# Patient Record
Sex: MALE | Race: WHITE | Employment: OTHER | URBAN - METROPOLITAN AREA
[De-identification: names, ages, dates, MRNs, and addresses within clinical notes are randomized per-mention and may not be internally consistent; named-entity substitution may affect disease eponyms.]

---

## 2017-01-01 ENCOUNTER — OFFICE VISIT (OUTPATIENT)
Dept: CARDIOLOGY CLINIC | Age: 77
End: 2017-01-01

## 2017-01-01 ENCOUNTER — APPOINTMENT (OUTPATIENT)
Dept: GENERAL RADIOLOGY | Age: 77
DRG: 291 | End: 2017-01-01
Attending: PHYSICIAN ASSISTANT
Payer: MEDICARE

## 2017-01-01 ENCOUNTER — APPOINTMENT (OUTPATIENT)
Dept: GENERAL RADIOLOGY | Age: 77
DRG: 291 | End: 2017-01-01
Attending: FAMILY MEDICINE
Payer: MEDICARE

## 2017-01-01 ENCOUNTER — APPOINTMENT (OUTPATIENT)
Dept: GENERAL RADIOLOGY | Age: 77
DRG: 291 | End: 2017-01-01
Attending: NURSE PRACTITIONER
Payer: MEDICARE

## 2017-01-01 ENCOUNTER — HOSPICE ADMISSION (OUTPATIENT)
Dept: HOSPICE | Facility: HOSPICE | Age: 77
End: 2017-01-01
Payer: MEDICARE

## 2017-01-01 ENCOUNTER — HOSPITAL ENCOUNTER (INPATIENT)
Age: 77
LOS: 11 days | Discharge: HOSPICE | DRG: 291 | End: 2017-05-25
Attending: EMERGENCY MEDICINE | Admitting: FAMILY MEDICINE
Payer: MEDICARE

## 2017-01-01 ENCOUNTER — TELEPHONE (OUTPATIENT)
Dept: CARDIOLOGY CLINIC | Age: 77
End: 2017-01-01

## 2017-01-01 ENCOUNTER — HOME CARE VISIT (OUTPATIENT)
Dept: HOSPICE | Facility: HOSPICE | Age: 77
End: 2017-01-01
Payer: MEDICARE

## 2017-01-01 ENCOUNTER — HOME CARE VISIT (OUTPATIENT)
Dept: SCHEDULING | Facility: HOME HEALTH | Age: 77
End: 2017-01-01
Payer: MEDICARE

## 2017-01-01 ENCOUNTER — TELEPHONE (OUTPATIENT)
Dept: OTHER | Age: 77
End: 2017-01-01

## 2017-01-01 ENCOUNTER — HOSPITAL ENCOUNTER (EMERGENCY)
Age: 77
Discharge: HOME | End: 2017-05-09
Attending: EMERGENCY MEDICINE | Admitting: EMERGENCY MEDICINE
Payer: MEDICARE

## 2017-01-01 ENCOUNTER — APPOINTMENT (OUTPATIENT)
Dept: GENERAL RADIOLOGY | Age: 77
DRG: 291 | End: 2017-01-01
Attending: INTERNAL MEDICINE
Payer: MEDICARE

## 2017-01-01 ENCOUNTER — APPOINTMENT (OUTPATIENT)
Dept: ULTRASOUND IMAGING | Age: 77
DRG: 291 | End: 2017-01-01
Attending: INTERNAL MEDICINE
Payer: MEDICARE

## 2017-01-01 ENCOUNTER — TELEPHONE (OUTPATIENT)
Dept: CARDIAC REHAB | Age: 77
End: 2017-01-01

## 2017-01-01 ENCOUNTER — HOSPITAL ENCOUNTER (INPATIENT)
Age: 77
LOS: 12 days | Discharge: HOME HEALTH | DRG: 291 | End: 2017-03-11
Attending: EMERGENCY MEDICINE | Admitting: FAMILY MEDICINE
Payer: MEDICARE

## 2017-01-01 VITALS
RESPIRATION RATE: 20 BRPM | DIASTOLIC BLOOD PRESSURE: 59 MMHG | BODY MASS INDEX: 25.31 KG/M2 | TEMPERATURE: 96.3 F | SYSTOLIC BLOOD PRESSURE: 101 MMHG | WEIGHT: 191 LBS | HEIGHT: 73 IN | OXYGEN SATURATION: 95 % | HEART RATE: 69 BPM

## 2017-01-01 VITALS
WEIGHT: 193 LBS | OXYGEN SATURATION: 94 % | SYSTOLIC BLOOD PRESSURE: 117 MMHG | DIASTOLIC BLOOD PRESSURE: 67 MMHG | RESPIRATION RATE: 24 BRPM | TEMPERATURE: 98.4 F | HEART RATE: 61 BPM | HEIGHT: 72 IN | BODY MASS INDEX: 26.14 KG/M2

## 2017-01-01 VITALS
WEIGHT: 191 LBS | BODY MASS INDEX: 25.87 KG/M2 | HEART RATE: 76 BPM | TEMPERATURE: 95.3 F | DIASTOLIC BLOOD PRESSURE: 69 MMHG | RESPIRATION RATE: 18 BRPM | HEIGHT: 72 IN | OXYGEN SATURATION: 84 % | SYSTOLIC BLOOD PRESSURE: 100 MMHG

## 2017-01-01 VITALS
HEART RATE: 62 BPM | BODY MASS INDEX: 25.47 KG/M2 | HEIGHT: 72 IN | SYSTOLIC BLOOD PRESSURE: 129 MMHG | DIASTOLIC BLOOD PRESSURE: 66 MMHG | OXYGEN SATURATION: 100 % | WEIGHT: 188 LBS | RESPIRATION RATE: 17 BRPM | TEMPERATURE: 96 F

## 2017-01-01 VITALS
BODY MASS INDEX: 26.55 KG/M2 | WEIGHT: 196 LBS | SYSTOLIC BLOOD PRESSURE: 146 MMHG | DIASTOLIC BLOOD PRESSURE: 75 MMHG | HEIGHT: 72 IN | HEART RATE: 79 BPM

## 2017-01-01 DIAGNOSIS — I35.0 AORTIC STENOSIS, MODERATE: Chronic | ICD-10-CM

## 2017-01-01 DIAGNOSIS — I25.708 CORONARY ARTERY DISEASE OF BYPASS GRAFT OF NATIVE HEART WITH STABLE ANGINA PECTORIS (HCC): ICD-10-CM

## 2017-01-01 DIAGNOSIS — I27.20 PULMONARY HTN (HCC): ICD-10-CM

## 2017-01-01 DIAGNOSIS — I50.9 HEART FAILURE, UNSPECIFIED: ICD-10-CM

## 2017-01-01 DIAGNOSIS — D69.6 THROMBOCYTOPENIA (HCC): ICD-10-CM

## 2017-01-01 DIAGNOSIS — N18.9 CKD (CHRONIC KIDNEY DISEASE), UNSPECIFIED STAGE: ICD-10-CM

## 2017-01-01 DIAGNOSIS — I50.33 DIASTOLIC CHF, ACUTE ON CHRONIC (HCC): Primary | ICD-10-CM

## 2017-01-01 DIAGNOSIS — L03.115 CELLULITIS OF RIGHT LOWER EXTREMITY: Primary | ICD-10-CM

## 2017-01-01 DIAGNOSIS — Z95.1 HX OF CABG: ICD-10-CM

## 2017-01-01 DIAGNOSIS — I10 ESSENTIAL HYPERTENSION: Chronic | ICD-10-CM

## 2017-01-01 DIAGNOSIS — R60.0 LEG EDEMA, RIGHT: ICD-10-CM

## 2017-01-01 DIAGNOSIS — D64.9 ANEMIA, UNSPECIFIED TYPE: ICD-10-CM

## 2017-01-01 DIAGNOSIS — I73.9 PVD (PERIPHERAL VASCULAR DISEASE) (HCC): ICD-10-CM

## 2017-01-01 DIAGNOSIS — Z95.810 AICD (AUTOMATIC CARDIOVERTER/DEFIBRILLATOR) PRESENT: ICD-10-CM

## 2017-01-01 DIAGNOSIS — Z98.890 H/O CAROTID ENDARTERECTOMY: ICD-10-CM

## 2017-01-01 DIAGNOSIS — I50.33 ACUTE ON CHRONIC DIASTOLIC (CONGESTIVE) HEART FAILURE (HCC): ICD-10-CM

## 2017-01-01 DIAGNOSIS — L03.115 CELLULITIS OF RIGHT LOWER EXTREMITY: ICD-10-CM

## 2017-01-01 DIAGNOSIS — N18.9 ACUTE ON CHRONIC RENAL INSUFFICIENCY: ICD-10-CM

## 2017-01-01 DIAGNOSIS — R60.1 ANASARCA: ICD-10-CM

## 2017-01-01 DIAGNOSIS — N28.9 ACUTE ON CHRONIC RENAL INSUFFICIENCY: ICD-10-CM

## 2017-01-01 DIAGNOSIS — I50.9 ACUTE ON CHRONIC CONGESTIVE HEART FAILURE, UNSPECIFIED CONGESTIVE HEART FAILURE TYPE: Primary | ICD-10-CM

## 2017-01-01 DIAGNOSIS — E78.5 HYPERLIPIDEMIA, UNSPECIFIED HYPERLIPIDEMIA TYPE: Chronic | ICD-10-CM

## 2017-01-01 DIAGNOSIS — J44.9 CHRONIC OBSTRUCTIVE PULMONARY DISEASE, UNSPECIFIED COPD TYPE (HCC): Primary | ICD-10-CM

## 2017-01-01 DIAGNOSIS — I50.32 CHRONIC DIASTOLIC CONGESTIVE HEART FAILURE (HCC): Primary | ICD-10-CM

## 2017-01-01 LAB
ABO + RH BLD: NORMAL
ABO + RH BLD: NORMAL
ALBUMIN 24H MFR UR ELPH: 33.8 %
ALBUMIN SERPL BCP-MCNC: 2.7 G/DL (ref 3.4–5)
ALBUMIN SERPL BCP-MCNC: 2.7 G/DL (ref 3.4–5)
ALBUMIN SERPL BCP-MCNC: 2.8 G/DL (ref 3.4–5)
ALBUMIN SERPL BCP-MCNC: 3.1 G/DL (ref 3.4–5)
ALBUMIN SERPL BCP-MCNC: 3.2 G/DL (ref 3.4–5)
ALBUMIN/GLOB SERPL: 0.7 {RATIO} (ref 0.8–1.7)
ALP SERPL-CCNC: 107 U/L (ref 45–117)
ALP SERPL-CCNC: 121 U/L (ref 45–117)
ALP SERPL-CCNC: 70 U/L (ref 45–117)
ALP SERPL-CCNC: 92 U/L (ref 45–117)
ALPHA1 GLOB 24H MFR UR ELPH: 4.6 %
ALPHA2 GLOB 24H MFR UR ELPH: 10.4 %
ALT SERPL-CCNC: 15 U/L (ref 16–61)
ALT SERPL-CCNC: 15 U/L (ref 16–61)
ALT SERPL-CCNC: 152 U/L (ref 16–61)
ALT SERPL-CCNC: 205 U/L (ref 16–61)
ANION GAP BLD CALC-SCNC: 11 MMOL/L (ref 3–18)
ANION GAP BLD CALC-SCNC: 11 MMOL/L (ref 3–18)
ANION GAP BLD CALC-SCNC: 12 MMOL/L (ref 3–18)
ANION GAP BLD CALC-SCNC: 2 MMOL/L (ref 3–18)
ANION GAP BLD CALC-SCNC: 3 MMOL/L (ref 3–18)
ANION GAP BLD CALC-SCNC: 3 MMOL/L (ref 3–18)
ANION GAP BLD CALC-SCNC: 4 MMOL/L (ref 3–18)
ANION GAP BLD CALC-SCNC: 5 MMOL/L (ref 3–18)
ANION GAP BLD CALC-SCNC: 5 MMOL/L (ref 3–18)
ANION GAP BLD CALC-SCNC: 6 MMOL/L (ref 3–18)
ANION GAP BLD CALC-SCNC: 7 MMOL/L (ref 3–18)
ANION GAP BLD CALC-SCNC: 8 MMOL/L (ref 3–18)
ANION GAP BLD CALC-SCNC: 9 MMOL/L (ref 3–18)
APPEARANCE UR: CLEAR
APTT PPP: 41.7 SEC (ref 23–36.4)
APTT PPP: 43.3 SEC (ref 23–36.4)
APTT PPP: 49.4 SEC (ref 23–36.4)
ARTERIAL PATENCY WRIST A: YES
ARTERIAL PATENCY WRIST A: YES
AST SERPL W P-5'-P-CCNC: 20 U/L (ref 15–37)
AST SERPL W P-5'-P-CCNC: 34 U/L (ref 15–37)
AST SERPL W P-5'-P-CCNC: 372 U/L (ref 15–37)
AST SERPL W P-5'-P-CCNC: 79 U/L (ref 15–37)
ATRIAL RATE: 56 BPM
ATRIAL RATE: 58 BPM
ATRIAL RATE: 73 BPM
ATRIAL RATE: 74 BPM
B-GLOBULIN MFR UR ELPH: 23.2 %
BACTERIA SPEC CULT: ABNORMAL
BACTERIA SPEC CULT: NORMAL
BACTERIA URNS QL MICRO: NEGATIVE /HPF
BASE EXCESS BLD CALC-SCNC: 0 MMOL/L
BASE EXCESS BLD CALC-SCNC: 1 MMOL/L
BASOPHILS # BLD AUTO: 0 K/UL (ref 0–0.06)
BASOPHILS # BLD AUTO: 0 K/UL (ref 0–0.1)
BASOPHILS # BLD: 0 % (ref 0–2)
BASOPHILS # BLD: 0 % (ref 0–3)
BASOPHILS # BLD: 1 % (ref 0–2)
BDY SITE: ABNORMAL
BDY SITE: ABNORMAL
BILIRUB DIRECT SERPL-MCNC: 0.6 MG/DL (ref 0–0.2)
BILIRUB SERPL-MCNC: 0.8 MG/DL (ref 0.2–1)
BILIRUB SERPL-MCNC: 0.9 MG/DL (ref 0.2–1)
BILIRUB SERPL-MCNC: 1.3 MG/DL (ref 0.2–1)
BILIRUB SERPL-MCNC: 2.6 MG/DL (ref 0.2–1)
BILIRUB UR QL: NEGATIVE
BLD PROD TYP BPU: NORMAL
BLOOD BANK CMNT PATIENT-IMP: NORMAL
BLOOD GROUP ANTIBODIES SERPL: NORMAL
BLOOD GROUP ANTIBODIES SERPL: NORMAL
BNP SERPL-MCNC: ABNORMAL PG/ML (ref 0–900)
BPU ID: NORMAL
BUN SERPL-MCNC: 33 MG/DL (ref 7–18)
BUN SERPL-MCNC: 37 MG/DL (ref 7–18)
BUN SERPL-MCNC: 37 MG/DL (ref 7–18)
BUN SERPL-MCNC: 38 MG/DL (ref 7–18)
BUN SERPL-MCNC: 39 MG/DL (ref 7–18)
BUN SERPL-MCNC: 40 MG/DL (ref 7–18)
BUN SERPL-MCNC: 41 MG/DL (ref 7–18)
BUN SERPL-MCNC: 41 MG/DL (ref 7–18)
BUN SERPL-MCNC: 43 MG/DL (ref 7–18)
BUN SERPL-MCNC: 46 MG/DL (ref 7–18)
BUN SERPL-MCNC: 47 MG/DL (ref 7–18)
BUN SERPL-MCNC: 48 MG/DL (ref 7–18)
BUN SERPL-MCNC: 50 MG/DL (ref 7–18)
BUN SERPL-MCNC: 51 MG/DL (ref 7–18)
BUN SERPL-MCNC: 56 MG/DL (ref 7–18)
BUN SERPL-MCNC: 57 MG/DL (ref 7–18)
BUN SERPL-MCNC: 57 MG/DL (ref 7–18)
BUN SERPL-MCNC: 58 MG/DL (ref 7–18)
BUN SERPL-MCNC: 59 MG/DL (ref 7–18)
BUN SERPL-MCNC: 62 MG/DL (ref 7–18)
BUN SERPL-MCNC: 63 MG/DL (ref 7–18)
BUN SERPL-MCNC: 65 MG/DL (ref 7–18)
BUN SERPL-MCNC: 79 MG/DL (ref 7–18)
BUN SERPL-MCNC: 81 MG/DL (ref 7–18)
BUN SERPL-MCNC: 85 MG/DL (ref 7–18)
BUN SERPL-MCNC: 86 MG/DL (ref 7–18)
BUN/CREAT SERPL: 11 (ref 12–20)
BUN/CREAT SERPL: 13 (ref 12–20)
BUN/CREAT SERPL: 15 (ref 12–20)
BUN/CREAT SERPL: 15 (ref 12–20)
BUN/CREAT SERPL: 17 (ref 12–20)
BUN/CREAT SERPL: 18 (ref 12–20)
BUN/CREAT SERPL: 19 (ref 12–20)
BUN/CREAT SERPL: 20 (ref 12–20)
BUN/CREAT SERPL: 21 (ref 12–20)
BUN/CREAT SERPL: 21 (ref 12–20)
BUN/CREAT SERPL: 22 (ref 12–20)
BUN/CREAT SERPL: 22 (ref 12–20)
BUN/CREAT SERPL: 23 (ref 12–20)
BUN/CREAT SERPL: 23 (ref 12–20)
BUN/CREAT SERPL: 24 (ref 12–20)
BUN/CREAT SERPL: 25 (ref 12–20)
BUN/CREAT SERPL: 25 (ref 12–20)
BUN/CREAT SERPL: 26 (ref 12–20)
BUN/CREAT SERPL: 26 (ref 12–20)
BUN/CREAT SERPL: 27 (ref 12–20)
BUN/CREAT SERPL: 30 (ref 12–20)
BUN/CREAT SERPL: 31 (ref 12–20)
CALCIUM SERPL-MCNC: 8.3 MG/DL (ref 8.5–10.1)
CALCIUM SERPL-MCNC: 8.3 MG/DL (ref 8.5–10.1)
CALCIUM SERPL-MCNC: 8.4 MG/DL (ref 8.5–10.1)
CALCIUM SERPL-MCNC: 8.5 MG/DL (ref 8.5–10.1)
CALCIUM SERPL-MCNC: 8.5 MG/DL (ref 8.5–10.1)
CALCIUM SERPL-MCNC: 8.6 MG/DL (ref 8.5–10.1)
CALCIUM SERPL-MCNC: 8.6 MG/DL (ref 8.5–10.1)
CALCIUM SERPL-MCNC: 8.7 MG/DL (ref 8.5–10.1)
CALCIUM SERPL-MCNC: 8.8 MG/DL (ref 8.5–10.1)
CALCIUM SERPL-MCNC: 8.9 MG/DL (ref 8.5–10.1)
CALCIUM SERPL-MCNC: 9 MG/DL (ref 8.5–10.1)
CALCIUM SERPL-MCNC: 9 MG/DL (ref 8.5–10.1)
CALCIUM SERPL-MCNC: 9.1 MG/DL (ref 8.5–10.1)
CALCIUM SERPL-MCNC: 9.2 MG/DL (ref 8.5–10.1)
CALCIUM SERPL-MCNC: 9.5 MG/DL (ref 8.5–10.1)
CALCIUM SERPL-MCNC: 9.6 MG/DL (ref 8.5–10.1)
CALCULATED P AXIS, ECG09: 13 DEGREES
CALCULATED P AXIS, ECG09: 22 DEGREES
CALCULATED P AXIS, ECG09: 39 DEGREES
CALCULATED R AXIS, ECG10: -10 DEGREES
CALCULATED R AXIS, ECG10: -11 DEGREES
CALCULATED R AXIS, ECG10: 3 DEGREES
CALCULATED R AXIS, ECG10: 36 DEGREES
CALCULATED T AXIS, ECG11: -126 DEGREES
CALCULATED T AXIS, ECG11: -169 DEGREES
CALCULATED T AXIS, ECG11: -171 DEGREES
CALCULATED T AXIS, ECG11: 179 DEGREES
CALLED TO:,BCALL1: NORMAL
CHLORIDE SERPL-SCNC: 100 MMOL/L (ref 100–108)
CHLORIDE SERPL-SCNC: 100 MMOL/L (ref 100–108)
CHLORIDE SERPL-SCNC: 102 MMOL/L (ref 100–108)
CHLORIDE SERPL-SCNC: 87 MMOL/L (ref 100–108)
CHLORIDE SERPL-SCNC: 88 MMOL/L (ref 100–108)
CHLORIDE SERPL-SCNC: 90 MMOL/L (ref 100–108)
CHLORIDE SERPL-SCNC: 91 MMOL/L (ref 100–108)
CHLORIDE SERPL-SCNC: 92 MMOL/L (ref 100–108)
CHLORIDE SERPL-SCNC: 93 MMOL/L (ref 100–108)
CHLORIDE SERPL-SCNC: 93 MMOL/L (ref 100–108)
CHLORIDE SERPL-SCNC: 94 MMOL/L (ref 100–108)
CHLORIDE SERPL-SCNC: 95 MMOL/L (ref 100–108)
CHLORIDE SERPL-SCNC: 95 MMOL/L (ref 100–108)
CHLORIDE SERPL-SCNC: 96 MMOL/L (ref 100–108)
CHLORIDE SERPL-SCNC: 97 MMOL/L (ref 100–108)
CHLORIDE SERPL-SCNC: 98 MMOL/L (ref 100–108)
CHLORIDE SERPL-SCNC: 98 MMOL/L (ref 100–108)
CK MB CFR SERPL CALC: 1.9 % (ref 0–4)
CK MB CFR SERPL CALC: 10.4 % (ref 0–4)
CK MB CFR SERPL CALC: 3 % (ref 0–4)
CK MB CFR SERPL CALC: 7.5 % (ref 0–4)
CK MB CFR SERPL CALC: 8.8 % (ref 0–4)
CK MB CFR SERPL CALC: NORMAL % (ref 0–4)
CK MB SERPL-MCNC: 1.2 NG/ML (ref 5–25)
CK MB SERPL-MCNC: 1.7 NG/ML (ref 5–25)
CK MB SERPL-MCNC: 1.8 NG/ML (ref 5–25)
CK MB SERPL-MCNC: 2.1 NG/ML (ref 5–25)
CK MB SERPL-MCNC: 2.8 NG/ML (ref 5–25)
CK MB SERPL-MCNC: <1 NG/ML (ref 5–25)
CK SERPL-CCNC: 24 U/L (ref 39–308)
CK SERPL-CCNC: 24 U/L (ref 39–308)
CK SERPL-CCNC: 27 U/L (ref 39–308)
CK SERPL-CCNC: 39 U/L (ref 39–308)
CK SERPL-CCNC: 40 U/L (ref 39–308)
CK SERPL-CCNC: 89 U/L (ref 39–308)
CO2 SERPL-SCNC: 25 MMOL/L (ref 21–32)
CO2 SERPL-SCNC: 27 MMOL/L (ref 21–32)
CO2 SERPL-SCNC: 28 MMOL/L (ref 21–32)
CO2 SERPL-SCNC: 29 MMOL/L (ref 21–32)
CO2 SERPL-SCNC: 30 MMOL/L (ref 21–32)
CO2 SERPL-SCNC: 32 MMOL/L (ref 21–32)
CO2 SERPL-SCNC: 34 MMOL/L (ref 21–32)
CO2 SERPL-SCNC: 34 MMOL/L (ref 21–32)
CO2 SERPL-SCNC: 35 MMOL/L (ref 21–32)
CO2 SERPL-SCNC: 36 MMOL/L (ref 21–32)
CO2 SERPL-SCNC: 37 MMOL/L (ref 21–32)
CO2 SERPL-SCNC: 37 MMOL/L (ref 21–32)
CO2 SERPL-SCNC: 38 MMOL/L (ref 21–32)
CO2 SERPL-SCNC: 38 MMOL/L (ref 21–32)
CO2 SERPL-SCNC: 39 MMOL/L (ref 21–32)
CO2 SERPL-SCNC: 40 MMOL/L (ref 21–32)
CO2 SERPL-SCNC: 41 MMOL/L (ref 21–32)
CO2 SERPL-SCNC: 41 MMOL/L (ref 21–32)
CO2 SERPL-SCNC: 42 MMOL/L (ref 21–32)
CO2 SERPL-SCNC: 42 MMOL/L (ref 21–32)
COLLECT DURATION TIME UR: 24 HR
COLOR UR: YELLOW
CREAT SERPL-MCNC: 1.77 MG/DL (ref 0.6–1.3)
CREAT SERPL-MCNC: 1.81 MG/DL (ref 0.6–1.3)
CREAT SERPL-MCNC: 1.86 MG/DL (ref 0.6–1.3)
CREAT SERPL-MCNC: 1.86 MG/DL (ref 0.6–1.3)
CREAT SERPL-MCNC: 1.89 MG/DL (ref 0.6–1.3)
CREAT SERPL-MCNC: 1.93 MG/DL (ref 0.6–1.3)
CREAT SERPL-MCNC: 1.93 MG/DL (ref 0.6–1.3)
CREAT SERPL-MCNC: 1.95 MG/DL (ref 0.6–1.3)
CREAT SERPL-MCNC: 1.99 MG/DL (ref 0.6–1.3)
CREAT SERPL-MCNC: 1.99 MG/DL (ref 0.6–1.3)
CREAT SERPL-MCNC: 2 MG/DL (ref 0.6–1.3)
CREAT SERPL-MCNC: 2.01 MG/DL (ref 0.6–1.3)
CREAT SERPL-MCNC: 2.03 MG/DL (ref 0.6–1.3)
CREAT SERPL-MCNC: 2.09 MG/DL (ref 0.6–1.3)
CREAT SERPL-MCNC: 2.1 MG/DL (ref 0.6–1.3)
CREAT SERPL-MCNC: 2.12 MG/DL (ref 0.6–1.3)
CREAT SERPL-MCNC: 2.16 MG/DL (ref 0.6–1.3)
CREAT SERPL-MCNC: 2.41 MG/DL (ref 0.6–1.3)
CREAT SERPL-MCNC: 2.76 MG/DL (ref 0.6–1.3)
CREAT SERPL-MCNC: 2.91 MG/DL (ref 0.6–1.3)
CREAT SERPL-MCNC: 3.23 MG/DL (ref 0.6–1.3)
CREAT SERPL-MCNC: 3.61 MG/DL (ref 0.6–1.3)
CREAT SERPL-MCNC: 3.65 MG/DL (ref 0.6–1.3)
CREAT SERPL-MCNC: 3.78 MG/DL (ref 0.6–1.3)
CREAT SERPL-MCNC: 3.79 MG/DL (ref 0.6–1.3)
CREAT SERPL-MCNC: 3.96 MG/DL (ref 0.6–1.3)
CREAT SERPL-MCNC: 4.07 MG/DL (ref 0.6–1.3)
CREAT SERPL-MCNC: 4.14 MG/DL (ref 0.6–1.3)
CREAT SERPL-MCNC: 4.65 MG/DL (ref 0.6–1.3)
CREAT SERPL-MCNC: 4.94 MG/DL (ref 0.6–1.3)
CREAT UR-MCNC: 35.6 MG/DL (ref 30–125)
CREAT UR-MCNC: 60.3 MG/DL (ref 30–125)
CROSSMATCH RESULT,%XM: NORMAL
CROSSMATCH RESULT,%XM: NORMAL
DIAGNOSIS, 93000: NORMAL
DIFFERENTIAL METHOD BLD: ABNORMAL
EOSINOPHIL # BLD: 0 K/UL (ref 0–0.4)
EOSINOPHIL # BLD: 0 K/UL (ref 0–0.4)
EOSINOPHIL # BLD: 0.2 K/UL (ref 0–0.4)
EOSINOPHIL # BLD: 0.3 K/UL (ref 0–0.4)
EOSINOPHIL # BLD: 0.4 K/UL (ref 0–0.4)
EOSINOPHIL # BLD: 0.5 K/UL (ref 0–0.4)
EOSINOPHIL # BLD: 0.5 K/UL (ref 0–0.4)
EOSINOPHIL NFR BLD: 0 % (ref 0–5)
EOSINOPHIL NFR BLD: 0 % (ref 0–5)
EOSINOPHIL NFR BLD: 10 % (ref 0–5)
EOSINOPHIL NFR BLD: 3 % (ref 0–5)
EOSINOPHIL NFR BLD: 5 % (ref 0–5)
EOSINOPHIL NFR BLD: 5 % (ref 0–5)
EOSINOPHIL NFR BLD: 6 % (ref 0–5)
EOSINOPHIL NFR BLD: 7 % (ref 0–5)
EOSINOPHIL NFR BLD: 8 % (ref 0–5)
EOSINOPHIL NFR BLD: 8 % (ref 0–5)
EOSINOPHIL NFR BLD: 9 % (ref 0–5)
EPITH CASTS URNS QL MICRO: NORMAL /LPF (ref 0–5)
ERYTHROCYTE [DISTWIDTH] IN BLOOD BY AUTOMATED COUNT: 16.3 % (ref 11.6–14.5)
ERYTHROCYTE [DISTWIDTH] IN BLOOD BY AUTOMATED COUNT: 16.5 % (ref 11.6–14.5)
ERYTHROCYTE [DISTWIDTH] IN BLOOD BY AUTOMATED COUNT: 16.5 % (ref 11.6–14.5)
ERYTHROCYTE [DISTWIDTH] IN BLOOD BY AUTOMATED COUNT: 16.6 % (ref 11.6–14.5)
ERYTHROCYTE [DISTWIDTH] IN BLOOD BY AUTOMATED COUNT: 16.7 % (ref 11.6–14.5)
ERYTHROCYTE [DISTWIDTH] IN BLOOD BY AUTOMATED COUNT: 16.8 % (ref 11.6–14.5)
ERYTHROCYTE [DISTWIDTH] IN BLOOD BY AUTOMATED COUNT: 16.8 % (ref 11.6–14.5)
ERYTHROCYTE [DISTWIDTH] IN BLOOD BY AUTOMATED COUNT: 16.9 % (ref 11.6–14.5)
ERYTHROCYTE [DISTWIDTH] IN BLOOD BY AUTOMATED COUNT: 16.9 % (ref 11.6–14.5)
ERYTHROCYTE [DISTWIDTH] IN BLOOD BY AUTOMATED COUNT: 17 % (ref 11.6–14.5)
ERYTHROCYTE [DISTWIDTH] IN BLOOD BY AUTOMATED COUNT: 17 % (ref 11.6–14.5)
ERYTHROCYTE [DISTWIDTH] IN BLOOD BY AUTOMATED COUNT: 17.1 % (ref 11.6–14.5)
ERYTHROCYTE [DISTWIDTH] IN BLOOD BY AUTOMATED COUNT: 17.1 % (ref 11.6–14.5)
ERYTHROCYTE [DISTWIDTH] IN BLOOD BY AUTOMATED COUNT: 17.2 % (ref 11.6–14.5)
ERYTHROCYTE [DISTWIDTH] IN BLOOD BY AUTOMATED COUNT: 17.3 % (ref 11.6–14.5)
ERYTHROCYTE [DISTWIDTH] IN BLOOD BY AUTOMATED COUNT: 17.4 % (ref 11.6–14.5)
ERYTHROCYTE [DISTWIDTH] IN BLOOD BY AUTOMATED COUNT: 17.5 % (ref 11.6–14.5)
ERYTHROCYTE [DISTWIDTH] IN BLOOD BY AUTOMATED COUNT: 17.7 % (ref 11.6–14.5)
ERYTHROCYTE [DISTWIDTH] IN BLOOD BY AUTOMATED COUNT: 17.8 % (ref 11.6–14.5)
ERYTHROCYTE [DISTWIDTH] IN BLOOD BY AUTOMATED COUNT: 17.9 % (ref 11.6–14.5)
ERYTHROCYTE [DISTWIDTH] IN BLOOD BY AUTOMATED COUNT: 18.1 % (ref 11.6–14.5)
FERRITIN SERPL-MCNC: 111 NG/ML (ref 8–388)
GAMMA GLOB 24H MFR UR ELPH: 28 %
GAS FLOW.O2 O2 DELIVERY SYS: ABNORMAL L/MIN
GAS FLOW.O2 O2 DELIVERY SYS: ABNORMAL L/MIN
GAS FLOW.O2 SETTING OXYMISER: 8 L/M
GLOBULIN SER CALC-MCNC: 4 G/DL (ref 2–4)
GLOBULIN SER CALC-MCNC: 4.1 G/DL (ref 2–4)
GLOBULIN SER CALC-MCNC: 4.3 G/DL (ref 2–4)
GLOBULIN SER CALC-MCNC: 4.7 G/DL (ref 2–4)
GLUCOSE BLD STRIP.AUTO-MCNC: 103 MG/DL (ref 70–110)
GLUCOSE BLD STRIP.AUTO-MCNC: 108 MG/DL (ref 70–110)
GLUCOSE BLD STRIP.AUTO-MCNC: 119 MG/DL (ref 70–110)
GLUCOSE BLD STRIP.AUTO-MCNC: 125 MG/DL (ref 70–110)
GLUCOSE BLD STRIP.AUTO-MCNC: 136 MG/DL (ref 70–110)
GLUCOSE BLD STRIP.AUTO-MCNC: 92 MG/DL (ref 70–110)
GLUCOSE SERPL-MCNC: 101 MG/DL (ref 74–99)
GLUCOSE SERPL-MCNC: 101 MG/DL (ref 74–99)
GLUCOSE SERPL-MCNC: 102 MG/DL (ref 74–99)
GLUCOSE SERPL-MCNC: 103 MG/DL (ref 74–99)
GLUCOSE SERPL-MCNC: 106 MG/DL (ref 74–99)
GLUCOSE SERPL-MCNC: 110 MG/DL (ref 74–99)
GLUCOSE SERPL-MCNC: 113 MG/DL (ref 74–99)
GLUCOSE SERPL-MCNC: 114 MG/DL (ref 74–99)
GLUCOSE SERPL-MCNC: 116 MG/DL (ref 74–99)
GLUCOSE SERPL-MCNC: 117 MG/DL (ref 74–99)
GLUCOSE SERPL-MCNC: 153 MG/DL (ref 74–99)
GLUCOSE SERPL-MCNC: 48 MG/DL (ref 74–99)
GLUCOSE SERPL-MCNC: 71 MG/DL (ref 74–99)
GLUCOSE SERPL-MCNC: 76 MG/DL (ref 74–99)
GLUCOSE SERPL-MCNC: 78 MG/DL (ref 74–99)
GLUCOSE SERPL-MCNC: 80 MG/DL (ref 74–99)
GLUCOSE SERPL-MCNC: 81 MG/DL (ref 74–99)
GLUCOSE SERPL-MCNC: 84 MG/DL (ref 74–99)
GLUCOSE SERPL-MCNC: 85 MG/DL (ref 74–99)
GLUCOSE SERPL-MCNC: 88 MG/DL (ref 74–99)
GLUCOSE SERPL-MCNC: 88 MG/DL (ref 74–99)
GLUCOSE SERPL-MCNC: 90 MG/DL (ref 74–99)
GLUCOSE SERPL-MCNC: 91 MG/DL (ref 74–99)
GLUCOSE SERPL-MCNC: 93 MG/DL (ref 74–99)
GLUCOSE SERPL-MCNC: 96 MG/DL (ref 74–99)
GLUCOSE SERPL-MCNC: 98 MG/DL (ref 74–99)
GLUCOSE UR STRIP.AUTO-MCNC: NEGATIVE MG/DL
HBV SURFACE AB SER QL IA: NEGATIVE
HBV SURFACE AB SERPL IA-ACNC: <3.1 MIU/ML
HBV SURFACE AG SER QL: <0.1 INDEX
HBV SURFACE AG SER QL: NEGATIVE
HCO3 BLD-SCNC: 26.9 MMOL/L (ref 22–26)
HCO3 BLD-SCNC: 27 MMOL/L (ref 22–26)
HCT VFR BLD AUTO: 18.7 % (ref 36–48)
HCT VFR BLD AUTO: 21.3 % (ref 36–48)
HCT VFR BLD AUTO: 22.2 % (ref 36–48)
HCT VFR BLD AUTO: 22.8 % (ref 36–48)
HCT VFR BLD AUTO: 23.5 % (ref 36–48)
HCT VFR BLD AUTO: 23.5 % (ref 36–48)
HCT VFR BLD AUTO: 24 % (ref 36–48)
HCT VFR BLD AUTO: 24 % (ref 36–48)
HCT VFR BLD AUTO: 24.8 % (ref 36–48)
HCT VFR BLD AUTO: 25.3 % (ref 36–48)
HCT VFR BLD AUTO: 25.5 % (ref 36–48)
HCT VFR BLD AUTO: 26.3 % (ref 36–48)
HCT VFR BLD AUTO: 26.4 % (ref 36–48)
HCT VFR BLD AUTO: 26.5 % (ref 36–48)
HCT VFR BLD AUTO: 26.6 % (ref 36–48)
HCT VFR BLD AUTO: 26.9 % (ref 36–48)
HCT VFR BLD AUTO: 27.3 % (ref 36–48)
HCT VFR BLD AUTO: 27.3 % (ref 36–48)
HCT VFR BLD AUTO: 27.4 % (ref 36–48)
HCT VFR BLD AUTO: 27.6 % (ref 36–48)
HCT VFR BLD AUTO: 27.7 % (ref 36–48)
HCT VFR BLD AUTO: 27.7 % (ref 36–48)
HCT VFR BLD AUTO: 28.5 % (ref 36–48)
HCT VFR BLD AUTO: 28.5 % (ref 36–48)
HCT VFR BLD AUTO: 30 % (ref 36–48)
HCT VFR BLD AUTO: 30.9 % (ref 36–48)
HEP BS AB COMMENT,HBSAC: ABNORMAL
HGB BLD-MCNC: 5.9 G/DL (ref 13–16)
HGB BLD-MCNC: 6.6 G/DL (ref 13–16)
HGB BLD-MCNC: 7 G/DL (ref 13–16)
HGB BLD-MCNC: 7 G/DL (ref 13–16)
HGB BLD-MCNC: 7.2 G/DL (ref 13–16)
HGB BLD-MCNC: 7.3 G/DL (ref 13–16)
HGB BLD-MCNC: 7.6 G/DL (ref 13–16)
HGB BLD-MCNC: 7.6 G/DL (ref 13–16)
HGB BLD-MCNC: 7.8 G/DL (ref 13–16)
HGB BLD-MCNC: 7.8 G/DL (ref 13–16)
HGB BLD-MCNC: 8 G/DL (ref 13–16)
HGB BLD-MCNC: 8.1 G/DL (ref 13–16)
HGB BLD-MCNC: 8.2 G/DL (ref 13–16)
HGB BLD-MCNC: 8.3 G/DL (ref 13–16)
HGB BLD-MCNC: 8.4 G/DL (ref 13–16)
HGB BLD-MCNC: 8.5 G/DL (ref 13–16)
HGB BLD-MCNC: 9.1 G/DL (ref 13–16)
HGB BLD-MCNC: 9.6 G/DL (ref 13–16)
HGB UR QL STRIP: ABNORMAL
HYALINE CASTS URNS QL MICRO: NORMAL /LPF (ref 0–2)
INR PPP: 1.3 (ref 0.8–1.2)
INR PPP: 1.4 (ref 0.8–1.2)
INR PPP: 1.9 (ref 0.8–1.2)
INTERPRETATION UR IFE-IMP: ABNORMAL
IRON SATN MFR SERPL: 13 %
IRON SATN MFR SERPL: 19 %
IRON SERPL-MCNC: 43 UG/DL (ref 50–175)
IRON SERPL-MCNC: 62 UG/DL (ref 50–175)
KETONES UR QL STRIP.AUTO: NEGATIVE MG/DL
LACTATE SERPL-SCNC: 1.7 MMOL/L (ref 0.4–2)
LACTATE SERPL-SCNC: 2.8 MMOL/L (ref 0.4–2)
LEUKOCYTE ESTERASE UR QL STRIP.AUTO: NEGATIVE
LYMPHOCYTES # BLD AUTO: 10 % (ref 21–52)
LYMPHOCYTES # BLD AUTO: 11 % (ref 20–51)
LYMPHOCYTES # BLD AUTO: 13 % (ref 21–52)
LYMPHOCYTES # BLD AUTO: 14 % (ref 20–51)
LYMPHOCYTES # BLD AUTO: 15 % (ref 20–51)
LYMPHOCYTES # BLD AUTO: 17 % (ref 21–52)
LYMPHOCYTES # BLD AUTO: 18 % (ref 21–52)
LYMPHOCYTES # BLD AUTO: 19 % (ref 21–52)
LYMPHOCYTES # BLD AUTO: 2 % (ref 20–51)
LYMPHOCYTES # BLD AUTO: 20 % (ref 21–52)
LYMPHOCYTES # BLD AUTO: 21 % (ref 21–52)
LYMPHOCYTES # BLD AUTO: 22 % (ref 21–52)
LYMPHOCYTES # BLD AUTO: 6 % (ref 21–52)
LYMPHOCYTES # BLD AUTO: 6 % (ref 21–52)
LYMPHOCYTES # BLD AUTO: 7 % (ref 21–52)
LYMPHOCYTES # BLD AUTO: 9 % (ref 21–52)
LYMPHOCYTES # BLD AUTO: 9 % (ref 21–52)
LYMPHOCYTES # BLD: 0.2 K/UL (ref 0.8–3.5)
LYMPHOCYTES # BLD: 0.3 K/UL (ref 0.9–3.6)
LYMPHOCYTES # BLD: 0.4 K/UL (ref 0.9–3.6)
LYMPHOCYTES # BLD: 0.5 K/UL (ref 0.9–3.6)
LYMPHOCYTES # BLD: 0.6 K/UL (ref 0.8–3.5)
LYMPHOCYTES # BLD: 0.6 K/UL (ref 0.9–3.6)
LYMPHOCYTES # BLD: 0.6 K/UL (ref 0.9–3.6)
LYMPHOCYTES # BLD: 0.7 K/UL (ref 0.8–3.5)
LYMPHOCYTES # BLD: 0.7 K/UL (ref 0.8–3.5)
LYMPHOCYTES # BLD: 0.7 K/UL (ref 0.9–3.6)
LYMPHOCYTES # BLD: 0.8 K/UL (ref 0.9–3.6)
LYMPHOCYTES # BLD: 0.9 K/UL (ref 0.9–3.6)
LYMPHOCYTES # BLD: 0.9 K/UL (ref 0.9–3.6)
LYMPHOCYTES # BLD: 1 K/UL (ref 0.9–3.6)
LYMPHOCYTES # BLD: 1 K/UL (ref 0.9–3.6)
M PROTEIN 24H MFR UR ELPH: ABNORMAL %
MAGNESIUM SERPL-MCNC: 1.7 MG/DL (ref 1.8–2.4)
MAGNESIUM SERPL-MCNC: 1.8 MG/DL (ref 1.8–2.4)
MAGNESIUM SERPL-MCNC: 1.8 MG/DL (ref 1.8–2.4)
MAGNESIUM SERPL-MCNC: 1.9 MG/DL (ref 1.8–2.4)
MAGNESIUM SERPL-MCNC: 2 MG/DL (ref 1.6–2.6)
MAGNESIUM SERPL-MCNC: 2 MG/DL (ref 1.6–2.6)
MAGNESIUM SERPL-MCNC: 2 MG/DL (ref 1.8–2.4)
MAGNESIUM SERPL-MCNC: 2.1 MG/DL (ref 1.8–2.4)
MAGNESIUM SERPL-MCNC: 2.3 MG/DL (ref 1.8–2.4)
MCH RBC QN AUTO: 27.2 PG (ref 24–34)
MCH RBC QN AUTO: 27.5 PG (ref 24–34)
MCH RBC QN AUTO: 27.6 PG (ref 24–34)
MCH RBC QN AUTO: 27.7 PG (ref 24–34)
MCH RBC QN AUTO: 27.8 PG (ref 24–34)
MCH RBC QN AUTO: 28 PG (ref 24–34)
MCH RBC QN AUTO: 28.1 PG (ref 24–34)
MCH RBC QN AUTO: 28.1 PG (ref 24–34)
MCH RBC QN AUTO: 28.2 PG (ref 24–34)
MCH RBC QN AUTO: 28.3 PG (ref 24–34)
MCH RBC QN AUTO: 28.4 PG (ref 24–34)
MCH RBC QN AUTO: 28.4 PG (ref 24–34)
MCH RBC QN AUTO: 28.5 PG (ref 24–34)
MCH RBC QN AUTO: 28.5 PG (ref 24–34)
MCHC RBC AUTO-ENTMCNC: 28.8 G/DL (ref 31–37)
MCHC RBC AUTO-ENTMCNC: 29.8 G/DL (ref 31–37)
MCHC RBC AUTO-ENTMCNC: 30 G/DL (ref 31–37)
MCHC RBC AUTO-ENTMCNC: 30 G/DL (ref 31–37)
MCHC RBC AUTO-ENTMCNC: 30.1 G/DL (ref 31–37)
MCHC RBC AUTO-ENTMCNC: 30.3 G/DL (ref 31–37)
MCHC RBC AUTO-ENTMCNC: 30.4 G/DL (ref 31–37)
MCHC RBC AUTO-ENTMCNC: 30.4 G/DL (ref 31–37)
MCHC RBC AUTO-ENTMCNC: 30.6 G/DL (ref 31–37)
MCHC RBC AUTO-ENTMCNC: 30.7 G/DL (ref 31–37)
MCHC RBC AUTO-ENTMCNC: 30.8 G/DL (ref 31–37)
MCHC RBC AUTO-ENTMCNC: 30.9 G/DL (ref 31–37)
MCHC RBC AUTO-ENTMCNC: 31 G/DL (ref 31–37)
MCHC RBC AUTO-ENTMCNC: 31.1 G/DL (ref 31–37)
MCHC RBC AUTO-ENTMCNC: 31.1 G/DL (ref 31–37)
MCHC RBC AUTO-ENTMCNC: 31.4 G/DL (ref 31–37)
MCHC RBC AUTO-ENTMCNC: 31.5 G/DL (ref 31–37)
MCHC RBC AUTO-ENTMCNC: 31.5 G/DL (ref 31–37)
MCHC RBC AUTO-ENTMCNC: 31.6 G/DL (ref 31–37)
MCHC RBC AUTO-ENTMCNC: 31.7 G/DL (ref 31–37)
MCHC RBC AUTO-ENTMCNC: 31.7 G/DL (ref 31–37)
MCV RBC AUTO: 88.6 FL (ref 74–97)
MCV RBC AUTO: 89.2 FL (ref 74–97)
MCV RBC AUTO: 89.5 FL (ref 74–97)
MCV RBC AUTO: 89.7 FL (ref 74–97)
MCV RBC AUTO: 89.8 FL (ref 74–97)
MCV RBC AUTO: 90.2 FL (ref 74–97)
MCV RBC AUTO: 90.3 FL (ref 74–97)
MCV RBC AUTO: 90.3 FL (ref 74–97)
MCV RBC AUTO: 90.5 FL (ref 74–97)
MCV RBC AUTO: 90.7 FL (ref 74–97)
MCV RBC AUTO: 90.8 FL (ref 74–97)
MCV RBC AUTO: 91.3 FL (ref 74–97)
MCV RBC AUTO: 91.4 FL (ref 74–97)
MCV RBC AUTO: 91.7 FL (ref 74–97)
MCV RBC AUTO: 91.8 FL (ref 74–97)
MCV RBC AUTO: 91.9 FL (ref 74–97)
MCV RBC AUTO: 92 FL (ref 74–97)
MCV RBC AUTO: 92.2 FL (ref 74–97)
MCV RBC AUTO: 92.2 FL (ref 74–97)
MCV RBC AUTO: 92.3 FL (ref 74–97)
MCV RBC AUTO: 92.6 FL (ref 74–97)
MCV RBC AUTO: 92.9 FL (ref 74–97)
MCV RBC AUTO: 94.4 FL (ref 74–97)
MONOCYTES # BLD: 0.2 K/UL (ref 0.05–1.2)
MONOCYTES # BLD: 0.3 K/UL (ref 0.05–1.2)
MONOCYTES # BLD: 0.3 K/UL (ref 0–1)
MONOCYTES # BLD: 0.4 K/UL (ref 0.05–1.2)
MONOCYTES # BLD: 0.4 K/UL (ref 0–1)
MONOCYTES # BLD: 0.5 K/UL (ref 0.05–1.2)
MONOCYTES # BLD: 0.6 K/UL (ref 0.05–1.2)
MONOCYTES # BLD: 0.6 K/UL (ref 0.05–1.2)
MONOCYTES # BLD: 0.7 K/UL (ref 0.05–1.2)
MONOCYTES # BLD: 0.7 K/UL (ref 0–1)
MONOCYTES # BLD: 0.8 K/UL (ref 0–1)
MONOCYTES NFR BLD AUTO: 10 % (ref 2–9)
MONOCYTES NFR BLD AUTO: 10 % (ref 3–10)
MONOCYTES NFR BLD AUTO: 11 % (ref 3–10)
MONOCYTES NFR BLD AUTO: 11 % (ref 3–10)
MONOCYTES NFR BLD AUTO: 12 % (ref 3–10)
MONOCYTES NFR BLD AUTO: 13 % (ref 2–9)
MONOCYTES NFR BLD AUTO: 4 % (ref 2–9)
MONOCYTES NFR BLD AUTO: 5 % (ref 3–10)
MONOCYTES NFR BLD AUTO: 7 % (ref 3–10)
MONOCYTES NFR BLD AUTO: 8 % (ref 2–9)
MONOCYTES NFR BLD AUTO: 8 % (ref 3–10)
MONOCYTES NFR BLD AUTO: 9 % (ref 3–10)
NEUTS BAND NFR BLD MANUAL: 1 % (ref 0–5)
NEUTS BAND NFR BLD MANUAL: 2 % (ref 0–5)
NEUTS BAND NFR BLD MANUAL: 6 % (ref 0–5)
NEUTS SEG # BLD: 2.6 K/UL (ref 1.8–8)
NEUTS SEG # BLD: 2.7 K/UL (ref 1.8–8)
NEUTS SEG # BLD: 2.8 K/UL (ref 1.8–8)
NEUTS SEG # BLD: 2.9 K/UL (ref 1.8–8)
NEUTS SEG # BLD: 3 K/UL (ref 1.8–8)
NEUTS SEG # BLD: 3.1 K/UL (ref 1.8–8)
NEUTS SEG # BLD: 3.3 K/UL (ref 1.8–8)
NEUTS SEG # BLD: 3.4 K/UL (ref 1.8–8)
NEUTS SEG # BLD: 3.4 K/UL (ref 1.8–8)
NEUTS SEG # BLD: 3.9 K/UL (ref 1.8–8)
NEUTS SEG # BLD: 3.9 K/UL (ref 1.8–8)
NEUTS SEG # BLD: 4.4 K/UL (ref 1.8–8)
NEUTS SEG # BLD: 5.3 K/UL (ref 1.8–8)
NEUTS SEG # BLD: 5.7 K/UL (ref 1.8–8)
NEUTS SEG # BLD: 8.8 K/UL (ref 1.8–8)
NEUTS SEG NFR BLD AUTO: 58 % (ref 40–73)
NEUTS SEG NFR BLD AUTO: 62 % (ref 42–75)
NEUTS SEG NFR BLD AUTO: 63 % (ref 40–73)
NEUTS SEG NFR BLD AUTO: 64 % (ref 40–73)
NEUTS SEG NFR BLD AUTO: 65 % (ref 40–73)
NEUTS SEG NFR BLD AUTO: 67 % (ref 40–73)
NEUTS SEG NFR BLD AUTO: 67 % (ref 42–75)
NEUTS SEG NFR BLD AUTO: 68 % (ref 40–73)
NEUTS SEG NFR BLD AUTO: 69 % (ref 40–73)
NEUTS SEG NFR BLD AUTO: 72 % (ref 40–73)
NEUTS SEG NFR BLD AUTO: 73 % (ref 40–73)
NEUTS SEG NFR BLD AUTO: 74 % (ref 40–73)
NEUTS SEG NFR BLD AUTO: 76 % (ref 40–73)
NEUTS SEG NFR BLD AUTO: 77 % (ref 40–73)
NEUTS SEG NFR BLD AUTO: 77 % (ref 40–73)
NEUTS SEG NFR BLD AUTO: 80 % (ref 42–75)
NEUTS SEG NFR BLD AUTO: 82 % (ref 40–73)
NEUTS SEG NFR BLD AUTO: 84 % (ref 42–75)
NITRITE UR QL STRIP.AUTO: NEGATIVE
NOTE, 149533: ABNORMAL
O2/TOTAL GAS SETTING VFR VENT: 54 %
O2/TOTAL GAS SETTING VFR VENT: 60 %
P-R INTERVAL, ECG05: 192 MS
P-R INTERVAL, ECG05: 216 MS
PCO2 BLD: 51.6 MMHG (ref 35–45)
PCO2 BLD: 52.8 MMHG (ref 35–45)
PEEP RESPIRATORY: 7 CMH2O
PH BLD: 7.32 [PH] (ref 7.35–7.45)
PH BLD: 7.33 [PH] (ref 7.35–7.45)
PH UR STRIP: 6 [PH] (ref 5–8)
PHOSPHATE SERPL-MCNC: 2.8 MG/DL (ref 2.5–4.9)
PHOSPHATE SERPL-MCNC: 3 MG/DL (ref 2.5–4.9)
PHOSPHATE SERPL-MCNC: 4.5 MG/DL (ref 2.5–4.9)
PIP ISTAT,IPIP: 14
PLATELET # BLD AUTO: 104 K/UL (ref 135–420)
PLATELET # BLD AUTO: 36 K/UL (ref 135–420)
PLATELET # BLD AUTO: 44 K/UL (ref 135–420)
PLATELET # BLD AUTO: 45 K/UL (ref 135–420)
PLATELET # BLD AUTO: 48 K/UL (ref 135–420)
PLATELET # BLD AUTO: 48 K/UL (ref 135–420)
PLATELET # BLD AUTO: 49 K/UL (ref 135–420)
PLATELET # BLD AUTO: 53 K/UL (ref 135–420)
PLATELET # BLD AUTO: 54 K/UL (ref 135–420)
PLATELET # BLD AUTO: 55 K/UL (ref 135–420)
PLATELET # BLD AUTO: 55 K/UL (ref 135–420)
PLATELET # BLD AUTO: 56 K/UL (ref 135–420)
PLATELET # BLD AUTO: 59 K/UL (ref 135–420)
PLATELET # BLD AUTO: 60 K/UL (ref 135–420)
PLATELET # BLD AUTO: 64 K/UL (ref 135–420)
PLATELET # BLD AUTO: 70 K/UL (ref 135–420)
PLATELET # BLD AUTO: 72 K/UL (ref 135–420)
PLATELET # BLD AUTO: 76 K/UL (ref 135–420)
PLATELET # BLD AUTO: 78 K/UL (ref 135–420)
PLATELET # BLD AUTO: 86 K/UL (ref 135–420)
PLATELET # BLD AUTO: 91 K/UL (ref 135–420)
PLATELET # BLD AUTO: 94 K/UL (ref 135–420)
PLATELET # BLD AUTO: ABNORMAL K/UL (ref 135–420)
PLATELET COMMENTS,PCOM: ABNORMAL
PMV BLD AUTO: 10.3 FL (ref 9.2–11.8)
PMV BLD AUTO: 10.4 FL (ref 9.2–11.8)
PMV BLD AUTO: 10.4 FL (ref 9.2–11.8)
PMV BLD AUTO: 10.5 FL (ref 9.2–11.8)
PMV BLD AUTO: 10.6 FL (ref 9.2–11.8)
PMV BLD AUTO: 10.7 FL (ref 9.2–11.8)
PMV BLD AUTO: 10.9 FL (ref 9.2–11.8)
PMV BLD AUTO: 11 FL (ref 9.2–11.8)
PMV BLD AUTO: 11 FL (ref 9.2–11.8)
PMV BLD AUTO: 11.1 FL (ref 9.2–11.8)
PMV BLD AUTO: 11.4 FL (ref 9.2–11.8)
PMV BLD AUTO: 11.5 FL (ref 9.2–11.8)
PMV BLD AUTO: 11.9 FL (ref 9.2–11.8)
PMV BLD AUTO: 11.9 FL (ref 9.2–11.8)
PMV BLD AUTO: 8.8 FL (ref 9.2–11.8)
PMV BLD AUTO: 8.8 FL (ref 9.2–11.8)
PMV BLD AUTO: 9.7 FL (ref 9.2–11.8)
PMV BLD AUTO: 9.9 FL (ref 9.2–11.8)
PMV BLD AUTO: 9.9 FL (ref 9.2–11.8)
PO2 BLD: 53 MMHG (ref 80–100)
PO2 BLD: 93 MMHG (ref 80–100)
POTASSIUM SERPL-SCNC: 3.2 MMOL/L (ref 3.5–5.5)
POTASSIUM SERPL-SCNC: 3.4 MMOL/L (ref 3.5–5.5)
POTASSIUM SERPL-SCNC: 3.5 MMOL/L (ref 3.5–5.5)
POTASSIUM SERPL-SCNC: 3.5 MMOL/L (ref 3.5–5.5)
POTASSIUM SERPL-SCNC: 3.6 MMOL/L (ref 3.5–5.5)
POTASSIUM SERPL-SCNC: 3.7 MMOL/L (ref 3.5–5.5)
POTASSIUM SERPL-SCNC: 3.8 MMOL/L (ref 3.5–5.5)
POTASSIUM SERPL-SCNC: 3.8 MMOL/L (ref 3.5–5.5)
POTASSIUM SERPL-SCNC: 3.9 MMOL/L (ref 3.5–5.5)
POTASSIUM SERPL-SCNC: 4 MMOL/L (ref 3.5–5.5)
POTASSIUM SERPL-SCNC: 4.1 MMOL/L (ref 3.5–5.5)
POTASSIUM SERPL-SCNC: 4.2 MMOL/L (ref 3.5–5.5)
POTASSIUM SERPL-SCNC: 4.3 MMOL/L (ref 3.5–5.5)
POTASSIUM SERPL-SCNC: 4.4 MMOL/L (ref 3.5–5.5)
POTASSIUM SERPL-SCNC: 4.6 MMOL/L (ref 3.5–5.5)
PROT 24H UR-MRATE: 352.8 MG/24 HR (ref 30–150)
PROT SERPL-MCNC: 6.7 G/DL (ref 6.4–8.2)
PROT SERPL-MCNC: 6.8 G/DL (ref 6.4–8.2)
PROT SERPL-MCNC: 7.5 G/DL (ref 6.4–8.2)
PROT SERPL-MCNC: 7.8 G/DL (ref 6.4–8.2)
PROT UR STRIP-MCNC: NEGATIVE MG/DL
PROT UR-MCNC: 101 MG/DL
PROT UR-MCNC: 35 MG/DL
PROT UR-MCNC: 4.9 MG/DL
PROT/CREAT UR-RTO: 1
PROTHROMBIN TIME: 15.5 SEC (ref 11.5–15.2)
PROTHROMBIN TIME: 15.6 SEC (ref 11.5–15.2)
PROTHROMBIN TIME: 15.9 SEC (ref 11.5–15.2)
PROTHROMBIN TIME: 17 SEC (ref 11.5–15.2)
PROTHROMBIN TIME: 20.6 SEC (ref 11.5–15.2)
PTH-INTACT SERPL-MCNC: 136.9 PG/ML (ref 14–72)
Q-T INTERVAL, ECG07: 418 MS
Q-T INTERVAL, ECG07: 420 MS
Q-T INTERVAL, ECG07: 424 MS
Q-T INTERVAL, ECG07: 440 MS
QRS DURATION, ECG06: 80 MS
QRS DURATION, ECG06: 82 MS
QRS DURATION, ECG06: 86 MS
QRS DURATION, ECG06: 88 MS
QTC CALCULATION (BEZET), ECG08: 403 MS
QTC CALCULATION (BEZET), ECG08: 431 MS
QTC CALCULATION (BEZET), ECG08: 466 MS
QTC CALCULATION (BEZET), ECG08: 467 MS
RBC # BLD AUTO: 2.07 M/UL (ref 4.7–5.5)
RBC # BLD AUTO: 2.36 M/UL (ref 4.7–5.5)
RBC # BLD AUTO: 2.46 M/UL (ref 4.7–5.5)
RBC # BLD AUTO: 2.52 M/UL (ref 4.7–5.5)
RBC # BLD AUTO: 2.59 M/UL (ref 4.7–5.5)
RBC # BLD AUTO: 2.62 M/UL (ref 4.7–5.5)
RBC # BLD AUTO: 2.69 M/UL (ref 4.7–5.5)
RBC # BLD AUTO: 2.71 M/UL (ref 4.7–5.5)
RBC # BLD AUTO: 2.75 M/UL (ref 4.7–5.5)
RBC # BLD AUTO: 2.77 M/UL (ref 4.7–5.5)
RBC # BLD AUTO: 2.84 M/UL (ref 4.7–5.5)
RBC # BLD AUTO: 2.84 M/UL (ref 4.7–5.5)
RBC # BLD AUTO: 2.89 M/UL (ref 4.7–5.5)
RBC # BLD AUTO: 2.9 M/UL (ref 4.7–5.5)
RBC # BLD AUTO: 2.92 M/UL (ref 4.7–5.5)
RBC # BLD AUTO: 2.93 M/UL (ref 4.7–5.5)
RBC # BLD AUTO: 2.95 M/UL (ref 4.7–5.5)
RBC # BLD AUTO: 2.96 M/UL (ref 4.7–5.5)
RBC # BLD AUTO: 2.97 M/UL (ref 4.7–5.5)
RBC # BLD AUTO: 2.99 M/UL (ref 4.7–5.5)
RBC # BLD AUTO: 3.02 M/UL (ref 4.7–5.5)
RBC # BLD AUTO: 3.09 M/UL (ref 4.7–5.5)
RBC # BLD AUTO: 3.27 M/UL (ref 4.7–5.5)
RBC # BLD AUTO: 3.38 M/UL (ref 4.7–5.5)
RBC #/AREA URNS HPF: NORMAL /HPF (ref 0–5)
RBC MORPH BLD: ABNORMAL
SAO2 % BLD: 84 % (ref 92–97)
SAO2 % BLD: 96 % (ref 92–97)
SERVICE CMNT-IMP: ABNORMAL
SERVICE CMNT-IMP: NORMAL
SODIUM SERPL-SCNC: 130 MMOL/L (ref 136–145)
SODIUM SERPL-SCNC: 131 MMOL/L (ref 136–145)
SODIUM SERPL-SCNC: 131 MMOL/L (ref 136–145)
SODIUM SERPL-SCNC: 132 MMOL/L (ref 136–145)
SODIUM SERPL-SCNC: 132 MMOL/L (ref 136–145)
SODIUM SERPL-SCNC: 133 MMOL/L (ref 136–145)
SODIUM SERPL-SCNC: 134 MMOL/L (ref 136–145)
SODIUM SERPL-SCNC: 135 MMOL/L (ref 136–145)
SODIUM SERPL-SCNC: 136 MMOL/L (ref 136–145)
SODIUM SERPL-SCNC: 136 MMOL/L (ref 136–145)
SODIUM SERPL-SCNC: 137 MMOL/L (ref 136–145)
SODIUM SERPL-SCNC: 138 MMOL/L (ref 136–145)
SODIUM SERPL-SCNC: 139 MMOL/L (ref 136–145)
SODIUM SERPL-SCNC: 140 MMOL/L (ref 136–145)
SODIUM SERPL-SCNC: 140 MMOL/L (ref 136–145)
SODIUM UR-SCNC: 52 MMOL/L (ref 20–110)
SP GR UR REFRACTOMETRY: 1.01 (ref 1–1.03)
SPECIMEN EXP DATE BLD: NORMAL
SPECIMEN EXP DATE BLD: NORMAL
SPECIMEN TYPE: ABNORMAL
SPECIMEN TYPE: ABNORMAL
SPECIMEN VOL ?TM UR: 7200 ML
STATUS OF UNIT,%ST: NORMAL
TIBC SERPL-MCNC: 326 UG/DL (ref 250–450)
TIBC SERPL-MCNC: 339 UG/DL (ref 250–450)
TOTAL RESP. RATE, ITRR: 18
TOTAL RESP. RATE, ITRR: 18
TROPONIN I SERPL-MCNC: 0.77 NG/ML (ref 0–0.04)
TROPONIN I SERPL-MCNC: 1 NG/ML (ref 0–0.04)
TROPONIN I SERPL-MCNC: 1.07 NG/ML (ref 0–0.04)
TROPONIN I SERPL-MCNC: <0.02 NG/ML (ref 0–0.04)
UNIT DIVISION, %UDIV: 0
URATE SERPL-MCNC: 10.9 MG/DL (ref 2.6–7.2)
UROBILINOGEN UR QL STRIP.AUTO: 1 EU/DL (ref 0.2–1)
VANCOMYCIN SERPL-MCNC: 3.5 UG/ML (ref 5–40)
VENTRICULAR RATE, ECG03: 56 BPM
VENTRICULAR RATE, ECG03: 58 BPM
VENTRICULAR RATE, ECG03: 73 BPM
VENTRICULAR RATE, ECG03: 74 BPM
WBC # BLD AUTO: 4 K/UL (ref 4.6–13.2)
WBC # BLD AUTO: 4.1 K/UL (ref 4.6–13.2)
WBC # BLD AUTO: 4.2 K/UL (ref 4.6–13.2)
WBC # BLD AUTO: 4.2 K/UL (ref 4.6–13.2)
WBC # BLD AUTO: 4.3 K/UL (ref 4.6–13.2)
WBC # BLD AUTO: 4.3 K/UL (ref 4.6–13.2)
WBC # BLD AUTO: 4.4 K/UL (ref 4.6–13.2)
WBC # BLD AUTO: 4.5 K/UL (ref 4.6–13.2)
WBC # BLD AUTO: 4.6 K/UL (ref 4.6–13.2)
WBC # BLD AUTO: 4.7 K/UL (ref 4.6–13.2)
WBC # BLD AUTO: 4.7 K/UL (ref 4.6–13.2)
WBC # BLD AUTO: 4.8 K/UL (ref 4.6–13.2)
WBC # BLD AUTO: 4.8 K/UL (ref 4.6–13.2)
WBC # BLD AUTO: 5 K/UL (ref 4.6–13.2)
WBC # BLD AUTO: 5.1 K/UL (ref 4.6–13.2)
WBC # BLD AUTO: 5.3 K/UL (ref 4.6–13.2)
WBC # BLD AUTO: 5.9 K/UL (ref 4.6–13.2)
WBC # BLD AUTO: 6.6 K/UL (ref 4.6–13.2)
WBC # BLD AUTO: 7 K/UL (ref 4.6–13.2)
WBC # BLD AUTO: 9.8 K/UL (ref 4.6–13.2)
WBC URNS QL MICRO: NEGATIVE /HPF (ref 0–4)

## 2017-01-01 PROCEDURE — 74011250636 HC RX REV CODE- 250/636: Performed by: NURSE PRACTITIONER

## 2017-01-01 PROCEDURE — 80053 COMPREHEN METABOLIC PANEL: CPT | Performed by: FAMILY MEDICINE

## 2017-01-01 PROCEDURE — 80048 BASIC METABOLIC PNL TOTAL CA: CPT | Performed by: INTERNAL MEDICINE

## 2017-01-01 PROCEDURE — 77010033678 HC OXYGEN DAILY

## 2017-01-01 PROCEDURE — 87040 BLOOD CULTURE FOR BACTERIA: CPT | Performed by: PHYSICIAN ASSISTANT

## 2017-01-01 PROCEDURE — 74011250636 HC RX REV CODE- 250/636: Performed by: FAMILY MEDICINE

## 2017-01-01 PROCEDURE — 77010033711 HC HIGH FLOW OXYGEN

## 2017-01-01 PROCEDURE — 74011250636 HC RX REV CODE- 250/636: Performed by: INTERNAL MEDICINE

## 2017-01-01 PROCEDURE — 65660000004 HC RM CVT STEPDOWN

## 2017-01-01 PROCEDURE — 74011250637 HC RX REV CODE- 250/637: Performed by: NURSE PRACTITIONER

## 2017-01-01 PROCEDURE — 36592 COLLECT BLOOD FROM PICC: CPT

## 2017-01-01 PROCEDURE — 65660000000 HC RM CCU STEPDOWN

## 2017-01-01 PROCEDURE — 74011250637 HC RX REV CODE- 250/637: Performed by: INTERNAL MEDICINE

## 2017-01-01 PROCEDURE — 85025 COMPLETE CBC W/AUTO DIFF WBC: CPT | Performed by: FAMILY MEDICINE

## 2017-01-01 PROCEDURE — 84156 ASSAY OF PROTEIN URINE: CPT | Performed by: INTERNAL MEDICINE

## 2017-01-01 PROCEDURE — 74011000250 HC RX REV CODE- 250: Performed by: FAMILY MEDICINE

## 2017-01-01 PROCEDURE — 74011250637 HC RX REV CODE- 250/637: Performed by: FAMILY MEDICINE

## 2017-01-01 PROCEDURE — 94762 N-INVAS EAR/PLS OXIMTRY CONT: CPT

## 2017-01-01 PROCEDURE — 83735 ASSAY OF MAGNESIUM: CPT | Performed by: INTERNAL MEDICINE

## 2017-01-01 PROCEDURE — 74011000258 HC RX REV CODE- 258: Performed by: FAMILY MEDICINE

## 2017-01-01 PROCEDURE — 80048 BASIC METABOLIC PNL TOTAL CA: CPT | Performed by: FAMILY MEDICINE

## 2017-01-01 PROCEDURE — 99152 MOD SED SAME PHYS/QHP 5/>YRS: CPT

## 2017-01-01 PROCEDURE — 83540 ASSAY OF IRON: CPT | Performed by: INTERNAL MEDICINE

## 2017-01-01 PROCEDURE — 85025 COMPLETE CBC W/AUTO DIFF WBC: CPT | Performed by: INTERNAL MEDICINE

## 2017-01-01 PROCEDURE — 74011000250 HC RX REV CODE- 250: Performed by: INTERNAL MEDICINE

## 2017-01-01 PROCEDURE — 36415 COLL VENOUS BLD VENIPUNCTURE: CPT | Performed by: INTERNAL MEDICINE

## 2017-01-01 PROCEDURE — 36415 COLL VENOUS BLD VENIPUNCTURE: CPT | Performed by: NURSE PRACTITIONER

## 2017-01-01 PROCEDURE — 96375 TX/PRO/DX INJ NEW DRUG ADDON: CPT

## 2017-01-01 PROCEDURE — 80053 COMPREHEN METABOLIC PANEL: CPT | Performed by: NURSE PRACTITIONER

## 2017-01-01 PROCEDURE — 36415 COLL VENOUS BLD VENIPUNCTURE: CPT | Performed by: FAMILY MEDICINE

## 2017-01-01 PROCEDURE — 93306 TTE W/DOPPLER COMPLETE: CPT

## 2017-01-01 PROCEDURE — 84100 ASSAY OF PHOSPHORUS: CPT | Performed by: INTERNAL MEDICINE

## 2017-01-01 PROCEDURE — 80053 COMPREHEN METABOLIC PANEL: CPT

## 2017-01-01 PROCEDURE — 36415 COLL VENOUS BLD VENIPUNCTURE: CPT

## 2017-01-01 PROCEDURE — P9047 ALBUMIN (HUMAN), 25%, 50ML: HCPCS

## 2017-01-01 PROCEDURE — 74011000250 HC RX REV CODE- 250: Performed by: SURGERY

## 2017-01-01 PROCEDURE — 83605 ASSAY OF LACTIC ACID: CPT | Performed by: FAMILY MEDICINE

## 2017-01-01 PROCEDURE — A6209 FOAM DRSG <=16 SQ IN W/O BDR: HCPCS

## 2017-01-01 PROCEDURE — 85610 PROTHROMBIN TIME: CPT | Performed by: FAMILY MEDICINE

## 2017-01-01 PROCEDURE — 82962 GLUCOSE BLOOD TEST: CPT

## 2017-01-01 PROCEDURE — 83880 ASSAY OF NATRIURETIC PEPTIDE: CPT | Performed by: PHYSICIAN ASSISTANT

## 2017-01-01 PROCEDURE — 71010 XR CHEST PORT: CPT

## 2017-01-01 PROCEDURE — 82550 ASSAY OF CK (CPK): CPT | Performed by: NURSE PRACTITIONER

## 2017-01-01 PROCEDURE — 85025 COMPLETE CBC W/AUTO DIFF WBC: CPT | Performed by: NURSE PRACTITIONER

## 2017-01-01 PROCEDURE — 74011250636 HC RX REV CODE- 250/636: Performed by: SURGERY

## 2017-01-01 PROCEDURE — 80048 BASIC METABOLIC PNL TOTAL CA: CPT

## 2017-01-01 PROCEDURE — 74011000258 HC RX REV CODE- 258: Performed by: NURSE PRACTITIONER

## 2017-01-01 PROCEDURE — 0651 HSPC ROUTINE HOME CARE

## 2017-01-01 PROCEDURE — 83735 ASSAY OF MAGNESIUM: CPT | Performed by: NURSE PRACTITIONER

## 2017-01-01 PROCEDURE — 83605 ASSAY OF LACTIC ACID: CPT | Performed by: PHYSICIAN ASSISTANT

## 2017-01-01 PROCEDURE — 85730 THROMBOPLASTIN TIME PARTIAL: CPT

## 2017-01-01 PROCEDURE — 82803 BLOOD GASES ANY COMBINATION: CPT

## 2017-01-01 PROCEDURE — 83880 ASSAY OF NATRIURETIC PEPTIDE: CPT | Performed by: NURSE PRACTITIONER

## 2017-01-01 PROCEDURE — 85027 COMPLETE CBC AUTOMATED: CPT | Performed by: FAMILY MEDICINE

## 2017-01-01 PROCEDURE — P9035 PLATELET PHERES LEUKOREDUCED: HCPCS | Performed by: FAMILY MEDICINE

## 2017-01-01 PROCEDURE — 74011250637 HC RX REV CODE- 250/637: Performed by: PHYSICIAN ASSISTANT

## 2017-01-01 PROCEDURE — 86900 BLOOD TYPING SEROLOGIC ABO: CPT | Performed by: SURGERY

## 2017-01-01 PROCEDURE — 77030002986 HC SUT PROL J&J -A

## 2017-01-01 PROCEDURE — 86900 BLOOD TYPING SEROLOGIC ABO: CPT | Performed by: FAMILY MEDICINE

## 2017-01-01 PROCEDURE — 85610 PROTHROMBIN TIME: CPT | Performed by: NURSE PRACTITIONER

## 2017-01-01 PROCEDURE — 94760 N-INVAS EAR/PLS OXIMETRY 1: CPT

## 2017-01-01 PROCEDURE — 76450000000

## 2017-01-01 PROCEDURE — 96374 THER/PROPH/DIAG INJ IV PUSH: CPT

## 2017-01-01 PROCEDURE — 65270000029 HC RM PRIVATE

## 2017-01-01 PROCEDURE — 99284 EMERGENCY DEPT VISIT MOD MDM: CPT

## 2017-01-01 PROCEDURE — 77030011943

## 2017-01-01 PROCEDURE — 97116 GAIT TRAINING THERAPY: CPT

## 2017-01-01 PROCEDURE — 80048 BASIC METABOLIC PNL TOTAL CA: CPT | Performed by: NURSE PRACTITIONER

## 2017-01-01 PROCEDURE — 86335 IMMUNFIX E-PHORSIS/URINE/CSF: CPT | Performed by: INTERNAL MEDICINE

## 2017-01-01 PROCEDURE — 80048 BASIC METABOLIC PNL TOTAL CA: CPT | Performed by: PHYSICIAN ASSISTANT

## 2017-01-01 PROCEDURE — 87086 URINE CULTURE/COLONY COUNT: CPT | Performed by: PHYSICIAN ASSISTANT

## 2017-01-01 PROCEDURE — 99285 EMERGENCY DEPT VISIT HI MDM: CPT

## 2017-01-01 PROCEDURE — 82040 ASSAY OF SERUM ALBUMIN: CPT | Performed by: INTERNAL MEDICINE

## 2017-01-01 PROCEDURE — 90686 IIV4 VACC NO PRSV 0.5 ML IM: CPT | Performed by: FAMILY MEDICINE

## 2017-01-01 PROCEDURE — 93005 ELECTROCARDIOGRAM TRACING: CPT

## 2017-01-01 PROCEDURE — 84100 ASSAY OF PHOSPHORUS: CPT | Performed by: FAMILY MEDICINE

## 2017-01-01 PROCEDURE — 84300 ASSAY OF URINE SODIUM: CPT | Performed by: INTERNAL MEDICINE

## 2017-01-01 PROCEDURE — 94640 AIRWAY INHALATION TREATMENT: CPT

## 2017-01-01 PROCEDURE — 93925 LOWER EXTREMITY STUDY: CPT

## 2017-01-01 PROCEDURE — 85025 COMPLETE CBC W/AUTO DIFF WBC: CPT

## 2017-01-01 PROCEDURE — 97530 THERAPEUTIC ACTIVITIES: CPT

## 2017-01-01 PROCEDURE — 36558 INSERT TUNNELED CV CATH: CPT

## 2017-01-01 PROCEDURE — 36430 TRANSFUSION BLD/BLD COMPNT: CPT

## 2017-01-01 PROCEDURE — 90935 HEMODIALYSIS ONE EVALUATION: CPT

## 2017-01-01 PROCEDURE — 82550 ASSAY OF CK (CPK): CPT

## 2017-01-01 PROCEDURE — 83880 ASSAY OF NATRIURETIC PEPTIDE: CPT

## 2017-01-01 PROCEDURE — 76770 US EXAM ABDO BACK WALL COMP: CPT

## 2017-01-01 PROCEDURE — 76937 US GUIDE VASCULAR ACCESS: CPT

## 2017-01-01 PROCEDURE — 82570 ASSAY OF URINE CREATININE: CPT | Performed by: INTERNAL MEDICINE

## 2017-01-01 PROCEDURE — 74011250637 HC RX REV CODE- 250/637: Performed by: EMERGENCY MEDICINE

## 2017-01-01 PROCEDURE — 93308 TTE F-UP OR LMTD: CPT

## 2017-01-01 PROCEDURE — 82550 ASSAY OF CK (CPK): CPT | Performed by: FAMILY MEDICINE

## 2017-01-01 PROCEDURE — 74011250636 HC RX REV CODE- 250/636: Performed by: EMERGENCY MEDICINE

## 2017-01-01 PROCEDURE — 93971 EXTREMITY STUDY: CPT

## 2017-01-01 PROCEDURE — 87040 BLOOD CULTURE FOR BACTERIA: CPT | Performed by: FAMILY MEDICINE

## 2017-01-01 PROCEDURE — 3336500001 HSPC ELECTION

## 2017-01-01 PROCEDURE — 86920 COMPATIBILITY TEST SPIN: CPT | Performed by: FAMILY MEDICINE

## 2017-01-01 PROCEDURE — C1750 CATH, HEMODIALYSIS,LONG-TERM: HCPCS

## 2017-01-01 PROCEDURE — 02HV33Z INSERTION OF INFUSION DEVICE INTO SUPERIOR VENA CAVA, PERCUTANEOUS APPROACH: ICD-10-PCS | Performed by: SURGERY

## 2017-01-01 PROCEDURE — 36600 WITHDRAWAL OF ARTERIAL BLOOD: CPT

## 2017-01-01 PROCEDURE — 74011250636 HC RX REV CODE- 250/636

## 2017-01-01 PROCEDURE — 83970 ASSAY OF PARATHORMONE: CPT | Performed by: INTERNAL MEDICINE

## 2017-01-01 PROCEDURE — P9016 RBC LEUKOCYTES REDUCED: HCPCS | Performed by: FAMILY MEDICINE

## 2017-01-01 PROCEDURE — 74011250636 HC RX REV CODE- 250/636: Performed by: PHYSICIAN ASSISTANT

## 2017-01-01 PROCEDURE — 74011250637 HC RX REV CODE- 250/637

## 2017-01-01 PROCEDURE — 85730 THROMBOPLASTIN TIME PARTIAL: CPT | Performed by: FAMILY MEDICINE

## 2017-01-01 PROCEDURE — 80202 ASSAY OF VANCOMYCIN: CPT | Performed by: FAMILY MEDICINE

## 2017-01-01 PROCEDURE — 77030033263 HC DRSG MEPILEX 16-48IN BORD MOLN -B

## 2017-01-01 PROCEDURE — 5A1D60Z PERFORMANCE OF URINARY FILTRATION, MULTIPLE: ICD-10-PCS | Performed by: INTERNAL MEDICINE

## 2017-01-01 PROCEDURE — 83735 ASSAY OF MAGNESIUM: CPT | Performed by: FAMILY MEDICINE

## 2017-01-01 PROCEDURE — 90471 IMMUNIZATION ADMIN: CPT

## 2017-01-01 PROCEDURE — 82728 ASSAY OF FERRITIN: CPT | Performed by: INTERNAL MEDICINE

## 2017-01-01 PROCEDURE — 77030010545

## 2017-01-01 PROCEDURE — 77030018719 HC DRSG PTCH ANTIMIC J&J -A

## 2017-01-01 PROCEDURE — 84550 ASSAY OF BLOOD/URIC ACID: CPT | Performed by: NURSE PRACTITIONER

## 2017-01-01 PROCEDURE — 87106 FUNGI IDENTIFICATION YEAST: CPT | Performed by: PHYSICIAN ASSISTANT

## 2017-01-01 PROCEDURE — 77030011256 HC DRSG MEPILEX <16IN NO BORD MOLN -A

## 2017-01-01 PROCEDURE — HOSPICE MEDICATION HC HH HOSPICE MEDICATION

## 2017-01-01 PROCEDURE — 77030010507 HC ADH SKN DERMBND J&J -B

## 2017-01-01 PROCEDURE — 81001 URINALYSIS AUTO W/SCOPE: CPT | Performed by: FAMILY MEDICINE

## 2017-01-01 PROCEDURE — 86706 HEP B SURFACE ANTIBODY: CPT | Performed by: INTERNAL MEDICINE

## 2017-01-01 PROCEDURE — 80076 HEPATIC FUNCTION PANEL: CPT | Performed by: NURSE PRACTITIONER

## 2017-01-01 PROCEDURE — 74011000258 HC RX REV CODE- 258: Performed by: INTERNAL MEDICINE

## 2017-01-01 PROCEDURE — 97162 PT EVAL MOD COMPLEX 30 MIN: CPT

## 2017-01-01 PROCEDURE — 94660 CPAP INITIATION&MGMT: CPT

## 2017-01-01 PROCEDURE — G0155 HHCP-SVS OF CSW,EA 15 MIN: HCPCS

## 2017-01-01 PROCEDURE — 96365 THER/PROPH/DIAG IV INF INIT: CPT

## 2017-01-01 PROCEDURE — 96366 THER/PROPH/DIAG IV INF ADDON: CPT

## 2017-01-01 PROCEDURE — 85025 COMPLETE CBC W/AUTO DIFF WBC: CPT | Performed by: PHYSICIAN ASSISTANT

## 2017-01-01 PROCEDURE — 77010033678 HC OXYGEN DAILY: Performed by: FAMILY MEDICINE

## 2017-01-01 PROCEDURE — G0156 HHCP-SVS OF AIDE,EA 15 MIN: HCPCS

## 2017-01-01 PROCEDURE — 77030002933 HC SUT MCRYL J&J -A

## 2017-01-01 PROCEDURE — 87340 HEPATITIS B SURFACE AG IA: CPT | Performed by: INTERNAL MEDICINE

## 2017-01-01 PROCEDURE — 97165 OT EVAL LOW COMPLEX 30 MIN: CPT

## 2017-01-01 PROCEDURE — 97535 SELF CARE MNGMENT TRAINING: CPT

## 2017-01-01 PROCEDURE — 85610 PROTHROMBIN TIME: CPT

## 2017-01-01 RX ORDER — ACETAZOLAMIDE 250 MG/1
250 TABLET ORAL DAILY
Status: DISCONTINUED | OUTPATIENT
Start: 2017-01-01 | End: 2017-01-01

## 2017-01-01 RX ORDER — SODIUM CHLORIDE 0.9 % (FLUSH) 0.9 %
5-10 SYRINGE (ML) INJECTION EVERY 8 HOURS
Status: DISCONTINUED | OUTPATIENT
Start: 2017-01-01 | End: 2017-01-01 | Stop reason: SDUPTHER

## 2017-01-01 RX ORDER — ONDANSETRON 2 MG/ML
4 INJECTION INTRAMUSCULAR; INTRAVENOUS
Status: DISCONTINUED | OUTPATIENT
Start: 2017-01-01 | End: 2017-01-01 | Stop reason: HOSPADM

## 2017-01-01 RX ORDER — LORAZEPAM 0.5 MG/1
0.5 TABLET ORAL
Status: COMPLETED | OUTPATIENT
Start: 2017-01-01 | End: 2017-01-01

## 2017-01-01 RX ORDER — COLCHICINE 0.6 MG/1
0.6 CAPSULE ORAL
Status: DISCONTINUED | OUTPATIENT
Start: 2017-01-01 | End: 2017-01-01 | Stop reason: HOSPADM

## 2017-01-01 RX ORDER — HEPARIN SODIUM 5000 [USP'U]/ML
5000 INJECTION, SOLUTION INTRAVENOUS; SUBCUTANEOUS EVERY 8 HOURS
Status: DISCONTINUED | OUTPATIENT
Start: 2017-01-01 | End: 2017-01-01

## 2017-01-01 RX ORDER — FUROSEMIDE 10 MG/ML
80 INJECTION INTRAMUSCULAR; INTRAVENOUS
Status: COMPLETED | OUTPATIENT
Start: 2017-01-01 | End: 2017-01-01

## 2017-01-01 RX ORDER — METOLAZONE 5 MG/1
2.5 TABLET ORAL DAILY
Status: DISCONTINUED | OUTPATIENT
Start: 2017-01-01 | End: 2017-01-01

## 2017-01-01 RX ORDER — FLUTICASONE PROPIONATE 50 MCG
2 SPRAY, SUSPENSION (ML) NASAL DAILY
Status: DISCONTINUED | OUTPATIENT
Start: 2017-01-01 | End: 2017-01-01 | Stop reason: HOSPADM

## 2017-01-01 RX ORDER — THERA TABS 400 MCG
1 TAB ORAL DAILY
Status: DISCONTINUED | OUTPATIENT
Start: 2017-01-01 | End: 2017-01-01 | Stop reason: HOSPADM

## 2017-01-01 RX ORDER — COLCHICINE 0.6 MG/1
0.6 CAPSULE ORAL DAILY
Status: DISCONTINUED | OUTPATIENT
Start: 2017-01-01 | End: 2017-01-01

## 2017-01-01 RX ORDER — PANTOPRAZOLE SODIUM 40 MG/1
40 TABLET, DELAYED RELEASE ORAL
Status: DISCONTINUED | OUTPATIENT
Start: 2017-01-01 | End: 2017-01-01 | Stop reason: HOSPADM

## 2017-01-01 RX ORDER — ACETAZOLAMIDE 250 MG/1
250 TABLET ORAL DAILY
Status: COMPLETED | OUTPATIENT
Start: 2017-01-01 | End: 2017-01-01

## 2017-01-01 RX ORDER — ACETAMINOPHEN 325 MG/1
650 TABLET ORAL
Qty: 100 TAB | Refills: 0 | Status: SHIPPED | OUTPATIENT
Start: 2017-01-01

## 2017-01-01 RX ORDER — ASPIRIN 81 MG/1
162 TABLET ORAL DAILY
Status: DISCONTINUED | OUTPATIENT
Start: 2017-01-01 | End: 2017-01-01

## 2017-01-01 RX ORDER — HYDROCODONE BITARTRATE AND ACETAMINOPHEN 5; 325 MG/1; MG/1
1 TABLET ORAL
Status: DISCONTINUED | OUTPATIENT
Start: 2017-01-01 | End: 2017-01-01 | Stop reason: SDUPTHER

## 2017-01-01 RX ORDER — CARVEDILOL 6.25 MG/1
6.25 TABLET ORAL 2 TIMES DAILY WITH MEALS
Status: DISCONTINUED | OUTPATIENT
Start: 2017-01-01 | End: 2017-01-01

## 2017-01-01 RX ORDER — ALLOPURINOL 100 MG/1
50 TABLET ORAL DAILY
Status: DISCONTINUED | OUTPATIENT
Start: 2017-01-01 | End: 2017-01-01 | Stop reason: HOSPADM

## 2017-01-01 RX ORDER — LANOLIN ALCOHOL/MO/W.PET/CERES
1 CREAM (GRAM) TOPICAL
Status: DISCONTINUED | OUTPATIENT
Start: 2017-01-01 | End: 2017-01-01 | Stop reason: HOSPADM

## 2017-01-01 RX ORDER — METOLAZONE 2.5 MG/1
2.5 TABLET ORAL DAILY
Qty: 100 TAB | Refills: 0 | Status: SHIPPED | OUTPATIENT
Start: 2017-01-01 | End: 2017-01-01

## 2017-01-01 RX ORDER — CARVEDILOL 3.12 MG/1
3.12 TABLET ORAL 2 TIMES DAILY WITH MEALS
Status: DISCONTINUED | OUTPATIENT
Start: 2017-01-01 | End: 2017-01-01

## 2017-01-01 RX ORDER — POTASSIUM CHLORIDE 20 MEQ/1
20 TABLET, EXTENDED RELEASE ORAL DAILY
Status: DISCONTINUED | OUTPATIENT
Start: 2017-01-01 | End: 2017-01-01

## 2017-01-01 RX ORDER — ALBUMIN HUMAN 250 G/1000ML
SOLUTION INTRAVENOUS
Status: COMPLETED
Start: 2017-01-01 | End: 2017-01-01

## 2017-01-01 RX ORDER — LIDOCAINE HYDROCHLORIDE 10 MG/ML
1-60 INJECTION, SOLUTION EPIDURAL; INFILTRATION; INTRACAUDAL; PERINEURAL
Status: DISCONTINUED | OUTPATIENT
Start: 2017-01-01 | End: 2017-01-01 | Stop reason: HOSPADM

## 2017-01-01 RX ORDER — BUDESONIDE AND FORMOTEROL FUMARATE DIHYDRATE 160; 4.5 UG/1; UG/1
2 AEROSOL RESPIRATORY (INHALATION)
Status: DISCONTINUED | OUTPATIENT
Start: 2017-01-01 | End: 2017-01-01 | Stop reason: HOSPADM

## 2017-01-01 RX ORDER — GUAIFENESIN 100 MG/5ML
81 LIQUID (ML) ORAL DAILY
Status: DISCONTINUED | OUTPATIENT
Start: 2017-01-01 | End: 2017-01-01 | Stop reason: HOSPADM

## 2017-01-01 RX ORDER — SODIUM CHLORIDE 0.9 % (FLUSH) 0.9 %
5-10 SYRINGE (ML) INJECTION AS NEEDED
Status: DISCONTINUED | OUTPATIENT
Start: 2017-01-01 | End: 2017-01-01 | Stop reason: HOSPADM

## 2017-01-01 RX ORDER — LANOLIN ALCOHOL/MO/W.PET/CERES
325 CREAM (GRAM) TOPICAL
Status: DISCONTINUED | OUTPATIENT
Start: 2017-01-01 | End: 2017-01-01 | Stop reason: HOSPADM

## 2017-01-01 RX ORDER — HEPARIN SODIUM 200 [USP'U]/100ML
500 INJECTION, SOLUTION INTRAVENOUS ONCE
Status: COMPLETED | OUTPATIENT
Start: 2017-01-01 | End: 2017-01-01

## 2017-01-01 RX ORDER — ACETAMINOPHEN 325 MG/1
650 TABLET ORAL
Status: DISCONTINUED | OUTPATIENT
Start: 2017-01-01 | End: 2017-01-01 | Stop reason: HOSPADM

## 2017-01-01 RX ORDER — NITROGLYCERIN 0.4 MG/1
0.4 TABLET SUBLINGUAL AS NEEDED
Status: DISCONTINUED | OUTPATIENT
Start: 2017-01-01 | End: 2017-01-01 | Stop reason: HOSPADM

## 2017-01-01 RX ORDER — HYDROCODONE BITARTRATE AND ACETAMINOPHEN 5; 325 MG/1; MG/1
1 TABLET ORAL
Qty: 12 TAB | Refills: 0 | Status: SHIPPED | OUTPATIENT
Start: 2017-01-01 | End: 2017-01-01

## 2017-01-01 RX ORDER — PRAVASTATIN SODIUM 20 MG/1
80 TABLET ORAL
Status: DISCONTINUED | OUTPATIENT
Start: 2017-01-01 | End: 2017-01-01

## 2017-01-01 RX ORDER — PANTOPRAZOLE SODIUM 40 MG/1
40 TABLET, DELAYED RELEASE ORAL
Qty: 100 TAB | Refills: 0 | Status: SHIPPED | OUTPATIENT
Start: 2017-01-01 | End: 2017-01-01

## 2017-01-01 RX ORDER — LANOLIN ALCOHOL/MO/W.PET/CERES
325 CREAM (GRAM) TOPICAL
Qty: 100 TAB | Refills: 0 | Status: SHIPPED | OUTPATIENT
Start: 2017-01-01 | End: 2017-01-01

## 2017-01-01 RX ORDER — ALLOPURINOL 100 MG/1
100 TABLET ORAL DAILY
Status: DISCONTINUED | OUTPATIENT
Start: 2017-01-01 | End: 2017-01-01 | Stop reason: HOSPADM

## 2017-01-01 RX ORDER — SODIUM CHLORIDE 9 MG/ML
100 INJECTION, SOLUTION INTRAVENOUS CONTINUOUS
Status: DISCONTINUED | OUTPATIENT
Start: 2017-01-01 | End: 2017-01-01

## 2017-01-01 RX ORDER — IPRATROPIUM BROMIDE AND ALBUTEROL SULFATE 2.5; .5 MG/3ML; MG/3ML
3 SOLUTION RESPIRATORY (INHALATION)
Status: DISCONTINUED | OUTPATIENT
Start: 2017-01-01 | End: 2017-01-01 | Stop reason: HOSPADM

## 2017-01-01 RX ORDER — CARVEDILOL 6.25 MG/1
6.25 TABLET ORAL 2 TIMES DAILY WITH MEALS
Qty: 60 TAB | Refills: 0 | Status: SHIPPED | OUTPATIENT
Start: 2017-01-01 | End: 2017-01-01 | Stop reason: SDUPTHER

## 2017-01-01 RX ORDER — DOBUTAMINE HYDROCHLORIDE 200 MG/100ML
5 INJECTION INTRAVENOUS CONTINUOUS
Status: DISCONTINUED | OUTPATIENT
Start: 2017-01-01 | End: 2017-01-01

## 2017-01-01 RX ORDER — ZOLPIDEM TARTRATE 5 MG/1
5 TABLET ORAL
Status: DISCONTINUED | OUTPATIENT
Start: 2017-01-01 | End: 2017-01-01 | Stop reason: HOSPADM

## 2017-01-01 RX ORDER — CARVEDILOL 3.12 MG/1
3.12 TABLET ORAL EVERY 12 HOURS
Qty: 100 TAB | Refills: 0 | Status: SHIPPED | OUTPATIENT
Start: 2017-01-01

## 2017-01-01 RX ORDER — POTASSIUM CHLORIDE 20 MEQ/1
20 TABLET, EXTENDED RELEASE ORAL 2 TIMES DAILY
Status: DISCONTINUED | OUTPATIENT
Start: 2017-01-01 | End: 2017-01-01

## 2017-01-01 RX ORDER — THERA TABS 400 MCG
1 TAB ORAL DAILY
Qty: 100 TAB | Refills: 0 | Status: SHIPPED | OUTPATIENT
Start: 2017-01-01

## 2017-01-01 RX ORDER — NITROGLYCERIN 0.4 MG/1
0.4 TABLET SUBLINGUAL AS NEEDED
Qty: 1 BOTTLE | Refills: 0 | Status: SHIPPED | OUTPATIENT
Start: 2017-01-01

## 2017-01-01 RX ORDER — BUDESONIDE AND FORMOTEROL FUMARATE DIHYDRATE 160; 4.5 UG/1; UG/1
2 AEROSOL RESPIRATORY (INHALATION) 2 TIMES DAILY
Qty: 1 INHALER | Refills: 0 | Status: SHIPPED | OUTPATIENT
Start: 2017-01-01

## 2017-01-01 RX ORDER — TRISODIUM CITRATE DIHYDRATE 4 G/100ML
2 INJECTION, SOLUTION INTRAVENOUS
Status: DISCONTINUED | OUTPATIENT
Start: 2017-01-01 | End: 2017-01-01 | Stop reason: HOSPADM

## 2017-01-01 RX ORDER — METOLAZONE 5 MG/1
2.5 TABLET ORAL DAILY
Status: DISCONTINUED | OUTPATIENT
Start: 2017-01-01 | End: 2017-01-01 | Stop reason: HOSPADM

## 2017-01-01 RX ORDER — IPRATROPIUM BROMIDE AND ALBUTEROL SULFATE 2.5; .5 MG/3ML; MG/3ML
3 SOLUTION RESPIRATORY (INHALATION)
Qty: 30 NEBULE | Refills: 0 | Status: SHIPPED | OUTPATIENT
Start: 2017-01-01

## 2017-01-01 RX ORDER — LANOLIN ALCOHOL/MO/W.PET/CERES
325 CREAM (GRAM) TOPICAL
Qty: 100 TAB | Refills: 0 | Status: SHIPPED | OUTPATIENT
Start: 2017-01-01

## 2017-01-01 RX ORDER — SODIUM CHLORIDE 9 MG/ML
250 INJECTION, SOLUTION INTRAVENOUS CONTINUOUS
Status: DISCONTINUED | OUTPATIENT
Start: 2017-01-01 | End: 2017-01-01

## 2017-01-01 RX ORDER — METOLAZONE 2.5 MG/1
2.5 TABLET ORAL DAILY
Status: DISCONTINUED | OUTPATIENT
Start: 2017-01-01 | End: 2017-01-01

## 2017-01-01 RX ORDER — SODIUM CHLORIDE 9 MG/ML
250 INJECTION, SOLUTION INTRAVENOUS AS NEEDED
Status: DISCONTINUED | OUTPATIENT
Start: 2017-01-01 | End: 2017-01-01

## 2017-01-01 RX ORDER — CARVEDILOL 6.25 MG/1
6.25 TABLET ORAL 2 TIMES DAILY WITH MEALS
Qty: 60 TAB | Refills: 6 | Status: SHIPPED | OUTPATIENT
Start: 2017-01-01 | End: 2017-01-01

## 2017-01-01 RX ORDER — MAGNESIUM SULFATE HEPTAHYDRATE 40 MG/ML
2 INJECTION, SOLUTION INTRAVENOUS ONCE
Status: COMPLETED | OUTPATIENT
Start: 2017-01-01 | End: 2017-01-01

## 2017-01-01 RX ORDER — MELATONIN
2000 DAILY
Qty: 100 TAB | Refills: 0 | Status: SHIPPED | OUTPATIENT
Start: 2017-01-01 | End: 2017-01-01

## 2017-01-01 RX ORDER — HYDROCODONE BITARTRATE AND ACETAMINOPHEN 5; 325 MG/1; MG/1
1 TABLET ORAL
Status: DISCONTINUED | OUTPATIENT
Start: 2017-01-01 | End: 2017-01-01 | Stop reason: HOSPADM

## 2017-01-01 RX ORDER — COLCHICINE 0.6 MG/1
0.6 CAPSULE ORAL
Qty: 100 CAP | Refills: 0 | Status: SHIPPED | OUTPATIENT
Start: 2017-01-01

## 2017-01-01 RX ORDER — HEPARIN SODIUM 5000 [USP'U]/ML
10000 INJECTION, SOLUTION INTRAVENOUS; SUBCUTANEOUS ONCE
Status: COMPLETED | OUTPATIENT
Start: 2017-01-01 | End: 2017-01-01

## 2017-01-01 RX ORDER — ACETAMINOPHEN 325 MG/1
650 TABLET ORAL
Status: DISCONTINUED | OUTPATIENT
Start: 2017-01-01 | End: 2017-01-01 | Stop reason: SDUPTHER

## 2017-01-01 RX ORDER — COLCHICINE 0.6 MG/1
0.6 TABLET ORAL DAILY
COMMUNITY
End: 2017-01-01

## 2017-01-01 RX ORDER — FENTANYL CITRATE 50 UG/ML
12.5-1 INJECTION, SOLUTION INTRAMUSCULAR; INTRAVENOUS
Status: DISCONTINUED | OUTPATIENT
Start: 2017-01-01 | End: 2017-01-01 | Stop reason: HOSPADM

## 2017-01-01 RX ORDER — MELATONIN
2000 DAILY
Status: DISCONTINUED | OUTPATIENT
Start: 2017-01-01 | End: 2017-01-01 | Stop reason: HOSPADM

## 2017-01-01 RX ORDER — FUROSEMIDE 10 MG/ML
40 INJECTION INTRAMUSCULAR; INTRAVENOUS
Status: COMPLETED | OUTPATIENT
Start: 2017-01-01 | End: 2017-01-01

## 2017-01-01 RX ORDER — FLUTICASONE PROPIONATE 50 MCG
SPRAY, SUSPENSION (ML) NASAL
Qty: 1 BOTTLE | Refills: 0 | Status: SHIPPED | OUTPATIENT
Start: 2017-01-01

## 2017-01-01 RX ORDER — NALOXONE HYDROCHLORIDE 0.4 MG/ML
0.4 INJECTION, SOLUTION INTRAMUSCULAR; INTRAVENOUS; SUBCUTANEOUS AS NEEDED
Status: DISCONTINUED | OUTPATIENT
Start: 2017-01-01 | End: 2017-01-01 | Stop reason: HOSPADM

## 2017-01-01 RX ORDER — DOBUTAMINE HYDROCHLORIDE 200 MG/100ML
2.5 INJECTION INTRAVENOUS CONTINUOUS
Status: DISCONTINUED | OUTPATIENT
Start: 2017-01-01 | End: 2017-01-01

## 2017-01-01 RX ORDER — THERA TABS 400 MCG
1 TAB ORAL DAILY
Qty: 100 TAB | Refills: 0 | Status: SHIPPED | OUTPATIENT
Start: 2017-01-01 | End: 2017-01-01

## 2017-01-01 RX ORDER — MORPHINE SULFATE 4 MG/ML
4 INJECTION, SOLUTION INTRAMUSCULAR; INTRAVENOUS
Status: COMPLETED | OUTPATIENT
Start: 2017-01-01 | End: 2017-01-01

## 2017-01-01 RX ORDER — ALLOPURINOL 100 MG/1
50 TABLET ORAL DAILY
Qty: 100 TAB | Refills: 0 | Status: SHIPPED | OUTPATIENT
Start: 2017-01-01

## 2017-01-01 RX ORDER — FUROSEMIDE 10 MG/ML
40 INJECTION INTRAMUSCULAR; INTRAVENOUS DAILY
Status: DISCONTINUED | OUTPATIENT
Start: 2017-01-01 | End: 2017-01-01

## 2017-01-01 RX ORDER — HYDROCODONE BITARTRATE AND ACETAMINOPHEN 5; 325 MG/1; MG/1
1 TABLET ORAL
Qty: 60 TAB | Refills: 0 | Status: SHIPPED | OUTPATIENT
Start: 2017-01-01

## 2017-01-01 RX ORDER — ZOLPIDEM TARTRATE 5 MG/1
5 TABLET ORAL
Qty: 30 TAB | Refills: 0 | Status: SHIPPED | OUTPATIENT
Start: 2017-01-01

## 2017-01-01 RX ORDER — SODIUM CHLORIDE 0.9 % (FLUSH) 0.9 %
5-10 SYRINGE (ML) INJECTION AS NEEDED
Status: DISCONTINUED | OUTPATIENT
Start: 2017-01-01 | End: 2017-01-01 | Stop reason: SDUPTHER

## 2017-01-01 RX ORDER — ALLOPURINOL 100 MG/1
100 TABLET ORAL DAILY
Qty: 30 TAB | Refills: 0 | Status: SHIPPED | OUTPATIENT
Start: 2017-01-01 | End: 2017-01-01

## 2017-01-01 RX ORDER — CARVEDILOL 3.12 MG/1
3.12 TABLET ORAL EVERY 12 HOURS
Status: DISCONTINUED | OUTPATIENT
Start: 2017-01-01 | End: 2017-01-01 | Stop reason: HOSPADM

## 2017-01-01 RX ORDER — ASPIRIN 81 MG/1
162 TABLET ORAL DAILY
Qty: 30 TAB | Refills: 0 | Status: SHIPPED | OUTPATIENT
Start: 2017-01-01 | End: 2017-01-01

## 2017-01-01 RX ORDER — MIDAZOLAM HYDROCHLORIDE 1 MG/ML
.25-2 INJECTION, SOLUTION INTRAMUSCULAR; INTRAVENOUS
Status: DISCONTINUED | OUTPATIENT
Start: 2017-01-01 | End: 2017-01-01 | Stop reason: HOSPADM

## 2017-01-01 RX ORDER — PANTOPRAZOLE SODIUM 40 MG/1
40 TABLET, DELAYED RELEASE ORAL
Qty: 100 TAB | Refills: 0 | Status: SHIPPED | OUTPATIENT
Start: 2017-01-01

## 2017-01-01 RX ORDER — FUROSEMIDE 40 MG/1
40 TABLET ORAL 2 TIMES DAILY
Status: DISCONTINUED | OUTPATIENT
Start: 2017-01-01 | End: 2017-01-01 | Stop reason: HOSPADM

## 2017-01-01 RX ORDER — LOPERAMIDE HCL 2 MG
2 TABLET ORAL
COMMUNITY
End: 2017-01-01

## 2017-01-01 RX ORDER — FLUTICASONE PROPIONATE 50 MCG
SPRAY, SUSPENSION (ML) NASAL
Qty: 1 BOTTLE | Refills: 0 | Status: SHIPPED | OUTPATIENT
Start: 2017-01-01 | End: 2017-01-01

## 2017-01-01 RX ORDER — PRAVASTATIN SODIUM 20 MG/1
80 TABLET ORAL
Status: DISCONTINUED | OUTPATIENT
Start: 2017-01-01 | End: 2017-01-01 | Stop reason: HOSPADM

## 2017-01-01 RX ORDER — HYDROCODONE BITARTRATE AND ACETAMINOPHEN 5; 325 MG/1; MG/1
1 TABLET ORAL
Qty: 60 TAB | Refills: 0 | Status: ON HOLD | OUTPATIENT
Start: 2017-01-01 | End: 2017-01-01 | Stop reason: SDUPTHER

## 2017-01-01 RX ORDER — CEPHALEXIN 500 MG/1
500 CAPSULE ORAL 4 TIMES DAILY
Qty: 28 CAP | Refills: 0 | Status: SHIPPED | OUTPATIENT
Start: 2017-01-01 | End: 2017-01-01

## 2017-01-01 RX ORDER — BUDESONIDE AND FORMOTEROL FUMARATE DIHYDRATE 160; 4.5 UG/1; UG/1
2 AEROSOL RESPIRATORY (INHALATION) 2 TIMES DAILY
Status: DISCONTINUED | OUTPATIENT
Start: 2017-01-01 | End: 2017-01-01

## 2017-01-01 RX ORDER — SODIUM CHLORIDE 9 MG/ML
500 INJECTION, SOLUTION INTRAVENOUS ONCE
Status: COMPLETED | OUTPATIENT
Start: 2017-01-01 | End: 2017-01-01

## 2017-01-01 RX ORDER — PRAVASTATIN SODIUM 80 MG/1
80 TABLET ORAL
Qty: 100 TAB | Refills: 0 | Status: SHIPPED | OUTPATIENT
Start: 2017-01-01 | End: 2017-01-01

## 2017-01-01 RX ORDER — POTASSIUM CHLORIDE 20 MEQ/1
20 TABLET, EXTENDED RELEASE ORAL 3 TIMES DAILY
Status: DISCONTINUED | OUTPATIENT
Start: 2017-01-01 | End: 2017-01-01

## 2017-01-01 RX ORDER — FUROSEMIDE 10 MG/ML
80 INJECTION INTRAMUSCULAR; INTRAVENOUS DAILY
Status: DISCONTINUED | OUTPATIENT
Start: 2017-01-01 | End: 2017-01-01

## 2017-01-01 RX ORDER — NITROGLYCERIN 0.4 MG/1
0.4 TABLET SUBLINGUAL AS NEEDED
Qty: 1 BOTTLE | Refills: 0 | Status: SHIPPED | OUTPATIENT
Start: 2017-01-01 | End: 2017-01-01

## 2017-01-01 RX ORDER — ACETAZOLAMIDE 250 MG/1
250 TABLET ORAL DAILY
Status: DISPENSED | OUTPATIENT
Start: 2017-01-01 | End: 2017-01-01

## 2017-01-01 RX ORDER — LORAZEPAM 0.5 MG/1
0.5 TABLET ORAL
Status: DISCONTINUED | OUTPATIENT
Start: 2017-01-01 | End: 2017-01-01 | Stop reason: HOSPADM

## 2017-01-01 RX ORDER — CARVEDILOL 6.25 MG/1
6.25 TABLET ORAL 2 TIMES DAILY WITH MEALS
Status: DISCONTINUED | OUTPATIENT
Start: 2017-01-01 | End: 2017-01-01 | Stop reason: HOSPADM

## 2017-01-01 RX ORDER — ADHESIVE BANDAGE
30 BANDAGE TOPICAL DAILY PRN
Status: DISCONTINUED | OUTPATIENT
Start: 2017-01-01 | End: 2017-01-01

## 2017-01-01 RX ORDER — SODIUM CHLORIDE 9 MG/ML
250 INJECTION, SOLUTION INTRAVENOUS AS NEEDED
Status: DISCONTINUED | OUTPATIENT
Start: 2017-01-01 | End: 2017-01-01 | Stop reason: HOSPADM

## 2017-01-01 RX ORDER — CARVEDILOL 3.12 MG/1
3.12 TABLET ORAL EVERY 12 HOURS
Status: DISCONTINUED | OUTPATIENT
Start: 2017-01-01 | End: 2017-01-01

## 2017-01-01 RX ORDER — ZOLPIDEM TARTRATE 5 MG/1
5 TABLET ORAL
Qty: 30 TAB | Refills: 0 | Status: SHIPPED | OUTPATIENT
Start: 2017-01-01 | End: 2017-01-01

## 2017-01-01 RX ORDER — SODIUM CHLORIDE 0.9 % (FLUSH) 0.9 %
5-10 SYRINGE (ML) INJECTION EVERY 8 HOURS
Status: DISCONTINUED | OUTPATIENT
Start: 2017-01-01 | End: 2017-01-01 | Stop reason: HOSPADM

## 2017-01-01 RX ORDER — FUROSEMIDE 40 MG/1
TABLET ORAL
Qty: 100 TAB | Refills: 0 | Status: SHIPPED | OUTPATIENT
Start: 2017-01-01 | End: 2017-01-01

## 2017-01-01 RX ORDER — ALLOPURINOL 100 MG/1
100 TABLET ORAL DAILY
Status: DISCONTINUED | OUTPATIENT
Start: 2017-01-01 | End: 2017-01-01

## 2017-01-01 RX ORDER — COLCHICINE 0.6 MG/1
0.6 TABLET ORAL DAILY
Status: DISCONTINUED | OUTPATIENT
Start: 2017-01-01 | End: 2017-01-01 | Stop reason: SDUPTHER

## 2017-01-01 RX ORDER — LIDOCAINE HYDROCHLORIDE 10 MG/ML
5 INJECTION, SOLUTION EPIDURAL; INFILTRATION; INTRACAUDAL; PERINEURAL ONCE
Status: COMPLETED | OUTPATIENT
Start: 2017-01-01 | End: 2017-01-01

## 2017-01-01 RX ORDER — IPRATROPIUM BROMIDE AND ALBUTEROL SULFATE 2.5; .5 MG/3ML; MG/3ML
3 SOLUTION RESPIRATORY (INHALATION)
Qty: 30 NEBULE | Refills: 0 | Status: SHIPPED | OUTPATIENT
Start: 2017-01-01 | End: 2017-01-01

## 2017-01-01 RX ADMIN — ERYTHROPOIETIN 20000 UNITS: 20000 INJECTION, SOLUTION INTRAVENOUS; SUBCUTANEOUS at 18:04

## 2017-01-01 RX ADMIN — METOLAZONE 2.5 MG: 5 TABLET ORAL at 09:56

## 2017-01-01 RX ADMIN — CARVEDILOL 6.25 MG: 6.25 TABLET, FILM COATED ORAL at 17:54

## 2017-01-01 RX ADMIN — ALLOPURINOL 100 MG: 100 TABLET ORAL at 09:23

## 2017-01-01 RX ADMIN — FERROUS SULFATE TAB 325 MG (65 MG ELEMENTAL FE) 325 MG: 325 (65 FE) TAB at 08:50

## 2017-01-01 RX ADMIN — FUROSEMIDE 5 MG/HR: 10 INJECTION, SOLUTION INTRAVENOUS at 09:17

## 2017-01-01 RX ADMIN — CARVEDILOL 6.25 MG: 6.25 TABLET, FILM COATED ORAL at 09:52

## 2017-01-01 RX ADMIN — Medication 1 CAPSULE: at 09:05

## 2017-01-01 RX ADMIN — POTASSIUM CHLORIDE 20 MEQ: 20 TABLET, EXTENDED RELEASE ORAL at 08:40

## 2017-01-01 RX ADMIN — Medication 10 ML: at 06:00

## 2017-01-01 RX ADMIN — NITROGLYCERIN 1 INCH: 20 OINTMENT TOPICAL at 18:17

## 2017-01-01 RX ADMIN — ACETAZOLAMIDE 250 MG: 250 TABLET ORAL at 09:53

## 2017-01-01 RX ADMIN — Medication 10 ML: at 01:29

## 2017-01-01 RX ADMIN — LIDOCAINE HYDROCHLORIDE 5 ML: 10 INJECTION, SOLUTION EPIDURAL; INFILTRATION; INTRACAUDAL; PERINEURAL at 12:00

## 2017-01-01 RX ADMIN — Medication 10 ML: at 06:18

## 2017-01-01 RX ADMIN — CARVEDILOL 6.25 MG: 6.25 TABLET, FILM COATED ORAL at 09:46

## 2017-01-01 RX ADMIN — Medication 1 CAPSULE: at 09:56

## 2017-01-01 RX ADMIN — PRAVASTATIN SODIUM 80 MG: 20 TABLET ORAL at 20:51

## 2017-01-01 RX ADMIN — PANTOPRAZOLE SODIUM 40 MG: 40 TABLET, DELAYED RELEASE ORAL at 09:30

## 2017-01-01 RX ADMIN — CARVEDILOL 3.12 MG: 3.12 TABLET, FILM COATED ORAL at 08:50

## 2017-01-01 RX ADMIN — Medication 10 ML: at 00:34

## 2017-01-01 RX ADMIN — THERA TABS 1 TABLET: TAB at 08:40

## 2017-01-01 RX ADMIN — THERA TABS 1 TABLET: TAB at 09:43

## 2017-01-01 RX ADMIN — PRAVASTATIN SODIUM 80 MG: 20 TABLET ORAL at 21:26

## 2017-01-01 RX ADMIN — Medication 10 ML: at 06:11

## 2017-01-01 RX ADMIN — PRAVASTATIN SODIUM 80 MG: 20 TABLET ORAL at 22:57

## 2017-01-01 RX ADMIN — THERA TABS 1 TABLET: TAB at 09:32

## 2017-01-01 RX ADMIN — Medication 10 ML: at 13:23

## 2017-01-01 RX ADMIN — FLUTICASONE PROPIONATE 2 SPRAY: 50 SPRAY, METERED NASAL at 15:19

## 2017-01-01 RX ADMIN — ONDANSETRON 4 MG: 2 INJECTION INTRAMUSCULAR; INTRAVENOUS at 10:44

## 2017-01-01 RX ADMIN — LORAZEPAM 0.5 MG: 0.5 TABLET ORAL at 15:52

## 2017-01-01 RX ADMIN — Medication 1 CAPSULE: at 09:00

## 2017-01-01 RX ADMIN — PANTOPRAZOLE SODIUM 40 MG: 40 TABLET, DELAYED RELEASE ORAL at 08:40

## 2017-01-01 RX ADMIN — FLUTICASONE PROPIONATE 2 SPRAY: 50 SPRAY, METERED NASAL at 09:29

## 2017-01-01 RX ADMIN — Medication 10 ML: at 23:04

## 2017-01-01 RX ADMIN — DOBUTAMINE IN DEXTROSE 5 MCG/KG/MIN: 200 INJECTION, SOLUTION INTRAVENOUS at 17:52

## 2017-01-01 RX ADMIN — ALLOPURINOL 100 MG: 100 TABLET ORAL at 09:56

## 2017-01-01 RX ADMIN — ALLOPURINOL 100 MG: 100 TABLET ORAL at 09:27

## 2017-01-01 RX ADMIN — Medication 10 ML: at 17:34

## 2017-01-01 RX ADMIN — FERROUS SULFATE TAB 325 MG (65 MG ELEMENTAL FE) 325 MG: 325 (65 FE) TAB at 08:30

## 2017-01-01 RX ADMIN — Medication 10 ML: at 21:58

## 2017-01-01 RX ADMIN — DOBUTAMINE IN DEXTROSE 5 MCG/KG/MIN: 200 INJECTION, SOLUTION INTRAVENOUS at 22:59

## 2017-01-01 RX ADMIN — ONDANSETRON 4 MG: 2 INJECTION INTRAMUSCULAR; INTRAVENOUS at 10:16

## 2017-01-01 RX ADMIN — THERA TABS 1 TABLET: TAB at 08:19

## 2017-01-01 RX ADMIN — FUROSEMIDE 40 MG: 40 TABLET ORAL at 09:23

## 2017-01-01 RX ADMIN — ACETAZOLAMIDE 250 MG: 250 TABLET ORAL at 09:46

## 2017-01-01 RX ADMIN — HYDROCODONE BITARTRATE AND ACETAMINOPHEN 1 TABLET: 5; 325 TABLET ORAL at 22:33

## 2017-01-01 RX ADMIN — ALLOPURINOL 100 MG: 100 TABLET ORAL at 09:46

## 2017-01-01 RX ADMIN — CHOLECALCIFEROL TAB 25 MCG (1000 UNIT) 2000 UNITS: 25 TAB at 09:05

## 2017-01-01 RX ADMIN — FLUTICASONE PROPIONATE 2 SPRAY: 50 SPRAY, METERED NASAL at 09:00

## 2017-01-01 RX ADMIN — HYDROCODONE BITARTRATE AND ACETAMINOPHEN 1 TABLET: 5; 325 TABLET ORAL at 19:01

## 2017-01-01 RX ADMIN — Medication 10 ML: at 17:56

## 2017-01-01 RX ADMIN — Medication 10 ML: at 15:28

## 2017-01-01 RX ADMIN — FERROUS SULFATE TAB 325 MG (65 MG ELEMENTAL FE) 325 MG: 325 (65 FE) TAB at 09:36

## 2017-01-01 RX ADMIN — PRAVASTATIN SODIUM 80 MG: 20 TABLET ORAL at 01:27

## 2017-01-01 RX ADMIN — POTASSIUM CHLORIDE 20 MEQ: 1500 TABLET, EXTENDED RELEASE ORAL at 09:53

## 2017-01-01 RX ADMIN — FERROUS SULFATE TAB 325 MG (65 MG ELEMENTAL FE) 325 MG: 325 (65 FE) TAB at 08:00

## 2017-01-01 RX ADMIN — PANTOPRAZOLE SODIUM 40 MG: 40 TABLET, DELAYED RELEASE ORAL at 09:36

## 2017-01-01 RX ADMIN — ASPIRIN 81 MG CHEWABLE TABLET 81 MG: 81 TABLET CHEWABLE at 09:56

## 2017-01-01 RX ADMIN — ZOLPIDEM TARTRATE 5 MG: 5 TABLET, FILM COATED ORAL at 21:45

## 2017-01-01 RX ADMIN — COLCHICINE 0.6 MG: 0.6 CAPSULE ORAL at 18:10

## 2017-01-01 RX ADMIN — DOBUTAMINE IN DEXTROSE 2.5 MCG/KG/MIN: 200 INJECTION, SOLUTION INTRAVENOUS at 02:28

## 2017-01-01 RX ADMIN — FLUTICASONE PROPIONATE 2 SPRAY: 50 SPRAY, METERED NASAL at 09:47

## 2017-01-01 RX ADMIN — SODIUM CHLORIDE 0.5 MG/HR: 900 INJECTION, SOLUTION INTRAVENOUS at 13:32

## 2017-01-01 RX ADMIN — FERROUS SULFATE TAB 325 MG (65 MG ELEMENTAL FE) 325 MG: 325 (65 FE) TAB at 08:19

## 2017-01-01 RX ADMIN — Medication 10 ML: at 15:04

## 2017-01-01 RX ADMIN — HYDROCODONE BITARTRATE AND ACETAMINOPHEN 1 TABLET: 5; 325 TABLET ORAL at 10:18

## 2017-01-01 RX ADMIN — THERA TABS 1 TABLET: TAB at 09:26

## 2017-01-01 RX ADMIN — PRAVASTATIN SODIUM 80 MG: 20 TABLET ORAL at 22:35

## 2017-01-01 RX ADMIN — ACETAZOLAMIDE 250 MG: 250 TABLET ORAL at 09:42

## 2017-01-01 RX ADMIN — PIPERACILLIN AND TAZOBACTAM 2.25 G: 2; .25 INJECTION, POWDER, FOR SOLUTION INTRAVENOUS at 00:34

## 2017-01-01 RX ADMIN — PANTOPRAZOLE SODIUM 40 MG: 40 TABLET, DELAYED RELEASE ORAL at 09:56

## 2017-01-01 RX ADMIN — Medication 10 ML: at 22:35

## 2017-01-01 RX ADMIN — Medication 10 ML: at 21:41

## 2017-01-01 RX ADMIN — CHOLECALCIFEROL TAB 25 MCG (1000 UNIT) 2000 UNITS: 25 TAB at 09:00

## 2017-01-01 RX ADMIN — THERA TABS 1 TABLET: TAB at 09:23

## 2017-01-01 RX ADMIN — VITAMIN D, TAB 1000IU (100/BT) 2000 UNITS: 25 TAB at 10:08

## 2017-01-01 RX ADMIN — FLUTICASONE PROPIONATE 2 SPRAY: 50 SPRAY, METERED NASAL at 09:34

## 2017-01-01 RX ADMIN — IPRATROPIUM BROMIDE AND ALBUTEROL SULFATE 3 ML: .5; 3 SOLUTION RESPIRATORY (INHALATION) at 20:59

## 2017-01-01 RX ADMIN — CARVEDILOL 6.25 MG: 6.25 TABLET, FILM COATED ORAL at 09:42

## 2017-01-01 RX ADMIN — CARVEDILOL 6.25 MG: 6.25 TABLET, FILM COATED ORAL at 18:25

## 2017-01-01 RX ADMIN — SODIUM CHLORIDE 27.96 MCG: 900 INJECTION, SOLUTION INTRAVENOUS at 22:19

## 2017-01-01 RX ADMIN — PANTOPRAZOLE SODIUM 40 MG: 40 TABLET, DELAYED RELEASE ORAL at 08:37

## 2017-01-01 RX ADMIN — CARVEDILOL 6.25 MG: 6.25 TABLET, FILM COATED ORAL at 17:53

## 2017-01-01 RX ADMIN — CHOLECALCIFEROL TAB 25 MCG (1000 UNIT) 2000 UNITS: 25 TAB at 09:30

## 2017-01-01 RX ADMIN — THERA TABS 1 TABLET: TAB at 09:56

## 2017-01-01 RX ADMIN — HYDROCODONE BITARTRATE AND ACETAMINOPHEN 1 TABLET: 5; 325 TABLET ORAL at 01:41

## 2017-01-01 RX ADMIN — COLCHICINE 0.6 MG: 0.6 CAPSULE ORAL at 17:33

## 2017-01-01 RX ADMIN — SODIUM CHLORIDE 250 ML: 900 INJECTION, SOLUTION INTRAVENOUS at 21:42

## 2017-01-01 RX ADMIN — HEPARIN SODIUM 1000 UNITS: 200 INJECTION, SOLUTION INTRAVENOUS at 11:40

## 2017-01-01 RX ADMIN — ANTICOAGULANT SODIUM CITRATE SOLUTION 0.08 G: 4 SOLUTION INTRAVENOUS at 17:00

## 2017-01-01 RX ADMIN — ALLOPURINOL 100 MG: 100 TABLET ORAL at 10:08

## 2017-01-01 RX ADMIN — HEPARIN SODIUM 5000 UNITS: 5000 INJECTION INTRAVENOUS; SUBCUTANEOUS at 01:57

## 2017-01-01 RX ADMIN — HYDROCODONE BITARTRATE AND ACETAMINOPHEN 1 TABLET: 5; 325 TABLET ORAL at 13:46

## 2017-01-01 RX ADMIN — FUROSEMIDE 40 MG: 40 TABLET ORAL at 17:46

## 2017-01-01 RX ADMIN — HEPARIN SODIUM 5000 UNITS: 5000 INJECTION, SOLUTION INTRAVENOUS; SUBCUTANEOUS at 18:40

## 2017-01-01 RX ADMIN — PANTOPRAZOLE SODIUM 40 MG: 40 TABLET, DELAYED RELEASE ORAL at 09:05

## 2017-01-01 RX ADMIN — PANTOPRAZOLE SODIUM 40 MG: 40 TABLET, DELAYED RELEASE ORAL at 08:19

## 2017-01-01 RX ADMIN — POTASSIUM CHLORIDE 20 MEQ: 20 TABLET, EXTENDED RELEASE ORAL at 09:23

## 2017-01-01 RX ADMIN — VITAMIN D, TAB 1000IU (100/BT) 2000 UNITS: 25 TAB at 09:46

## 2017-01-01 RX ADMIN — VITAMIN D, TAB 1000IU (100/BT) 2000 UNITS: 25 TAB at 09:42

## 2017-01-01 RX ADMIN — HEPARIN SODIUM 5000 UNITS: 5000 INJECTION, SOLUTION INTRAVENOUS; SUBCUTANEOUS at 00:34

## 2017-01-01 RX ADMIN — PRAVASTATIN SODIUM 80 MG: 20 TABLET ORAL at 21:59

## 2017-01-01 RX ADMIN — FERROUS SULFATE TAB 325 MG (65 MG ELEMENTAL FE) 325 MG: 325 (65 FE) TAB at 09:43

## 2017-01-01 RX ADMIN — ACETAZOLAMIDE 250 MG: 250 TABLET ORAL at 09:23

## 2017-01-01 RX ADMIN — DOBUTAMINE IN DEXTROSE 5 MCG/KG/MIN: 200 INJECTION, SOLUTION INTRAVENOUS at 23:02

## 2017-01-01 RX ADMIN — Medication 1 CAPSULE: at 09:30

## 2017-01-01 RX ADMIN — VITAMIN D, TAB 1000IU (100/BT) 2000 UNITS: 25 TAB at 09:27

## 2017-01-01 RX ADMIN — Medication 10 ML: at 23:00

## 2017-01-01 RX ADMIN — HYDROCODONE BITARTRATE AND ACETAMINOPHEN 1 TABLET: 5; 325 TABLET ORAL at 15:06

## 2017-01-01 RX ADMIN — Medication 1 CAPSULE: at 18:00

## 2017-01-01 RX ADMIN — FUROSEMIDE 40 MG: 10 INJECTION, SOLUTION INTRAMUSCULAR; INTRAVENOUS at 11:02

## 2017-01-01 RX ADMIN — METOLAZONE 2.5 MG: 2.5 TABLET ORAL at 08:39

## 2017-01-01 RX ADMIN — BUDESONIDE AND FORMOTEROL FUMARATE DIHYDRATE 2 PUFF: 160; 4.5 AEROSOL RESPIRATORY (INHALATION) at 18:43

## 2017-01-01 RX ADMIN — FUROSEMIDE 5 MG/HR: 10 INJECTION, SOLUTION INTRAVENOUS at 20:00

## 2017-01-01 RX ADMIN — CHOLECALCIFEROL TAB 25 MCG (1000 UNIT) 2000 UNITS: 25 TAB at 08:50

## 2017-01-01 RX ADMIN — Medication 10 ML: at 05:20

## 2017-01-01 RX ADMIN — PIPERACILLIN AND TAZOBACTAM 2.25 G: 2; .25 INJECTION, POWDER, FOR SOLUTION INTRAVENOUS at 05:14

## 2017-01-01 RX ADMIN — FERROUS SULFATE TAB 325 MG (65 MG ELEMENTAL FE) 325 MG: 325 (65 FE) TAB at 09:23

## 2017-01-01 RX ADMIN — POTASSIUM CHLORIDE 20 MEQ: 1500 TABLET, EXTENDED RELEASE ORAL at 09:43

## 2017-01-01 RX ADMIN — CHOLECALCIFEROL TAB 25 MCG (1000 UNIT) 2000 UNITS: 25 TAB at 09:28

## 2017-01-01 RX ADMIN — Medication 10 ML: at 06:19

## 2017-01-01 RX ADMIN — VITAMIN D, TAB 1000IU (100/BT) 2000 UNITS: 25 TAB at 09:37

## 2017-01-01 RX ADMIN — Medication 1 CAPSULE: at 17:55

## 2017-01-01 RX ADMIN — THERA TABS 1 TABLET: TAB at 09:36

## 2017-01-01 RX ADMIN — CARVEDILOL 6.25 MG: 6.25 TABLET, FILM COATED ORAL at 10:08

## 2017-01-01 RX ADMIN — FUROSEMIDE 5 MG/HR: 10 INJECTION, SOLUTION INTRAVENOUS at 13:15

## 2017-01-01 RX ADMIN — ASPIRIN 81 MG CHEWABLE TABLET 81 MG: 81 TABLET CHEWABLE at 09:42

## 2017-01-01 RX ADMIN — SODIUM CHLORIDE 100 ML/HR: 900 INJECTION, SOLUTION INTRAVENOUS at 07:21

## 2017-01-01 RX ADMIN — ASPIRIN 81 MG CHEWABLE TABLET 81 MG: 81 TABLET CHEWABLE at 09:52

## 2017-01-01 RX ADMIN — CARVEDILOL 6.25 MG: 6.25 TABLET, FILM COATED ORAL at 09:36

## 2017-01-01 RX ADMIN — Medication 1 CAPSULE: at 08:40

## 2017-01-01 RX ADMIN — FUROSEMIDE 5 MG/HR: 10 INJECTION, SOLUTION INTRAVENOUS at 22:46

## 2017-01-01 RX ADMIN — Medication 10 ML: at 09:30

## 2017-01-01 RX ADMIN — Medication 1 CAPSULE: at 10:06

## 2017-01-01 RX ADMIN — Medication 10 ML: at 09:51

## 2017-01-01 RX ADMIN — POTASSIUM CHLORIDE 20 MEQ: 20 TABLET, EXTENDED RELEASE ORAL at 09:00

## 2017-01-01 RX ADMIN — Medication 10 ML: at 22:00

## 2017-01-01 RX ADMIN — FERROUS SULFATE TAB 325 MG (65 MG ELEMENTAL FE) 325 MG: 325 (65 FE) TAB at 09:52

## 2017-01-01 RX ADMIN — THERA TABS 1 TABLET: TAB at 09:47

## 2017-01-01 RX ADMIN — FLUTICASONE PROPIONATE 2 SPRAY: 50 SPRAY, METERED NASAL at 13:31

## 2017-01-01 RX ADMIN — PRAVASTATIN SODIUM 80 MG: 20 TABLET ORAL at 22:20

## 2017-01-01 RX ADMIN — Medication 1 CAPSULE: at 18:24

## 2017-01-01 RX ADMIN — CARVEDILOL 6.25 MG: 6.25 TABLET, FILM COATED ORAL at 08:30

## 2017-01-01 RX ADMIN — FENTANYL CITRATE 25 MCG: 50 INJECTION INTRAMUSCULAR; INTRAVENOUS at 11:40

## 2017-01-01 RX ADMIN — CARVEDILOL 6.25 MG: 6.25 TABLET, FILM COATED ORAL at 09:57

## 2017-01-01 RX ADMIN — PIPERACILLIN AND TAZOBACTAM 2.25 G: 2; .25 INJECTION, POWDER, FOR SOLUTION INTRAVENOUS at 18:40

## 2017-01-01 RX ADMIN — PANTOPRAZOLE SODIUM 40 MG: 40 TABLET, DELAYED RELEASE ORAL at 09:34

## 2017-01-01 RX ADMIN — ALLOPURINOL 100 MG: 100 TABLET ORAL at 09:52

## 2017-01-01 RX ADMIN — ALLOPURINOL 50 MG: 100 TABLET ORAL at 09:31

## 2017-01-01 RX ADMIN — CHOLECALCIFEROL TAB 25 MCG (1000 UNIT) 2000 UNITS: 25 TAB at 08:19

## 2017-01-01 RX ADMIN — Medication 1 CAPSULE: at 17:41

## 2017-01-01 RX ADMIN — ACETAZOLAMIDE 250 MG: 250 TABLET ORAL at 09:34

## 2017-01-01 RX ADMIN — POTASSIUM CHLORIDE 20 MEQ: 1500 TABLET, EXTENDED RELEASE ORAL at 20:50

## 2017-01-01 RX ADMIN — FERROUS SULFATE TAB 325 MG (65 MG ELEMENTAL FE) 325 MG: 325 (65 FE) TAB at 09:01

## 2017-01-01 RX ADMIN — FLUTICASONE PROPIONATE 2 SPRAY: 50 SPRAY, METERED NASAL at 09:55

## 2017-01-01 RX ADMIN — Medication 10 ML: at 15:05

## 2017-01-01 RX ADMIN — PANTOPRAZOLE SODIUM 40 MG: 40 TABLET, DELAYED RELEASE ORAL at 09:26

## 2017-01-01 RX ADMIN — COLCHICINE 0.6 MG: 0.6 CAPSULE ORAL at 12:37

## 2017-01-01 RX ADMIN — HYDROCODONE BITARTRATE AND ACETAMINOPHEN 1 TABLET: 5; 325 TABLET ORAL at 08:30

## 2017-01-01 RX ADMIN — COLCHICINE 0.6 MG: 0.6 TABLET, FILM COATED ORAL at 13:17

## 2017-01-01 RX ADMIN — VITAMIN D, TAB 1000IU (100/BT) 2000 UNITS: 25 TAB at 09:52

## 2017-01-01 RX ADMIN — MIDAZOLAM HYDROCHLORIDE 1 MG: 1 INJECTION, SOLUTION INTRAMUSCULAR; INTRAVENOUS at 11:43

## 2017-01-01 RX ADMIN — VITAMIN D, TAB 1000IU (100/BT) 2000 UNITS: 25 TAB at 09:23

## 2017-01-01 RX ADMIN — BUDESONIDE AND FORMOTEROL FUMARATE DIHYDRATE 2 PUFF: 160; 4.5 AEROSOL RESPIRATORY (INHALATION) at 10:34

## 2017-01-01 RX ADMIN — CARVEDILOL 3.12 MG: 3.12 TABLET, FILM COATED ORAL at 09:00

## 2017-01-01 RX ADMIN — PIPERACILLIN AND TAZOBACTAM 2.25 G: 2; .25 INJECTION, POWDER, FOR SOLUTION INTRAVENOUS at 17:56

## 2017-01-01 RX ADMIN — Medication 10 ML: at 01:08

## 2017-01-01 RX ADMIN — Medication 1 CAPSULE: at 18:17

## 2017-01-01 RX ADMIN — FUROSEMIDE 3 MG/HR: 10 INJECTION, SOLUTION INTRAVENOUS at 08:39

## 2017-01-01 RX ADMIN — HEPARIN SODIUM 10000 UNITS: 5000 INJECTION, SOLUTION INTRAVENOUS; SUBCUTANEOUS at 11:44

## 2017-01-01 RX ADMIN — ALLOPURINOL 50 MG: 100 TABLET ORAL at 09:00

## 2017-01-01 RX ADMIN — Medication 1 CAPSULE: at 17:53

## 2017-01-01 RX ADMIN — DOBUTAMINE IN DEXTROSE 5 MCG/KG/MIN: 200 INJECTION, SOLUTION INTRAVENOUS at 14:38

## 2017-01-01 RX ADMIN — MAGNESIUM SULFATE HEPTAHYDRATE 2 G: 40 INJECTION, SOLUTION INTRAVENOUS at 18:16

## 2017-01-01 RX ADMIN — VITAMIN D, TAB 1000IU (100/BT) 2000 UNITS: 25 TAB at 08:37

## 2017-01-01 RX ADMIN — Medication 1 CAPSULE: at 09:23

## 2017-01-01 RX ADMIN — Medication 10 ML: at 01:57

## 2017-01-01 RX ADMIN — DOBUTAMINE IN DEXTROSE 2.5 MCG/KG/MIN: 200 INJECTION, SOLUTION INTRAVENOUS at 10:45

## 2017-01-01 RX ADMIN — FUROSEMIDE 40 MG: 40 TABLET ORAL at 08:30

## 2017-01-01 RX ADMIN — PANTOPRAZOLE SODIUM 40 MG: 40 TABLET, DELAYED RELEASE ORAL at 09:47

## 2017-01-01 RX ADMIN — DOBUTAMINE IN DEXTROSE 5 MCG/KG/MIN: 200 INJECTION, SOLUTION INTRAVENOUS at 19:18

## 2017-01-01 RX ADMIN — PANTOPRAZOLE SODIUM 40 MG: 40 TABLET, DELAYED RELEASE ORAL at 09:42

## 2017-01-01 RX ADMIN — ALLOPURINOL 50 MG: 100 TABLET ORAL at 09:01

## 2017-01-01 RX ADMIN — PANTOPRAZOLE SODIUM 40 MG: 40 TABLET, DELAYED RELEASE ORAL at 09:28

## 2017-01-01 RX ADMIN — THERA TABS 1 TABLET: TAB at 09:00

## 2017-01-01 RX ADMIN — PRAVASTATIN SODIUM 80 MG: 20 TABLET ORAL at 02:13

## 2017-01-01 RX ADMIN — ACETAMINOPHEN 650 MG: 325 TABLET ORAL at 09:44

## 2017-01-01 RX ADMIN — ASPIRIN 81 MG CHEWABLE TABLET 81 MG: 81 TABLET CHEWABLE at 10:08

## 2017-01-01 RX ADMIN — CARVEDILOL 6.25 MG: 6.25 TABLET, FILM COATED ORAL at 17:46

## 2017-01-01 RX ADMIN — ERYTHROPOIETIN 20000 UNITS: 20000 INJECTION, SOLUTION INTRAVENOUS; SUBCUTANEOUS at 18:16

## 2017-01-01 RX ADMIN — Medication 1 CAPSULE: at 09:52

## 2017-01-01 RX ADMIN — FLUTICASONE PROPIONATE 2 SPRAY: 50 SPRAY, METERED NASAL at 10:23

## 2017-01-01 RX ADMIN — Medication 1 CAPSULE: at 18:05

## 2017-01-01 RX ADMIN — CARVEDILOL 6.25 MG: 6.25 TABLET, FILM COATED ORAL at 18:05

## 2017-01-01 RX ADMIN — Medication 10 ML: at 14:00

## 2017-01-01 RX ADMIN — ACETAMINOPHEN 650 MG: 325 TABLET ORAL at 06:36

## 2017-01-01 RX ADMIN — PANTOPRAZOLE SODIUM 40 MG: 40 TABLET, DELAYED RELEASE ORAL at 10:08

## 2017-01-01 RX ADMIN — HYDROCODONE BITARTRATE AND ACETAMINOPHEN 1 TABLET: 5; 325 TABLET ORAL at 21:38

## 2017-01-01 RX ADMIN — PRAVASTATIN SODIUM 80 MG: 20 TABLET ORAL at 22:17

## 2017-01-01 RX ADMIN — Medication 1 CAPSULE: at 08:30

## 2017-01-01 RX ADMIN — HYDROCODONE BITARTRATE AND ACETAMINOPHEN 1 TABLET: 5; 325 TABLET ORAL at 12:32

## 2017-01-01 RX ADMIN — THERA TABS 1 TABLET: TAB at 09:52

## 2017-01-01 RX ADMIN — FUROSEMIDE 40 MG: 40 TABLET ORAL at 09:30

## 2017-01-01 RX ADMIN — ERYTHROPOIETIN 20000 UNITS: 20000 INJECTION, SOLUTION INTRAVENOUS; SUBCUTANEOUS at 18:26

## 2017-01-01 RX ADMIN — ONDANSETRON 4 MG: 2 INJECTION INTRAMUSCULAR; INTRAVENOUS at 05:35

## 2017-01-01 RX ADMIN — PANTOPRAZOLE SODIUM 40 MG: 40 TABLET, DELAYED RELEASE ORAL at 09:52

## 2017-01-01 RX ADMIN — BUDESONIDE AND FORMOTEROL FUMARATE DIHYDRATE 2 PUFF: 160; 4.5 AEROSOL RESPIRATORY (INHALATION) at 07:44

## 2017-01-01 RX ADMIN — CARVEDILOL 6.25 MG: 6.25 TABLET, FILM COATED ORAL at 09:31

## 2017-01-01 RX ADMIN — Medication 10 ML: at 18:41

## 2017-01-01 RX ADMIN — POTASSIUM CHLORIDE 20 MEQ: 20 TABLET, EXTENDED RELEASE ORAL at 22:00

## 2017-01-01 RX ADMIN — FUROSEMIDE 40 MG: 40 TABLET ORAL at 17:55

## 2017-01-01 RX ADMIN — ERYTHROPOIETIN 6000 UNITS: 3000 INJECTION, SOLUTION INTRAVENOUS; SUBCUTANEOUS at 15:36

## 2017-01-01 RX ADMIN — PANTOPRAZOLE SODIUM 40 MG: 40 TABLET, DELAYED RELEASE ORAL at 08:30

## 2017-01-01 RX ADMIN — FUROSEMIDE 40 MG: 10 INJECTION, SOLUTION INTRAVENOUS at 08:37

## 2017-01-01 RX ADMIN — PANTOPRAZOLE SODIUM 40 MG: 40 TABLET, DELAYED RELEASE ORAL at 09:31

## 2017-01-01 RX ADMIN — THERA TABS 1 TABLET: TAB at 08:30

## 2017-01-01 RX ADMIN — THERA TABS 1 TABLET: TAB at 08:38

## 2017-01-01 RX ADMIN — HYDROCODONE BITARTRATE AND ACETAMINOPHEN 1 TABLET: 5; 325 TABLET ORAL at 06:20

## 2017-01-01 RX ADMIN — FENTANYL CITRATE 25 MCG: 50 INJECTION INTRAMUSCULAR; INTRAVENOUS at 11:43

## 2017-01-01 RX ADMIN — Medication 10 ML: at 17:54

## 2017-01-01 RX ADMIN — ONDANSETRON 4 MG: 2 INJECTION INTRAMUSCULAR; INTRAVENOUS at 09:30

## 2017-01-01 RX ADMIN — CARVEDILOL 3.12 MG: 3.12 TABLET, FILM COATED ORAL at 20:00

## 2017-01-01 RX ADMIN — PANTOPRAZOLE SODIUM 40 MG: 40 TABLET, DELAYED RELEASE ORAL at 08:50

## 2017-01-01 RX ADMIN — CARVEDILOL 6.25 MG: 6.25 TABLET, FILM COATED ORAL at 08:40

## 2017-01-01 RX ADMIN — PRAVASTATIN SODIUM 80 MG: 20 TABLET ORAL at 22:14

## 2017-01-01 RX ADMIN — ASPIRIN 81 MG CHEWABLE TABLET 81 MG: 81 TABLET CHEWABLE at 09:46

## 2017-01-01 RX ADMIN — Medication 10 ML: at 07:30

## 2017-01-01 RX ADMIN — ALLOPURINOL 100 MG: 100 TABLET ORAL at 09:36

## 2017-01-01 RX ADMIN — FLUTICASONE PROPIONATE 2 SPRAY: 50 SPRAY, METERED NASAL at 10:00

## 2017-01-01 RX ADMIN — ALLOPURINOL 100 MG: 100 TABLET ORAL at 09:43

## 2017-01-01 RX ADMIN — VITAMIN D, TAB 1000IU (100/BT) 2000 UNITS: 25 TAB at 09:57

## 2017-01-01 RX ADMIN — FUROSEMIDE 3 MG/HR: 10 INJECTION, SOLUTION INTRAVENOUS at 22:58

## 2017-01-01 RX ADMIN — THERA TABS 1 TABLET: TAB at 10:08

## 2017-01-01 RX ADMIN — ASPIRIN 162 MG: 81 TABLET, COATED ORAL at 08:37

## 2017-01-01 RX ADMIN — THERA TABS 1 TABLET: TAB at 09:01

## 2017-01-01 RX ADMIN — Medication 10 ML: at 18:19

## 2017-01-01 RX ADMIN — METOLAZONE 2.5 MG: 5 TABLET ORAL at 09:29

## 2017-01-01 RX ADMIN — ALLOPURINOL 100 MG: 100 TABLET ORAL at 09:30

## 2017-01-01 RX ADMIN — FERROUS SULFATE TAB 325 MG (65 MG ELEMENTAL FE) 325 MG: 325 (65 FE) TAB at 09:31

## 2017-01-01 RX ADMIN — Medication 1 CAPSULE: at 17:40

## 2017-01-01 RX ADMIN — FLUTICASONE PROPIONATE 2 SPRAY: 50 SPRAY, METERED NASAL at 09:41

## 2017-01-01 RX ADMIN — CARVEDILOL 6.25 MG: 6.25 TABLET, FILM COATED ORAL at 17:56

## 2017-01-01 RX ADMIN — Medication 4 MG: at 08:35

## 2017-01-01 RX ADMIN — SODIUM CHLORIDE 1000 MG: 900 INJECTION, SOLUTION INTRAVENOUS at 09:35

## 2017-01-01 RX ADMIN — ASPIRIN 81 MG CHEWABLE TABLET 81 MG: 81 TABLET CHEWABLE at 08:29

## 2017-01-01 RX ADMIN — ONDANSETRON 4 MG: 2 INJECTION INTRAMUSCULAR; INTRAVENOUS at 17:44

## 2017-01-01 RX ADMIN — COLCHICINE 0.6 MG: 0.6 CAPSULE ORAL at 17:14

## 2017-01-01 RX ADMIN — FERROUS SULFATE TAB 325 MG (65 MG ELEMENTAL FE) 325 MG: 325 (65 FE) TAB at 09:42

## 2017-01-01 RX ADMIN — BUDESONIDE AND FORMOTEROL FUMARATE DIHYDRATE 2 PUFF: 160; 4.5 AEROSOL RESPIRATORY (INHALATION) at 18:12

## 2017-01-01 RX ADMIN — PRAVASTATIN SODIUM 80 MG: 20 TABLET ORAL at 21:09

## 2017-01-01 RX ADMIN — Medication 10 ML: at 07:31

## 2017-01-01 RX ADMIN — ALLOPURINOL 100 MG: 100 TABLET ORAL at 09:05

## 2017-01-01 RX ADMIN — Medication 10 ML: at 15:07

## 2017-01-01 RX ADMIN — Medication 1 CAPSULE: at 09:34

## 2017-01-01 RX ADMIN — HYDROCODONE BITARTRATE AND ACETAMINOPHEN 1 TABLET: 5; 325 TABLET ORAL at 10:31

## 2017-01-01 RX ADMIN — POTASSIUM CHLORIDE 20 MEQ: 20 TABLET, EXTENDED RELEASE ORAL at 10:06

## 2017-01-01 RX ADMIN — Medication 1 CAPSULE: at 18:01

## 2017-01-01 RX ADMIN — DOBUTAMINE IN DEXTROSE 5 MCG/KG/MIN: 200 INJECTION, SOLUTION INTRAVENOUS at 15:54

## 2017-01-01 RX ADMIN — CARVEDILOL 6.25 MG: 6.25 TABLET, FILM COATED ORAL at 18:17

## 2017-01-01 RX ADMIN — DOBUTAMINE IN DEXTROSE 5 MCG/KG/MIN: 200 INJECTION, SOLUTION INTRAVENOUS at 03:39

## 2017-01-01 RX ADMIN — ASPIRIN 81 MG CHEWABLE TABLET 81 MG: 81 TABLET CHEWABLE at 09:23

## 2017-01-01 RX ADMIN — FLUTICASONE PROPIONATE 2 SPRAY: 50 SPRAY, METERED NASAL at 08:33

## 2017-01-01 RX ADMIN — HYDROCODONE BITARTRATE AND ACETAMINOPHEN 1 TABLET: 5; 325 TABLET ORAL at 13:17

## 2017-01-01 RX ADMIN — Medication 10 ML: at 15:00

## 2017-01-01 RX ADMIN — METOLAZONE 2.5 MG: 5 TABLET ORAL at 15:06

## 2017-01-01 RX ADMIN — FERROUS SULFATE TAB 325 MG (65 MG ELEMENTAL FE) 325 MG: 325 (65 FE) TAB at 08:40

## 2017-01-01 RX ADMIN — HYDROCODONE BITARTRATE AND ACETAMINOPHEN 1 TABLET: 5; 325 TABLET ORAL at 15:08

## 2017-01-01 RX ADMIN — POTASSIUM CHLORIDE 20 MEQ: 20 TABLET, EXTENDED RELEASE ORAL at 22:58

## 2017-01-01 RX ADMIN — PIPERACILLIN AND TAZOBACTAM 2.25 G: 2; .25 INJECTION, POWDER, FOR SOLUTION INTRAVENOUS at 06:19

## 2017-01-01 RX ADMIN — Medication 10 ML: at 14:33

## 2017-01-01 RX ADMIN — DOBUTAMINE IN DEXTROSE 5 MCG/KG/MIN: 200 INJECTION, SOLUTION INTRAVENOUS at 09:55

## 2017-01-01 RX ADMIN — FERROUS SULFATE TAB 325 MG (65 MG ELEMENTAL FE) 325 MG: 325 (65 FE) TAB at 09:28

## 2017-01-01 RX ADMIN — CARVEDILOL 6.25 MG: 6.25 TABLET, FILM COATED ORAL at 19:42

## 2017-01-01 RX ADMIN — CARVEDILOL 6.25 MG: 6.25 TABLET, FILM COATED ORAL at 18:23

## 2017-01-01 RX ADMIN — Medication 1 CAPSULE: at 09:47

## 2017-01-01 RX ADMIN — ALLOPURINOL 50 MG: 100 TABLET ORAL at 08:19

## 2017-01-01 RX ADMIN — Medication 10 ML: at 16:24

## 2017-01-01 RX ADMIN — Medication 10 ML: at 17:41

## 2017-01-01 RX ADMIN — Medication 4 MG: at 09:35

## 2017-01-01 RX ADMIN — CARVEDILOL 3.12 MG: 3.12 TABLET, FILM COATED ORAL at 09:28

## 2017-01-01 RX ADMIN — FERROUS SULFATE TAB 325 MG (65 MG ELEMENTAL FE) 325 MG: 325 (65 FE) TAB at 09:05

## 2017-01-01 RX ADMIN — POTASSIUM CHLORIDE 20 MEQ: 1500 TABLET, EXTENDED RELEASE ORAL at 19:42

## 2017-01-01 RX ADMIN — PANTOPRAZOLE SODIUM 40 MG: 40 TABLET, DELAYED RELEASE ORAL at 09:23

## 2017-01-01 RX ADMIN — ALLOPURINOL 50 MG: 100 TABLET ORAL at 08:50

## 2017-01-01 RX ADMIN — CARVEDILOL 6.25 MG: 6.25 TABLET, FILM COATED ORAL at 09:32

## 2017-01-01 RX ADMIN — CARVEDILOL 6.25 MG: 6.25 TABLET, FILM COATED ORAL at 09:23

## 2017-01-01 RX ADMIN — CARVEDILOL 3.12 MG: 3.12 TABLET, FILM COATED ORAL at 21:28

## 2017-01-01 RX ADMIN — INFLUENZA VIRUS VACCINE 0.5 ML: 15; 15; 15; 15 SUSPENSION INTRAMUSCULAR at 13:38

## 2017-01-01 RX ADMIN — ERYTHROPOIETIN 20000 UNITS: 20000 INJECTION, SOLUTION INTRAVENOUS; SUBCUTANEOUS at 17:54

## 2017-01-01 RX ADMIN — CARVEDILOL 6.25 MG: 6.25 TABLET, FILM COATED ORAL at 08:36

## 2017-01-01 RX ADMIN — CHOLECALCIFEROL TAB 25 MCG (1000 UNIT) 2000 UNITS: 25 TAB at 09:01

## 2017-01-01 RX ADMIN — THERA TABS 1 TABLET: TAB at 09:28

## 2017-01-01 RX ADMIN — FERROUS SULFATE TAB 325 MG (65 MG ELEMENTAL FE) 325 MG: 325 (65 FE) TAB at 10:07

## 2017-01-01 RX ADMIN — ASPIRIN 162 MG: 81 TABLET, COATED ORAL at 08:36

## 2017-01-01 RX ADMIN — THERA TABS 1 TABLET: TAB at 09:06

## 2017-01-01 RX ADMIN — ALLOPURINOL 50 MG: 100 TABLET ORAL at 09:43

## 2017-01-01 RX ADMIN — DOBUTAMINE IN DEXTROSE 5 MCG/KG/MIN: 200 INJECTION, SOLUTION INTRAVENOUS at 22:21

## 2017-01-01 RX ADMIN — FLUTICASONE PROPIONATE 2 SPRAY: 50 SPRAY, METERED NASAL at 09:53

## 2017-01-01 RX ADMIN — PIPERACILLIN AND TAZOBACTAM 2.25 G: 2; .25 INJECTION, POWDER, FOR SOLUTION INTRAVENOUS at 13:18

## 2017-01-01 RX ADMIN — Medication 1 CAPSULE: at 09:31

## 2017-01-01 RX ADMIN — VITAMIN D, TAB 1000IU (100/BT) 2000 UNITS: 25 TAB at 08:29

## 2017-01-01 RX ADMIN — Medication 10 ML: at 21:59

## 2017-01-01 RX ADMIN — THERA TABS 1 TABLET: TAB at 08:36

## 2017-01-01 RX ADMIN — FUROSEMIDE 80 MG: 10 INJECTION, SOLUTION INTRAVENOUS at 18:16

## 2017-01-01 RX ADMIN — Medication 1 CAPSULE: at 18:27

## 2017-01-01 RX ADMIN — CHOLECALCIFEROL TAB 25 MCG (1000 UNIT) 2000 UNITS: 25 TAB at 08:39

## 2017-01-01 RX ADMIN — LORAZEPAM 0.5 MG: 0.5 TABLET ORAL at 19:53

## 2017-01-01 RX ADMIN — CARVEDILOL 6.25 MG: 6.25 TABLET, FILM COATED ORAL at 17:34

## 2017-01-01 RX ADMIN — ALLOPURINOL 100 MG: 100 TABLET ORAL at 08:37

## 2017-01-01 RX ADMIN — PRAVASTATIN SODIUM 80 MG: 20 TABLET ORAL at 00:34

## 2017-01-01 RX ADMIN — FUROSEMIDE 5 MG/HR: 10 INJECTION, SOLUTION INTRAVENOUS at 12:27

## 2017-01-01 RX ADMIN — Medication 10 ML: at 17:55

## 2017-01-01 RX ADMIN — Medication 10 ML: at 22:02

## 2017-01-01 RX ADMIN — Medication 1 CAPSULE: at 08:37

## 2017-01-01 RX ADMIN — THERA TABS 1 TABLET: TAB at 08:50

## 2017-01-01 RX ADMIN — Medication 10 ML: at 13:39

## 2017-01-01 RX ADMIN — PANTOPRAZOLE SODIUM 40 MG: 40 TABLET, DELAYED RELEASE ORAL at 09:01

## 2017-01-01 RX ADMIN — ALBUMIN (HUMAN) 25 G: 0.25 INJECTION, SOLUTION INTRAVENOUS at 15:30

## 2017-01-01 RX ADMIN — Medication 1 CAPSULE: at 17:54

## 2017-01-01 RX ADMIN — Medication 1 CAPSULE: at 08:29

## 2017-01-01 RX ADMIN — HYDROCODONE BITARTRATE AND ACETAMINOPHEN 1 TABLET: 5; 325 TABLET ORAL at 21:59

## 2017-01-01 RX ADMIN — ONDANSETRON 4 MG: 2 INJECTION INTRAMUSCULAR; INTRAVENOUS at 09:01

## 2017-01-01 RX ADMIN — HYDROCODONE BITARTRATE AND ACETAMINOPHEN 1 TABLET: 5; 325 TABLET ORAL at 21:28

## 2017-01-01 RX ADMIN — FLUTICASONE PROPIONATE 2 SPRAY: 50 SPRAY, METERED NASAL at 10:37

## 2017-01-01 RX ADMIN — HYDROCODONE BITARTRATE AND ACETAMINOPHEN 1 TABLET: 5; 325 TABLET ORAL at 06:45

## 2017-01-01 RX ADMIN — PANTOPRAZOLE SODIUM 40 MG: 40 TABLET, DELAYED RELEASE ORAL at 08:29

## 2017-01-01 RX ADMIN — SODIUM CHLORIDE 500 ML: 900 INJECTION, SOLUTION INTRAVENOUS at 06:12

## 2017-01-01 RX ADMIN — ALLOPURINOL 50 MG: 100 TABLET ORAL at 08:29

## 2017-01-01 RX ADMIN — CARVEDILOL 3.12 MG: 3.12 TABLET, FILM COATED ORAL at 09:31

## 2017-01-01 RX ADMIN — Medication 10 ML: at 18:44

## 2017-01-01 RX ADMIN — ASPIRIN 81 MG CHEWABLE TABLET 81 MG: 81 TABLET CHEWABLE at 08:40

## 2017-01-01 RX ADMIN — FERROUS SULFATE TAB 325 MG (65 MG ELEMENTAL FE) 325 MG: 325 (65 FE) TAB at 09:32

## 2017-01-01 RX ADMIN — ASPIRIN 81 MG CHEWABLE TABLET 81 MG: 81 TABLET CHEWABLE at 09:30

## 2017-01-01 RX ADMIN — Medication 1 CAPSULE: at 09:27

## 2017-01-01 RX ADMIN — FLUTICASONE PROPIONATE 2 SPRAY: 50 SPRAY, METERED NASAL at 09:26

## 2017-01-01 RX ADMIN — ALLOPURINOL 50 MG: 100 TABLET ORAL at 09:28

## 2017-01-01 RX ADMIN — ALLOPURINOL 50 MG: 100 TABLET ORAL at 09:26

## 2017-01-01 RX ADMIN — PRAVASTATIN SODIUM 80 MG: 20 TABLET ORAL at 21:38

## 2017-01-01 RX ADMIN — HYDROCODONE BITARTRATE AND ACETAMINOPHEN 1 TABLET: 5; 325 TABLET ORAL at 09:17

## 2017-01-01 RX ADMIN — Medication 10 ML: at 07:24

## 2017-01-01 RX ADMIN — CARVEDILOL 3.12 MG: 3.12 TABLET, FILM COATED ORAL at 21:45

## 2017-01-01 RX ADMIN — HYDROCODONE BITARTRATE AND ACETAMINOPHEN 1 TABLET: 5; 325 TABLET ORAL at 15:49

## 2017-01-01 RX ADMIN — HYDROCODONE BITARTRATE AND ACETAMINOPHEN 1 TABLET: 5; 325 TABLET ORAL at 08:50

## 2017-01-01 RX ADMIN — FERROUS SULFATE TAB 325 MG (65 MG ELEMENTAL FE) 325 MG: 325 (65 FE) TAB at 09:26

## 2017-01-01 RX ADMIN — VITAMIN D, TAB 1000IU (100/BT) 2000 UNITS: 25 TAB at 09:36

## 2017-01-01 RX ADMIN — PANTOPRAZOLE SODIUM 40 MG: 40 TABLET, DELAYED RELEASE ORAL at 09:43

## 2017-01-01 RX ADMIN — FLUTICASONE PROPIONATE 2 SPRAY: 50 SPRAY, METERED NASAL at 08:40

## 2017-01-01 RX ADMIN — DOBUTAMINE IN DEXTROSE 5 MCG/KG/MIN: 200 INJECTION, SOLUTION INTRAVENOUS at 00:49

## 2017-01-01 RX ADMIN — PIPERACILLIN AND TAZOBACTAM 2.25 G: 2; .25 INJECTION, POWDER, FOR SOLUTION INTRAVENOUS at 01:25

## 2017-01-01 RX ADMIN — PRAVASTATIN SODIUM 80 MG: 20 TABLET ORAL at 21:57

## 2017-01-01 RX ADMIN — ASPIRIN 81 MG CHEWABLE TABLET 81 MG: 81 TABLET CHEWABLE at 09:34

## 2017-01-01 RX ADMIN — Medication 1 CAPSULE: at 17:34

## 2017-01-01 RX ADMIN — VITAMIN D, TAB 1000IU (100/BT) 2000 UNITS: 25 TAB at 09:30

## 2017-01-01 RX ADMIN — PANTOPRAZOLE SODIUM 40 MG: 40 TABLET, DELAYED RELEASE ORAL at 08:36

## 2017-01-01 RX ADMIN — IPRATROPIUM BROMIDE AND ALBUTEROL SULFATE 3 ML: .5; 3 SOLUTION RESPIRATORY (INHALATION) at 09:20

## 2017-01-01 RX ADMIN — HYDROCODONE BITARTRATE AND ACETAMINOPHEN 1 TABLET: 5; 325 TABLET ORAL at 21:45

## 2017-01-01 RX ADMIN — Medication 10 ML: at 22:27

## 2017-01-01 RX ADMIN — Medication 10 ML: at 18:00

## 2017-01-01 RX ADMIN — DOBUTAMINE IN DEXTROSE 5 MCG/KG/MIN: 200 INJECTION, SOLUTION INTRAVENOUS at 22:46

## 2017-01-01 RX ADMIN — DOBUTAMINE IN DEXTROSE 2.5 MCG/KG/MIN: 200 INJECTION, SOLUTION INTRAVENOUS at 19:09

## 2017-01-01 RX ADMIN — FUROSEMIDE 40 MG: 40 TABLET ORAL at 17:53

## 2017-01-01 RX ADMIN — ALLOPURINOL 100 MG: 100 TABLET ORAL at 08:30

## 2017-01-01 RX ADMIN — Medication 10 ML: at 06:42

## 2017-01-01 RX ADMIN — HYDROCODONE BITARTRATE AND ACETAMINOPHEN 1 TABLET: 5; 325 TABLET ORAL at 01:30

## 2017-01-01 RX ADMIN — HYDROCODONE BITARTRATE AND ACETAMINOPHEN 1 TABLET: 5; 325 TABLET ORAL at 09:05

## 2017-01-01 RX ADMIN — VITAMIN D, TAB 1000IU (100/BT) 2000 UNITS: 25 TAB at 08:40

## 2017-01-01 RX ADMIN — POTASSIUM CHLORIDE 20 MEQ: 20 TABLET, EXTENDED RELEASE ORAL at 17:54

## 2017-01-01 RX ADMIN — CARVEDILOL 3.12 MG: 3.12 TABLET, FILM COATED ORAL at 21:00

## 2017-01-01 RX ADMIN — PRAVASTATIN SODIUM 80 MG: 20 TABLET ORAL at 22:58

## 2017-01-01 RX ADMIN — Medication 10 ML: at 18:29

## 2017-01-01 RX ADMIN — FERROUS SULFATE TAB 325 MG (65 MG ELEMENTAL FE) 325 MG: 325 (65 FE) TAB at 08:36

## 2017-01-01 RX ADMIN — ACETAZOLAMIDE 250 MG: 250 TABLET ORAL at 12:51

## 2017-01-01 RX ADMIN — ALLOPURINOL 100 MG: 100 TABLET ORAL at 08:39

## 2017-01-01 RX ADMIN — Medication 10 ML: at 22:20

## 2017-01-01 RX ADMIN — ASPIRIN 81 MG CHEWABLE TABLET 81 MG: 81 TABLET CHEWABLE at 09:31

## 2017-01-01 RX ADMIN — THERA TABS 1 TABLET: TAB at 09:42

## 2017-01-01 RX ADMIN — FUROSEMIDE 40 MG: 40 TABLET ORAL at 18:25

## 2017-01-01 RX ADMIN — ERYTHROPOIETIN 6000 UNITS: 3000 INJECTION, SOLUTION INTRAVENOUS; SUBCUTANEOUS at 17:53

## 2017-01-01 RX ADMIN — Medication 10 ML: at 18:27

## 2017-01-01 RX ADMIN — BUDESONIDE AND FORMOTEROL FUMARATE DIHYDRATE 2 PUFF: 160; 4.5 AEROSOL RESPIRATORY (INHALATION) at 20:46

## 2017-01-01 RX ADMIN — ACETAZOLAMIDE 250 MG: 250 TABLET ORAL at 09:36

## 2017-01-01 RX ADMIN — Medication 1 CAPSULE: at 09:42

## 2017-01-01 RX ADMIN — CHOLECALCIFEROL TAB 25 MCG (1000 UNIT) 2000 UNITS: 25 TAB at 09:26

## 2017-01-01 RX ADMIN — FUROSEMIDE 40 MG: 40 TABLET ORAL at 18:23

## 2017-01-01 RX ADMIN — HYDROCODONE BITARTRATE AND ACETAMINOPHEN 1 TABLET: 5; 325 TABLET ORAL at 12:12

## 2017-01-01 RX ADMIN — CHOLECALCIFEROL TAB 25 MCG (1000 UNIT) 2000 UNITS: 25 TAB at 09:43

## 2017-01-01 RX ADMIN — Medication 1 CAPSULE: at 17:46

## 2017-01-01 RX ADMIN — FUROSEMIDE 5 MG/HR: 10 INJECTION, SOLUTION INTRAVENOUS at 16:23

## 2017-01-01 RX ADMIN — FUROSEMIDE 40 MG: 40 TABLET ORAL at 09:37

## 2017-01-01 RX ADMIN — DOBUTAMINE IN DEXTROSE 2.5 MCG/KG/MIN: 200 INJECTION, SOLUTION INTRAVENOUS at 06:24

## 2017-01-01 RX ADMIN — SODIUM CHLORIDE 0.5 MG/HR: 900 INJECTION, SOLUTION INTRAVENOUS at 09:27

## 2017-01-01 RX ADMIN — ALLOPURINOL 100 MG: 100 TABLET ORAL at 08:40

## 2017-01-01 RX ADMIN — CHOLECALCIFEROL TAB 25 MCG (1000 UNIT) 2000 UNITS: 25 TAB at 08:29

## 2017-01-01 RX ADMIN — METOLAZONE 2.5 MG: 5 TABLET ORAL at 09:31

## 2017-01-01 RX ADMIN — Medication 1 CAPSULE: at 19:42

## 2017-01-01 RX ADMIN — ASPIRIN 81 MG CHEWABLE TABLET 81 MG: 81 TABLET CHEWABLE at 09:36

## 2017-01-01 RX ADMIN — Medication 10 ML: at 22:15

## 2017-01-01 RX ADMIN — DOBUTAMINE IN DEXTROSE 2.5 MCG/KG/MIN: 200 INJECTION, SOLUTION INTRAVENOUS at 14:31

## 2017-01-01 RX ADMIN — THERA TABS 1 TABLET: TAB at 09:31

## 2017-01-01 RX ADMIN — ONDANSETRON 4 MG: 2 INJECTION INTRAMUSCULAR; INTRAVENOUS at 20:03

## 2017-01-01 RX ADMIN — PANTOPRAZOLE SODIUM 40 MG: 40 TABLET, DELAYED RELEASE ORAL at 07:30

## 2017-02-26 PROBLEM — I50.9 ACUTE CHF (CONGESTIVE HEART FAILURE) (HCC): Status: ACTIVE | Noted: 2017-01-01

## 2017-02-26 NOTE — ED NOTES
Mr. Noemi Vega appears to be in mild CHF with abd ascities and significant pitting weeping amy lower leg edema. Chronic oxygen use and will need a bed with O2 capability. Assisted in Triage of patient and referred to main treatment area due to severity of complaint. Initial orders started and patient assisted to back by Triage RN.    Signed By: Merrick Hennessy NP     February 26, 2017

## 2017-02-26 NOTE — Clinical Note
Status[de-identified] Inpatient [101] Type of Bed: Telemetry [19] Inpatient Hospitalization Certified Necessary for the Following Reasons: 8. Other (further clarification in H&P documentation) Admitting Diagnosis: CHF (congestive heart failure) ([facility] [608905] Admitting Physician: Sadaf Ribera Attending Physician: Sadaf Ribera Estimated Length of Stay: 5-7 Midnights Discharge Plan[de-identified] Extended Care Facility (e.g. Adult Home, Nursing Home, etc.)

## 2017-02-26 NOTE — IP AVS SNAPSHOT
[address] [place] [address] [place] Patient: Katy Stubbs MRN: [id number] :1940 You are allergic to the following No active allergies Immunizations Administered for This Admission Name Date Influenza Vaccine (Quad) PF 3/11/2017 Recent Documentation Height Weight BMI Smoking Status 1.829 m 85.3 kg 25.5 kg/m2 Former Smoker Emergency Contacts Name Discharge Info Relation Home Work Mobile [place], [place] DISCHARGE CAREGIVER [3] Child [2] [phone] About your hospitalization You were admitted on:  2017 You last received care in the:  [address] [place] [address] You were discharged on:  2017 Unit phone number:  [phone] Why you were hospitalized Your primary diagnosis was:  Not on File Your diagnoses also included:  Acute Chf (Congestive Heart Failure) (Hcc), Chf (Congestive Heart Failure) (Hcc), Acute On Chronic Diastolic (Congestive) Heart Failure (Hcc), Hld (Hyperlipidemia), Htn (Hypertension), Acute Exacerbation Of Chf (Congestive Heart Failure) ([facility]), Aicd (Automatic Cardioverter/Defibrillator) Present, Cad (Coronary Artery Disease), H/O Carotid Endarterectomy, Pvd (Peripheral Vascular Disease) ([facility]), Hematospermia, Diastolic Chf, Acute On Chronic (Hcc), Hx Of Cabg, Thrombocytopenia (Hcc), Hypokalemia, Aortic Stenosis, Moderate Providers Seen During Your Hospitalizations Provider Role Specialty Primary office phone Cole Watson MD Attending Provider Emergency Medicine [phone] Kenia Lowe MD Attending Provider [facility] [phone] Your Primary Care Physician (PCP) Primary Care Physician Office Phone Office Fax Eileen Moore [phone] Follow-up Information Follow up With Details Comments Contact Info Jan Wesley MD On 3/14/2017 at 3pm, check in at 230pm [facility] [address] [place] [address]. [address] [facility] [address] 
[phone] Your Appointments Tuesday March 14, 2017  3:00 PM EDT HOSPITAL FOLLOW-UP with Jan Wesley MD  
[facility] [address]) [place] [facility] [address]  
[phone] Tuesday March 21, 2017  2:30 PM EDT  
ESTABLISHED PATIENT with Amish Ogden MD  
[facility] ([address]) [address] [facility], [address] [facility] [address]  
[phone] Current Discharge Medication List  
  
START taking these medications Dose & Instructions Dispensing Information Comments Morning Noon Evening Bedtime  
 albuterol-ipratropium 2.5 mg-0.5 mg/3 ml Nebu Commonly known as:  Linda Medicus Your next dose is: Today, Tomorrow Other:  _________ Dose:  3 mL  
3 mL by Nebulization route every six (6) hours as needed. Quantity:  30 Nebule Refills:  0  
     
   
   
   
  
 cholecalciferol 1,000 unit tablet Commonly known as:  VITAMIN D3  
Replaces:  VITAMIN D3 1,000 unit Cap Your next dose is: Today, Tomorrow Other:  _________ Dose:  2000 Units Take 2 Tabs by mouth daily. Quantity:  100 Tab Refills:  0  
     
   
   
   
  
 ferrous sulfate 325 mg (65 mg iron) tablet Your next dose is: Today, Tomorrow Other:  _________ Dose:  325 mg Take 1 Tab by mouth daily (with breakfast). Quantity:  100 Tab Refills:  0  
     
   
   
   
  
 fish oil-omega-3 fatty acids 340-1,000 mg capsule Replaces:  Omega-3 Fatty Acids 300 mg Cap Your next dose is: Today, Tomorrow Other:  _________ Dose:  1 Cap Take 1 Cap by mouth two (2) times a day. Indications: HYPERTRIGLYCERIDEMIA Quantity:  100 Cap Refills:  0 fluticasone 50 mcg/actuation nasal spray Commonly known as:  Moira Reges Your next dose is: Today, Tomorrow Other:  _________  
   
   
 2 spray each nostril daily  Indications: ALLERGIC RHINITIS Quantity:  1 Bottle Refills:  0 HYDROcodone-acetaminophen 5-325 mg per tablet Commonly known as:  Cassidy Justice Your next dose is: Today, Tomorrow Other:  _________ Dose:  1 Tab Take 1 Tab by mouth every four (4) hours as needed. Max Daily Amount: 6 Tabs. Quantity:  60 Tab Refills:  0  
     
   
   
   
  
 influenza vaccine 2016-17 (36mos+)(PF) Syrg injection Commonly known as:  FLUZONE/FLUARIX/FLULAVAL QUAD Your next dose is: Today, Tomorrow Other:  _________ Dose:  0.5 mL  
0.5 mL by IntraMUSCular route PRIOR TO DISCHARGE. Quantity:  0.5 mL Refills:  0  
     
   
   
   
  
 metOLazone 2.5 mg tablet Commonly known as:  Finas Dirk Your next dose is: Today, Tomorrow Other:  _________ Dose:  2.5 mg Take 1 Tab by mouth daily. Quantity:  100 Tab Refills:  0  
     
   
   
   
  
 pantoprazole 40 mg tablet Commonly known as:  PROTONIX Replaces:  omeprazole 20 mg capsule Your next dose is: Today, Tomorrow Other:  _________ Dose:  40 mg Take 1 Tab by mouth Daily (before breakfast). Quantity:  100 Tab Refills:  0  
     
   
   
   
  
 therapeutic multivitamin tablet Commonly known as:  [facility] Replaces:  multivitamin tablet Your next dose is: Today, Tomorrow Other:  _________ Dose:  1 Tab Take 1 Tab by mouth daily. Quantity:  100 Tab Refills:  0  
     
   
   
   
  
 zolpidem 5 mg tablet Commonly known as:  AMBIEN Your next dose is: Today, Tomorrow Other:  _________ Dose:  5 mg Take 1 Tab by mouth nightly as needed for Sleep. Max Daily Amount: 5 mg. Quantity:  30 Tab Refills:  0 CONTINUE these medications which have CHANGED Dose & Instructions Dispensing Information Comments Morning Noon Evening Bedtime  
 furosemide 40 mg tablet Commonly known as:  LASIX What changed:  additional instructions Your next dose is: Today, Tomorrow Other:  _________  
   
   
 1 tab BID Quantity:  100 Tab Refills:  0  
     
   
   
   
  
 nitroglycerin 0.4 mg SL tablet Commonly known as:  NITROSTAT What changed:   
- how much to take - when to take this Your next dose is: Today, Tomorrow Other:  _________ Dose:  0.4 mg  
1 Tab by SubLINGual route as needed for Chest Pain. Quantity:  1 Bottle Refills:  0 CONTINUE these medications which have NOT CHANGED Dose & Instructions Dispensing Information Comments Morning Noon Evening Bedtime  
 allopurinol 100 mg tablet Commonly known as:  Courtney Gosling Your next dose is: Today, Tomorrow Other:  _________ Dose:  100 mg Take 1 Tab by mouth daily. Indications: GOUT  
 Quantity:  30 Tab Refills:  0  
     
   
   
   
  
 aspirin delayed-release 81 mg tablet Your next dose is: Today, Tomorrow Other:  _________ Dose:  162 mg Take 2 Tabs by mouth daily. Quantity:  30 Tab Refills:  0  
     
   
   
   
  
 carvedilol 6.25 mg tablet Commonly known as:  Cori [place] Your next dose is: Today, Tomorrow Other:  _________ Dose:  6.25 mg Take 1 Tab by mouth two (2) times daily (with meals). Indications: Chronic Heart Failure Quantity:  60 Tab Refills:  0  
     
   
   
   
  
 pravastatin 80 mg tablet Commonly known as:  PRAVACHOL Your next dose is: Today, Tomorrow Other:  _________ Dose:  80 mg Take 1 Tab by mouth nightly. Quantity:  100 Tab Refills:  0 STOP taking these medications   
 lisinopril 5 mg tablet Commonly known as:  PRINIVIL, ZESTRIL  
   
  
 multivitamin tablet Commonly known as:  ONE A DAY Replaced by:  therapeutic multivitamin tablet Omega-3 Fatty Acids 300 mg Cap Replaced by:  fish oil-omega-3 fatty acids 340-1,000 mg capsule  
   
  
 omeprazole 20 mg capsule Commonly known as:  PRILOSEC Replaced by:  pantoprazole 40 mg tablet VITAMIN D3 1,000 unit Cap Generic drug:  cholecalciferol Replaced by:  cholecalciferol 1,000 unit tablet Where to Get Your Medications Information on where to get these meds will be given to you by the nurse or doctor. ! Ask your nurse or doctor about these medications  
  albuterol-ipratropium 2.5 mg-0.5 mg/3 ml Nebu  
 allopurinol 100 mg tablet  
 aspirin delayed-release 81 mg tablet  
 carvedilol 6.25 mg tablet  
 cholecalciferol 1,000 unit tablet  
 ferrous sulfate 325 mg (65 mg iron) tablet  
 fish oil-omega-3 fatty acids 340-1,000 mg capsule  
 fluticasone 50 mcg/actuation nasal spray  
 furosemide 40 mg tablet HYDROcodone-acetaminophen 5-325 mg per tablet  
 influenza vaccine 2016-17 (36mos+)(PF) Syrg injection  
 metOLazone 2.5 mg tablet  
 nitroglycerin 0.4 mg SL tablet  
 pantoprazole 40 mg tablet  
 pravastatin 80 mg tablet  
 therapeutic multivitamin tablet  
 zolpidem 5 mg tablet Discharge Instructions DISCHARGE SUMMARY from Nurse The following personal items are in your possession at time of discharge: 
 
  
Visual Aid: Glasses, With patient Home Medications: Kept at bedside Jewelry: None Clothing: Other (comment), Undergarments, Shirt, Slippers, Pajamas (blanket) Other Valuables: Cell Phone, Other (comment) ( ) PATIENT INSTRUCTIONS: 
 
 
F-face looks uneven A-arms unable to move or move unevenly S-speech slurred or non-existent T-time-call 911 as soon as signs and symptoms begin-DO NOT go Back to bed or wait to see if you get better-TIME IS BRAIN. Warning Signs of HEART ATTACK Call 911 if you have these symptoms: 
? Chest discomfort. Most heart attacks involve discomfort in the center of the chest that lasts more than a few minutes, or that goes away and comes back. It can feel like uncomfortable pressure, squeezing, fullness, or pain. ? Discomfort in other areas of the upper body. Symptoms can include pain or discomfort in one or both arms, the back, neck, jaw, or stomach. ? Shortness of breath with or without chest discomfort. ? Other signs may include breaking out in a cold sweat, nausea, or lightheadedness. Don't wait more than five minutes to call [address]! Fast action can save your life. Calling 911 is almost always the fastest way to get lifesaving treatment. Emergency Medical Services staff can begin treatment when they arrive  up to an hour sooner than if someone gets to the hospital by car. The discharge information has been reviewed with the patient. The patient verbalized understanding. Discharge medications reviewed with the patient and appropriate educational materials and side effects teaching were provided. [employer] Activation Thank you for requesting access to [employer]. Please follow the instructions below to securely access and download your online medical record. [employer] allows you to send messages to your doctor, view your test results, renew your prescriptions, schedule appointments, and more. How Do I Sign Up? 1.  In your internet browser, go to [email or web address]. 2. Click on the First Time User? Click Here link in the Sign In box. You will see the New Member Sign Up page. 3. Enter your [employer] Access Code exactly as it appears below. You will not need to use this code after youve completed the sign-up process. If you do not sign up before the expiration date, you must request a new code. [employer] Access Code: [email or web address] Expires: 2017 11:53 AM (This is the date your [employer] access code will ) 4. Enter the last four digits of your Social Security Number (xxxx) and Date of Birth (mm/dd/yyyy) as indicated and click Submit. You will be taken to the next sign-up page. 5. Create a [employer] ID. This will be your [employer] login ID and cannot be changed, so think of one that is secure and easy to remember. 6. Create a [employer] password. You can change your password at any time. 7. Enter your Password Reset Question and Answer. This can be used at a later time if you forget your password. 8. Enter your e-mail address. You will receive e-mail notification when new information is available in [address] 9. Click Sign Up. You can now view and download portions of your medical record. 10. Click the Download Summary menu link to download a portable copy of your medical information. Additional Information If you have questions, please visit the Frequently Asked Questions section of the [employer] website at [email or web address] Remember, [employer] is NOT to be used for urgent needs. For medical emergencies, dial 911. Patient armband removed and shredded Discharge Orders Procedure Order Date Status Priority Quantity Spec Type Associated Dx 200 [facility] 17 1135 Normal Routine 1 Comments:  PT/OT/ Skilled care/CHF pathway. DME as neede [employer] Announcement  We are excited to announce that we are making your provider's discharge notes available to you in LOYAL3. You will see these notes when they are completed and signed by the physician that discharged you from your recent hospital stay. If you have any questions or concerns about any information you see in LOYAL3, please call the Health Information Department where you were seen or reach out to your Primary Care Provider for more information about your plan of care. Introducing [facility]! [employer] introduces LOYAL[employer] patient portal. Now you can access parts of your medical record, email your doctor's office, and request medication refills online. 1. In your internet browser, go to [email or web address] 2. Click on the First Time User? Click Here link in the Sign In box. You will see the New Member Sign Up page. 3. Enter your [employer] Access Code exactly as it appears below. You will not need to use this code after youve completed the sign-up process. If you do not sign up before the expiration date, you must request a new code. · [employer] Access Code: [email or web address] Expires: 6/7/2017 11:53 AM 
 
4. Enter the last four digits of your Social Security Number (xxxx) and Date of Birth (mm/dd/yyyy) as indicated and click Submit. You will be taken to the next sign-up page. 5. Create a [employer] ID. This will be your [employer] login ID and cannot be changed, so think of one that is secure and easy to remember. 6. Create a [employer] password. You can change your password at any time. 7. Enter your Password Reset Question and Answer. This can be used at a later time if you forget your password. 8. Enter your e-mail address. You will receive e-mail notification when new information is available in [address] 9. Click Sign Up. You can now view and download portions of your medical record. 10. Click the Download Summary menu link to download a portable copy of your medical information. If you have questions, please visit the Frequently Asked Questions section of the MyChart website. Remember, MyChart is NOT to be used for urgent needs. For medical emergencies, dial 911. Now available from your iPhone and Android! General Information Please provide this summary of care documentation to your next provider. Patient Signature:  ____________________________________________________________ Date:  ____________________________________________________________  
  
Kim Sleight Provider Signature:  ____________________________________________________________ Date:  ____________________________________________________________

## 2017-02-26 NOTE — ED PROVIDER NOTES
HPI Comments: 5:48 PM Sheldon Castro is a 78 y.o. male with a history of HTN, CHF, and hypercholesterolemia, who presents to the ED for evaluation of increased bilateral leg swelling. He is also c/o increased urination, SOB, and abdominal distention. He states that these sx have been getting worse over the past 2 weeks. He states that he has been taking his medications normally, and endorses taking 40 mg Lasix BID. He states that the only thing that has been abnormal lately has been a recent stress due to ordering things online. He notes that he takes baby aspirin BID, but denies being on any other blood thinners. He denies any recent changes in his medications. He notes that he uses between 3.5 and 4 L of O2 at home. He notes a surgical hx of hiatal hernia repair and CABGx3. He states that he doesn't smoke or drink or use any drugs. He denies any recent chest pain or fever. There are no other concerns at this time. Patient is a 78 y.o. male presenting with shortness of breath, lower extremity edema, and other event. The history is provided by the patient and the spouse. Shortness of Breath   Associated symptoms include leg swelling. Pertinent negatives include no cough, no wheezing, no chest pain, no vomiting and no abdominal pain. Leg Swelling   Associated symptoms include shortness of breath. Pertinent negatives include no chest pain and no abdominal pain. Other   Associated symptoms include shortness of breath. Pertinent negatives include no chest pain and no abdominal pain.         Past Medical History:   Diagnosis Date    Arrhythmia     A fib; has external vest and belt he wears for this    Arthritis     High blood pressure     Hypercholesteremia     Mini stroke ([facility] [address] 2000    Unspecified sleep apnea     does not wear machine       Past Surgical History:   Procedure Laterality Date    CABG, ARTERY-VEIN, THREE  2013    HX CAROTID ENDARTERECTOMY Left     HX CAROTID ENDARTERECTOMY Right 2000    HX CORONARY STENT PLACEMENT           No family history on file. Social History     Social History    Marital status:      Spouse name: N/A    Number of children: N/A    Years of education: N/A     Occupational History    Not on file. Social History Main Topics    Smoking status: Former Smoker    Smokeless tobacco: Never Used      Comment: quit in the 90s    Alcohol use No    Drug use: No    Sexual activity: Not on file     Other Topics Concern    Not on file     Social History Narrative         ALLERGIES: Review of patient's allergies indicates no known allergies. Review of Systems   Constitutional: Negative. HENT: Negative. Eyes: Negative. Respiratory: Positive for shortness of breath. Negative for apnea, cough, choking, chest tightness, wheezing and stridor. Cardiovascular: Positive for leg swelling. Negative for chest pain and palpitations. Gastrointestinal: Positive for abdominal distention. Negative for abdominal pain, anal bleeding, blood in stool, constipation, diarrhea, nausea, rectal pain and vomiting. Endocrine: Positive for polyuria. Negative for cold intolerance, heat intolerance, polydipsia and polyphagia. Genitourinary: Negative. Musculoskeletal: Negative. Skin: Negative. Allergic/Immunologic: Negative. Neurological: Negative. Hematological: Negative. Psychiatric/Behavioral: Negative. All other systems reviewed and are negative. Vitals:    02/26/17 1451   BP: 149/79   Pulse: (!) 57   Resp: 20   Temp: 97.4 °F (36.3 °C)   SpO2: 93%   Weight: 95.3 kg (210 lb)   Height: 6' (1.829 m)            Physical Exam   Constitutional: He is oriented to person, place, and time. He appears well-developed and well-nourished. Alert and appropriate but with visible dyspnea   HENT:   Head: Normocephalic and atraumatic.    Right Ear: External ear normal.   Left Ear: External ear normal.   Mouth/Throat: Oropharynx is clear and moist. No oropharyngeal exudate. Eyes: Conjunctivae and EOM are normal. Pupils are equal, round, and reactive to light. Right eye exhibits no discharge. Left eye exhibits no discharge. No scleral icterus. Neck: Normal range of motion. Neck supple. JVD present. No tracheal deviation present. No thyromegaly present. Cardiovascular: Normal rate and regular rhythm. Murmur heard. Heart sounds distant   Pulmonary/Chest: No respiratory distress. He has no wheezes. He has no rales. Decreased breath sounds bilateral bases   Abdominal: Soft. He exhibits distension. There is no tenderness. There is no rebound and no guarding. Ascites but no focal tenderness; well healed abdominal surgical scar; hypoactive bowel sounds   Musculoskeletal: Normal range of motion. He exhibits edema. He exhibits no tenderness. 4+ pitting edema bilateral lower extremities   Lymphadenopathy:     He has no cervical adenopathy. Neurological: He is alert and oriented to person, place, and time. No cranial nerve deficit. Coordination normal.   Skin: Skin is warm. Rash noted. No erythema. Chronic skin changes with erythema and flaking of bilateral lower extremities with some clear drainage but symmetrical without warmth or tenderness   Psychiatric: He has a normal mood and affect. His behavior is normal. Judgment and thought content normal.   Nursing note and vitals reviewed. MDM  Number of Diagnoses or Management Options  Acute on chronic renal insufficiency:   Anasarca:   Diastolic CHF, acute on chronic [facility]):   Diagnosis management comments: Patient with clinical signs of worsening CHF without evidence of respiratory failure but does have increased oxygen requirement. Has been compliant with medications but not with follow-up with Nephrology or Cardiology. No pain or signs of hypertensive or vascular emergency. Will evaluate for worsening CHF, arrhythmia, worsening renal function, electrolyte abnormality, anemia, and ACS.  Anticipate need for admission and input from Cardiology and Nephrology. The patient and his wife agree with this plan. Amount and/or Complexity of Data Reviewed  Clinical lab tests: ordered and reviewed  Tests in the radiology section of CPT®: ordered and reviewed  Obtain history from someone other than the patient: yes  Review and summarize past medical records: yes  Discuss the patient with other providers: yes  Independent visualization of images, tracings, or specimens: yes    Risk of Complications, Morbidity, and/or Mortality  Presenting problems: high  Diagnostic procedures: moderate  Management options: moderate    Patient Progress  Patient progress: improved    ED Course       Procedures        Lab findings:  Recent Results (from the past 12 hour(s))   EKG, 12 LEAD, INITIAL    Collection Time: 02/26/17  3:04 PM   Result Value Ref Range    Ventricular Rate 56 BPM    Atrial Rate 56 BPM    P-R Interval 192 ms    QRS Duration 80 ms    Q-T Interval 418 ms    QTC Calculation (Bezet) 403 ms    Calculated R Axis -10 degrees    Calculated T Axis -169 degrees    Diagnosis       Sinus bradycardia  Low voltage QRS  Septal infarct (cited on or before 17-MAR-2015)  Possible Lateral infarct (cited on or before 21-JUL-2016)  Abnormal ECG  When compared with ECG of 12-AUG-2016 14:34,  No significant change was found     CBC WITH AUTOMATED DIFF    Collection Time: 02/26/17  3:25 PM   Result Value Ref Range    WBC 4.0 (L) 4.6 - 13.2 K/uL    RBC 3.38 (L) 4.70 - 5.50 M/uL    HGB 9.6 (L) 13.0 - 16.0 g/dL    HCT 30.9 (L) 36.0 - 48.0 %    MCV 91.4 74.0 - 97.0 FL    MCH 28.4 24.0 - 34.0 PG    MCHC 31.1 31.0 - 37.0 g/dL    RDW 17.4 (H) 11.6 - 14.5 %    PLATELET CLUMPED 326 - 420 K/uL    NEUTROPHILS 73 40 - 73 %    LYMPHOCYTES 13 (L) 21 - 52 %    MONOCYTES 7 3 - 10 %    EOSINOPHILS 6 (H) 0 - 5 %    BASOPHILS 1 0 - 2 %    ABS. NEUTROPHILS 3.0 1.8 - 8.0 K/UL    ABS. LYMPHOCYTES 0.5 (L) 0.9 - 3.6 K/UL    ABS. MONOCYTES 0.3 0.05 - 1.2 K/UL    ABS. EOSINOPHILS 0.2 0.0 - 0.4 K/UL    ABS. BASOPHILS 0.0 0.0 - 0.1 K/UL    DF AUTOMATED      PLATELET COMMENTS ADEQUATE PLATELETS      RBC COMMENTS ANISOCYTOSIS  1+        RBC COMMENTS POIKILOCYTOSIS  1+       METABOLIC PANEL, COMPREHENSIVE    Collection Time: 02/26/17  3:25 PM   Result Value Ref Range    Sodium 139 136 - 145 mmol/L    Potassium 4.6 3.5 - 5.5 mmol/L    Chloride 96 (L) 100 - 108 mmol/L    CO2 38 (H) 21 - 32 mmol/L    Anion gap 5 3.0 - 18 mmol/L    Glucose 81 74 - 99 mg/dL    BUN 39 (H) 7.0 - 18 MG/DL    Creatinine 2.10 (H) 0.6 - 1.3 MG/DL    BUN/Creatinine ratio 19 12 - 20      GFR est AA 38 (L) >60 ml/min/1.73m2    GFR est non-AA 31 (L) >60 ml/min/1.73m2    Calcium 8.8 8.5 - 10.1 MG/DL    Bilirubin, total 0.9 0.2 - 1.0 MG/DL    ALT (SGPT) 15 (L) 16 - 61 U/L    AST (SGOT) 34 15 - 37 U/L    Alk.  phosphatase 107 45 - 117 U/L    Protein, total 7.8 6.4 - 8.2 g/dL    Albumin 3.1 (L) 3.4 - 5.0 g/dL    Globulin 4.7 (H) 2.0 - 4.0 g/dL    A-G Ratio 0.7 (L) 0.8 - 1.7     CARDIAC PANEL,(CK, CKMB & TROPONIN)    Collection Time: 02/26/17  3:25 PM   Result Value Ref Range    CK 89 39 - 308 U/L    CK - MB 1.7 <3.6 ng/ml    CK-MB Index 1.9 0.0 - 4.0 %    Troponin-I, Qt. <0.02 0.0 - 0.045 NG/ML   PRO-BNP    Collection Time: 02/26/17  3:25 PM   Result Value Ref Range    NT pro-BNP 21405 (H) 0 - 900 PG/ML   PROTHROMBIN TIME + INR    Collection Time: 02/26/17  3:25 PM   Result Value Ref Range    Prothrombin time 15.6 (H) 11.5 - 15.2 sec    INR 1.3 (H) 0.8 - 1.2     MAGNESIUM    Collection Time: 02/26/17  3:25 PM   Result Value Ref Range    Magnesium 1.7 (L) 1.8 - 2.4 mg/dL       EKG interpretation by ED Physician:  Sinus bradycardia, rate of 56, low voltage, t-wave flattening throughout without interval change from 2016    X-Ray, CT or other radiology findings or impressions:  XR CHEST PORT    (Results Pending)   Cardiomegaly and increased pulmonary venous congestion with effusions R>>L      Orders  Orders Placed This Encounter    XR CHEST PORT     Standing Status:   Standing     Number of Occurrences:   1     Order Specific Question:   Reason for Exam     Answer:   shortness of breath    CBC WITH AUTOMATED DIFF     Standing Status:   Standing     Number of Occurrences:   1    METABOLIC PANEL, COMPREHENSIVE     Standing Status:   Standing     Number of Occurrences:   1    CARDIAC PANEL,(CK, CKMB & TROPONIN)     Standing Status:   Standing     Number of Occurrences:   1    PRO-BNP     Standing Status:   Standing     Number of Occurrences:   1    PROTHROMBIN TIME + INR     Standing Status:   Standing     Number of Occurrences:   1    PT     Standing Status:   Standing     Number of Occurrences:   1    MAGNESIUM     Standing Status:   Standing     Number of Occurrences:   1    Cardiac Monitor     Standing Status:   Standing     Number of Occurrences:   1     Order Specific Question:   Type: Answer:   Remote Telemetry    EKG, 12 LEAD, INITIAL     Standing Status:   Standing     Number of Occurrences:   1     Order Specific Question:   Reason for Exam:     Answer:   short of breath    INSERT PERIPHERAL IV ONE TIME STAT     Standing Status:   Standing     Number of Occurrences:   1    magnesium sulfate 2 g/50 ml IVPB (premix or compounded)    furosemide (LASIX) injection 80 mg    nitroglycerin (NITROBID) 2 % ointment 1 Inch    INITIAL PHYSICIAN ORDER: INPATIENT Telemetry; 3. Patient receiving treatment that can only be provided in an inpatient setting (further clarification in H&P documentation)     Standing Status:   Standing     Number of Occurrences:   1     Order Specific Question:   Status: Answer:   Inpatient [101]     Order Specific Question:   Type of Bed     Answer:   Telemetry [19]     Order Specific Question:   Inpatient Hospitalization Certified Necessary for the Following Reasons     Answer:   3.  Patient receiving treatment that can only be provided in an inpatient setting (further clarification in H&P documentation)     Order Specific Question:   Admitting Diagnosis     Answer:   Acute CHF (congestive heart failure) [facility]) [[id number]]     Order Specific Question:   Admitting Physician     Answer:   Jessica Wood     Order Specific Question:   Attending Physician     Answer:   Jessica Wood     Order Specific Question:   Estimated Length of Stay     Answer:   > or = to 2 Midnights     Order Specific Question:   Discharge Plan:     Answer:   Home with Office Follow-up           Progress notes, Consult notes or additional Procedure notes:   Patient started on diuresis with lasix and topical nitrates applied. Replacing magnesium. Discussed clinical findings with patient with wife, patient, nephrology, and Dr. Zach Dean, all of whom agree with planned admission for further acute treatment for his decompensated CHF and renal failure. Disposition:  Diagnosis:   1. Diastolic CHF, acute on chronic (HCC)    2. Anasarca    3. Acute on chronic renal insufficiency        Disposition: Admission    Follow-up Information     None           Patient's Medications   Start Taking    No medications on file   Continue Taking    ALLOPURINOL (ZYLOPRIM) 100 MG TABLET    Take 100 mg by mouth daily. ASPIRIN DELAYED-RELEASE 81 MG TABLET    Take 162 mg by mouth daily. CARVEDILOL (COREG) 6.25 MG TABLET    Take 1 Tab by mouth two (2) times daily (with meals). CHOLECALCIFEROL, VITAMIN D3, (VITAMIN D3) 1,000 UNIT CAP    Take 2,000 Units by mouth daily. FUROSEMIDE (LASIX) 40 MG TABLET    One tab daily  Indications: PERIPHERAL EDEMA DUE TO CHRONIC HEART FAILURE    LISINOPRIL (PRINIVIL, ZESTRIL) 5 MG TABLET    Take 1 Tab by mouth daily. MULTIVITAMIN (ONE A DAY) TABLET    Take 1 Tab by mouth daily. NITROGLYCERIN (NITROSTAT) 0.4 MG SL TABLET    by SubLINGual route every five (5) minutes as needed for Chest Pain. OMEGA-3 FATTY ACIDS 300 MG CAP    Take 1 Cap by mouth two (2) times a day.     OMEPRAZOLE (PRILOSEC) 20 MG CAPSULE    Take 20 mg by mouth daily. PRAVASTATIN (PRAVACHOL) 80 MG TABLET    Take 80 mg by mouth nightly. These Medications have changed    No medications on file   Stop Taking    No medications on file         Scribe Attestation:   Mark Madrid acting as a scribe for and in the presence of Dr. Colonel Kashif MD     Signed by: Juan Corona, February 26, 2017 at 6:29 PM     Provider Attestation:   I personally performed the services described in the documentation, reviewed the documentation, as recorded by the scribe in my presence, and it accurately and completely records my words and actions.      Reviewed and signed by:  Dr. Colonel Kashif MD

## 2017-02-26 NOTE — ED TRIAGE NOTES
Pt, with family  C/o  increasing shortness of breath,  Leg & abdominal swelling  For 2 -3 weeks, On 4 L nasal O2

## 2017-02-26 NOTE — IP AVS SNAPSHOT
Current Discharge Medication List  
  
Take these medications at their scheduled times Dose & Instructions Dispensing Information Comments Morning Noon Evening Bedtime  
 allopurinol 100 mg tablet Commonly known as:  Orpha Conquest Your next dose is: Today, Tomorrow Other:  ____________ Dose:  100 mg Take 1 Tab by mouth daily. Indications: GOUT  
 Quantity:  30 Tab Refills:  0  
     
   
   
   
  
 aspirin delayed-release 81 mg tablet Your next dose is: Today, Tomorrow Other:  ____________ Dose:  162 mg Take 2 Tabs by mouth daily. Quantity:  30 Tab Refills:  0  
     
   
   
   
  
 carvedilol 6.25 mg tablet Commonly known as:  Macel Rajiv Your next dose is: Today, Tomorrow Other:  ____________ Dose:  6.25 mg Take 1 Tab by mouth two (2) times daily (with meals). Indications: Chronic Heart Failure Quantity:  60 Tab Refills:  0  
     
   
   
   
  
 cholecalciferol 1,000 unit tablet Commonly known as:  VITAMIN D3 Your next dose is: Today, Tomorrow Other:  ____________ Dose:  2000 Units Take 2 Tabs by mouth daily. Quantity:  100 Tab Refills:  0  
     
   
   
   
  
 ferrous sulfate 325 mg (65 mg iron) tablet Your next dose is: Today, Tomorrow Other:  ____________ Dose:  325 mg Take 1 Tab by mouth daily (with breakfast). Quantity:  100 Tab Refills:  0  
     
   
   
   
  
 fish oil-omega-3 fatty acids 340-1,000 mg capsule Your next dose is: Today, Tomorrow Other:  ____________ Dose:  1 Cap Take 1 Cap by mouth two (2) times a day. Indications: HYPERTRIGLYCERIDEMIA Quantity:  100 Cap Refills:  0  
     
   
   
   
  
 influenza vaccine 2016-17 (36mos+)(PF) Syrg injection Commonly known as:  FLUZONE/FLUARIX/FLULAVAL QUAD Your next dose is: Today, Tomorrow Other:  ____________  Dose:  0.5 mL  
 0.5 mL by IntraMUSCular route PRIOR TO DISCHARGE. Quantity:  0.5 mL Refills:  0  
     
   
   
   
  
 metOLazone 2.5 mg tablet Commonly known as:  Fabiene Solid Your next dose is: Today, Tomorrow Other:  ____________ Dose:  2.5 mg Take 1 Tab by mouth daily. Quantity:  100 Tab Refills:  0  
     
   
   
   
  
 pantoprazole 40 mg tablet Commonly known as:  PROTONIX Your next dose is: Today, Tomorrow Other:  ____________ Dose:  40 mg Take 1 Tab by mouth Daily (before breakfast). Quantity:  100 Tab Refills:  0  
     
   
   
   
  
 pravastatin 80 mg tablet Commonly known as:  PRAVACHOL Your next dose is: Today, Tomorrow Other:  ____________ Dose:  80 mg Take 1 Tab by mouth nightly. Quantity:  100 Tab Refills:  0  
     
   
   
   
  
 therapeutic multivitamin tablet Commonly known as:  [facility] Your next dose is: Today, Tomorrow Other:  ____________ Dose:  1 Tab Take 1 Tab by mouth daily. Quantity:  100 Tab Refills:  0 Take these medications as needed Dose & Instructions Dispensing Information Comments Morning Noon Evening Bedtime  
 albuterol-ipratropium 2.5 mg-0.5 mg/3 ml Nebu Commonly known as:  Sriram Amrit Your next dose is: Today, Tomorrow Other:  ____________ Dose:  3 mL  
3 mL by Nebulization route every six (6) hours as needed. Quantity:  30 Nebule Refills:  0 HYDROcodone-acetaminophen 5-325 mg per tablet Commonly known as:  Lizz Kellerthergill Your next dose is: Today, Tomorrow Other:  ____________ Dose:  1 Tab Take 1 Tab by mouth every four (4) hours as needed. Max Daily Amount: 6 Tabs. Quantity:  60 Tab Refills:  0  
     
   
   
   
  
 nitroglycerin 0.4 mg SL tablet Commonly known as:  NITROSTAT Your next dose is: Today, Tomorrow Other:  ____________ Dose:  0.4 mg  
1 Tab by SubLINGual route as needed for Chest Pain. Quantity:  1 Bottle Refills:  0  
     
   
   
   
  
 zolpidem 5 mg tablet Commonly known as:  AMBIEN Your next dose is: Today, Tomorrow Other:  ____________ Dose:  5 mg Take 1 Tab by mouth nightly as needed for Sleep. Max Daily Amount: 5 mg. Quantity:  30 Tab Refills:  0 Take these medications as directed Dose & Instructions Dispensing Information Comments Morning Noon Evening Bedtime  
 fluticasone 50 mcg/actuation nasal spray Commonly known as:  Padma Najera Your next dose is: Today, Tomorrow Other:  ____________  
   
   
 2 spray each nostril daily  Indications: ALLERGIC RHINITIS Quantity:  1 Bottle Refills:  0  
     
   
   
   
  
 furosemide 40 mg tablet Commonly known as:  LASIX Your next dose is: Today, Tomorrow Other:  ____________  
   
   
 1 tab BID Quantity:  100 Tab Refills:  0 Where to Get Your Medications Information about where to get these medications is not yet available ! Ask your nurse or doctor about these medications  
  albuterol-ipratropium 2.5 mg-0.5 mg/3 ml Nebu  
 allopurinol 100 mg tablet  
 aspirin delayed-release 81 mg tablet  
 carvedilol 6.25 mg tablet  
 cholecalciferol 1,000 unit tablet  
 ferrous sulfate 325 mg (65 mg iron) tablet  
 fish oil-omega-3 fatty acids 340-1,000 mg capsule  
 fluticasone 50 mcg/actuation nasal spray  
 furosemide 40 mg tablet HYDROcodone-acetaminophen 5-325 mg per tablet  
 influenza vaccine 2016-17 (36mos+)(PF) Syrg injection  
 metOLazone 2.5 mg tablet  
 nitroglycerin 0.4 mg SL tablet  
 pantoprazole 40 mg tablet  
 pravastatin 80 mg tablet  
 therapeutic multivitamin tablet  
 zolpidem 5 mg tablet

## 2017-02-26 NOTE — ED NOTES
Mr. Junior Kerr is in failure and Oxygen tank is close to end. Will consult with ED head nurse for ED bed placement since medications and ? Admission may be indicated.    Signed By: Jill Leach NP     February 26, 2017

## 2017-02-27 PROBLEM — I50.33 ACUTE ON CHRONIC DIASTOLIC (CONGESTIVE) HEART FAILURE (HCC): Status: ACTIVE | Noted: 2017-01-01

## 2017-02-27 NOTE — H&P
[address] [place] [address]    Name:  Katharina Piedra  MR#:  [id number]  :  1940  Account #:  [de-identified]  Date of Adm:  2017      CHIEF COMPLAINT: Admitted with chief complaint of increasing  abdominal girth and leg swelling. HISTORY OF PRESENT ILLNESS: This is a 68-year-old white male  with history of hypertension, hyperlipidemia, coronary bypass surgery,  history of diastolic heart failure, as well as chronic kidney disease  stage III, complained of increasing abdominal girth for the past 2  weeks, also increasing leg swelling. He has been taking Lasix 40 b.i.d.,  but despite of this abdominal swelling and leg swelling persists. He  reports that he is making enough urine. The patient has known chronic  kidney disease stage III. PAST MEDICAL HISTORY: Coronary bypass surgery, defibrillator  placement, hypertension, chronic kidney disease, gout, hyperlipidemia,  stent placement, mini strokes, obstructive sleep apnea. SOCIAL HISTORY: He is a , used to drink alcohol several  years ago, does not smoke. Used to work on air conditioning. FAMILY HISTORY: Both parents  of heart attack. REVIEW OF SYSTEMS  HEENT: No headache, no dizziness. CARDIOVASCULAR/RESPIRATORY: No chest pain. Admits to  shortness of breath. GASTROINTESTINAL: Has abdominal swelling. GENITOURINARY: No complaint. MUSCULOSKELETAL: Increasing leg edema. PHYSICAL EXAMINATION  GENERAL: The patient is awake and alert and appears to be  comfortable at rest.  VITAL SIGNS: Blood pressure on admission is 149/79, pulse 67,  temperature 97, saturation 93%. HEENT: Head is normocephalic. EYES: Pupils equal. Eye ocular movements intact. NOSE: No septal deviation. MOUTH: Tongue is moist. He is keeping his nasal oxygen through his  mouth because he says he is mouth breathing  NECK: I do not see vein distention. CHEST: Symmetrical.  HEART: Regular. LUNGS: Decreased breath sounds.   ABDOMEN: Protuberant, consistent with ascites. A scar in the right  upper quadrant, right side of the abdomen. He tells me from  cholecystectomy. EXTREMITIES: Revealed 2-3+ leg edema. LABORATORY DATA: Review of laboratory data revealed the  following: Hemoglobin 9 grams, white count 4.2, platelet is 78. CHEMISTRY: BUN 39, creatinine 1.95, creatinine yesterday was 2.1,  so this is an improvement. BTNP is 27,000. Chest x-ray showed a large right pleural effusion and small left pleural  effusion and mild interstitial edema. IMPRESSION:  1. Acute-on-chronic diastolic heart failure. 2. History of coronary artery bypass surgery. 2006  3. Hypertension. 4. Hyperlipidemia. 5. History of coronary artery bypass and defibrillator placement, as well  as stent placement. 6. Obstructive sleep apnea. 7. History of mini strokes. 8. Ascites  9. Anasrca    DISCUSSION: The patient obviously in diastolic heart failure. He may  have some right heart failure component, his ejection fraction is 55% to  60%. I will review his home medications and continue those that he  needs. Will ask Cardiology to see. Nephrology was also consulted.     MD CM Srivastava / BILL  D:  02/27/2017   09:12  T:  02/27/2017   10:17  Job #:  [id number]

## 2017-02-27 NOTE — CONSULTS
[facility] - Consult Note    Date of  Admission: 2/26/2017  5:34 PM     Primary Care Physician:  Nuris Mendez MD     Plan:       1. Acute combined systolic and diastolic congestive heart failure- presents with worsening CHF with severe edema and elevated NT pro BNP. Follow cardiac enzymes and ekg today. Fluid restriction. Stricts I&O. Monitor electrolytes and renal function. Ordered echo to assess LVF, RVF or any WMA. continue with asa, bb and statin Will add Lasix drip and dobutamine drip. Added metolazone 2.5 mg daily. Patient will need cardiac w/u when stable. 2. Anasarca- agressive diuresis with Lasix drip and metolazone. Add iv dobut due to MR/AS  3. S/p CABG in 2013 in [facility]- stable denies chest pain or pressure continue with asa, statin and bb.  4. S/p AICD- [employer]. Will interrogate today   5. Hx Carotid endarterectomy- bilateral in 2015    6. KYLEE on CKD- monitor renal function   7. Pancytopenia- consider oncology consult   8. Hypertension- normal low . continue with bb monitor closely    9. Mitral regurgitation -mild to moderate regurgitation echo on 2016   10. Aortic stenosis : moderate, will f/u      SUMMARY: echo 08/16  Left ventricle: Systolic function was normal. Ejection fraction was  estimated in the range of 55 % to 60 %. There were no regional wall motion  abnormalities. Right ventricle: Estimated peak pressure was 50 mmHg. Left atrium: Size was normal. LA volume index was 33 ml/mï¾². Mitral valve: There was annular calcification. There was mild thickening. There was moderate calcification of the anterior leaflet with mild chordal  involvement. There was mild to moderate regurgitation. Aortic valve: Transaortic velocity was increased due to valvular stenosis. There was moderate stenosis. Valve mean gradient was 24 mmHg. Tricuspid valve: There was mild regurgitation. COMPARISONS:  Comparison was made with the previous study of 22-Jul-2016.  There be a  slight improvement in the degree of mitral regurgitation, otherwise no  significant change since the previous exam           Assessment:     Hospital Problems  Date Reviewed: 8/13/2016          Codes Class Noted POA    Acute on chronic diastolic (congestive) heart failure (HCC) ICD-10-CM: I50.33  ICD-9-CM: 428.33, 428.0  2/27/2017 Unknown        Acute CHF (congestive heart failure) ([facility] ICD-10-CM: I50.9  ICD-9-CM: 428.0  2/26/2017 Unknown        HTN (hypertension) (Chronic) ICD-10-CM: I10  ICD-9-CM: 401.9  9/4/2016 Yes        HLD (hyperlipidemia) (Chronic) ICD-10-CM: E78.5  ICD-9-CM: 272.4  9/4/2016 Yes        Diastolic CHF, acute on chronic ([facility] ICD-10-CM: I50.33  ICD-9-CM: 428.33, 428.0  8/17/2016 Yes        Acute exacerbation of CHF (congestive heart failure) ([facility] ICD-10-CM: I50.9  ICD-9-CM: 428.0  8/12/2016 Yes        Hx of CABG ICD-10-CM: Z95.1  ICD-9-CM: V45.81  7/22/2016 Yes    Overview Signed 7/22/2016  5:23 PM by [address], NP     2013             H/O carotid endarterectomy ICD-10-CM: Z98.890  ICD-9-CM: V45.89  7/22/2016 Yes    Overview Signed 7/22/2016  5:27 PM by [address], NP     2015             AICD (automatic cardioverter/defibrillator) present ICD-10-CM: Z95.810  ICD-9-CM: V45.02  7/22/2016 Yes    Overview Signed 7/22/2016  5:27 PM by [address], NP     2015             CHF (congestive heart failure) (HCC) ICD-10-CM: I50.9  ICD-9-CM: 428.0  7/21/2016 Unknown        Hematospermia ICD-10-CM: R36.1  ICD-9-CM: 608.82  3/5/2014 Yes        CAD (coronary artery disease) ICD-10-CM: I25.10  ICD-9-CM: 414.00  3/5/2014 Yes        PVD (peripheral vascular disease) ([facility] ICD-10-CM: I73.9  ICD-9-CM: 443.9  3/5/2014 Yes                   History of Present Illness: This is a 78 y.o. male admitted for Acute CHF (congestive heart failure) ([facility])CHF (congestive heart failure) ([facility] Patient with PMHx CHF, CAD s/p CABG in 2013, s/p AICD, Hx of PVD, Hx carotid endarterectomy.  [place] Violet is a 70 y.o. Patient complains of dyspnea, increase in fatigue and  lower extremity edema. Patient admitted with progressive worsening sob, abdominal distention and leg swelling. He states that these sx have been getting worse over the past 2 weeks. He states that he has been taking his medications normally, and endorses taking 40 mg Lasix BID. He  uses Oxygen at home between 3.5 and 4 L of O2 at home. Denies chest pain or syncope/palpitations. No fever or chills. No abdominal pain, nausea or vomiting. C/o worsening pedal edema and increasing abdominal girth     Past Medical History:     Past Medical History:   Diagnosis Date    Arrhythmia     A fib; has external vest and belt he wears for this    Arthritis     High blood pressure     Hypercholesteremia     Mini stroke (HCC) 2000    Unspecified sleep apnea     does not wear machine         Social History:     Social History     Social History    Marital status:      Spouse name: N/A    Number of children: N/A    Years of education: N/A     Social History Main Topics    Smoking status: Former Smoker    Smokeless tobacco: Never Used      Comment: quit in the 90s    Alcohol use No    Drug use: No    Sexual activity: Not on file     Other Topics Concern    Not on file     Social History Narrative        Family History:   No family history on file.      Medications:   No Known Allergies     Current Facility-Administered Medications   Medication Dose Route Frequency    furosemide (LASIX) 100 mg in 0.9% sodium chloride 100 mL infusion  5 mg/hr IntraVENous CONTINUOUS    allopurinol (ZYLOPRIM) tablet 100 mg  100 mg Oral DAILY    aspirin delayed-release tablet 162 mg  162 mg Oral DAILY    nitroglycerin (NITROSTAT) tablet 0.4 mg  0.4 mg SubLINGual PRN    pantoprazole (PROTONIX) tablet 40 mg  40 mg Oral ACB    pravastatin (PRAVACHOL) tablet 80 mg  80 mg Oral QHS    therapeutic multivitamin (THERAGRAN) tablet 1 Tab  1 Tab Oral DAILY    cholecalciferol (VITAMIN D3) tablet 2,000 Units  2,000 Units Oral DAILY    fish oil-omega-3 fatty acids 340-1,000 mg capsule 1 Cap  1 Cap Oral BID    carvedilol (COREG) tablet 6.25 mg  6.25 mg Oral BID WITH MEALS    sodium chloride (NS) flush 5-10 mL  5-10 mL IntraVENous Q8H    sodium chloride (NS) flush 5-10 mL  5-10 mL IntraVENous PRN    acetaminophen (TYLENOL) tablet 650 mg  650 mg Oral Q4H PRN    HYDROcodone-acetaminophen (NORCO) 5-325 mg per tablet 1 Tab  1 Tab Oral Q4H PRN    naloxone (NARCAN) injection 0.4 mg  0.4 mg IntraVENous PRN    ondansetron (ZOFRAN) injection 4 mg  4 mg IntraVENous Q4H PRN    heparin (porcine) injection 5,000 Units  5,000 Units SubCUTAneous Q8H     Current Outpatient Prescriptions   Medication Sig    furosemide (LASIX) 40 mg tablet One tab daily  Indications: PERIPHERAL EDEMA DUE TO CHRONIC HEART FAILURE    carvedilol (COREG) 6.25 mg tablet Take 1 Tab by mouth two (2) times daily (with meals).  lisinopril (PRINIVIL, ZESTRIL) 5 mg tablet Take 1 Tab by mouth daily.  multivitamin (ONE A DAY) tablet Take 1 Tab by mouth daily.  Cholecalciferol, Vitamin D3, (VITAMIN D3) 1,000 unit cap Take 2,000 Units by mouth daily.  Omega-3 Fatty Acids 300 mg cap Take 1 Cap by mouth two (2) times a day.  allopurinol (ZYLOPRIM) 100 mg tablet Take 100 mg by mouth daily.  aspirin delayed-release 81 mg tablet Take 162 mg by mouth daily.  nitroglycerin (NITROSTAT) 0.4 mg SL tablet by SubLINGual route every five (5) minutes as needed for Chest Pain.  omeprazole (PRILOSEC) 20 mg capsule Take 20 mg by mouth daily.  pravastatin (PRAVACHOL) 80 mg tablet Take 80 mg by mouth nightly. Review Of Systems:           Constitutional: increase in fatigue. No fever, no chills, no weight loss, no night sweats   HEENT: No epistaxis, no nasal drainage, no difficulty in swallowing, no redness in eyes  Respiratory:  dyspnea on exertion  Cardiovascular:  dyspnea, chronic leg edema.    Gastrointestinal: abdominal  distention. Genitourinary: No urinary symptoms or hematuria  Integument/breast: No ulcers or rashes  Musculoskeletal: no muscle pain, no weakness  Neurological: No focal weakness, no seizures, no headaches  Behvioral/Psych: No anxiety, no depression             Physical Exam:     Visit Vitals    /74    Pulse 60    Temp 97.4 °F (36.3 °C)    Resp 15    Ht 6' (1.829 m)    Wt 95.3 kg (210 lb)    SpO2 93%    BMI 28.48 kg/m2     BP Readings from Last 3 Encounters:   02/27/17 125/74   08/23/16 110/80   08/02/16 93/57     Pulse Readings from Last 3 Encounters:   02/27/17 60   08/23/16 67   08/02/16 70     Wt Readings from Last 3 Encounters:   02/26/17 95.3 kg (210 lb)   08/23/16 89.6 kg (197 lb 9.6 oz)   08/02/16 97.3 kg (214 lb 9.6 oz)       General:  alert, cooperative, no distress, appears stated age, moderately obese  Skin: Warm and dry, acyanotic, normal color. Head: Normocephalic, atraumatic. Eyes: Sclerae anicteric, conjunctivae without injection. Neck:  nontender, no nuchal rigidity, no masses, no stridor, no carotid bruit, + JVD-severe  Lungs:  Dooly crackles at the bases of  both the left and right lungs. diminished breath sounds   Heart:  regular rate and rhythm, S1, S2 normal, no S3 or S4, systolic murmur: holosystolic 3/6, blowing at lower left sternal border, at apex, radiates to axilla  Abdomen:  abdomen is distended soft without significant tenderness, masses, organomegaly or guarding. +abd wall edema  Extremities:  Extremities with chronic skin changes with erythema and some drainage. no cyanosis. Edema BLE +3-4 up to upper thighs and lower back. Anasarca   Neurological: grossly intact. No focal abnormalities, moves all extremities well. Psychiatric Affect: The patient is awake, alert and oriented x3. [place] is interactive and appropriate.      Data Review:     Recent Results (from the past 48 hour(s))   EKG, 12 LEAD, INITIAL    Collection Time: 02/26/17  3:04 PM Result Value Ref Range    Ventricular Rate 56 BPM    Atrial Rate 56 BPM    P-R Interval 192 ms    QRS Duration 80 ms    Q-T Interval 418 ms    QTC Calculation (Bezet) 403 ms    Calculated R Axis -10 degrees    Calculated T Axis -169 degrees    Diagnosis       Sinus bradycardia  Low voltage QRS  Septal infarct (cited on or before 17-MAR-2015)  Possible Lateral infarct (cited on or before 21-JUL-2016)  Abnormal ECG  When compared with ECG of 12-AUG-2016 14:34,  No significant change was found  Confirmed by Vicenta Leavitt MD, ----- ([id number]) on 2/26/2017 10:18:02 PM     CBC WITH AUTOMATED DIFF    Collection Time: 02/26/17  3:25 PM   Result Value Ref Range    WBC 4.0 (L) 4.6 - 13.2 K/uL    RBC 3.38 (L) 4.70 - 5.50 M/uL    HGB 9.6 (L) 13.0 - 16.0 g/dL    HCT 30.9 (L) 36.0 - 48.0 %    MCV 91.4 74.0 - 97.0 FL    MCH 28.4 24.0 - 34.0 PG    MCHC 31.1 31.0 - 37.0 g/dL    RDW 17.4 (H) 11.6 - 14.5 %    PLATELET CLUMPED 636 - 420 K/uL    NEUTROPHILS 73 40 - 73 %    LYMPHOCYTES 13 (L) 21 - 52 %    MONOCYTES 7 3 - 10 %    EOSINOPHILS 6 (H) 0 - 5 %    BASOPHILS 1 0 - 2 %    ABS. NEUTROPHILS 3.0 1.8 - 8.0 K/UL    ABS. LYMPHOCYTES 0.5 (L) 0.9 - 3.6 K/UL    ABS. MONOCYTES 0.3 0.05 - 1.2 K/UL    ABS. EOSINOPHILS 0.2 0.0 - 0.4 K/UL    ABS.  BASOPHILS 0.0 0.0 - 0.1 K/UL    DF AUTOMATED      PLATELET COMMENTS ADEQUATE PLATELETS      RBC COMMENTS ANISOCYTOSIS  1+        RBC COMMENTS POIKILOCYTOSIS  1+       METABOLIC PANEL, COMPREHENSIVE    Collection Time: 02/26/17  3:25 PM   Result Value Ref Range    Sodium 139 136 - 145 mmol/L    Potassium 4.6 3.5 - 5.5 mmol/L    Chloride 96 (L) 100 - 108 mmol/L    CO2 38 (H) 21 - 32 mmol/L    Anion gap 5 3.0 - 18 mmol/L    Glucose 81 74 - 99 mg/dL    BUN 39 (H) 7.0 - 18 MG/DL    Creatinine 2.10 (H) 0.6 - 1.3 MG/DL    BUN/Creatinine ratio 19 12 - 20      GFR est AA 38 (L) >60 ml/min/1.73m2    GFR est non-AA 31 (L) >60 ml/min/1.73m2    Calcium 8.8 8.5 - 10.1 MG/DL    Bilirubin, total 0.9 0.2 - 1.0 MG/DL ALT (SGPT) 15 (L) 16 - 61 U/L    AST (SGOT) 34 15 - 37 U/L    Alk.  phosphatase 107 45 - 117 U/L    Protein, total 7.8 6.4 - 8.2 g/dL    Albumin 3.1 (L) 3.4 - 5.0 g/dL    Globulin 4.7 (H) 2.0 - 4.0 g/dL    A-G Ratio 0.7 (L) 0.8 - 1.7     CARDIAC PANEL,(CK, CKMB & TROPONIN)    Collection Time: 02/26/17  3:25 PM   Result Value Ref Range    CK 89 39 - 308 U/L    CK - MB 1.7 <3.6 ng/ml    CK-MB Index 1.9 0.0 - 4.0 %    Troponin-I, Qt. <0.02 0.0 - 0.045 NG/ML   PRO-BNP    Collection Time: 02/26/17  3:25 PM   Result Value Ref Range    NT pro-BNP 01990 (H) 0 - 900 PG/ML   PROTHROMBIN TIME + INR    Collection Time: 02/26/17  3:25 PM   Result Value Ref Range    Prothrombin time 15.6 (H) 11.5 - 15.2 sec    INR 1.3 (H) 0.8 - 1.2     MAGNESIUM    Collection Time: 02/26/17  3:25 PM   Result Value Ref Range    Magnesium 1.7 (L) 1.8 - 2.4 mg/dL   URINALYSIS W/ RFLX MICROSCOPIC    Collection Time: 02/27/17  1:45 AM   Result Value Ref Range    Color YELLOW      Appearance CLEAR      Specific gravity 1.009 1.005 - 1.030      pH (UA) 6.0 5.0 - 8.0      Protein NEGATIVE  NEG mg/dL    Glucose NEGATIVE  NEG mg/dL    Ketone NEGATIVE  NEG mg/dL    Bilirubin NEGATIVE  NEG      Blood SMALL (A) NEG      Urobilinogen 1.0 0.2 - 1.0 EU/dL    Nitrites NEGATIVE  NEG      Leukocyte Esterase NEGATIVE  NEG     URINE MICROSCOPIC ONLY    Collection Time: 02/27/17  1:45 AM   Result Value Ref Range    WBC NEGATIVE  0 - 4 /hpf    RBC 4 to 10 0 - 5 /hpf    Epithelial cells FEW 0 - 5 /lpf    Bacteria NEGATIVE  NEG /hpf    Hyaline cast 0 to 3 0 - 2 /lpf   METABOLIC PANEL, BASIC    Collection Time: 02/27/17  3:35 AM   Result Value Ref Range    Sodium 138 136 - 145 mmol/L    Potassium 4.2 3.5 - 5.5 mmol/L    Chloride 96 (L) 100 - 108 mmol/L    CO2 38 (H) 21 - 32 mmol/L    Anion gap 4 3.0 - 18 mmol/L    Glucose 101 (H) 74 - 99 mg/dL    BUN 39 (H) 7.0 - 18 MG/DL    Creatinine 1.95 (H) 0.6 - 1.3 MG/DL    BUN/Creatinine ratio 20 12 - 20      GFR est AA 41 (L) >60 ml/min/1.73m2    GFR est non-AA 34 (L) >60 ml/min/1.73m2    Calcium 9.1 8.5 - 10.1 MG/DL   MAGNESIUM    Collection Time: 02/27/17  3:35 AM   Result Value Ref Range    Magnesium 2.3 1.8 - 2.4 mg/dL   PROTHROMBIN TIME + INR    Collection Time: 02/27/17  3:35 AM   Result Value Ref Range    Prothrombin time 15.5 (H) 11.5 - 15.2 sec    INR 1.3 (H) 0.8 - 1.2     PTT    Collection Time: 02/27/17  3:35 AM   Result Value Ref Range    aPTT 41.7 (H) 23.0 - 36.4 SEC   CBC WITH AUTOMATED DIFF    Collection Time: 02/27/17  3:35 AM   Result Value Ref Range    WBC 4.2 (L) 4.6 - 13.2 K/uL    RBC 3.27 (L) 4.70 - 5.50 M/uL    HGB 9.1 (L) 13.0 - 16.0 g/dL    HCT 30.0 (L) 36.0 - 48.0 %    MCV 91.7 74.0 - 97.0 FL    MCH 27.8 24.0 - 34.0 PG    MCHC 30.3 (L) 31.0 - 37.0 g/dL    RDW 17.3 (H) 11.6 - 14.5 %    PLATELET 78 (L) 200 - 420 K/uL    MPV 11.0 9.2 - 11.8 FL    NEUTROPHILS 68 40 - 73 %    LYMPHOCYTES 17 (L) 21 - 52 %    MONOCYTES 7 3 - 10 %    EOSINOPHILS 7 (H) 0 - 5 %    BASOPHILS 1 0 - 2 %    ABS. NEUTROPHILS 2.9 1.8 - 8.0 K/UL    ABS. LYMPHOCYTES 0.7 (L) 0.9 - 3.6 K/UL    ABS. MONOCYTES 0.3 0.05 - 1.2 K/UL    ABS. EOSINOPHILS 0.3 0.0 - 0.4 K/UL    ABS. BASOPHILS 0.0 0.0 - 0.1 K/UL    DF AUTOMATED           Intake/Output Summary (Last 24 hours) at 02/27/17 1304  Last data filed at 02/27/17 0145   Gross per 24 hour   Intake                0 ml   Output             1500 ml   Net            -1500 ml       Cardiographics:     ECG: Sinus bradycardia. No acute ST-T changes    Echocardiogram: ordered       Signed By: Li Garsia NP     February 27, 2017      Patient seen independently  Discussed the details with NP and patient.  Please see orders & recommendations  Mayo Nunez MD

## 2017-02-27 NOTE — ED NOTES
Bedside shift change report given to Javad Azar (oncoming nurse) by John Ortiz RN   (offgoing nurse). Report included the following information SBAR.

## 2017-02-27 NOTE — ED NOTES
Provided pt w/ warm dinner meal tray and drink. Pt currently eating. Has no further requests at this time.

## 2017-02-27 NOTE — CONSULTS
Consult Note  Consult requested by: dr Fernando Sibley is a 78 y.o. male [facility] who is being seen on consult for renal failure  Chief Complaint   Patient presents with    Shortness of Breath    Leg Swelling    Other     Admission diagnosis: <principal problem not specified>     HPI: 78 y o white male with hx of crf stage,atrophic right kidney,probably not functionning. admitted to the hospital with fluid oveload. hx of cad,cadiomyopathy,chf,afib ,htn. I had seen this pt in my office,has small r kidney,renal arteries dopplers showed patent left artery  Past Medical History:   Diagnosis Date    Arrhythmia     A fib; has external vest and belt he wears for this    Arthritis     High blood pressure     Hypercholesteremia     Mini stroke ([facility] [address] 2000    Unspecified sleep apnea     does not wear machine      Past Surgical History:   Procedure Laterality Date    CABG, ARTERY-VEIN, THREE  2013    HX CAROTID ENDARTERECTOMY Left     HX CAROTID ENDARTERECTOMY Right 2000    HX CORONARY STENT PLACEMENT         Social History     Social History    Marital status:      Spouse name: N/A    Number of children: N/A    Years of education: N/A     Occupational History    Not on file. Social History Main Topics    Smoking status: Former Smoker    Smokeless tobacco: Never Used      Comment: quit in the 90s    Alcohol use No    Drug use: No    Sexual activity: Not on file     Other Topics Concern    Not on file     Social History Narrative       No family history on file. No Known Allergies     Home Medications:     Prior to Admission Medications   Prescriptions Last Dose Informant Patient Reported? Taking? Cholecalciferol, Vitamin D3, (VITAMIN D3) 1,000 unit cap   Yes No   Sig: Take 2,000 Units by mouth daily. Omega-3 Fatty Acids 300 mg cap   Yes No   Sig: Take 1 Cap by mouth two (2) times a day. allopurinol (ZYLOPRIM) 100 mg tablet   Yes No   Sig: Take 100 mg by mouth daily. aspirin delayed-release 81 mg tablet   Yes No   Sig: Take 162 mg by mouth daily. carvedilol (COREG) 6.25 mg tablet   No No   Sig: Take 1 Tab by mouth two (2) times daily (with meals). furosemide (LASIX) 40 mg tablet   No No   Sig: One tab daily  Indications: PERIPHERAL EDEMA DUE TO CHRONIC HEART FAILURE   lisinopril (PRINIVIL, ZESTRIL) 5 mg tablet   No No   Sig: Take 1 Tab by mouth daily. multivitamin (ONE A DAY) tablet   Yes No   Sig: Take 1 Tab by mouth daily. nitroglycerin (NITROSTAT) 0.4 mg SL tablet   Yes No   Sig: by SubLINGual route every five (5) minutes as needed for Chest Pain. omeprazole (PRILOSEC) 20 mg capsule   Yes No   Sig: Take 20 mg by mouth daily. pravastatin (PRAVACHOL) 80 mg tablet   Yes No   Sig: Take 80 mg by mouth nightly.       Facility-Administered Medications: None       Current Facility-Administered Medications   Medication Dose Route Frequency    furosemide (LASIX) 100 mg in 0.9% sodium chloride 100 mL infusion  5 mg/hr IntraVENous CONTINUOUS    metOLazone (ZAROXOLYN) tablet 2.5 mg  2.5 mg Oral DAILY    allopurinol (ZYLOPRIM) tablet 100 mg  100 mg Oral DAILY    nitroglycerin (NITROSTAT) tablet 0.4 mg  0.4 mg SubLINGual PRN    pantoprazole (PROTONIX) tablet 40 mg  40 mg Oral ACB    pravastatin (PRAVACHOL) tablet 80 mg  80 mg Oral QHS    therapeutic multivitamin (THERAGRAN) tablet 1 Tab  1 Tab Oral DAILY    cholecalciferol (VITAMIN D3) tablet 2,000 Units  2,000 Units Oral DAILY    fish oil-omega-3 fatty acids 340-1,000 mg capsule 1 Cap  1 Cap Oral BID    carvedilol (COREG) tablet 6.25 mg  6.25 mg Oral BID WITH MEALS    sodium chloride (NS) flush 5-10 mL  5-10 mL IntraVENous Q8H    sodium chloride (NS) flush 5-10 mL  5-10 mL IntraVENous PRN    acetaminophen (TYLENOL) tablet 650 mg  650 mg Oral Q4H PRN    HYDROcodone-acetaminophen (NORCO) 5-325 mg per tablet 1 Tab  1 Tab Oral Q4H PRN    naloxone (NARCAN) injection 0.4 mg  0.4 mg IntraVENous PRN    ondansetron [facility]) injection 4 mg  4 mg IntraVENous Q4H PRN     Current Outpatient Prescriptions   Medication Sig    furosemide (LASIX) 40 mg tablet One tab daily  Indications: PERIPHERAL EDEMA DUE TO CHRONIC HEART FAILURE    carvedilol (COREG) 6.25 mg tablet Take 1 Tab by mouth two (2) times daily (with meals).  lisinopril (PRINIVIL, ZESTRIL) 5 mg tablet Take 1 Tab by mouth daily.  multivitamin (ONE A DAY) tablet Take 1 Tab by mouth daily.  Cholecalciferol, Vitamin D3, (VITAMIN D3) 1,000 unit cap Take 2,000 Units by mouth daily.  Omega-3 Fatty Acids 300 mg cap Take 1 Cap by mouth two (2) times a day.  allopurinol (ZYLOPRIM) 100 mg tablet Take 100 mg by mouth daily.  aspirin delayed-release 81 mg tablet Take 162 mg by mouth daily.  nitroglycerin (NITROSTAT) 0.4 mg SL tablet by SubLINGual route every five (5) minutes as needed for Chest Pain.  omeprazole (PRILOSEC) 20 mg capsule Take 20 mg by mouth daily.  pravastatin (PRAVACHOL) 80 mg tablet Take 80 mg by mouth nightly. Review of Systems:   A comprehensive review of systems was negative except for that written in the HPI.   Data Review:    Labs: Results:       Chemistry Recent Labs      02/27/17 0335 02/26/17   1525   GLU  101*  81   NA  138  139   K  4.2  4.6   CL  96*  96*   CO2  38*  38*   BUN  39*  39*   CREA  1.95*  2.10*   CA  9.1  8.8   AGAP  4  5   BUCR  20  19   AP   --   107   TP   --   7.8   ALB   --   3.1*   GLOB   --   4.7*   AGRAT   --   0.7*      PTH  Lab Results   Component Value Date/Time    Calcium 9.1 02/27/2017 03:35 AM      CBC w/Diff Recent Labs      02/27/17 0335  02/26/17   1525   WBC  4.2*  4.0*   RBC  3.27*  3.38*   HGB  9.1*  9.6*   HCT  30.0*  30.9*   PLT  78*  CLUMPED   GRANS  68  73   LYMPH  17*  13*   EOS  7*  6*      Coagulation Recent Labs      02/27/17   0335  02/26/17   1525   PTP  15.5*  15.6*   INR  1.3*  1.3*   APTT  41.7*   --        Iron/Ferritin No results for input(s): IRON in the last 72 hours.    No lab exists for component: TIBCCALC   BNP No results for input(s): BNPP in the last 72 hours. Cardiac Enzymes Recent Labs      02/26/17   1525   CPK  89   CKND1  1.9      Liver Enzymes Recent Labs      02/26/17   1525   TP  7.8   ALB  3.1*   AP  107   SGOT  34      Thyroid Studies Lab Results   Component Value Date/Time    TSH 4.23 07/22/2016 05:20 AM        Urinalysis Lab Results   Component Value Date/Time    Color YELLOW 02/27/2017 01:45 AM    Appearance CLEAR 02/27/2017 01:45 AM    Specific gravity 1.009 02/27/2017 01:45 AM    pH (UA) 6.0 02/27/2017 01:45 AM    Protein NEGATIVE  02/27/2017 01:45 AM    Glucose NEGATIVE  02/27/2017 01:45 AM    Ketone NEGATIVE  02/27/2017 01:45 AM    Bilirubin NEGATIVE  02/27/2017 01:45 AM    Urobilinogen 1.0 02/27/2017 01:45 AM    Nitrites NEGATIVE  02/27/2017 01:45 AM    Leukocyte Esterase NEGATIVE  02/27/2017 01:45 AM    Epithelial cells FEW 02/27/2017 01:45 AM    Bacteria NEGATIVE  02/27/2017 01:45 AM    WBC NEGATIVE  02/27/2017 01:45 AM    RBC 4 to 10 02/27/2017 01:45 AM         IMAGES:   XR Results (maximum last 3): Results from Hospital Encounter encounter on 02/26/17   XR CHEST PORT   Narrative EXAM: Chest Radiograph    INDICATION: Shortness of breath    TECHNIQUE: AP view of the chest    COMPARISON: 8/12/2016, 8/2/2016 and 7/21/2016    FINDINGS: An unchanged left-sided pacemaker is present. No pneumothorax  identified. Interstitial infiltrates are noted. A large right pleural effusion  is present. A small layering left pleural effusion is present. The cardiac  silhouette is enlarged. Bony vasculature is mildly prominent. Degenerative  changes of the spine are noted. Impression Impression:  1. Large right pleural effusion and small left pleural effusion. 2.  Mild interstitial pulmonary edema.        Results from Hospital Encounter encounter on 08/12/16   XR CHEST PORT   Narrative Portable chest    History: Dyspnoea    Comparison: Chest radiograph 8/2/2016    Findings:      Median sternotomy wires and dual lead AICD in left chest wall unchanged. The  cardiomediastinal silhouette is enlarged. The pulmonary vasculature is  prominent. Diffuse interstitial linear opacities are present throughout the  lungs. Layering pleural effusions blunt the costophrenic angles, right greater  than left. There is silhouetting of the hemidiaphragms. Moderate degenerative  changes are present at the acromioclavicular joints. Surgical clips partially  visualized in the right neck. Impression Impression:    Cardiomegaly with central vascular congestion and interstitial edema. Pleural effusions, right greater than left, slightly increased on the left since  prior exam 8/2/2016. Similar bibasilar consolidations, atelectasis or infiltrate. Thank you for this referral.        Results from [place] encounter on 07/21/16   XR CHEST PORT   Narrative Portable Chest 1235 hours    CPT CODE:51897    HISTORY:  Nausea, vomiting, decreased oxygen saturation, acute combined systolic and  diastolic congestive heart failure,   02 sat 85 % on 3 lit /n.c..    COMPARISON: Chest x-ray July 21, 2016. FINDINGS:     There is persistent widening of the right lateral and inferior pleural space  similar to the previous exam. There is minimal widening of the left costophrenic  angle. Pulmonary vessels are cephalized and ill-defined. There is persistent  increased opacity at the lung bases with obscuration of the hemidiaphragms. The  heart remains mildly enlarged. No change in the right atrial nor the right  ventricular lead of the left upper chest AICD. Sternal cerclage wires noted. .         Impression IMPRESSION:    Pulmonary vascular congestion. Right greater than left pleural effusions. Stable cardiomegaly. Bibasal minimal infiltrate or atelectasis. CT Results (maximum last 3):     Results from [place] encounter on 12/12/16   CT ABD WO CONT   Narrative CT ABDOMEN WITHOUT ENHANCEMENT     CPT CODE: 73878    INDICATION: Acute renal failure and chronic kidney disease. TECHNIQUE: 5 mm collimation axial images obtained from the diaphragm to the  level of the iliac crest without intravenous contrast. Oral contrast was  administered. All CT scans at this facility are performed using dose optimization technique as  appropriate to a performed exam, to include automated exposure control,  adjustment of the mA and/or kV according to patient size (including appropriate  matching first site-specific examinations), or use of iterative reconstruction  technique. COMPARISON: None. FINDINGS:    ABDOMEN FINDINGS:     Lack of intravenous contrast renders this study suboptimal for evaluating the  solid abdominal organs, vasculature and bowel. Liver: Grossly unremarkable. Spleen: No splenomegaly. Pancreas: Grossly unremarkable. Biliary: Status post cholecystectomy. Bowel: No small bowel or colon dilation. .   Peritoneum/ Retroperitoneum: Large volume ascites. No free air. Peggyann Gang Lymph Nodes: 1.1 cm in short axis left periaortic node (series 2, image 45). 1  cm retrocaval node (43). .    Adrenal Glands: No focal nodule. Kidneys: Right kidney is atrophic compared with left measuring 8.2 cm in  craniocaudal diameter compared with 9.5 cm on the left. . There are left greater  than right renal vascular calcifications. There is a 4.2 cm lesion at the right  interpolar kidney with density minimally above water. There are a few  subcentimeter left renal lesions which are too small to characterize. . No  hydronephrosis. Vessels: Decreased density within the aortic lumen. 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 Postsurgical changes  aortobiiliac graft with severe calcification in the excluded bilateral common  iliac arteries. Visceral artery calcifications. . Moderate atherosclerosis with  coarse calcification throughout the visceral ostia. Lung Base: Decreased density in the cardiac chambers. Incompletely visualized  cardiac device. Right atrium appears enlarged. Large right and moderate left  pleural effusions. There is complete atelectasis at the right lower lobe and at  atelectasis at the right middle lobe. Mild atelectasis at the left lung base. .    Bones: Moderate anasarca. Status post median sternotomy. Severe degenerative  disc disease L4-L5 with vacuum disc phenomenon and moderate degenerative disc  disease otherwise. Impression IMPRESSION:  1. Atrophic right kidney with bilateral renal vascular calcifications and  moderate atherosclerosis. 2.  Right renal lesion with density minimally above water is statistically most  likely a cyst.  3.  Large volume ascites, large right and moderate left pleural effusions and  moderate anasarca. 4.  Borderline retroperitoneal lymphadenopathy. 5.  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 6.  Anemia. MRI Results (maximum last 3): No results found for this or any previous visit. Nuclear Medicine Results (maximum last 3): No results found for this or any previous visit. US Results (maximum last 3): No results found for this or any previous visit. DEXA Results (maximum last 3): No results found for this or any previous visit. KIMBERLEY Results (maximum last 3): No results found for this or any previous visit. IR Results (maximum last 3): Results from [place] encounter on 05/30/02   TRANSCRIBED VENOUS MAPPING    VAS/US Results (maximum last 3): Results from [place] encounter on 10/13/16   DUPLEX RENAL ART/THEO BILATERAL  Results from [place] encounter on 07/21/16   DUPLEX LOWER EXT VENOUS BILAT    PET Results (maximum last 3): No results found for this or any previous visit.     No results found for this or any previous visit. @LASTPROCAMB([id number])    CULTURE:   )No results for input(s): SDES, CULT in the last 72 hours. No results for input(s): CULT in the last 72 hours. Physical Assessment:     Visit Vitals    /74    Pulse 60    Temp 97.4 °F (36.3 °C)    Resp 15    Ht 6' (1.829 m)    Wt 95.3 kg (210 lb)    SpO2 93%    BMI 28.48 kg/m2     Last 3 Recorded Weights in this Encounter    02/26/17 1451   Weight: 95.3 kg (210 lb)       Intake/Output Summary (Last 24 hours) at 02/27/17 1416  Last data filed at 02/27/17 0145   Gross per 24 hour   Intake                0 ml   Output             1500 ml   Net            -1500 ml       Physial Exam:  General appearance: alert, mild distress, appears stated age  Skin: normal coloration and turgor, no rashes, no suspicious skin lesions noted. HEENT: Head; normocephalic, atraumatic. ADOLFO. ENT- ENT exam normal, no neck nodes or sinus tenderness. Lungs: decreased breath sounds  Heart: irregular  Abdomen: ascites  Extremities: edema ++    PLAN / RECOMMENDATION:    crf stage 3,atrophic right kidney,proteinuria. ?nephrotic. check 24 hours urine for protein and immunofixation,was supposed to do this test as outpatient  Chf,decompensated,needs iv diuretics ,watch renal function and electrolytes  Ascites,consider paracentesis if no improvement with diuresis  Anemia,check iron studies,may need epo     Thank you for the consultation to participate in patient's care. I have personally discussed my plan with the referring physician.      Min Rivers MD  February 27, 2017

## 2017-02-27 NOTE — ED NOTES
Paged Dr. Phu Cruz r/t patient's BP being low and patient ordered for lasix lawanda. Dr. Phu Cruz requesting question be asked of cardiology. Cardiology paged.

## 2017-02-27 NOTE — PROGRESS NOTES
H&P dictated  1. Acute on chronic diastolic CHF  2. Possible right heart failure  3. CAD/ CABGS/ Defibrillator/ Stent  4. KYLEE  5. Ascites  6. Pleural effusion    Plan  Cardiology andrenal consult  Paracentesis?

## 2017-02-27 NOTE — ED NOTES
Bedside report received from [address] pt sitting up on stretcher eating at this time without any complaints.

## 2017-02-27 NOTE — ED NOTES
Spoke with Dr. Dee Smith with Cardiology who states that it is ok to start lasix drip since patient is now on dobutamine drip, but hold lasix drip if SBP <90.

## 2017-02-28 NOTE — PROGRESS NOTES
RENAL DAILY PROGRESS NOTE    Patient: Sander Leung               Sex: male          DOA: 2/26/2017  5:34 PM        YOB: 1940      Age:  78 y.o.        LOS:  LOS: 1 day     Subjective:     Sander Leung is a 78 y.o.  who presents with Acute CHF (congestive heart failure) (HCC)  CHF (congestive heart failure) (HCC)  Acute on chronic diastolic (congestive) heart failure ([facility] [address]    Asked to evaluate for crf stage 3.admitted with fluid overload,chf  Chief complains: Patient denies nausea, vomiting, chest pain, dizziness, shortness of breath or headache.  - Reviewed last 24 hrs events     Current Facility-Administered Medications   Medication Dose Route Frequency    albuterol-ipratropium (DUO-NEB) 2.5 MG-0.5 MG/3 ML  3 mL Nebulization Q6H PRN    sodium chloride (NS) flush 5-10 mL  5-10 mL IntraVENous PRN    acetaminophen (TYLENOL) tablet 650 mg  650 mg Oral Q4H PRN    HYDROcodone-acetaminophen (NORCO) 5-325 mg per tablet 1 Tab  1 Tab Oral Q4H PRN    furosemide (LASIX) 100 mg in 0.9% sodium chloride 100 mL infusion  5 mg/hr IntraVENous CONTINUOUS    metOLazone (ZAROXOLYN) tablet 2.5 mg  2.5 mg Oral DAILY    fluticasone (FLONASE) 50 mcg/actuation nasal spray 2 Spray  2 Spray Both Nostrils DAILY    aspirin chewable tablet 81 mg  81 mg Oral DAILY    DOBUTamine (DOBUTREX) 500 mg/250 mL (2,000 mcg/mL) infusion  5 mcg/kg/min IntraVENous CONTINUOUS    allopurinol (ZYLOPRIM) tablet 100 mg  100 mg Oral DAILY    nitroglycerin (NITROSTAT) tablet 0.4 mg  0.4 mg SubLINGual PRN    pantoprazole (PROTONIX) tablet 40 mg  40 mg Oral ACB    pravastatin (PRAVACHOL) tablet 80 mg  80 mg Oral QHS    therapeutic multivitamin (THERAGRAN) tablet 1 Tab  1 Tab Oral DAILY    cholecalciferol (VITAMIN D3) tablet 2,000 Units  2,000 Units Oral DAILY    fish oil-omega-3 fatty acids 340-1,000 mg capsule 1 Cap  1 Cap Oral BID    carvedilol (COREG) tablet 6.25 mg  6.25 mg Oral BID WITH MEALS    sodium chloride (NS) flush 5-10 mL  5-10 mL IntraVENous Q8H    naloxone (NARCAN) injection 0.4 mg  0.4 mg IntraVENous PRN    ondansetron (ZOFRAN) injection 4 mg  4 mg IntraVENous Q4H PRN       Objective:     Visit Vitals    /68    Pulse 61    Temp 97.7 °F (36.5 °C)    Resp 18    Ht 6' (1.829 m)    Wt 110.7 kg (244 lb 1.6 oz)    SpO2 (!) 88%    BMI 33.11 kg/m2       Intake/Output Summary (Last 24 hours) at 02/28/17 1408  Last data filed at 02/28/17 0930   Gross per 24 hour   Intake                0 ml   Output             3300 ml   Net            -3300 ml       Physical Examination:     GEN: AAO X 3, NAD  RS: Chest is bilateral  crackles  CVS: S1-S2 heard, RRR, No S3 / murmur  Abdomen: Soft, Non tender, ascites, Positive bowel sounds, no organomegaly, no CVA / supra pubic tenderness  Extremities: + edema, no cyanosis, skin is warm on touch  CNS: Awake & follows commands, CN II-XII are grossly intact. HEENT: Head is atraumatic, PERRLA, conjunctiva pink & non icteric. No JVD or carotid bruit   Psychiatric: Normal mood, affect, judgement & memory    Musculoskeletal: No gross joints or bone deformities   Lymph Node: No palpable cervical, axillary or groin lymphadenopathy. Data Review:      Labs:     Hematology: Recent Labs      02/28/17   0350  02/27/17   0335  02/26/17   1525   WBC  4.6  4.2*  4.0*   HGB  8.3*  9.1*  9.6*   HCT  27.4*  30.0*  30.9*     Chemistry: Recent Labs      02/28/17   0350  02/27/17   0335  02/26/17   1525   BUN  39*  39*  39*   CREA  2.01*  1.95*  2.10*   CA  8.7  9.1  8.8   ALB   --    --   3.1*   K  3.9  4.2  4.6   NA  140  138  139   CL  97*  96*  96*   CO2  39*  38*  38*   PHOS  2.8   --    --    GLU  103*  101*  81        Images:    XR (Most Recent).  CXR reviewed by me and compared with previous CXR   Results from Hospital Encounter encounter on 02/26/17   XR CHEST PORT   Narrative EXAM: Chest Radiograph    INDICATION: Shortness of breath    TECHNIQUE: AP view of the chest    COMPARISON: 8/12/2016, 8/2/2016 and 7/21/2016    FINDINGS: An unchanged left-sided pacemaker is present. No pneumothorax  identified. Interstitial infiltrates are noted. A large right pleural effusion  is present. A small layering left pleural effusion is present. The cardiac  silhouette is enlarged. Bony vasculature is mildly prominent. Degenerative  changes of the spine are noted. Impression Impression:  1. Large right pleural effusion and small left pleural effusion. 2.  Mild interstitial pulmonary edema. CT (Most Recent)   Results from Hospital Encounter encounter on 12/12/16   CT ABD WO CONT   Narrative CT ABDOMEN WITHOUT ENHANCEMENT     CPT CODE: 02566    INDICATION: Acute renal failure and chronic kidney disease. TECHNIQUE: 5 mm collimation axial images obtained from the diaphragm to the  level of the iliac crest without intravenous contrast. Oral contrast was  administered. All CT scans at this facility are performed using dose optimization technique as  appropriate to a performed exam, to include automated exposure control,  adjustment of the mA and/or kV according to patient size (including appropriate  matching first site-specific examinations), or use of iterative reconstruction  technique. COMPARISON: None. FINDINGS:    ABDOMEN FINDINGS:     Lack of intravenous contrast renders this study suboptimal for evaluating the  solid abdominal organs, vasculature and bowel. Liver: Grossly unremarkable. Spleen: No splenomegaly. Pancreas: Grossly unremarkable. Biliary: Status post cholecystectomy. Bowel: No small bowel or colon dilation. .   Peritoneum/ Retroperitoneum: Large volume ascites. No free air. Timo Garcia Lymph Nodes: 1.1 cm in short axis left periaortic node (series 2, image 45). 1  cm retrocaval node (43). .    Adrenal Glands: No focal nodule. Kidneys: Right kidney is atrophic compared with left measuring 8.2 cm in  craniocaudal diameter compared with 9.5 cm on the left. Timo Garcia There are left greater  than right renal vascular calcifications. There is a 4.2 cm lesion at the right  interpolar kidney with density minimally above water. There are a few  subcentimeter left renal lesions which are too small to characterize. . No  hydronephrosis. Vessels: Decreased density within the aortic lumen. 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 Postsurgical changes  aortobiiliac graft with severe calcification in the excluded bilateral common  iliac arteries. Visceral artery calcifications. . Moderate atherosclerosis with  coarse calcification throughout the visceral ostia. Lung Base: Decreased density in the cardiac chambers. Incompletely visualized  cardiac device. Right atrium appears enlarged. Large right and moderate left  pleural effusions. There is complete atelectasis at the right lower lobe and at  atelectasis at the right middle lobe. Mild atelectasis at the left lung base. .    Bones: Moderate anasarca. Status post median sternotomy. Severe degenerative  disc disease L4-L5 with vacuum disc phenomenon and moderate degenerative disc  disease otherwise. Impression IMPRESSION:  1. Atrophic right kidney with bilateral renal vascular calcifications and  moderate atherosclerosis. 2.  Right renal lesion with density minimally above water is statistically most  likely a cyst.  3.  Large volume ascites, large right and moderate left pleural effusions and  moderate anasarca. 4.  Borderline retroperitoneal lymphadenopathy. 5.  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 6.  Anemia. EKG No results found for this or any previous visit. I have personally reviewed the old medical records and patient's labs    Plan / Recommendation:      1. crf stage 3,atrophic right kidney,no obvious renal artery stenosis by dopplers,no significant change by diuresis. continue to monitor  2.proteinuria,24 hours urine collection in progress  3.hematuria,?lópez trauma. observe for now  4.anemia is worse,low iron. start iron supplements  5.fluid overload ,continue diuretics. monitor electrolytes    D/w Dr. Elinor Angeles MD  Nephrology  2/28/2017

## 2017-02-28 NOTE — PROGRESS NOTES
Spoke with Lisandra Jay from [employer] contacting representative for hospital to come check Defibrillator. [place] [id number] representative called back. Stated Defibrillator was checked 2/28 in the ER.  Copy reported sent to  office & placed in chart at hospital.

## 2017-02-28 NOTE — PROGRESS NOTES
TRANSFER - IN REPORT:    Verbal report received from [facility]) on Katy Stubbs  being received from ED(unit) for routine progression of care      Report consisted of patients Situation, Background, Assessment and   Recommendations(SBAR). Information from the following report(s) SBAR, Kardex and MAR was reviewed with the receiving nurse. Opportunity for questions and clarification was provided. Assessment completed upon patients arrival to unit and care assumed. Gave shift report to Nelida BELTRE RN, using SBAR, MAR, and Kardex.

## 2017-02-28 NOTE — NURSE NAVIGATOR
Cardiac Nurse Navigator Initial Note       Date of  Admission: 2/26/2017  5:34 PM   Hospital Day: 1    Admission type:Emergency      Patient Active Problem List    Diagnosis Date Noted    Acute on chronic diastolic (congestive) heart failure ([facility] 02/27/2017    Acute CHF (congestive heart failure) ([facility] 02/26/2017    HTN (hypertension) 09/04/2016    HLD (hyperlipidemia) 54/67/7474    Diastolic CHF, acute on chronic ([facility] 08/17/2016    Acute exacerbation of CHF (congestive heart failure) ([facility] 08/12/2016    Anasarca 07/22/2016    Hx of CABG 07/22/2016    H/O carotid endarterectomy 07/22/2016    AICD (automatic cardioverter/defibrillator) present 07/22/2016    CHF (congestive heart failure) ([facility] 07/21/2016    Hematospermia 03/05/2014    CAD (coronary artery disease) 03/05/2014    PVD (peripheral vascular disease) ([facility] 03/05/2014      Velma Rajna MD        Past Medical History:   Diagnosis Date    Arrhythmia     A fib; has external vest and belt he wears for this    Arthritis     High blood pressure     Hypercholesteremia     Mini stroke ([facility] 2000    Unspecified sleep apnea     does not wear machine      Past Surgical History:   Procedure Laterality Date    CABG, ARTERY-VEIN, THREE  2013    HX CAROTID ENDARTERECTOMY Left     HX CAROTID ENDARTERECTOMY Right 2000    HX CORONARY STENT PLACEMENT       Current Facility-Administered Medications   Medication Dose Route Frequency    albuterol-ipratropium (DUO-NEB) 2.5 MG-0.5 MG/3 ML  3 mL Nebulization Q6H PRN    sodium chloride (NS) flush 5-10 mL  5-10 mL IntraVENous PRN    acetaminophen (TYLENOL) tablet 650 mg  650 mg Oral Q4H PRN    HYDROcodone-acetaminophen (NORCO) 5-325 mg per tablet 1 Tab  1 Tab Oral Q4H PRN    furosemide (LASIX) 100 mg in 0.9% sodium chloride 100 mL infusion  5 mg/hr IntraVENous CONTINUOUS    metOLazone (ZAROXOLYN) tablet 2.5 mg  2.5 mg Oral DAILY    fluticasone (FLONASE) 50 mcg/actuation nasal spray 2 Spray  2 Spray Both Nostrils DAILY    aspirin chewable tablet 81 mg  81 mg Oral DAILY    DOBUTamine (DOBUTREX) 500 mg/250 mL (2,000 mcg/mL) infusion  5 mcg/kg/min IntraVENous CONTINUOUS    allopurinol (ZYLOPRIM) tablet 100 mg  100 mg Oral DAILY    nitroglycerin (NITROSTAT) tablet 0.4 mg  0.4 mg SubLINGual PRN    pantoprazole (PROTONIX) tablet 40 mg  40 mg Oral ACB    pravastatin (PRAVACHOL) tablet 80 mg  80 mg Oral QHS    therapeutic multivitamin (THERAGRAN) tablet 1 Tab  1 Tab Oral DAILY    cholecalciferol (VITAMIN D3) tablet 2,000 Units  2,000 Units Oral DAILY    fish oil-omega-3 fatty acids 340-1,000 mg capsule 1 Cap  1 Cap Oral BID    carvedilol (COREG) tablet 6.25 mg  6.25 mg Oral BID WITH MEALS    sodium chloride (NS) flush 5-10 mL  5-10 mL IntraVENous Q8H    naloxone (NARCAN) injection 0.4 mg  0.4 mg IntraVENous PRN    ondansetron (ZOFRAN) injection 4 mg  4 mg IntraVENous Q4H PRN        Prior to admission patient experienced SOB, leg and abdominal swelling    Patient observed resting in bed    Cardiographics  Patient on Telemetry: Cardiac/Telemetry Monitor On: Yes   Cardiac Rhythm (only for patients on telemetry): Cardiac Rhythm: Normal sinus rhythm    Echocardiogram:  []  None ordered    [] Results Pending       Results:     EF: 55-60%          Labs:   Recent Results (from the past 24 hour(s))   CARDIAC PANEL,(CK, CKMB & TROPONIN)    Collection Time: 02/27/17  8:15 PM   Result Value Ref Range    CK 39 39 - 308 U/L    CK - MB <1.0 <3.6 ng/ml    CK-MB Index Cannot be calulated 0.0 - 4.0 %    Troponin-I, Qt. <0.02 0.0 - 6.215 NG/ML   METABOLIC PANEL, BASIC    Collection Time: 02/28/17  3:50 AM   Result Value Ref Range    Sodium 140 136 - 145 mmol/L    Potassium 3.9 3.5 - 5.5 mmol/L    Chloride 97 (L) 100 - 108 mmol/L    CO2 39 (H) 21 - 32 mmol/L    Anion gap 4 3.0 - 18 mmol/L    Glucose 103 (H) 74 - 99 mg/dL    BUN 39 (H) 7.0 - 18 MG/DL    Creatinine 2.01 (H) 0.6 - 1.3 MG/DL    BUN/Creatinine ratio 19 12 - 20 GFR est AA 40 (L) >60 ml/min/1.73m2    GFR est non-AA 33 (L) >60 ml/min/1.73m2    Calcium 8.7 8.5 - 10.1 MG/DL   CBC WITH AUTOMATED DIFF    Collection Time: 02/28/17  3:50 AM   Result Value Ref Range    WBC 4.6 4.6 - 13.2 K/uL    RBC 2.95 (L) 4.70 - 5.50 M/uL    HGB 8.3 (L) 13.0 - 16.0 g/dL    HCT 27.4 (L) 36.0 - 48.0 %    MCV 92.9 74.0 - 97.0 FL    MCH 28.1 24.0 - 34.0 PG    MCHC 30.3 (L) 31.0 - 37.0 g/dL    RDW 17.7 (H) 11.6 - 14.5 %    PLATELET 60 (L) 195 - 420 K/uL    MPV 10.6 9.2 - 11.8 FL    NEUTROPHILS 67 40 - 73 %    LYMPHOCYTES 20 (L) 21 - 52 %    MONOCYTES 8 3 - 10 %    EOSINOPHILS 5 0 - 5 %    BASOPHILS 0 0 - 2 %    ABS. NEUTROPHILS 3.1 1.8 - 8.0 K/UL    ABS. LYMPHOCYTES 0.9 0.9 - 3.6 K/UL    ABS. MONOCYTES 0.4 0.05 - 1.2 K/UL    ABS. EOSINOPHILS 0.2 0.0 - 0.4 K/UL    ABS. BASOPHILS 0.0 0.0 - 0.1 K/UL    DF AUTOMATED      PLATELET COMMENTS CLUMPED PLATELETS      RBC COMMENTS ANISOCYTOSIS  1+        RBC COMMENTS POLYCHROMASIA  1+       PHOSPHORUS    Collection Time: 02/28/17  3:50 AM   Result Value Ref Range    Phosphorus 2.8 2.5 - 4.9 MG/DL   MAGNESIUM    Collection Time: 02/28/17  3:50 AM   Result Value Ref Range    Magnesium 1.9 1.8 - 2.4 mg/dL     Lab Results   Component Value Date/Time    Hemoglobin A1c 4.8 07/22/2016 05:20 AM       Code Status: Full Code    Patient would benefit from:  [] Cardiac Rehab             [x]Home Health   [] PT  [x] Case Management             [x] H2H                           [] OT                                    [] Nutrition                              []  Apnea Link                [] 6 minute walk                            []  Outpatient sleep study  [] Other:         [] Palliative Care     Educational folder provided and contents reviewed, will follow up for reinforcement, patient given opportunity to ask questions.       Maria Victoria Trotter RN

## 2017-02-28 NOTE — PROGRESS NOTES
Patient is not available to be assessed at this time.     [id number] [facility] Certified [address] [place] [address]   [phone]

## 2017-02-28 NOTE — INTERDISCIPLINARY ROUNDS
IDR/SLIDR Summary          Patient: Nadnie Tamayo MRN: [id number]    Age: 78 y.o. YOB: 1940 Room/Bed: [facility]   Admit Diagnosis: Acute CHF (congestive heart failure) (HCC)  CHF (congestive heart failure) (HCC)  Acute on chronic diastolic (congestive) heart failure (HCC)  Principal Diagnosis: <principal problem not specified>   Goals:   Readmission: YES  Quality Measure: CHF  VTE Prophylaxis: Chemical  Influenza Vaccine screening completed? YES  Pneumococcal Vaccine screening completed? YES  Mobility needs: Yes   Nutrition plan:No  Consults:P.T, O.T. and Case Management    Financial concerns:No  Escalated to CM? YES  RRAT Score:    Interventions:Home Health  Testing due for pt today?  YES  LOS: 1 days Expected length of stay 5 days  Discharge plan:    PCP: Kati Linder MD  Transportation needs: Yes    Days before discharge:two or more days before discharge   Discharge disposition: Home    Signed:     Pauline Ventura RN  2/28/2017  9:01 AM

## 2017-02-28 NOTE — PROGRESS NOTES
Jessica Kunz M.D. PROGRESS NOTE    Name: Asmita Zepeda MRN: [id number]   : 1940 Hospital: [facility]   Date: 2017  Admission Date: 2017     Subjective/Objective/Plans  CHF, Renal failure, large ascites  On Lasix drip  Ascites  Improving significantly    Plan  Continue Lasix drip  Watch renal function    Vital Signs:  Visit Vitals    /77    Pulse 61    Temp 96.5 °F (35.8 °C)    Resp 16    Ht 6' (1.829 m)    Wt 95.3 kg (210 lb)    SpO2 91%    BMI 28.48 kg/m2       O2 Device: Nasal cannula   O2 Flow Rate (L/min): 3 l/min   Temp (24hrs), Av.5 °F (35.8 °C), Min:96.5 °F (35.8 °C), Max:96.5 °F (35.8 °C)     6:11 AM  Intake/Output:   Last shift:      1901 -  0700  In: -   Out: 750 [Urine:750]  Last 3 shifts:  07 -  1900  In: -   Out: 2500 [Urine:2500]    Intake/Output Summary (Last 24 hours) at 17 3693  Last data filed at 17 2347   Gross per 24 hour   Intake                0 ml   Output             1750 ml   Net            -1750 ml         Physical Exam:    General: in no apparent distress    HEENT: pupils equal, no ear discharge   Neck: No abnormally enlarged lymph nodes. , no JDV   Mouth: MMM no lesions    Chest: normal,    Lungs: decreased air exchange bibasilar   Heart: Regular rate and rhythm   Abdomen: gross ascites noted   Extremity: 2+ edema   Neuro: alert    Skin: Skin color, texture, turgor normal. No rashes or lesions    Data Review:    Labs: Results:       Chemistry Recent Labs      17   0350  17   0335  17   1525   GLU  103*  101*  81   NA  140  138  139   K  3.9  4.2  4.6   CL  97*  96*  96*   CO2  39*  38*  38*   BUN  39*  39*  39*   CREA  2.01*  1.95*  2.10*   CA  8.7  9.1  8.8   AGAP  4  4  5   BUCR  19  20  19   TBILI   --    --   0.9   AP   --    --   107   TP   --    --   7.8   ALB   --    --   3.1*   GLOB   --    --   4.7*   AGRAT   --    --   0.7*      CBC w/Diff Recent Labs      17   0350 02/27/17   0335  02/26/17   1525   WBC  4.6  4.2*  4.0*   RBC  2.95*  3.27*  3.38*   HGB  8.3*  9.1*  9.6*   HCT  27.4*  30.0*  30.9*   PLT  PENDING  78*  CLUMPED   GRANS  PENDING  68  73   LYMPH  PENDING  17*  13*   EOS  PENDING  7*  6*      Cardiac Enzymes Recent Labs      02/27/17 2015 02/27/17   1400   CPK  39  40   CKND1  Cannot be calulated  3.0      Coagulation Recent Labs      02/27/17   0335  02/26/17   1525   PTP  15.5*  15.6*   INR  1.3*  1.3*   APTT  41.7*   --        Lipid Panel Lab Results   Component Value Date/Time    Cholesterol, total 100 07/22/2016 05:20 AM    HDL Cholesterol 31 07/22/2016 05:20 AM    LDL, calculated 54.4 07/22/2016 05:20 AM    VLDL, calculated 14.6 07/22/2016 05:20 AM    Triglyceride 73 07/22/2016 05:20 AM    CHOL/HDL Ratio 3.2 07/22/2016 05:20 AM      BNP No results for input(s): BNPP in the last 72 hours. Liver Enzymes Recent Labs      02/26/17   1525   TP  7.8   ALB  3.1*   TBILI  0.9   AP  107   SGOT  34   ALT  15*      Thyroid Studies Lab Results   Component Value Date/Time    TSH 4.23 07/22/2016 05:20 AM          Procedures/imaging: see electronic medical records for all procedures, Xrays and details which were not copied into this note but were reviewed. Allergies:  No Known Allergies    Home Medications:  Prior to Admission Medications   Prescriptions Last Dose Informant Patient Reported? Taking? Cholecalciferol, Vitamin D3, (VITAMIN D3) 1,000 unit cap   Yes No   Sig: Take 2,000 Units by mouth daily. Omega-3 Fatty Acids 300 mg cap   Yes No   Sig: Take 1 Cap by mouth two (2) times a day. allopurinol (ZYLOPRIM) 100 mg tablet   Yes No   Sig: Take 100 mg by mouth daily. aspirin delayed-release 81 mg tablet   Yes No   Sig: Take 162 mg by mouth daily. carvedilol (COREG) 6.25 mg tablet   No No   Sig: Take 1 Tab by mouth two (2) times daily (with meals).    furosemide (LASIX) 40 mg tablet   No No   Sig: One tab daily  Indications: PERIPHERAL EDEMA DUE TO CHRONIC HEART FAILURE   lisinopril (PRINIVIL, ZESTRIL) 5 mg tablet   No No   Sig: Take 1 Tab by mouth daily. multivitamin (ONE A DAY) tablet   Yes No   Sig: Take 1 Tab by mouth daily. nitroglycerin (NITROSTAT) 0.4 mg SL tablet   Yes No   Sig: by SubLINGual route every five (5) minutes as needed for Chest Pain. omeprazole (PRILOSEC) 20 mg capsule   Yes No   Sig: Take 20 mg by mouth daily. pravastatin (PRAVACHOL) 80 mg tablet   Yes No   Sig: Take 80 mg by mouth nightly.       Facility-Administered Medications: None       Current Medications:  Current Facility-Administered Medications   Medication Dose Route Frequency    albuterol-ipratropium (DUO-NEB) 2.5 MG-0.5 MG/3 ML  3 mL Nebulization Q6H PRN    sodium chloride (NS) flush 5-10 mL  5-10 mL IntraVENous PRN    acetaminophen (TYLENOL) tablet 650 mg  650 mg Oral Q4H PRN    HYDROcodone-acetaminophen (NORCO) 5-325 mg per tablet 1 Tab  1 Tab Oral Q4H PRN    furosemide (LASIX) 100 mg in 0.9% sodium chloride 100 mL infusion  5 mg/hr IntraVENous CONTINUOUS    metOLazone (ZAROXOLYN) tablet 2.5 mg  2.5 mg Oral DAILY    fluticasone (FLONASE) 50 mcg/actuation nasal spray 2 Spray  2 Spray Both Nostrils DAILY    aspirin chewable tablet 81 mg  81 mg Oral DAILY    DOBUTamine (DOBUTREX) 500 mg/250 mL (2,000 mcg/mL) infusion  5 mcg/kg/min IntraVENous CONTINUOUS    allopurinol (ZYLOPRIM) tablet 100 mg  100 mg Oral DAILY    nitroglycerin (NITROSTAT) tablet 0.4 mg  0.4 mg SubLINGual PRN    pantoprazole (PROTONIX) tablet 40 mg  40 mg Oral ACB    pravastatin (PRAVACHOL) tablet 80 mg  80 mg Oral QHS    therapeutic multivitamin (THERAGRAN) tablet 1 Tab  1 Tab Oral DAILY    cholecalciferol (VITAMIN D3) tablet 2,000 Units  2,000 Units Oral DAILY    fish oil-omega-3 fatty acids 340-1,000 mg capsule 1 Cap  1 Cap Oral BID    carvedilol (COREG) tablet 6.25 mg  6.25 mg Oral BID WITH MEALS    sodium chloride (NS) flush 5-10 mL  5-10 mL IntraVENous Q8H    naloxone (NARCAN) injection 0.4 mg  0.4 mg IntraVENous PRN    ondansetron (ZOFRAN) injection 4 mg  4 mg IntraVENous Q4H PRN       Chart and notes reviewed. Data reviewed. I have evaluated and examined the patient.         IMPRESSION:   Patient Active Problem List   Diagnosis Code    Hematospermia R36.1    CAD (coronary artery disease) I25.10    PVD (peripheral vascular disease) ([facility] I73.9    CHF (congestive heart failure) (HCC) I50.9    Anasarca R60.1    Hx of CABG Z95.1    H/O carotid endarterectomy Z98.890    AICD (automatic cardioverter/defibrillator) present Z95.810    Acute exacerbation of CHF (congestive heart failure) (HCC) V44.5    Diastolic CHF, acute on chronic (HCC) I50.33    HTN (hypertension) I10    HLD (hyperlipidemia) E78.5    Acute CHF (congestive heart failure) (HCC) I50.9    Acute on chronic diastolic (congestive) heart failure (HCC) I50.33 ·         PLAN:/DISCUSSION:   · Continue lawanda Cleaning MD  2/28/2017, 6:11 AM

## 2017-02-28 NOTE — PROGRESS NOTES
[facility]      CARDIOLOGY PROGRESS NOTE  RECS:  1. Acute on chronic diastolic congestive heart failure- presents with worsening CHF with severe edema and elevated NT pro BNP. Neg cardiac enzymes and ekg today. Fluid restriction. Stricts I&O. Monitor electrolytes and renal function. ECHO with mod AS, MR likely causing CHF. Patient will need cardiac w/u when diuresed. 2. Anasarca- agressive diuresis with Lasix drip and metolazone. continue iv dobut due to MR/AS  3. S/p CABG in 2013 in [facility] 7- stable denies chest pain or pressure continue with asa, statin and bb.  4. S/p AICD- [employer]. Will interrogate today   5. Hx Carotid endarterectomy- bilateral in 2015    6. KYLEE on CKD- monitor renal function   7. Pancytopenia- consider oncology consult   8. Hypertension- normal low . continue with bb monitor closely   9. Mitral regurgitation -mild to moderate regurgitation echo on 2016   10.  Aortic stenosis : moderate, will f/u    ASSESSMENT:  Hospital Problems  Date Reviewed: 8/13/2016          Codes Class Noted POA    Acute on chronic diastolic (congestive) heart failure (HCC) ICD-10-CM: I50.33  ICD-9-CM: 428.33, 428.0  2/27/2017 Unknown        Acute CHF (congestive heart failure) ([facility] ICD-10-CM: I50.9  ICD-9-CM: 428.0  2/26/2017 Unknown        HTN (hypertension) (Chronic) ICD-10-CM: I10  ICD-9-CM: 401.9  9/4/2016 Yes        HLD (hyperlipidemia) (Chronic) ICD-10-CM: E78.5  ICD-9-CM: 272.4  9/4/2016 Yes        Diastolic CHF, acute on chronic ([facility] ICD-10-CM: I50.33  ICD-9-CM: 428.33, 428.0  8/17/2016 Yes        Acute exacerbation of CHF (congestive heart failure) ([facility] ICD-10-CM: I50.9  ICD-9-CM: 428.0  8/12/2016 Yes        Hx of CABG ICD-10-CM: Z95.1  ICD-9-CM: V45.81  7/22/2016 Yes    Overview Signed 7/22/2016  5:23 PM by Mandi Kaur NP     2013             H/O carotid endarterectomy ICD-10-CM: Z98.890  ICD-9-CM: V45.89  7/22/2016 Yes    Overview Signed 7/22/2016  5:27 PM by Ghulam Mauro NP     2015             AICD (automatic cardioverter/defibrillator) present ICD-10-CM: Z95.810  ICD-9-CM: V45.02  7/22/2016 Yes    Overview Signed 7/22/2016  5:27 PM by Ghulam Mauro NP     1182             CHF (congestive heart failure) ([facility] ICD-10-CM: I50.9  ICD-9-CM: 428.0  7/21/2016 Unknown        Hematospermia ICD-10-CM: R36.1  ICD-9-CM: 608.82  3/5/2014 Yes        CAD (coronary artery disease) ICD-10-CM: I25.10  ICD-9-CM: 414.00  3/5/2014 Yes        PVD (peripheral vascular disease) ([facility] ICD-10-CM: I73.9  ICD-9-CM: 443.9  3/5/2014 Yes                SUBJECTIVE:  No CP  Improving SOB    OBJECTIVE:    VS:   Visit Vitals    /68    Pulse 61    Temp 97.7 °F (36.5 °C)    Resp 18    Ht 6' (1.829 m)    Wt 110.7 kg (244 lb 1.6 oz)    SpO2 (!) 88%    BMI 33.11 kg/m2         Intake/Output Summary (Last 24 hours) at 02/28/17 1413  Last data filed at 02/28/17 0930   Gross per 24 hour   Intake                0 ml   Output             3300 ml   Net            -3300 ml     TELE: normal sinus rhythm    General: alert and in no apparent distress  HENT: Normocephalic, atraumatic. Normal external eye.   Neck :  bruit present, increased JVP  Cardiac:  regular rate and rhythm, systolic murmur: early systolic 3/6, crescendo at 2nd right intercostal space, radiates to carotids, 2nd systolic murmur: late systolic 3/6, blowing at apex  Chest/Lungs:mild rales heard lung bases  Abdomen: Soft, nontender, no masses, +ascites/abd wall edema  Extremities:  No c/c/4+edema UE/LE, peripheral pulses present      Labs: Results:       Chemistry Recent Labs      02/28/17   0350  02/27/17   0335  02/26/17   1525   GLU  103*  101*  81   NA  140  138  139   K  3.9  4.2  4.6   CL  97*  96*  96*   CO2  39*  38*  38*   BUN  39*  39*  39*   CREA  2.01*  1.95*  2.10*   CA  8.7  9.1  8.8   MG  1.9  2.3  1.7*   PHOS  2.8   --    --    AGAP  4  4  5   BUCR  19  20  19   AP   --    --   107   TP   --    --   7.8   ALB   -- --   3.1*   GLOB   --    --   4.7*   AGRAT   --    --   0.7*      CBC w/Diff Recent Labs      02/28/17   0350  02/27/17   0335  02/26/17   1525   WBC  4.6  4.2*  4.0*   RBC  2.95*  3.27*  3.38*   HGB  8.3*  9.1*  9.6*   HCT  27.4*  30.0*  30.9*   PLT  60*  78*  CLUMPED   GRANS  67  68  73   LYMPH  20*  17*  13*   EOS  5  7*  6*      Cardiac Enzymes Recent Labs      02/27/17 2015 02/27/17   1400   CPK  39  40   CKND1  Cannot be calulated  3.0      Coagulation Recent Labs      02/27/17   0335  02/26/17   1525   PTP  15.5*  15.6*   INR  1.3*  1.3*   APTT  41.7*   --        Lipid Panel Lab Results   Component Value Date/Time    Cholesterol, total 100 07/22/2016 05:20 AM    HDL Cholesterol 31 07/22/2016 05:20 AM    LDL, calculated 54.4 07/22/2016 05:20 AM    VLDL, calculated 14.6 07/22/2016 05:20 AM    Triglyceride 73 07/22/2016 05:20 AM    CHOL/HDL Ratio 3.2 07/22/2016 05:20 AM      BNP No results for input(s): BNPP in the last 72 hours.    Liver Enzymes Recent Labs      02/26/17   1525   TP  7.8   ALB  3.1*   AP  107   SGOT  34      Digoxin    Thyroid Studies Lab Results   Component Value Date/Time    TSH 4.23 07/22/2016 05:20 AM              Alvarez Aranda MD   Pager # [phone]

## 2017-02-28 NOTE — ED NOTES
TRANSFER - OUT REPORT:    Verbal report given to Susanna Jones RN(name) on Prudence Brace  being transferred to [address] [place] [address](unit) for routine progression of care       Report consisted of patients Situation, Background, Assessment and   Recommendations(SBAR). Information from the following report(s) SBAR, ED Summary, Procedure Summary, MAR and Recent Results was reviewed with the receiving nurse. Opportunity for questions and clarification was provided.       Patient transported with:   Monitor  O2 @ 4 liters  Registered Nurse  [facility]

## 2017-02-28 NOTE — ROUTINE PROCESS
Report received from BREA Montes De Oca RN. Lying in bed assisted with sitting on side of bed. Denies pain. States shortness of breath improved. Note patient holding O2 in mouth. states works better that way for him. States uses at home. Dobutamine & Lasix gtts infusing. See assessment. Call bell ikn reach.

## 2017-03-01 NOTE — PROGRESS NOTES
Received shift report from Tavia Reddy RN. Pt awake, talking with nurses. No pain. Call bell within reach.

## 2017-03-01 NOTE — PROGRESS NOTES
[facility]      CARDIOLOGY PROGRESS NOTE  RECS:      1. Acute on chronic diastolic congestive heart failure- slowly improving. Still with severe edema Fluid restriction. Stricts I&O. Monitor electrolytes and renal function. ECHO with mod AS, MR likely causing CHF. Patient will need cardiac w/u when diuresed. 2. Anasarca- agressive diuresis with Lasix drip. Hold metolazone as CO2 has increased will dd diamox for 2 days. continue iv dobut due to MR/AS  3. S/p CABG in 2013 in [facility] 7- stable denies chest pain or pressure continue with asa, statin and bb.  4. S/p AICD- [employer]. normal function interrogated on 02/27/17  5. Hx Carotid endarterectomy- bilateral in 2015    6. KYLEE on CKD- monitor renal function   7. Pancytopenia- consider oncology consult   8. Hypertension- normal low . continue with bb monitor closely   9. Mitral regurgitation -mild to moderate regurgitation echo on 2016   10.  Aortic stenosis : moderate, will f/u          ASSESSMENT:  Hospital Problems  Date Reviewed: 8/13/2016          Codes Class Noted POA    Acute on chronic diastolic (congestive) heart failure (HCC) ICD-10-CM: I50.33  ICD-9-CM: 428.33, 428.0  2/27/2017 Unknown        Acute CHF (congestive heart failure) ([facility] ICD-10-CM: I50.9  ICD-9-CM: 428.0  2/26/2017 Unknown        HTN (hypertension) (Chronic) ICD-10-CM: I10  ICD-9-CM: 401.9  9/4/2016 Yes        HLD (hyperlipidemia) (Chronic) ICD-10-CM: E78.5  ICD-9-CM: 272.4  9/4/2016 Yes        Diastolic CHF, acute on chronic ([facility] ICD-10-CM: I50.33  ICD-9-CM: 428.33, 428.0  8/17/2016 Yes        Acute exacerbation of CHF (congestive heart failure) ([facility] ICD-10-CM: I50.9  ICD-9-CM: 428.0  8/12/2016 Yes        Hx of CABG ICD-10-CM: Z95.1  ICD-9-CM: V45.81  7/22/2016 Yes    Overview Signed 7/22/2016  5:23 PM by Kamille Mo NP     2013             H/O carotid endarterectomy ICD-10-CM: Z98.890  ICD-9-CM: V45.89  7/22/2016 Yes    Overview Signed 7/22/2016 5:27 PM by Silvino Lazo NP     [id number]             AICD (automatic cardioverter/defibrillator) present ICD-10-CM: Z95.810  ICD-9-CM: V45.02  7/22/2016 Yes    Overview Signed 7/22/2016  5:27 PM by Silvino Lazo NP     2015             CHF (congestive heart failure) ([facility] ICD-10-CM: I50.9  ICD-9-CM: 428.0  7/21/2016 Unknown        Hematospermia ICD-10-CM: R36.1  ICD-9-CM: 608.82  3/5/2014 Yes        CAD (coronary artery disease) ICD-10-CM: I25.10  ICD-9-CM: 414.00  3/5/2014 Yes        PVD (peripheral vascular disease) ([facility] ICD-10-CM: I73.9  ICD-9-CM: 443.9  3/5/2014 Yes                SUBJECTIVE:  No CP  Improving SOB    OBJECTIVE:    VS:   Visit Vitals    /62 (BP 1 Location: Right arm, BP Patient Position: At rest)    Pulse 60    Temp 97.2 °F (36.2 °C)    Resp 18    Ht 6' (1.829 m)    Wt 105.7 kg (233 lb 1.6 oz)    SpO2 94%    BMI 31.61 kg/m2         Intake/Output Summary (Last 24 hours) at 03/01/17 1217  Last data filed at 03/01/17 1000   Gross per 24 hour   Intake            828.6 ml   Output             5550 ml   Net          -4721.4 ml     TELE: normal sinus rhythm    General: alert and in no apparent distress  HENT: Normocephalic, atraumatic. Normal external eye.   Neck :  bruit present, increased JVP  Cardiac:  regular rate and rhythm, systolic murmur: early systolic 3/6, crescendo at 2nd right intercostal space, radiates to carotids, 2nd systolic murmur: late systolic 3/6, blowing at apex  Chest/Lungs:mild rales heard lung bases  Abdomen: Soft, nontender, no masses, +ascites/abd wall edema  Extremities:  No c/c/2+edema UE/LE, peripheral pulses present      Labs: Results:       Chemistry Recent Labs      03/01/17   0326  02/28/17   0350  02/27/17   0335  02/26/17   1525   GLU  96  103*  101*  81   NA  137  140  138  139   K  3.6  3.9  4.2  4.6   CL  94*  97*  96*  96*   CO2  39*  39*  38*  38*   BUN  37*  39*  39*  39*   CREA  1.99*  2.01*  1.95*  2.10*   CA  9.0  8.7  9.1  8.8   MG  1.8 1.9  2.3  1.7*   PHOS   --   2.8   --    --    AGAP  4  4  4  5   BUCR  19  19  20  19   AP   --    --    --   107   TP   --    --    --   7.8   ALB   --    --    --   3.1*   GLOB   --    --    --   4.7*   AGRAT   --    --    --   0.7*      CBC w/Diff Recent Labs      03/01/17   0326  02/28/17   0350  02/27/17   0335   WBC  4.0*  4.6  4.2*   RBC  2.97*  2.95*  3.27*   HGB  8.2*  8.3*  9.1*   HCT  27.3*  27.4*  30.0*   PLT  UNABLE TO REPORT ACCURATE COUNT DUE TO PLATELET AGGREGATION, HOWEVER, PLATELETS APPEAR DECREASED IN NUMBER ON SMEAR. PLEASE RESUBMIT SODIUM CITRATE (BLUE) AND EDTA (LAVENDAR) TUBES FOR HEMATOLOGICAL TESTING. 60*  78*   GRANS  64  67  68   LYMPH  21  20*  17*   EOS  7*  5  7*      Cardiac Enzymes Recent Labs      02/27/17 2015 02/27/17   1400   CPK  39  40   CKND1  Cannot be calulated  3.0      Coagulation Recent Labs      02/27/17   0335  02/26/17   1525   PTP  15.5*  15.6*   INR  1.3*  1.3*   APTT  41.7*   --        Lipid Panel Lab Results   Component Value Date/Time    Cholesterol, total 100 07/22/2016 05:20 AM    HDL Cholesterol 31 07/22/2016 05:20 AM    LDL, calculated 54.4 07/22/2016 05:20 AM    VLDL, calculated 14.6 07/22/2016 05:20 AM    Triglyceride 73 07/22/2016 05:20 AM    CHOL/HDL Ratio 3.2 07/22/2016 05:20 AM      BNP No results for input(s): BNPP in the last 72 hours. Liver Enzymes Recent Labs      02/26/17   1525   TP  7.8   ALB  3.1*   AP  107   SGOT  34      Digoxin    Thyroid Studies Lab Results   Component Value Date/Time    TSH 4.23 07/22/2016 05:20 AM              Kim Kramer, NP  [phone]      I have independently evaluated taken history and examined the patient. All relevant labs and testing data's are reviewed. Care plan discussed and updated after review.   Felicity Holm MD

## 2017-03-01 NOTE — ROUTINE PROCESS
Assumed patient care. Report received from CAITY Lopez. Patient in bed, asleep in no distress. Call light placed in reach. Bed in low position.

## 2017-03-01 NOTE — PROGRESS NOTES
RENAL DAILY PROGRESS NOTE    Patient: Tasha Muhammad               Sex: male          DOA: 2/26/2017  5:34 PM        YOB: 1940      Age:  78 y.o.        LOS:  LOS: 2 days     Subjective:     Tasha Muhammad is a 78 y.o.  who presents with Acute CHF (congestive heart failure) (HCC)  CHF (congestive heart failure) (HCC)  Acute on chronic diastolic (congestive) heart failure ([facility]    Asked to evaluate for crf stage 3.admitted with fluid overload,chf  Chief complains: Patient denies nausea, vomiting, chest pain, dizziness, shortness of breath or headache.  - Reviewed last 24 hrs events     Current Facility-Administered Medications   Medication Dose Route Frequency    acetaZOLAMIDE (DIAMOX) tablet 250 mg  250 mg Oral DAILY    zolpidem (AMBIEN) tablet 5 mg  5 mg Oral QHS PRN    albuterol-ipratropium (DUO-NEB) 2.5 MG-0.5 MG/3 ML  3 mL Nebulization Q6H PRN    sodium chloride (NS) flush 5-10 mL  5-10 mL IntraVENous PRN    acetaminophen (TYLENOL) tablet 650 mg  650 mg Oral Q4H PRN    HYDROcodone-acetaminophen (NORCO) 5-325 mg per tablet 1 Tab  1 Tab Oral Q4H PRN    furosemide (LASIX) 100 mg in 0.9% sodium chloride 100 mL infusion  5 mg/hr IntraVENous CONTINUOUS    fluticasone (FLONASE) 50 mcg/actuation nasal spray 2 Spray  2 Spray Both Nostrils DAILY    aspirin chewable tablet 81 mg  81 mg Oral DAILY    DOBUTamine (DOBUTREX) 500 mg/250 mL (2,000 mcg/mL) infusion  5 mcg/kg/min IntraVENous CONTINUOUS    allopurinol (ZYLOPRIM) tablet 100 mg  100 mg Oral DAILY    nitroglycerin (NITROSTAT) tablet 0.4 mg  0.4 mg SubLINGual PRN    pantoprazole (PROTONIX) tablet 40 mg  40 mg Oral ACB    pravastatin (PRAVACHOL) tablet 80 mg  80 mg Oral QHS    therapeutic multivitamin (THERAGRAN) tablet 1 Tab  1 Tab Oral DAILY    cholecalciferol (VITAMIN D3) tablet 2,000 Units  2,000 Units Oral DAILY    fish oil-omega-3 fatty acids 340-1,000 mg capsule 1 Cap  1 Cap Oral BID    carvedilol (COREG) tablet 6.25 mg 6.25 mg Oral BID WITH MEALS    sodium chloride (NS) flush 5-10 mL  5-10 mL IntraVENous Q8H    naloxone (NARCAN) injection 0.4 mg  0.4 mg IntraVENous PRN    ondansetron (ZOFRAN) injection 4 mg  4 mg IntraVENous Q4H PRN       Objective:     Visit Vitals    /62 (BP 1 Location: Right arm, BP Patient Position: At rest)    Pulse 60    Temp 97.2 °F (36.2 °C)    Resp 18    Ht 6' (1.829 m)    Wt 105.7 kg (233 lb 1.6 oz)    SpO2 94%    BMI 31.61 kg/m2       Intake/Output Summary (Last 24 hours) at 03/01/17 1258  Last data filed at 03/01/17 1000   Gross per 24 hour   Intake            828.6 ml   Output             5550 ml   Net          -4721.4 ml       Physical Examination:     GEN: AAO X 3, NAD  RS: Chest is bilateral  crackles  CVS: S1-S2 heard, RRR, No S3 / murmur  Abdomen: Soft, Non tender, ascites, Positive bowel sounds, no organomegaly, no CVA / supra pubic tenderness  Extremities: + edema, no cyanosis, skin is warm on touch  CNS: Awake & follows commands, CN II-XII are grossly intact. HEENT: Head is atraumatic, PERRLA, conjunctiva pink & non icteric. No JVD or carotid bruit   Psychiatric: Normal mood, affect, judgement & memory    Musculoskeletal: No gross joints or bone deformities   Lymph Node: No palpable cervical, axillary or groin lymphadenopathy. Data Review:      Labs:     Hematology:   Recent Labs      03/01/17 0326 02/28/17 0350 02/27/17 0335 02/26/17   1525   WBC  4.0*  4.6  4.2*  4.0*   HGB  8.2*  8.3*  9.1*  9.6*   HCT  27.3*  27.4*  30.0*  30.9*     Chemistry:   Recent Labs      03/01/17 0326 02/28/17 0350 02/27/17 0335 02/26/17   1525   BUN  37*  39*  39*  39*   CREA  1.99*  2.01*  1.95*  2.10*   CA  9.0  8.7  9.1  8.8   ALB   --    --    --   3.1*   K  3.6  3.9  4.2  4.6   NA  137  140  138  139   CL  94*  97*  96*  96*   CO2  39*  39*  38*  38*   PHOS   --   2.8   --    --    GLU  96  103*  101*  81        Images:    XR (Most Recent).  CXR reviewed by me and compared with previous CXR   Results from [place] encounter on 02/26/17   XR CHEST PORT   Narrative EXAM: Chest Radiograph    INDICATION: Shortness of breath    TECHNIQUE: AP view of the chest    COMPARISON: 8/12/2016, 8/2/2016 and 7/21/2016    FINDINGS: An unchanged left-sided pacemaker is present. No pneumothorax  identified. Interstitial infiltrates are noted. A large right pleural effusion  is present. A small layering left pleural effusion is present. The cardiac  silhouette is enlarged. Bony vasculature is mildly prominent. Degenerative  changes of the spine are noted. Impression Impression:  1. Large right pleural effusion and small left pleural effusion. 2.  Mild interstitial pulmonary edema. CT (Most Recent)   Results from Hospital Encounter encounter on 12/12/16   CT ABD WO CONT   Narrative CT ABDOMEN WITHOUT ENHANCEMENT     CPT CODE: 14481    INDICATION: Acute renal failure and chronic kidney disease. TECHNIQUE: 5 mm collimation axial images obtained from the diaphragm to the  level of the iliac crest without intravenous contrast. Oral contrast was  administered. All CT scans at this facility are performed using dose optimization technique as  appropriate to a performed exam, to include automated exposure control,  adjustment of the mA and/or kV according to patient size (including appropriate  matching first site-specific examinations), or use of iterative reconstruction  technique. COMPARISON: None. FINDINGS:    ABDOMEN FINDINGS:     Lack of intravenous contrast renders this study suboptimal for evaluating the  solid abdominal organs, vasculature and bowel. Liver: Grossly unremarkable. Spleen: No splenomegaly. Pancreas: Grossly unremarkable. Biliary: Status post cholecystectomy. Bowel: No small bowel or colon dilation. .   Peritoneum/ Retroperitoneum: Large volume ascites. No free air. Dg Spies   Lymph Nodes: 1.1 cm in short axis left periaortic node (series 2, image 45). 1  cm retrocaval node (43). .    Adrenal Glands: No focal nodule. Kidneys: Right kidney is atrophic compared with left measuring 8.2 cm in  craniocaudal diameter compared with 9.5 cm on the left. . There are left greater  than right renal vascular calcifications. There is a 4.2 cm lesion at the right  interpolar kidney with density minimally above water. There are a few  subcentimeter left renal lesions which are too small to characterize. . No  hydronephrosis. Vessels: Decreased density within the aortic lumen. 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 Postsurgical changes  aortobiiliac graft with severe calcification in the excluded bilateral common  iliac arteries. Visceral artery calcifications. . Moderate atherosclerosis with  coarse calcification throughout the visceral ostia. Lung Base: Decreased density in the cardiac chambers. Incompletely visualized  cardiac device. Right atrium appears enlarged. Large right and moderate left  pleural effusions. There is complete atelectasis at the right lower lobe and at  atelectasis at the right middle lobe. Mild atelectasis at the left lung base. .    Bones: Moderate anasarca. Status post median sternotomy. Severe degenerative  disc disease L4-L5 with vacuum disc phenomenon and moderate degenerative disc  disease otherwise. Impression IMPRESSION:  1. Atrophic right kidney with bilateral renal vascular calcifications and  moderate atherosclerosis. 2.  Right renal lesion with density minimally above water is statistically most  likely a cyst.  3.  Large volume ascites, large right and moderate left pleural effusions and  moderate anasarca. 4.  Borderline retroperitoneal lymphadenopathy. 5.  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 6.  Anemia. EKG No results found for this or any previous visit.      I have personally reviewed the old medical records and patient's labs    Plan / Recommendation:      1. crf stage 3,atrophic right kidney,no obvious renal artery stenosis by dopplers,no significant change by diuresis. continue to monitor  2.proteinuria,24 hours urine collection in progress  3.hematuria,?lópez trauma. observe for now  4.anemia is worse,low iron. start iron supplements. needs oncology evaluation for pancytopenia  5.fluid overload . monitor electrolytes. metabolic alkalosis is worsening    D/w Dr. Asha Arboleda MD  Nephrology  3/1/2017

## 2017-03-01 NOTE — ROUTINE PROCESS
Bedside shift change report given to TRISHA Naranjo (oncoming nurse) by Chilo Cohen RN (offgoing nurse). Report given with SBAR, Kardex, Intake/Output, MAR and Recent Results.

## 2017-03-01 NOTE — PROGRESS NOTES
Care Management Interventions  PCP Verified by CM: Yes (MD GASTON Ocampo)  Palliative Care Consult (Criteria: CHF and RRAT>21): No  Reason for No Palliative Care Consult: Other (see comment)  Mode of Transport at Discharge: Self (pt's daughter will provide transport at [facility])  Transition of Care Consult (CM Consult): Discharge Planning  MyChart Signup: No  Discharge Durable Medical Equipment: No (patient has O2, rw, shower chair at home for use)  Health Maintenance Reviewed: Yes  Physical Therapy Consult: No  Occupational Therapy Consult: No  Speech Therapy Consult: No  Current Support Network: Lives Alone (patient has daughter, granddaughter who help him)  Confirm Follow Up Transport: Family  Plan discussed with Pt/Family/Caregiver: Yes  Discharge Location  Discharge Placement: Home     Patient 78years old male admitted to [facility] with acute chf. Saw the patient alone in room, he is AAO x 4, open to conversation. Patient shares he is a , lives alone. He has a daughter, who lives locally and helps him with transportation and as needed with other chores. Patient reports to have at home rw, shower chair, O2-only concentrator, he uses 3-4 lpm and its supplied by [employer]. Patient says he could benefit from portable O2, will request the order from attending md. DCP discussed, patient said, \"I'm going home\". He said he was in [facility] in a past and does not want to go snf any more. Patient was provided with [facility] preference to transfer form, he signed the form where he indicated his preference NOT to transfer to [place], form was faxed to [place]. CM will continue to follow.

## 2017-03-01 NOTE — ROUTINE PROCESS
Bedside and Verbal shift change report given to BREA Godinez RN (oncoming nurse) by Princess Avila RN (offgoing nurse). Report included the following information SBAR, Kardex, MAR and Recent Results.     SITUATION:    Code Status: Full Code   Reason for Admission: Acute CHF (congestive heart failure) ([facility]   CHF (congestive heart failure) (HCC)   Acute on chronic diastolic (congestive) heart failure ([facility]    [facility] day: 1   Problem List:       Hospital Problems  Date Reviewed: 8/13/2016          Codes Class Noted POA    Acute on chronic diastolic (congestive) heart failure (HCC) ICD-10-CM: I50.33  ICD-9-CM: 428.33, 428.0  2/27/2017 Unknown        Acute CHF (congestive heart failure) ([facility] ICD-10-CM: I50.9  ICD-9-CM: 428.0  2/26/2017 Unknown        HTN (hypertension) (Chronic) ICD-10-CM: I10  ICD-9-CM: 401.9  9/4/2016 Yes        HLD (hyperlipidemia) (Chronic) ICD-10-CM: E78.5  ICD-9-CM: 272.4  9/4/2016 Yes        Diastolic CHF, acute on chronic ([facility] ICD-10-CM: I50.33  ICD-9-CM: 428.33, 428.0  8/17/2016 Yes        Acute exacerbation of CHF (congestive heart failure) ([facility] ICD-10-CM: I50.9  ICD-9-CM: 428.0  8/12/2016 Yes        Hx of CABG ICD-10-CM: Z95.1  ICD-9-CM: V45.81  7/22/2016 Yes    Overview Signed 7/22/2016  5:23 PM by Silvino Lazo NP     2013             H/O carotid endarterectomy ICD-10-CM: Z98.890  ICD-9-CM: V45.89  7/22/2016 Yes    Overview Signed 7/22/2016  5:27 PM by Silvino Lazo NP     2015             AICD (automatic cardioverter/defibrillator) present ICD-10-CM: Z95.810  ICD-9-CM: V45.02  7/22/2016 Yes    Overview Signed 7/22/2016  5:27 PM by Silvino Lazo NP     2015             CHF (congestive heart failure) (HCC) ICD-10-CM: I50.9  ICD-9-CM: 428.0  7/21/2016 Unknown        Hematospermia ICD-10-CM: R36.1  ICD-9-CM: 608.82  3/5/2014 Yes        CAD (coronary artery disease) ICD-10-CM: I25.10  ICD-9-CM: 414.00  3/5/2014 Yes        PVD (peripheral vascular disease) ([facility] ICD-10-CM: I73.9  ICD-9-CM: 443.9  3/5/2014 Yes              BACKGROUND:    Past Medical History:   Past Medical History:   Diagnosis Date    Arrhythmia     A fib; has external vest and belt he wears for this    Arthritis     High blood pressure     Hypercholesteremia     [facility] ([facility] 2000    Unspecified sleep apnea     does not wear machine         Patient taking anticoagulants yes     ASSESSMENT:    Changes in Assessment Throughout Shift: Started 24 hour urine.  Diuresed     Patient has Central Line: no Reasons if yes:    Patient has Emerson Cathyes Reasons if yes: strict I&O & 24 hour urine collection      Last Vitals:     Vitals:    02/28/17 0945 02/28/17 1205 02/28/17 1215 02/28/17 1639   BP:  115/68  104/63   Pulse:  61  60   Resp:  18  18   Temp:  97.7 °F (36.5 °C)  97.9 °F (36.6 °C)   SpO2: 90% (!) 88% 91% 90%   Weight:       Height:            IV and DRAINS (will only show if present)   Peripheral IV 02/27/17 Left Forearm-Site Assessment: Clean, dry, & intact  Peripheral IV 02/26/17 Right Arm-Site Assessment: Clean, dry, & intact     WOUND (if present)   Wound Type: see Ip wound   Dressing present Dressing Present : No   Wound Concerns/Notes:  javy     PAIN    Pain Assessment    Pain Intensity 1: 0 (02/28/17 1728)              Patient Stated Pain Goal: 0  o Interventions for Pain:  none  o Intervention effective: no  o Time of last intervention:    o Reassessment Completed: no      Last 3 Weights:  Last 3 Recorded Weights in this Encounter    02/26/17 1451 02/28/17 0730   Weight: 95.3 kg (210 lb) 110.7 kg (244 lb 1.6 oz)     Weight change:      INTAKE/OUPUT    Current Shift:      Last three shifts: 02/27 0701 - 02/28 1900  In: 1009.6 [P.O.:780; I.V.:229.6]  Out: 5100 [Urine:5100]     LAB RESULTS     Recent Labs      02/28/17   0350  02/27/17   0335  02/26/17   1525   WBC  4.6  4.2*  4.0*   HGB  8.3*  9.1*  9.6*   HCT  27.4*  30.0*  30.9*   PLT  60*  78*  CLUMPED        Recent Labs      02/28/17 0350  02/27/17   0335  02/26/17   1525   NA  140  138  139   K  3.9  4.2  4.6   GLU  103*  101*  81   BUN  39*  39*  39*   CREA  2.01*  1.95*  2.10*   CA  8.7  9.1  8.8   MG  1.9  2.3  1.7*   INR   --   1.3*  1.3*       RECOMMENDATIONS AND DISCHARGE PLANNING     1. Pending tests/procedures/ Plan of Care or Other Needs: none     2. Discharge plan for patient and Needs/Barriers: Home with home health    3. Estimated Discharge Date: 3/3 Posted on Whiteboard in [address] [place] [address] Room: yes      4. The patient's care plan was reviewed with the oncoming nurse. \"HEALS\" SAFETY CHECK      Fall Risk    Total Score: 2    Safety Measures: Safety Measures: Bed/Chair-Wheels locked, Bed in low position, Call light within reach, Fall prevention (comment), Side rails X 3    A safety check occurred in the patient's room between off going nurse and oncoming nurse listed above. The safety check included the below items  Area Items   H  High Alert Medications - Verify all high alert medication drips (heparin, PCA, etc.)   E  Equipment - Suction is set up for ALL patients (with phylicia)  - Red plugs utilized for all equipment (IV pumps, etc.)  - WOWs wiped down at end of shift.  - Room stocked with oxygen, suction, and other unit-specific supplies   A  Alarms - Bed alarm is set for fall risk patients  - Ensure chair alarm is in place and activated if patient is up in a chair   L  Lines - Check IV for any infiltration  - Emerson bag is empty if patient has a Emerson   - Tubing and IV bags are labeled   S  Safety   - Room is clean, patient is clean, and equipment is clean. - Hallways are clear from equipment besides carts. - Fall bracelet on for fall risk patients  - Ensure room is clear and free of clutter  - Suction is set up for ALL patients (with phylicia)  - Hallways are clear from equipment besides carts.    - Isolation precautions followed, supplies available outside room, sign posted     Alexandra Jeffrey RN

## 2017-03-02 NOTE — ROUTINE PROCESS
Assumed patient care. Patient in bed, awake, alert and oriented x 3 in no distress. . Denies pain or discomforts at this time. Call light placed within reach. Bed in low position.

## 2017-03-02 NOTE — PROGRESS NOTES
Christine Ratliff M.D. PROGRESS NOTE    Name: Alejandrina Wallis MRN: [id number]   : 1940 Hospital: [address] [place] [address]   Date: 3/2/2017  Admission Date: 2017     Subjective/Objective/Plans  1. Acute-on-chronic diastolic heart failure. 2. History of coronary artery bypass surgery. 3. Hypertension. 4. Hyperlipidemia. 5. History of coronary artery bypass and defibrillator placement, as well  as stent placement. 6. Obstructive sleep apnea. 7. History of mini strokes. 8. Ascites    No complaint  Responding well to Lasix drip    Plan  Continue drip  24 hr urine test  Vital Signs:  Visit Vitals    /72 (BP 1 Location: Right arm, BP Patient Position: At rest)    Pulse 68    Temp 98 °F (36.7 °C)    Resp 19    Ht 6' (1.829 m)    Wt 102.6 kg (226 lb 4.8 oz)    SpO2 91%    BMI 30.69 kg/m2       O2 Device: Nasal cannula   O2 Flow Rate (L/min): 4 l/min   Temp (24hrs), Av.3 °F (36.3 °C), Min:96.4 °F (35.8 °C), Max:98 °F (36.7 °C)     6:35 PM  Intake/Output:   Last shift:       07 -  1900  In: -   Out: 190 [Urine:1900]  Last 3 shifts: 1901 -  0700  In: 1404.1 [P.O.:720; I.V.:684.1]  Out: 8275 [Urine:8275]    Intake/Output Summary (Last 24 hours) at 17 1835  Last data filed at 17 1230   Gross per 24 hour   Intake           685.11 ml   Output             5351 ml   Net         -4665.89 ml         Physical Exam:    General: in no apparent distress    HEENT: pupils equal, no ear discharge   Neck: No abnormally enlarged lymph nodes. , no JDV   Mouth: MMM no lesions    Chest: normal   Lungs: normal air entry   Heart: Regular rate and rhythm   Abdomen: gross ascites noted   Extremity: 2+ edema   Neuro: alert    Skin: Skin color, texture, turgor normal. No rashes or lesions    Data Review:    Labs: Results:       Chemistry Recent Labs      17   0315  17   0326  17   0350   GLU  90  96  103*   NA  136  137  140   K  3.6  3.6  3.9   CL  91*  94*  97*   CO2 41*  39*  39*   BUN  37*  37*  39*   CREA  1.81*  1.99*  2.01*   CA  8.9  9.0  8.7   AGAP  4  4  4   BUCR  20  19  19      CBC w/Diff Recent Labs      03/02/17   0315  03/01/17   0326  02/28/17   0350   WBC  4.0*  4.0*  4.6   RBC  2.93*  2.97*  2.95*   HGB  8.1*  8.2*  8.3*   HCT  26.9*  27.3*  27.4*   PLT  70*  UNABLE TO REPORT ACCURATE COUNT DUE TO PLATELET AGGREGATION, HOWEVER, PLATELETS APPEAR DECREASED IN NUMBER ON SMEAR. PLEASE RESUBMIT SODIUM CITRATE (BLUE) AND EDTA (LAVENDAR) TUBES FOR HEMATOLOGICAL TESTING. 60*   GRANS  63  64  67   LYMPH  18*  21  20*   EOS  10*  7*  5      Cardiac Enzymes Recent Labs      02/27/17 2015   CPK  39   CKND1  Cannot be calulated      Coagulation No results for input(s): PTP, INR, APTT in the last 72 hours. No lab exists for component: INREXT    Lipid Panel Lab Results   Component Value Date/Time    Cholesterol, total 100 07/22/2016 05:20 AM    HDL Cholesterol 31 07/22/2016 05:20 AM    LDL, calculated 54.4 07/22/2016 05:20 AM    VLDL, calculated 14.6 07/22/2016 05:20 AM    Triglyceride 73 07/22/2016 05:20 AM    CHOL/HDL Ratio 3.2 07/22/2016 05:20 AM      BNP No results for input(s): BNPP in the last 72 hours. Liver Enzymes No results for input(s): TP, ALB, TBILI, AP, SGOT, ALT, CBIL in the last 72 hours. Thyroid Studies Lab Results   Component Value Date/Time    TSH 4.23 07/22/2016 05:20 AM          Procedures/imaging: see electronic medical records for all procedures, Xrays and details which were not copied into this note but were reviewed. Allergies:  No Known Allergies    Home Medications:  Prior to Admission Medications   Prescriptions Last Dose Informant Patient Reported? Taking? Cholecalciferol, Vitamin D3, (VITAMIN D3) 1,000 unit cap   Yes No   Sig: Take 2,000 Units by mouth daily. Omega-3 Fatty Acids 300 mg cap   Yes No   Sig: Take 1 Cap by mouth two (2) times a day. allopurinol (ZYLOPRIM) 100 mg tablet   Yes No   Sig: Take 100 mg by mouth daily. aspirin delayed-release 81 mg tablet   Yes No   Sig: Take 162 mg by mouth daily. carvedilol (COREG) 6.25 mg tablet   No No   Sig: Take 1 Tab by mouth two (2) times daily (with meals). furosemide (LASIX) 40 mg tablet   No No   Sig: One tab daily  Indications: PERIPHERAL EDEMA DUE TO CHRONIC HEART FAILURE   lisinopril (PRINIVIL, ZESTRIL) 5 mg tablet   No No   Sig: Take 1 Tab by mouth daily. multivitamin (ONE A DAY) tablet   Yes No   Sig: Take 1 Tab by mouth daily. nitroglycerin (NITROSTAT) 0.4 mg SL tablet   Yes No   Sig: by SubLINGual route every five (5) minutes as needed for Chest Pain. omeprazole (PRILOSEC) 20 mg capsule   Yes No   Sig: Take 20 mg by mouth daily. pravastatin (PRAVACHOL) 80 mg tablet   Yes No   Sig: Take 80 mg by mouth nightly.       Facility-Administered Medications: None       Current Medications:  Current Facility-Administered Medications   Medication Dose Route Frequency    [START ON 3/3/2017] acetaZOLAMIDE (DIAMOX) tablet 250 mg  250 mg Oral DAILY    acetaZOLAMIDE (DIAMOX) tablet 250 mg  250 mg Oral DAILY    zolpidem (AMBIEN) tablet 5 mg  5 mg Oral QHS PRN    albuterol-ipratropium (DUO-NEB) 2.5 MG-0.5 MG/3 ML  3 mL Nebulization Q6H PRN    sodium chloride (NS) flush 5-10 mL  5-10 mL IntraVENous PRN    acetaminophen (TYLENOL) tablet 650 mg  650 mg Oral Q4H PRN    HYDROcodone-acetaminophen (NORCO) 5-325 mg per tablet 1 Tab  1 Tab Oral Q4H PRN    furosemide (LASIX) 100 mg in 0.9% sodium chloride 100 mL infusion  5 mg/hr IntraVENous CONTINUOUS    fluticasone (FLONASE) 50 mcg/actuation nasal spray 2 Spray  2 Spray Both Nostrils DAILY    aspirin chewable tablet 81 mg  81 mg Oral DAILY    DOBUTamine (DOBUTREX) 500 mg/250 mL (2,000 mcg/mL) infusion  5 mcg/kg/min IntraVENous CONTINUOUS    allopurinol (ZYLOPRIM) tablet 100 mg  100 mg Oral DAILY    nitroglycerin (NITROSTAT) tablet 0.4 mg  0.4 mg SubLINGual PRN    pantoprazole (PROTONIX) tablet 40 mg  40 mg Oral ACB    pravastatin (PRAVACHOL) tablet 80 mg  80 mg Oral QHS    therapeutic multivitamin (THERAGRAN) tablet 1 Tab  1 Tab Oral DAILY    cholecalciferol (VITAMIN D3) tablet 2,000 Units  2,000 Units Oral DAILY    fish oil-omega-3 fatty acids 340-1,000 mg capsule 1 Cap  1 Cap Oral BID    carvedilol (COREG) tablet 6.25 mg  6.25 mg Oral BID WITH MEALS    sodium chloride (NS) flush 5-10 mL  5-10 mL IntraVENous Q8H    naloxone (NARCAN) injection 0.4 mg  0.4 mg IntraVENous PRN    ondansetron (ZOFRAN) injection 4 mg  4 mg IntraVENous Q4H PRN       Chart and notes reviewed. Data reviewed. I have evaluated and examined the patient.         IMPRESSION:   Patient Active Problem List   Diagnosis Code    Hematospermia R36.1    CAD (coronary artery disease) I25.10    PVD (peripheral vascular disease) ([facility] [address] I73.9    CHF (congestive heart failure) (HCC) I50.9    Anasarca R60.1    Hx of CABG Z95.1    H/O carotid endarterectomy Z98.890    AICD (automatic cardioverter/defibrillator) present Z95.810    Acute exacerbation of CHF (congestive heart failure) (HCC) X45.0    Diastolic CHF, acute on chronic (HCC) I50.33    HTN (hypertension) I10    HLD (hyperlipidemia) E78.5    Acute CHF (congestive heart failure) (HCC) I50.9    Acute on chronic diastolic (congestive) heart failure (HCC) I50.33 ·         PLAN:/DISCUSSION:   · Continue current treatment        Lolita Warren MD  3/2/2017, 6:35 PM

## 2017-03-02 NOTE — ROUTINE PROCESS
Bedside and Verbal shift change report given to Lila Stevenson RN (oncoming nurse) by Constantino Burt RN (offgoing nurse). Report included the following information SBAR, Kardex, Intake/Output, MAR and Recent Results.

## 2017-03-02 NOTE — NURSE NAVIGATOR
Spoke with Myranda Drew, regarding the importance of daily weights, reporting worsening signs and symptoms of Heart Failure, low sodium diet, and not missing their MD appointments. \"Good for Your Heart\" educational folder given and reviewed. Discussed diet, patient states that he eats oatmeal for breakfast, cooks his own food, that he invested in new pots, pans, slow cooker. He tries to mix it up and watches his sodium. Answered related questions and Myranda Drew acknowledge understanding through teach back method.

## 2017-03-02 NOTE — PROGRESS NOTES
RENAL DAILY PROGRESS NOTE    Patient: Nicole Garibay               Sex: male          DOA: 2/26/2017  5:34 PM        YOB: 1940      Age:  78 y.o.        LOS:  LOS: 3 days     Subjective:     Nicole Garibay is a 78 y.o.  who presents with Acute CHF (congestive heart failure) (HCC)  CHF (congestive heart failure) (HCC)  Acute on chronic diastolic (congestive) heart failure ([facility] [address]    Asked to evaluate for crf stage 3.admitted with fluid overload,chf  Chief complains: Patient denies nausea, vomiting, chest pain, dizziness, shortness of breath or headache.  - Reviewed last 24 hrs events     Current Facility-Administered Medications   Medication Dose Route Frequency    acetaZOLAMIDE (DIAMOX) tablet 250 mg  250 mg Oral DAILY    zolpidem (AMBIEN) tablet 5 mg  5 mg Oral QHS PRN    albuterol-ipratropium (DUO-NEB) 2.5 MG-0.5 MG/3 ML  3 mL Nebulization Q6H PRN    sodium chloride (NS) flush 5-10 mL  5-10 mL IntraVENous PRN    acetaminophen (TYLENOL) tablet 650 mg  650 mg Oral Q4H PRN    HYDROcodone-acetaminophen (NORCO) 5-325 mg per tablet 1 Tab  1 Tab Oral Q4H PRN    furosemide (LASIX) 100 mg in 0.9% sodium chloride 100 mL infusion  5 mg/hr IntraVENous CONTINUOUS    fluticasone (FLONASE) 50 mcg/actuation nasal spray 2 Spray  2 Spray Both Nostrils DAILY    aspirin chewable tablet 81 mg  81 mg Oral DAILY    DOBUTamine (DOBUTREX) 500 mg/250 mL (2,000 mcg/mL) infusion  5 mcg/kg/min IntraVENous CONTINUOUS    allopurinol (ZYLOPRIM) tablet 100 mg  100 mg Oral DAILY    nitroglycerin (NITROSTAT) tablet 0.4 mg  0.4 mg SubLINGual PRN    pantoprazole (PROTONIX) tablet 40 mg  40 mg Oral ACB    pravastatin (PRAVACHOL) tablet 80 mg  80 mg Oral QHS    therapeutic multivitamin (THERAGRAN) tablet 1 Tab  1 Tab Oral DAILY    cholecalciferol (VITAMIN D3) tablet 2,000 Units  2,000 Units Oral DAILY    fish oil-omega-3 fatty acids 340-1,000 mg capsule 1 Cap  1 Cap Oral BID    carvedilol (COREG) tablet 6.25 mg 6.25 mg Oral BID WITH MEALS    sodium chloride (NS) flush 5-10 mL  5-10 mL IntraVENous Q8H    naloxone (NARCAN) injection 0.4 mg  0.4 mg IntraVENous PRN    ondansetron (ZOFRAN) injection 4 mg  4 mg IntraVENous Q4H PRN       Objective:     Visit Vitals    /81 (BP 1 Location: Right arm, BP Patient Position: At rest)    Pulse 65    Temp 97.9 °F (36.6 °C)    Resp 18    Ht 6' (1.829 m)    Wt 102.6 kg (226 lb 4.8 oz)    SpO2 91%    BMI 30.69 kg/m2       Intake/Output Summary (Last 24 hours) at 03/02/17 1057  Last data filed at 03/02/17 9301   Gross per 24 hour   Intake           925.11 ml   Output             4525 ml   Net         -3599.89 ml       Physical Examination:     GEN: AAO X 3, NAD  RS: Chest is bilateral  crackles  CVS: S1-S2 heard, RRR, No S3 / murmur  Abdomen: Soft, Non tender, ascites, Positive bowel sounds, no organomegaly, no CVA / supra pubic tenderness  Extremities: + edema, no cyanosis, skin is warm on touch  CNS: Awake & follows commands, CN II-XII are grossly intact. HEENT: Head is atraumatic, PERRLA, conjunctiva pink & non icteric. No JVD or carotid bruit   Psychiatric: Normal mood, affect, judgement & memory    Musculoskeletal: No gross joints or bone deformities   Lymph Node: No palpable cervical, axillary or groin lymphadenopathy. Data Review:      Labs:     Hematology:   Recent Labs      03/02/17 0315 03/01/17 0326  02/28/17   0350   WBC  4.0*  4.0*  4.6   HGB  8.1*  8.2*  8.3*   HCT  26.9*  27.3*  27.4*     Chemistry:   Recent Labs      03/02/17 0315 03/01/17   0326  02/28/17   0350   BUN  37*  37*  39*   CREA  1.81*  1.99*  2.01*   CA  8.9  9.0  8.7   K  3.6  3.6  3.9   NA  136  137  140   CL  91*  94*  97*   CO2  41*  39*  39*   PHOS   --    --   2.8   GLU  90  96  103*        Images:    XR (Most Recent).  CXR reviewed by me and compared with previous CXR   Results from Hospital Encounter encounter on 02/26/17   XR CHEST PORT   Narrative EXAM: Chest Radiograph    INDICATION: Shortness of breath    TECHNIQUE: AP view of the chest    COMPARISON: 8/12/2016, 8/2/2016 and 7/21/2016    FINDINGS: An unchanged left-sided pacemaker is present. No pneumothorax  identified. Interstitial infiltrates are noted. A large right pleural effusion  is present. A small layering left pleural effusion is present. The cardiac  silhouette is enlarged. Bony vasculature is mildly prominent. Degenerative  changes of the spine are noted. Impression Impression:  1. Large right pleural effusion and small left pleural effusion. 2.  Mild interstitial pulmonary edema. CT (Most Recent)   Results from Hospital Encounter encounter on 12/12/16   CT ABD WO CONT   Narrative CT ABDOMEN WITHOUT ENHANCEMENT     CPT CODE: 29529    INDICATION: Acute renal failure and chronic kidney disease. TECHNIQUE: 5 mm collimation axial images obtained from the diaphragm to the  level of the iliac crest without intravenous contrast. Oral contrast was  administered. All CT scans at this facility are performed using dose optimization technique as  appropriate to a performed exam, to include automated exposure control,  adjustment of the mA and/or kV according to patient size (including appropriate  matching first site-specific examinations), or use of iterative reconstruction  technique. COMPARISON: None. FINDINGS:    ABDOMEN FINDINGS:     Lack of intravenous contrast renders this study suboptimal for evaluating the  solid abdominal organs, vasculature and bowel. Liver: Grossly unremarkable. Spleen: No splenomegaly. Pancreas: Grossly unremarkable. Biliary: Status post cholecystectomy. Bowel: No small bowel or colon dilation. .   Peritoneum/ Retroperitoneum: Large volume ascites. No free air. Kulwant Isabel Lymph Nodes: 1.1 cm in short axis left periaortic node (series 2, image 45). 1  cm retrocaval node (43). .    Adrenal Glands: No focal nodule.   Kidneys: Right kidney is atrophic compared with left measuring 8.2 cm in  craniocaudal diameter compared with 9.5 cm on the left. . There are left greater  than right renal vascular calcifications. There is a 4.2 cm lesion at the right  interpolar kidney with density minimally above water. There are a few  subcentimeter left renal lesions which are too small to characterize. . No  hydronephrosis. Vessels: Decreased density within the aortic lumen. 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 Postsurgical changes  aortobiiliac graft with severe calcification in the excluded bilateral common  iliac arteries. Visceral artery calcifications. . Moderate atherosclerosis with  coarse calcification throughout the visceral ostia. Lung Base: Decreased density in the cardiac chambers. Incompletely visualized  cardiac device. Right atrium appears enlarged. Large right and moderate left  pleural effusions. There is complete atelectasis at the right lower lobe and at  atelectasis at the right middle lobe. Mild atelectasis at the left lung base. .    Bones: Moderate anasarca. Status post median sternotomy. Severe degenerative  disc disease L4-L5 with vacuum disc phenomenon and moderate degenerative disc  disease otherwise. Impression IMPRESSION:  1. Atrophic right kidney with bilateral renal vascular calcifications and  moderate atherosclerosis. 2.  Right renal lesion with density minimally above water is statistically most  likely a cyst.  3.  Large volume ascites, large right and moderate left pleural effusions and  moderate anasarca. 4.  Borderline retroperitoneal lymphadenopathy. 5.  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 6.  Anemia. EKG No results found for this or any previous visit.      I have personally reviewed the old medical records and patient's labs    Plan / Recommendation:      1. crf stage 3,atrophic right kidney,no obvious renal artery stenosis by dopplers,no significant change by diuresis. continue to monitor  2.proteinuria,? Nephrotic syndrome,24 hours urine collection in progress  3.hematuria,?lópez trauma. observe for now. repeat urinalysis when 24 hours collection is done  4.anemia is worse,low iron. start iron supplements. needs oncology evaluation for pancytopenia  5.fluid overload . monitor electrolytes  6.metabolic alkalosis related to diuretics,agree with diamox    D/w Dr. Jim Payne MD  Nephrology  3/2/2017

## 2017-03-02 NOTE — WOUND CARE
Physical Exam   Room 361: pt has broken blisters on lower legs. Will order skin care protocol for nonadherent dressings.   Nikki GALLARDON, RN, [facility], [address]

## 2017-03-02 NOTE — ROUTINE PROCESS
Received pt AAOx3, NAD, breathing non labored, on O2 NC at 4L, HOB up. Lasix drip and Dobutamine drip going per order. Pt's on 24h urine collection-to end at [address]. Emerson cath in place. Bed at the lowest level on lock position, call bell w/i reach. 1930 - 24h urine collection ended and sent to the lab per order. Bedside and Verbal shift change report given to [address] Giorgio (oncoming nurse) by me (offgoing nurse).  Report included the following information SBAR, Kardex, Intake/Output, MAR, Recent Results and Cardiac Rhythm SR.

## 2017-03-02 NOTE — PROGRESS NOTES
Jenifer Hagen M.D. PROGRESS NOTE    Name: Joey Martino MRN: [id number]   : 1940 Hospital: [address] [place] [address]   Date: 3/1/2017  Admission Date: 2017     Subjective/Objective/Plans  1. Acute-on-chronic diastolic heart failure. 2. History of coronary artery bypass surgery. 3. Hypertension. 4. Hyperlipidemia. 5. History of coronary artery bypass and defibrillator placement, as well  as stent placement. 6. Obstructive sleep apnea. 7. History of mini strokes. 8. Ascites    VSS  Edema improving  Abdomen softer but still  Significant ascites  Legs 2-3 + edema, some redness watch for cellulitis    Vital Signs:  Visit Vitals    /77 (BP 1 Location: Right arm, BP Patient Position: At rest)    Pulse 60    Temp 98.3 °F (36.8 °C)    Resp 18    Ht 6' (1.829 m)    Wt 105.7 kg (233 lb 1.6 oz)    SpO2 94%    BMI 31.61 kg/m2       O2 Device: Nasal cannula   O2 Flow Rate (L/min): 4 l/min   Temp (24hrs), Av.7 °F (36.5 °C), Min:96.8 °F (36 °C), Max:98.5 °F (36.9 °C)     7:24 PM  Intake/Output:   Last shift:         Last 3 shifts: 701 -  1900  In: 1960.2 [P.O.:1260; I.V.:700.2]  Out: 6825 [Urine:6825]    Intake/Output Summary (Last 24 hours) at 174  Last data filed at 17 1849   Gross per 24 hour   Intake            950.6 ml   Output             4825 ml   Net          -3874.4 ml         Physical Exam:    General: in no apparent distress, in no respiratory distress and acyanotic and alert    HEENT: pupils equal, no ear discharge   Neck: No abnormally enlarged lymph nodes. , no JDV   Mouth: MMM no lesions    Chest: normal, no breast masses   Lungs: decreased air exchange bilaterally   Heart: Regular rate and rhythm   Abdomen: gross ascites noted   Extremity: 2+, 3+ and pitting edema   Neuro: alert    Skin: Skin color, texture, turgor normal. No rashes or lesions    Data Review:    Labs: Results:       Chemistry Recent Labs      17   0326  17   0350 02/27/17   0335   GLU  96  103*  101*   NA  137  140  138   K  3.6  3.9  4.2   CL  94*  97*  96*   CO2  39*  39*  38*   BUN  37*  39*  39*   CREA  1.99*  2.01*  1.95*   CA  9.0  8.7  9.1   AGAP  4  4  4   BUCR  19  19  20      CBC w/Diff Recent Labs      03/01/17   0326  02/28/17   0350  02/27/17   0335   WBC  4.0*  4.6  4.2*   RBC  2.97*  2.95*  3.27*   HGB  8.2*  8.3*  9.1*   HCT  27.3*  27.4*  30.0*   PLT  UNABLE TO REPORT ACCURATE COUNT DUE TO PLATELET AGGREGATION, HOWEVER, PLATELETS APPEAR DECREASED IN NUMBER ON SMEAR. PLEASE RESUBMIT SODIUM CITRATE (BLUE) AND EDTA (LAVENDAR) TUBES FOR HEMATOLOGICAL TESTING. 60*  78*   GRANS  64  67  68   LYMPH  21  20*  17*   EOS  7*  5  7*      Cardiac Enzymes Recent Labs      02/27/17 2015 02/27/17   1400   CPK  39  40   CKND1  Cannot be calulated  3.0      Coagulation Recent Labs      02/27/17   0335   PTP  15.5*   INR  1.3*   APTT  41.7*       Lipid Panel Lab Results   Component Value Date/Time    Cholesterol, total 100 07/22/2016 05:20 AM    HDL Cholesterol 31 07/22/2016 05:20 AM    LDL, calculated 54.4 07/22/2016 05:20 AM    VLDL, calculated 14.6 07/22/2016 05:20 AM    Triglyceride 73 07/22/2016 05:20 AM    CHOL/HDL Ratio 3.2 07/22/2016 05:20 AM      BNP No results for input(s): BNPP in the last 72 hours. Liver Enzymes No results for input(s): TP, ALB, TBILI, AP, SGOT, ALT, CBIL in the last 72 hours. Thyroid Studies Lab Results   Component Value Date/Time    TSH 4.23 07/22/2016 05:20 AM          Procedures/imaging: see electronic medical records for all procedures, Xrays and details which were not copied into this note but were reviewed. Allergies:  No Known Allergies    Home Medications:  Prior to Admission Medications   Prescriptions Last Dose Informant Patient Reported? Taking? Cholecalciferol, Vitamin D3, (VITAMIN D3) 1,000 unit cap   Yes No   Sig: Take 2,000 Units by mouth daily.    Omega-3 Fatty Acids 300 mg cap   Yes No   Sig: Take 1 Cap by mouth two (2) times a day. allopurinol (ZYLOPRIM) 100 mg tablet   Yes No   Sig: Take 100 mg by mouth daily. aspirin delayed-release 81 mg tablet   Yes No   Sig: Take 162 mg by mouth daily. carvedilol (COREG) 6.25 mg tablet   No No   Sig: Take 1 Tab by mouth two (2) times daily (with meals). furosemide (LASIX) 40 mg tablet   No No   Sig: One tab daily  Indications: PERIPHERAL EDEMA DUE TO CHRONIC HEART FAILURE   lisinopril (PRINIVIL, ZESTRIL) 5 mg tablet   No No   Sig: Take 1 Tab by mouth daily. multivitamin (ONE A DAY) tablet   Yes No   Sig: Take 1 Tab by mouth daily. nitroglycerin (NITROSTAT) 0.4 mg SL tablet   Yes No   Sig: by SubLINGual route every five (5) minutes as needed for Chest Pain. omeprazole (PRILOSEC) 20 mg capsule   Yes No   Sig: Take 20 mg by mouth daily. pravastatin (PRAVACHOL) 80 mg tablet   Yes No   Sig: Take 80 mg by mouth nightly.       Facility-Administered Medications: None       Current Medications:  Current Facility-Administered Medications   Medication Dose Route Frequency    acetaZOLAMIDE (DIAMOX) tablet 250 mg  250 mg Oral DAILY    zolpidem (AMBIEN) tablet 5 mg  5 mg Oral QHS PRN    albuterol-ipratropium (DUO-NEB) 2.5 MG-0.5 MG/3 ML  3 mL Nebulization Q6H PRN    sodium chloride (NS) flush 5-10 mL  5-10 mL IntraVENous PRN    acetaminophen (TYLENOL) tablet 650 mg  650 mg Oral Q4H PRN    HYDROcodone-acetaminophen (NORCO) 5-325 mg per tablet 1 Tab  1 Tab Oral Q4H PRN    furosemide (LASIX) 100 mg in 0.9% sodium chloride 100 mL infusion  5 mg/hr IntraVENous CONTINUOUS    fluticasone (FLONASE) 50 mcg/actuation nasal spray 2 Spray  2 Spray Both Nostrils DAILY    aspirin chewable tablet 81 mg  81 mg Oral DAILY    DOBUTamine (DOBUTREX) 500 mg/250 mL (2,000 mcg/mL) infusion  5 mcg/kg/min IntraVENous CONTINUOUS    allopurinol (ZYLOPRIM) tablet 100 mg  100 mg Oral DAILY    nitroglycerin (NITROSTAT) tablet 0.4 mg  0.4 mg SubLINGual PRN    pantoprazole (PROTONIX) tablet 40 mg  40 mg Oral ACB    pravastatin (PRAVACHOL) tablet 80 mg  80 mg Oral QHS    therapeutic multivitamin (THERAGRAN) tablet 1 Tab  1 Tab Oral DAILY    cholecalciferol (VITAMIN D3) tablet 2,000 Units  2,000 Units Oral DAILY    fish oil-omega-3 fatty acids 340-1,000 mg capsule 1 Cap  1 Cap Oral BID    carvedilol (COREG) tablet 6.25 mg  6.25 mg Oral BID WITH MEALS    sodium chloride (NS) flush 5-10 mL  5-10 mL IntraVENous Q8H    naloxone (NARCAN) injection 0.4 mg  0.4 mg IntraVENous PRN    ondansetron (ZOFRAN) injection 4 mg  4 mg IntraVENous Q4H PRN       Chart and notes reviewed. Data reviewed. I have evaluated and examined the patient.         IMPRESSION:   Patient Active Problem List   Diagnosis Code    Hematospermia R36.1    CAD (coronary artery disease) I25.10    PVD (peripheral vascular disease) ([facility] I73.9    CHF (congestive heart failure) (HCC) I50.9    Anasarca R60.1    Hx of CABG Z95.1    H/O carotid endarterectomy Z98.890    AICD (automatic cardioverter/defibrillator) present Z95.810    Acute exacerbation of CHF (congestive heart failure) (HCC) N76.3    Diastolic CHF, acute on chronic (HCC) I50.33    HTN (hypertension) I10    HLD (hyperlipidemia) E78.5    Acute CHF (congestive heart failure) (HCC) I50.9    Acute on chronic diastolic (congestive) heart failure (HCC) I50.33 ·         PLAN:/DISCUSSION:   · Lasix drip as ordered        Jessica Driscoll MD  3/1/2017, 7:24 PM

## 2017-03-02 NOTE — ROUTINE PROCESS
Bedside shift change report given to Ollie (oncoming nurse) by Abdiaziz Mason RN (offgoing nurse). Report given with SBAR, Kardex, Intake/Output, MAR and Recent Results.

## 2017-03-02 NOTE — ROUTINE PROCESS
Bedside and Verbal shift change report given to Rodolfo Escamilla RN (oncoming nurse) by Wally Segura RN (offgoing nurse). Report included the following information SBAR, Kardex, MAR and Recent Results.     SITUATION:    Code Status: Full Code   Reason for Admission: Acute CHF (congestive heart failure) ([facility]   CHF (congestive heart failure) (HCC)   Acute on chronic diastolic (congestive) heart failure ([facility]    [facility] day: 2   Problem List:       Hospital Problems  Date Reviewed: 8/13/2016          Codes Class Noted POA    Acute on chronic diastolic (congestive) heart failure (HCC) ICD-10-CM: I50.33  ICD-9-CM: 428.33, 428.0  2/27/2017 Unknown        Acute CH[facility] (congestive heart failure) [facility] ICD-10-CM: I50.9  ICD-9-CM: 428.0  2/26/2017 Unknown        HTN (hypertension) (Chronic) ICD-10-CM: I10  ICD-9-CM: 401.9  9/4/2016 Yes        HLD (hyperlipidemia) (Chronic) ICD-10-CM: E78.5  ICD-9-CM: 272.4  9/4/2016 Yes        Diastolic CHF, acute on chronic ([facility] ICD-10-CM: I50.33  ICD-9-CM: 428.33, 428.0  8/17/2016 Yes        Acute exacerbation of CHF (congestive heart failure) ([facility] ICD-10-CM: I50.9  ICD-9-CM: 428.0  8/12/2016 Yes        Hx of CABG ICD-10-CM: Z95.1  ICD-9-CM: V45.81  7/22/2016 Yes    Overview Signed 7/22/2016  5:23 PM by Darylene Albee, NP     2013             H/O carotid endarterectomy ICD-10-CM: Z98.890  ICD-9-CM: V45.89  7/22/2016 Yes    Overview Signed 7/22/2016  5:27 PM by Darylene Albee, NP     2015             AICD (automatic cardioverter/defibrillator) present ICD-10-CM: Z95.810  ICD-9-CM: V45.02  7/22/2016 Yes    Overview Signed 7/22/2016  5:27 PM by Darylene Albee, NP     2015             CHF (congestive heart failure) (HCC) ICD-10-CM: I50.9  ICD-9-CM: 428.0  7/21/2016 Unknown        Hematospermia ICD-10-CM: R36.1  ICD-9-CM: 608.82  3/5/2014 Yes        CAD (coronary artery disease) ICD-10-CM: I25.10  ICD-9-CM: 414.00  3/5/2014 Yes        PVD (peripheral vascular disease) ([facility] ICD-10-CM: I73.9  ICD-9-CM: 443.9  3/5/2014 Yes              BACKGROUND:    Past Medical History:   Past Medical History:   Diagnosis Date    Arrhythmia     A fib; has external vest and belt he wears for this    Arthritis     High blood pressure     Hypercholesteremia     [facility] stroke ([facility] 2000    Unspecified sleep apnea     does not wear machine         Patient taking anticoagulants no     ASSESSMENT:    Changes in Assessment Throughout Shift:      Patient has Central Line: no Reasons if yes:    Patient has Emerson Cath: yes Reasons if yes: Strict I & O      Last Vitals:     Vitals:    03/01/17 0509 03/01/17 0746 03/01/17 1100 03/01/17 1535   BP:  127/80 119/62 110/77   Pulse:  63 60 60   Resp:  18 18 18   Temp:  97.3 °F (36.3 °C) 97.2 °F (36.2 °C) 98.3 °F (36.8 °C)   SpO2:  90% 94%    Weight: 105.7 kg (233 lb 1.6 oz)      Height:            IV and DRAINS (will only show if present)   Peripheral IV 02/27/17 Left Forearm-Site Assessment: Clean, dry, & intact  Peripheral IV 02/26/17 Right Arm-Site Assessment: Clean, dry, & intact     WOUND (if present)   Wound Type:  none   Dressing present Dressing Present : No   Wound Concerns/Notes:  none     PAIN    Pain Assessment    Pain Intensity 1: 0 (03/01/17 1835)              Patient Stated Pain Goal: 0  o Interventions for Pain:  none  o Intervention effective:   o Time of last intervention:    o Reassessment Completed:       Last 3 Weights:  Last 3 Recorded Weights in this Encounter    02/26/17 1451 02/28/17 0730 03/01/17 0509   Weight: 95.3 kg (210 lb) 110.7 kg (244 lb 1.6 oz) 105.7 kg (233 lb 1.6 oz)     Weight change:      INTAKE/OUPUT    Current Shift: 03/01 1901 - 03/02 0700  In: -   Out: 350 [Urine:350]    Last three shifts: 02/28 0701 - 03/01 1900  In: 1960.2 [P.O.:1260;  I.V.:700.2]  Out: 6825 [Urine:6825]     LAB RESULTS     Recent Labs      03/01/17   0326  02/28/17   0350  02/27/17   0335   WBC  4.0*  4.6  4.2*   HGB  8.2*  8.3*  9.1*   HCT  27.3*  27.4* 30.0*   PLT  UNABLE TO REPORT ACCURATE COUNT DUE TO PLATELET AGGREGATION, HOWEVER, PLATELETS APPEAR DECREASED IN NUMBER ON SMEAR. PLEASE RESUBMIT SODIUM CITRATE (BLUE) AND EDTA (LAVENDAR) TUBES FOR HEMATOLOGICAL TESTING. 60*  78*        Recent Labs      03/01/17   0326  02/28/17   0350  02/27/17   0335   NA  137  140  138   K  3.6  3.9  4.2   GLU  96  103*  101*   BUN  37*  39*  39*   CREA  1.99*  2.01*  1.95*   CA  9.0  8.7  9.1   MG  1.8  1.9  2.3   INR   --    --   1.3*       RECOMMENDATIONS AND DISCHARGE PLANNING     1. Pending tests/procedures/ Plan of Care or Other Needs:      2. Discharge plan for patient and Needs/Barriers:     3. Estimated Discharge Date: 3/3/17 Posted on Whiteboard in [facility] Room: yes      4. The patient's care plan was reviewed with the oncoming nurse. \"HEALS\" SAFETY CHECK      Fall Risk    Total Score: 2    Safety Measures: Safety Measures: Bed/Chair-Wheels locked, Bed in low position, Call light within reach, Gripper socks, Side rails X 3    A safety check occurred in the patient's room between off going nurse and oncoming nurse listed above. The safety check included the below items  Area Items   H  High Alert Medications - Verify all high alert medication drips (heparin, PCA, etc.)   E  Equipment - Suction is set up for ALL patients (with yanker)  - Red plugs utilized for all equipment (IV pumps, etc.)  - WOWs wiped down at end of shift.  - Room stocked with oxygen, suction, and other unit-specific supplies   A  Alarms - Bed alarm is set for fall risk patients  - Ensure chair alarm is in place and activated if patient is up in a chair   L  Lines - Check IV for any infiltration  - Emerson bag is empty if patient has a Emerson   - Tubing and IV bags are labeled   S  Safety   - Room is clean, patient is clean, and equipment is clean. - Hallways are clear from equipment besides carts.    - Fall bracelet on for fall risk patients  - Ensure room is clear and free of clutter  - Suction is set up for ALL patients (with phylicia)  - Hallways are clear from equipment besides carts.    - Isolation precautions followed, supplies available outside room, sign posted     Uma Her RN

## 2017-03-02 NOTE — PROGRESS NOTES
[facility]      CARDIOLOGY PROGRESS NOTE  RECS:      1. Acute on chronic diastolic congestive heart failure- slowly improving. Still with severe edema. Continue with Fluid restriction. Stricts I&O. Monitor electrolytes and renal function. ECHO with mod AS, MR likely causing CHF. Patient will need cardiac w/u when diuresed. 2. Anasarca- agressive diuresis with Lasix drip. Hold metolazone as CO2 has increased will add diamox for 3 days. continue iv dobut due to MR/AS  3. S/p CABG in 2013 in [facility]- stable denies chest pain or pressure continue with asa, statin and bb.  4. S/p AICD- [employer]. normal function interrogated on 02/27/17  5. Hx Carotid endarterectomy- bilateral in 2015    6. KYLEE on CKD- monitor renal function nephrology following   7. Pancytopenia- consider oncology consult   8. Hypertension- normal low . continue with bb monitor closely   9. Mitral regurgitation -mild to moderate regurgitation echo on 2016   10.  Aortic stenosis : moderate, will f/u          ASSESSMENT:  Hospital Problems  Date Reviewed: 8/13/2016          Codes Class Noted POA    Acute on chronic diastolic (congestive) heart failure (HCC) ICD-10-CM: I50.33  ICD-9-CM: 428.33, 428.0  2/27/2017 Unknown        Acute CHF (congestive heart failure) ([facility] ICD-10-CM: I50.9  ICD-9-CM: 428.0  2/26/2017 Unknown        HTN (hypertension) (Chronic) ICD-10-CM: I10  ICD-9-CM: 401.9  9/4/2016 Yes        HLD (hyperlipidemia) (Chronic) ICD-10-CM: E78.5  ICD-9-CM: 272.4  9/4/2016 Yes        Diastolic CHF, acute on chronic ([facility] ICD-10-CM: I50.33  ICD-9-CM: 428.33, 428.0  8/17/2016 Yes        Acute exacerbation of CHF (congestive heart failure) ([facility] ICD-10-CM: I50.9  ICD-9-CM: 428.0  8/12/2016 Yes        Hx of CABG ICD-10-CM: Z95.1  ICD-9-CM: V45.81  7/22/2016 Yes    Overview Signed 7/22/2016  5:23 PM by Hany Bains NP     2013             H/O carotid endarterectomy ICD-10-CM: Z98.890  ICD-9-CM: V45.89 7/22/2016 Yes    Overview Signed 7/22/2016  5:27 PM by [address] NP     6535             AICD (automatic cardioverter/defibrillator) present ICD-10-CM: Z95.810  ICD-9-CM: V45.02  7/22/2016 Yes    Overview Signed 7/22/2016  5:27 PM by [address] NP     2015             CHF (congestive heart failure) (HCC) ICD-10-CM: I50.9  ICD-9-CM: 428.0  7/21/2016 Unknown        Hematospermia ICD-10-CM: R36.1  ICD-9-CM: 608.82  3/5/2014 Yes        CAD (coronary artery disease) ICD-10-CM: I25.10  ICD-9-CM: 414.00  3/5/2014 Yes        PVD (peripheral vascular disease) ([facility] ICD-10-CM: I73.9  ICD-9-CM: 443.9  3/5/2014 Yes                SUBJECTIVE:  No CP  Improving SOB    OBJECTIVE:    VS:   Visit Vitals    /72 (BP 1 Location: Right arm, BP Patient Position: At rest)    Pulse 68    Temp 98 °F (36.7 °C)    Resp 19    Ht 6' (1.829 m)    Wt 102.6 kg (226 lb 4.8 oz)    SpO2 91%    BMI 30.69 kg/m2         Intake/Output Summary (Last 24 hours) at 03/02/17 1547  Last data filed at 03/02/17 1230   Gross per 24 hour   Intake           805.11 ml   Output             5351 ml   Net         -4545.89 ml     TELE: normal sinus rhythm    General: alert and in no apparent distress  HENT: Normocephalic, atraumatic. Normal external eye.   Neck :  bruit present, increased JVP  Cardiac:  regular rate and rhythm, systolic murmur: early systolic 3/6, crescendo at 2nd right intercostal space, radiates to carotids, 2nd systolic murmur: late systolic 3/6, blowing at apex  Chest/Lungs:mild rales heard lung bases  Abdomen: Soft, nontender, no masses, +ascites/abd wall edema  Extremities:  No c/c/2+edema UE/LE, peripheral pulses present      Labs: Results:       Chemistry Recent Labs      03/02/17   0315  03/01/17   0326  02/28/17   0350   GLU  90  96  103*   NA  136  137  140   K  3.6  3.6  3.9   CL  91*  94*  97*   CO2  41*  39*  39*   BUN  37*  37*  39*   CREA  1.81*  1.99*  2.01*   CA  8.9  9.0  8.7   MG  1.8  1.8  1.9   PHOS --    --   2.8   AGAP  4  4  4   BUCR  20  19  19      CBC w/Diff Recent Labs      03/02/17   0315  03/01/17   0326  02/28/17   0350   WBC  4.0*  4.0*  4.6   RBC  2.93*  2.97*  2.95*   HGB  8.1*  8.2*  8.3*   HCT  26.9*  27.3*  27.4*   PLT  70*  UNABLE TO REPORT ACCURATE COUNT DUE TO PLATELET AGGREGATION, HOWEVER, PLATELETS APPEAR DECREASED IN NUMBER ON SMEAR. PLEASE RESUBMIT SODIUM CITRATE (BLUE) AND EDTA (LAVENDAR) TUBES FOR HEMATOLOGICAL TESTING. 60*   GRANS  63  64  67   LYMPH  18*  21  20*   EOS  10*  7*  5      Cardiac Enzymes Recent Labs      02/27/17 2015   CPK  39   CKND1  Cannot be calulated      Coagulation No results for input(s): PTP, INR, APTT in the last 72 hours. No lab exists for component: INREXT, INREXT    Lipid Panel Lab Results   Component Value Date/Time    Cholesterol, total 100 07/22/2016 05:20 AM    HDL Cholesterol 31 07/22/2016 05:20 AM    LDL, calculated 54.4 07/22/2016 05:20 AM    VLDL, calculated 14.6 07/22/2016 05:20 AM    Triglyceride 73 07/22/2016 05:20 AM    CHOL/HDL Ratio 3.2 07/22/2016 05:20 AM      BNP No results for input(s): BNPP in the last 72 hours. Liver Enzymes No results for input(s): TP, ALB, TBIL, AP, SGOT, GPT in the last 72 hours. No lab exists for component: DBIL   Digoxin    Thyroid Studies Lab Results   Component Value Date/Time    TSH 4.23 07/22/2016 05:20 AM              Tolnamathew Najera NP  [phone] supervised. I have independently evaluated and examined the patient. All relevant labs and testing data's are reviewed. Care plan discussed and updated after review.     Jenny Mcnamara MD

## 2017-03-03 NOTE — PROGRESS NOTES
Received shift report from [place], [facility]. Pt awake, talking with nurses; no pain. Call bell within reach.

## 2017-03-03 NOTE — PROGRESS NOTES
Problem: Mobility Impaired (Adult and Pediatric)  Goal: *Acute Goals and Plan of Care (Insert Text)  Physical Therapy Goals  Initiated 3/3/2017 and to be accomplished within 7 day(s)  1. Patient will move from supine to sit and sit to supine in bed with independence. 2. Patient will transfer from bed to chair and chair to bed with modified independence using the least restrictive device. 3. Patient will perform sit to stand with modified independence. 4. Patient will ambulate with modified independence for 150 feet with the least restrictive device. 5. Patient will ascend/descend 3 stairs with 1 handrail(s) with supervision/set-up. PHYSICAL THERAPY EVALUATION     Patient: Anatoliy Higuera (57 y.o. male)  Date: 3/3/2017  Primary Diagnosis: Acute CHF (congestive heart failure) (HCC)  CHF (congestive heart failure) (HCC)  Acute on chronic diastolic (congestive) heart failure (HCC)        Precautions: fall         ASSESSMENT :  Based on the objective data described below, the patient presents with decreased strength and activity tolerance and increased edema resulting in decreased independence with functional mobiliyt. Pt was cooperative in initial part of evaluation with bed mobility and standing however became agitated and uncooperative as soon as he was asked to ambulate. Pt refused any ambulation further than 3 steps despite not having pain or shortness of breath. Patient was returned to supine in bed. Patient will benefit from skilled intervention to address the above impairments.   Patients rehabilitation potential is considered to be Fair  Factors which may influence rehabilitation potential include:   [ ]         None noted  [ ]         Mental ability/status  [X]         Medical condition  [ ]         Home/family situation and support systems  [ ]         Safety awareness  [ ]         Pain tolerance/management  [ ]         Other:        PLAN :  Recommendations and Planned Interventions:  [X] Bed Mobility Training             [ ]    Neuromuscular Re-Education  [X]           Transfer Training                   [ ]    Orthotic/Prosthetic Training  [X]           Gait Training                          [ ]    Modalities  [X]           Therapeutic Exercises          [X]    Edema Management/Control  [X]           Therapeutic Activities            [X]    Patient and Family Training/Education  [ ]           Other (comment):     Frequency/Duration: Patient will be followed by physical therapy 1-2 times per day/4-7 days per week to address goals. Discharge Recommendations: Home Health versus SNF  Further Equipment Recommendations for Discharge: rolling walker (TBD further when patient is willing to ambulate)       G-CODES       Mobility  Current  CJ= 20-39%   Goal  CI= 1-19%. The severity rating is based on the Level of Assistance required for Functional Mobility and ADLs. Eval Complexity: History: MEDIUM  Complexity : 1-2 comorbidities / personal factors will impact the outcome/ POC Exam:MEDIUM Complexity : 3 Standardized tests and measures addressing body structure, function, activity limitation and / or participation in recreation  Presentation: MEDIUM Complexity : Evolving with changing characteristics  Clinical Decision Making:Medium Complexity , Overall Complexity:MEDIUM      SUBJECTIVE:   Patient stated I'm not doing this, you aren't getting your way today.       OBJECTIVE DATA SUMMARY:       Past Medical History:   Diagnosis Date    Arrhythmia       A fib; has external vest and belt he wears for this    Arthritis      High blood pressure      Hypercholesteremia      Mini stroke ([facility] [address] 2000    Unspecified sleep apnea       does not wear machine     Past Surgical History:   Procedure Laterality Date    CABG, ARTERY-VEIN, THREE   2013    HX CAROTID ENDARTERECTOMY Left      HX CAROTID ENDARTERECTOMY Right 2000    HX CORONARY STENT PLACEMENT         Prior Level of Function/Home Situation: per patient he was independent with mobility   Home Situation  Home Environment: Private residence  # Steps to Enter: 0  One/Two Story Residence: One story  Living Alone: Yes  Support Systems: Family member(s), Child(erik)  Patient Expects to be Discharged to[de-identified] Private residence  Current DME Used/Available at Home: None  Critical Behavior:   easily agitated       Strength:    Strength: Generally decreased, functional (B LEs)   Tone & Sensation:   Tone: Normal       Range Of Motion:  AROM: Generally decreased, functional (B LEs)      Functional Mobility:  Bed Mobility:  Supine to Sit: Supervision  Sit to Supine: Supervision     Transfers:  Sit to Stand: Supervision  Stand to Sit: Supervision  Balance:   Sitting: Intact  Standing: With support  Standing - Static: Good  Standing - Dynamic : Fair  Ambulation/Gait Training:  Distance (ft): 3 Feet (ft)  Assistive Device: Walker, rolling  Ambulation - Level of Assistance: Supervision  Gait Abnormalities: Decreased step clearance     Pain:  Pt reports 2/10 pain or discomfort prior to treatment. Pt reports 2/10 pain or discomfort post treatment. Activity Tolerance:   Poor+  Please refer to the flowsheet for vital signs taken during this treatment. After treatment:   [ ]         Patient left in no apparent distress sitting up in chair  [X]         Patient left in no apparent distress in bed  [X]         Call bell left within reach  [X]         Nursing notified  [ ]         Caregiver present  [ ]         Bed alarm activated      COMMUNICATION/EDUCATION:   [X]         Fall prevention education was provided and the patient/caregiver indicated understanding. [X]         Patient/family have participated as able in goal setting and plan of care. [ ]         Patient/family agree to work toward stated goals and plan of care. [X]         Patient understands intent and goals of therapy, but is neutral about his/her participation.   [ ]         Patient is unable to participate in goal setting and plan of care.   Educated patient on the importance of increasing activity throughout the day     Thank you for this referral.  Teto Domínguez, PT   Time Calculation: 45 mins

## 2017-03-03 NOTE — CONSULTS
[address]    Name:  Anne-Marie Alan  MR#:  [id number]  :  1940  Account #:  [de-identified]  Date of Adm:  2017  Date of Consultation:  2017      REASON FOR CONSULTATION: Thrombocytopenia, anemia and  chronic kidney disease. CHIEF COMPLAINT: The patient was admitted with complaints of  progressive lower extremity swelling and an increasing abdominal  girth. HISTORY OF PRESENT ILLNESS: The patient is a 81-year-old man  who has a longstanding history of coronary artery disease, diastolic  heart failure, hypertension, hyperlipidemia, and chronic kidney disease. He was admitted with complaints of progressive swelling in his lower  extremities and an increase in his abdominal girth. The patient had  been on diuretic therapy with Lasix at a dose of 40 mg b.i.d. He  continued to have the problem and is now on the inpatient service. During the course of his evaluation, he was found to have progressive  thrombocytopenia with his a.m. platelet count at 63,131. He also has  longstanding kidney disease and he is anemic with a hemoglobin of  8.1 from yesterday and a hematocrit of 26.9%. Iron studies were  previously done and revealed that he is not iron deficient. His ferritin  level was 111 ng/mL with an iron saturation 13% and a circulating iron  level of 43 mcg/dL. I was asked to assess the patient because of his  thrombocytopenia and have subsequently learned that he also has  progressive anemia. Currently, the patient is not complaining of any  significant shortness of breath. He is diuresing well and does have a  catheter in place. ALLERGIES: THE PATIENT HAS NO KNOWN MEDICATION  ALLERGIES. MEDICATIONS PRIOR TO ADMISSION  The patient had been takin. Lasix 40 mg b.i.d.  2. Coreg 6.25 mg daily. 3. Zestril 5 mg daily. 4. Multivitamin supplement. 5. Vitamin D3, 2000 units daily. 6. Omega-3 fatty acids 300 mg daily. 7. Zyloprim 100 mg daily.   8. Aspirin 81 mg daily. 9. Nitrostat as needed for chest pain. 10. Prilosec 20 mg daily. 11. Pravachol 80 mg daily. MEDICAL DIAGNOSES  1. Coronary artery disease. 2. Diastolic heart failure. 3. Hypertension. 4. Hyperlipidemia. 5. Chronic kidney disease. SURGICAL HISTORY: Coronary artery bypass graft, carotid  endarterectomy, and coronary stent placement. SOCIAL HISTORY: The patient is a . He has a prior history of  alcohol use and chronic tobacco use. He formerly worked in air  conditioning. FAMILY HISTORY: There is a positive family history for heart disease  in both parents. REVIEW OF SYSTEMS: The primary complaint on the multiorgan  system review on admission was of progressive lower extremity  swelling or edema, progressive abdominal girth increase, mild  shortness of breath. He denied all other complaints on the multiorgan  system review except for some lower extremity weakness and  moderate deconditioning. PHYSICAL EXAMINATION  GENERAL APPEARANCE: The patient is sitting up at the bedside at  this time. CURRENT VITAL SIGNS: Show blood pressure 104/68, pulse 56,  respiratory rate 18, and temperature is 97.8. SKIN: Shows no bruise or rash. HEENT: Head is normocephalic and atraumatic. Conjunctivae and  sclerae are clear. Pupils are reactive to light and accommodation. EOMs are intact. ENT reveals no oral mucosal lesions or ulcerations. NECK: Supple, without lymphadenopathy or thyromegaly. CHEST WALL AND LUNGS: There is symmetric chest wall expansion. The lungs are without wheeze or rhonchi. HEART: The S1 and S2 heart sounds are present. There is no  murmur, gallops, or rubs. ABDOMEN: Bowel sounds are present. The patient has abdominal  distention due to ascites. GENITOURINARY/RECTAL: Deferred. EXTREMITIES: 3+ lower extremity edema is noted. LABORATORY DATA: CBC from today shows a WBC count of 4,  hemoglobin is 8.1 g/dL, hematocrit 26.9%, and the platelet count  33,339. The chemistry panel from 03/02/2017 shows a sodium of 136,  potassium 3.6, chloride 91, CO2 41, BUN 37, creatinine 1.81, calcium  8.9, and the ferritin level was 111 ng/mL with an iron level of 43  mcg/dL. Iron saturation was 13%. IMAGING: A general diagnostic chest x-ray from 02/26/2017 showed a  large right pleural effusion and small left pleural effusion. There was  mild interstitial pulmonary edema. ASSESSMENT AND PLAN: The patient is a 73-year-old man with  coronary artery disease and history of diastolic heart failure, who  presented with progressive leg edema and ascites. He also has  longstanding chronic kidney disease with associated anemia and  thrombocytopenia. At this time, I have explained to the patient that his  anemia is related most likely primarily to his chronic kidney disease;  however, because of his age he could have a component of  myelodysplastic syndrome. At this point, I would recommend starting  him on Procrit 20,000 units 3 times weekly while he is in the hospital.  Upon discharge, we will try to provide Procrit at a dose of 60,000 units  every 2 weeks. Additionally, the patient has chronic idiopathic  thrombocytopenic purpura or chronic thrombocytopenia. His platelet  count was 62,675. No therapeutic intervention is warranted at this time. We will monitor his platelets on a regular basis and therapeutic  intervention will only be warranted if the platelet counts decline below  30,000. The platelets will be monitored on a regular basis along with  his hemoglobin and hematocrit during his hospital stay. Thank you, Dr. Jose Arreola, for requesting my assessment in the  management of this patient.         MD ROBINSON Mcleod / Makenna Garcia  D:  03/03/2017   09:01  T:  03/03/2017   10:03  Job #:  [id number]

## 2017-03-03 NOTE — PROGRESS NOTES
Christine Ratliff M.D. PROGRESS NOTE    Name: Alejandrina Wallis MRN: [id number]   : 1940 Hospital: [address] [place] [address]   Date: 3/3/2017  Admission Date: 2017     Subjective/Objective/Plans  1. Acute-on-chronic diastolic heart failure. 2. History of coronary artery bypass surgery. 3. Hypertension. 4. Hyperlipidemia. 5. History of coronary artery bypass and defibrillator placement, as well  as stent placement. 6. Obstructive sleep apnea. 7. History of mini strokes. 8. Ascites      No complaint  Receeding edema, ascites less    Plan  Continue Lasix drip  Vital Signs:  Visit Vitals    /68 (BP 1 Location: Right arm, BP Patient Position: At rest)    Pulse (!) 56    Temp 97.8 °F (36.6 °C)    Resp 18    Ht 6' (1.829 m)    Wt 99.5 kg (219 lb 6.4 oz)    SpO2 94%    BMI 29.76 kg/m2       O2 Device: Nasal cannula   O2 Flow Rate (L/min): 4 l/min   Temp (24hrs), Av.8 °F (36.6 °C), Min:97.2 °F (36.2 °C), Max:98.1 °F (36.7 °C)     1:46 PM  Intake/Output:   Last shift:         Last 3 shifts:  1901 -  0700  In: 910.3 [P.O.:460; I.V.:450.3]  Out: 1253 [Urine:5350]    Intake/Output Summary (Last 24 hours) at 17 1346  Last data filed at 17 1859   Gross per 24 hour   Intake           456.74 ml   Output                0 ml   Net           456.74 ml         Physical Exam:    General: in no apparent distress, in no respiratory distress and acyanotic and alert    HEENT: pupils equal, no ear discharge   Neck: No abnormally enlarged lymph nodes. , no JDV   Mouth: MMM no lesions    Chest: normal,    Lungs: decreased air exchange bilaterally   Heart: Regular rate and rhythm   Abdomen: abdomen is soft without significant tenderness, masses, organomegaly or guarding, ascites   Extremity: 2 + edema   Neuro: alert    Skin: Skin color, texture, turgor normal. No rashes or lesions    Data Review:    Labs: Results:       Chemistry Recent Labs      17   0315  17   0326   GLU  90  03 NA  136  137   K  3.6  3.6   CL  91*  94*   CO2  41*  39*   BUN  37*  37*   CREA  1.81*  1.99*   CA  8.9  9.0   AGAP  4  4   BUCR  20  19      CBC w/Diff Recent Labs      03/03/17   1151  03/02/17   0315  03/01/17   0326   WBC  4.4*  4.0*  4.0*   RBC  3.09*  2.93*  2.97*   HGB  8.5*  8.1*  8.2*   HCT  28.5*  26.9*  27.3*   PLT  PENDING  70*  UNABLE TO REPORT ACCURATE COUNT DUE TO PLATELET AGGREGATION, HOWEVER, PLATELETS APPEAR DECREASED IN NUMBER ON SMEAR. PLEASE RESUBMIT SODIUM CITRATE (BLUE) AND EDTA (LAVENDAR) TUBES FOR HEMATOLOGICAL TESTING. GRANS  PENDING  63  64   LYMPH  PENDING  18*  21   EOS  PENDING  10*  7*      Cardiac Enzymes No results for input(s): CPK, CKND1, TYSON in the last 72 hours. No lab exists for component: CKRMB, TROIP   Coagulation No results for input(s): PTP, INR, APTT in the last 72 hours. No lab exists for component: INREXT    Lipid Panel Lab Results   Component Value Date/Time    Cholesterol, total 100 07/22/2016 05:20 AM    HDL Cholesterol 31 07/22/2016 05:20 AM    LDL, calculated 54.4 07/22/2016 05:20 AM    VLDL, calculated 14.6 07/22/2016 05:20 AM    Triglyceride 73 07/22/2016 05:20 AM    CHOL/HDL Ratio 3.2 07/22/2016 05:20 AM      BNP No results for input(s): BNPP in the last 72 hours. Liver Enzymes No results for input(s): TP, ALB, TBILI, AP, SGOT, ALT, CBIL in the last 72 hours. Thyroid Studies Lab Results   Component Value Date/Time    TSH 4.23 07/22/2016 05:20 AM          Procedures/imaging: see electronic medical records for all procedures, Xrays and details which were not copied into this note but were reviewed. Allergies:  No Known Allergies    Home Medications:  Prior to Admission Medications   Prescriptions Last Dose Informant Patient Reported? Taking? Cholecalciferol, Vitamin D3, (VITAMIN D3) 1,000 unit cap   Yes No   Sig: Take 2,000 Units by mouth daily. Omega-3 Fatty Acids 300 mg cap   Yes No   Sig: Take 1 Cap by mouth two (2) times a day. allopurinol (ZYLOPRIM) 100 mg tablet   Yes No   Sig: Take 100 mg by mouth daily. aspirin delayed-release 81 mg tablet   Yes No   Sig: Take 162 mg by mouth daily. carvedilol (COREG) 6.25 mg tablet   No No   Sig: Take 1 Tab by mouth two (2) times daily (with meals). furosemide (LASIX) 40 mg tablet   No No   Sig: One tab daily  Indications: PERIPHERAL EDEMA DUE TO CHRONIC HEART FAILURE   lisinopril (PRINIVIL, ZESTRIL) 5 mg tablet   No No   Sig: Take 1 Tab by mouth daily. multivitamin (ONE A DAY) tablet   Yes No   Sig: Take 1 Tab by mouth daily. nitroglycerin (NITROSTAT) 0.4 mg SL tablet   Yes No   Sig: by SubLINGual route every five (5) minutes as needed for Chest Pain. omeprazole (PRILOSEC) 20 mg capsule   Yes No   Sig: Take 20 mg by mouth daily. pravastatin (PRAVACHOL) 80 mg tablet   Yes No   Sig: Take 80 mg by mouth nightly.       Facility-Administered Medications: None       Current Medications:  Current Facility-Administered Medications   Medication Dose Route Frequency    epoetin ron (EPOGEN;PROCRIT) injection 20,000 Units  20,000 Units SubCUTAneous Q MON, WED & FRI    [START ON 3/4/2017] acetaZOLAMIDE (DIAMOX) tablet 250 mg  250 mg Oral DAILY    zolpidem (AMBIEN) tablet 5 mg  5 mg Oral QHS PRN    albuterol-ipratropium (DUO-NEB) 2.5 MG-0.5 MG/3 ML  3 mL Nebulization Q6H PRN    sodium chloride (NS) flush 5-10 mL  5-10 mL IntraVENous PRN    acetaminophen (TYLENOL) tablet 650 mg  650 mg Oral Q4H PRN    HYDROcodone-acetaminophen (NORCO) 5-325 mg per tablet 1 Tab  1 Tab Oral Q4H PRN    furosemide (LASIX) 100 mg in 0.9% sodium chloride 100 mL infusion  5 mg/hr IntraVENous CONTINUOUS    fluticasone (FLONASE) 50 mcg/actuation nasal spray 2 Spray  2 Spray Both Nostrils DAILY    aspirin chewable tablet 81 mg  81 mg Oral DAILY    DOBUTamine (DOBUTREX) 500 mg/250 mL (2,000 mcg/mL) infusion  5 mcg/kg/min IntraVENous CONTINUOUS    allopurinol (ZYLOPRIM) tablet 100 mg  100 mg Oral DAILY    nitroglycerin (NITROSTAT) tablet 0.4 mg  0.4 mg SubLINGual PRN    pantoprazole (PROTONIX) tablet 40 mg  40 mg Oral ACB    pravastatin (PRAVACHOL) tablet 80 mg  80 mg Oral QHS    therapeutic multivitamin (THERAGRAN) tablet 1 Tab  1 Tab Oral DAILY    cholecalciferol (VITAMIN D3) tablet 2,000 Units  2,000 Units Oral DAILY    fish oil-omega-3 fatty acids 340-1,000 mg capsule 1 Cap  1 Cap Oral BID    carvedilol (COREG) tablet 6.25 mg  6.25 mg Oral BID WITH MEALS    sodium chloride (NS) flush 5-10 mL  5-10 mL IntraVENous Q8H    naloxone (NARCAN) injection 0.4 mg  0.4 mg IntraVENous PRN    ondansetron (ZOFRAN) injection 4 mg  4 mg IntraVENous Q4H PRN       Chart and notes reviewed. Data reviewed. I have evaluated and examined the patient.         IMPRESSION:   Patient Active Problem List   Diagnosis Code    Hematospermia R36.1    CAD (coronary artery disease) I25.10    PVD (peripheral vascular disease) ([facility] [address] I73.9    CHF (congestive heart failure) (HCC) I50.9    Anasarca R60.1    Hx of CABG Z95.1    H/O carotid endarterectomy Z98.890    AICD (automatic cardioverter/defibrillator) present Z95.810    Acute exacerbation of CHF (congestive heart failure) (HCC) B81.5    Diastolic CHF, acute on chronic (HCC) I50.33    HTN (hypertension) I10    HLD (hyperlipidemia) E78.5    Acute CHF (congestive heart failure) (HCC) I50.9    Acute on chronic diastolic (congestive) heart failure (HCC) I50.33 ·         PLAN:/DISCUSSION:   · Continue current treatment        Kiko Huertas MD  3/3/2017, 1:46 PM

## 2017-03-03 NOTE — CARDIO/PULMONARY
Cardiac rehab in to follow up with patient. He stated that he did not have any questions at the present time. Will continue to follow.

## 2017-03-03 NOTE — PROGRESS NOTES
Problem: Self Care Deficits Care Plan (Adult)  Goal: *Acute Goals and Plan of Care (Insert Text)  Outcome: Resolved/Met Date Met:  03/03/17  OCCUPATIONAL THERAPY EVALUATION/DISCHARGE     Patient: Jacob Gaming (80 y.o. male)  Date: 3/3/2017  Primary Diagnosis: Acute CHF (congestive heart failure) (HCC)  CHF (congestive heart failure) (HCC)  Acute on chronic diastolic (congestive) heart failure ([facility] [address]        Precautions:   Fall      ASSESSMENT AND RECOMMENDATIONS:  Based on the objective data described below, the patient is able to perform basic self care tasks without assistance using AE given (reacher, sock aid, [facility], [facility]) after demonstration/practice. Supervision given for functional transfers. Will defer to PT for mobility training prn. Patient has needed DME for bathroom safety at home. Skilled occupational therapy is not indicated at this time. Discharge Recommendations: None  Further Equipment Recommendations for Discharge: N/A       COMPLEXITY      Eval Complexity: History: LOW Complexity : Brief history review ; Examination: LOW Complexity : 1-3 performance deficits relating to physical, cognitive , or psychosocial skils that result in activity limitations and / or participation restrictions ; Decision Making:LOW Complexity : No comorbidities that affect functional and no verbal or physical assistance needed to complete eval tasks  Assessment: Low Complexity          G-CODES:      Self Care  Current  CI= 1-19%   Goal  CI= 1-19%   D/C  CI= 1-19%. The severity rating is based on the Level of Assistance required for Functional Mobility and ADLs. SUBJECTIVE:   Patient stated I'm really tired today because the IV pump was beeping all night.       OBJECTIVE DATA SUMMARY:       Past Medical History:   Diagnosis Date    Arrhythmia       A fib; has external vest and belt he wears for this    Arthritis      High blood pressure      Hypercholesteremia      Mini stroke ([facility] [address] 2000    Unspecified sleep apnea       does not wear machine     Past Surgical History:   Procedure Laterality Date    CABG, ARTERY-VEIN, THREE   2013    HX CAROTID ENDARTERECTOMY Left      HX CAROTID ENDARTERECTOMY Right 2000    HX CORONARY STENT PLACEMENT         Prior Level of Function/Home Situation: Pt was modified independent with basic self care tasks and used a RW for functional mobility PTA. Home Situation  Home Environment: Private residence  # Steps to Enter: 0  One/Two Story Residence: One story  Living Alone: Yes  Support Systems: Family member(s), Child(erik)  Patient Expects to be Discharged to[de-identified] Private residence  Current DME Used/Available at Home: None  Tub or Shower Type: Shower (with seat)  [X]     Right hand dominant       [ ]     Left hand dominant  Cognitive/Behavioral Status:  Neurologic State: Alert  Orientation Level: Oriented X4  Cognition: Follows commands  Safety/Judgement: Fall prevention  Skin: Intact on UEs  Edema: None noted in UEs  Vision/Perceptual:    Acuity: Within Defined Limits    Corrective Lenses: Glasses  Coordination:  Coordination: Within functional limits (B LEs)  Fine Motor Skills-Upper: Left Intact; Right Intact    Gross Motor Skills-Upper: Left Intact; Right Intact  Balance:  Sitting: Intact  Standing: With support  Standing - Static: Good  Standing - Dynamic : Fair  Strength:  Strength:  Within functional limits (UEs)  Tone & Sensation:  Tone: Normal (UEs)  Range of Motion:  AROM: Within functional limits (UEs)  Functional Mobility and Transfers for ADLs:  Bed Mobility:  Supine to Sit: Supervision  Sit to Supine: Supervision  Transfers:  Sit to Stand: Supervision              Toilet Transfer : Supervision  ADL Assessment:  Feeding: Independent     Oral Facial Hygiene/Grooming: Independent     Bathing: Supervision     Upper Body Dressing: Independent     Lower Body Dressing: Supervision     Toileting: Independent  ADL Intervention:  Patient practiced LB dressing with use of AE (abhijit sock aid, long handled shoe horn) after demonstration. He is able to reach his feet most of the time for LB ADLs but needs assist in times of fatigue. Cognitive Retraining  Safety/Judgement: Fall prevention     Pain:  Pt reports 0/10 pain or discomfort prior to treatment. Pt reports 0/10 pain or discomfort post treatment. Activity Tolerance:   Good  Please refer to the flowsheet for vital signs taken during this treatment. After treatment:   [ ]  Patient left in no apparent distress sitting up in chair  [X]  Patient left in no apparent distress in bed  [X]  Call bell left within reach  [ ]  Nursing notified  [ ]  Caregiver present  [ ]  Bed alarm activated      COMMUNICATION/EDUCATION:   Communication/Collaboration:  [X]      Home safety education was provided and the patient/caregiver indicated understanding. [X]      Patient/family have participated as able and agree with findings and recommendations. [ ]      Patient is unable to participate in plan of care at this time.      Naheed Mooney, MS OTR/L  Time Calculation: 25 mins

## 2017-03-03 NOTE — PROGRESS NOTES
RENAL DAILY PROGRESS NOTE    Subjective:   Admitted for Acute CHF seen for CKD stage 3    Complaint: breathing much better wants to have his own oxygen concentrator at home.     Current Facility-Administered Medications   Medication Dose Route Frequency    epoetin ron (EPOGEN;PROCRIT) injection 20,000 Units  20,000 Units SubCUTAneous Q MON, WED & FRI    [START ON 3/4/2017] acetaZOLAMIDE (DIAMOX) tablet 250 mg  250 mg Oral DAILY    zolpidem (AMBIEN) tablet 5 mg  5 mg Oral QHS PRN    albuterol-ipratropium (DUO-NEB) 2.5 MG-0.5 MG/3 ML  3 mL Nebulization Q6H PRN    sodium chloride (NS) flush 5-10 mL  5-10 mL IntraVENous PRN    acetaminophen (TYLENOL) tablet 650 mg  650 mg Oral Q4H PRN    HYDROcodone-acetaminophen (NORCO) 5-325 mg per tablet 1 Tab  1 Tab Oral Q4H PRN    furosemide (LASIX) 100 mg in 0.9% sodium chloride 100 mL infusion  3 mg/hr IntraVENous CONTINUOUS    fluticasone (FLONASE) 50 mcg/actuation nasal spray 2 Spray  2 Spray Both Nostrils DAILY    aspirin chewable tablet 81 mg  81 mg Oral DAILY    DOBUTamine (DOBUTREX) 500 mg/250 mL (2,000 mcg/mL) infusion  5 mcg/kg/min IntraVENous CONTINUOUS    allopurinol (ZYLOPRIM) tablet 100 mg  100 mg Oral DAILY    nitroglycerin (NITROSTAT) tablet 0.4 mg  0.4 mg SubLINGual PRN    pantoprazole (PROTONIX) tablet 40 mg  40 mg Oral ACB    pravastatin (PRAVACHOL) tablet 80 mg  80 mg Oral QHS    therapeutic multivitamin (THERAGRAN) tablet 1 Tab  1 Tab Oral DAILY    cholecalciferol (VITAMIN D3) tablet 2,000 Units  2,000 Units Oral DAILY    fish oil-omega-3 fatty acids 340-1,000 mg capsule 1 Cap  1 Cap Oral BID    carvedilol (COREG) tablet 6.25 mg  6.25 mg Oral BID WITH MEALS    sodium chloride (NS) flush 5-10 mL  5-10 mL IntraVENous Q8H    naloxone (NARCAN) injection 0.4 mg  0.4 mg IntraVENous PRN    ondansetron (ZOFRAN) injection 4 mg  4 mg IntraVENous Q4H PRN           Objective:   Patient Vitals for the past 24 hrs:   Temp Pulse Resp BP SpO2   03/03/17 1601 98.2 °F (36.8 °C) 61 - 119/65 91 %   03/03/17 0327 97.8 °F (36.6 °C) (!) 56 18 104/68 94 %   03/02/17 2305 98.1 °F (36.7 °C) 68 18 121/70 90 %   03/02/17 1913 97.9 °F (36.6 °C) (!) 59 18 109/66 90 %        Weight change: -6.169 kg (-13 lb 9.6 oz)     03/01 1901 - 03/03 0700  In: 910.3 [P.O.:460; I.V.:450.3]  Out: 5351 [Urine:5350]    Intake/Output Summary (Last 24 hours) at 03/03/17 1806  Last data filed at 03/03/17 1227   Gross per 24 hour   Intake           816.74 ml   Output             1100 ml   Net          -283.26 ml     Physical Exam: alert, oriented x 3 afebrile    HEENT: non icteric  Cardiovascular: regular no rub + systolic Murmur  C/L: dec both bases  Abdomen: + ascites  Ext: + LE edema    Data Review:     LABS:   Hematology: Recent Labs      03/03/17   1151  03/02/17   0315  03/01/17   0326   WBC  4.4*  4.0*  4.0*   HGB  8.5*  8.1*  8.2*   HCT  28.5*  26.9*  27.3*   Pl 56K  Chemistry: Recent Labs      03/03/17   1357  03/02/17   0315  03/01/17   0326   BUN  40*  37*  37*   CREA  1.86*  1.81*  1.99*   CA  9.1  8.9  9.0   K  3.2*  3.6  3.6   NA  135*  136  137   CL  88*  91*  94*   CO2  42*  41*  39*   GLU  114*  90  96     24 H urine still pend    IMPRESSION AND PLAN:   CKD stage 3 renal function fairly stable good diuresis   Metabolic alkalosis pt on diamox and Lasix drip along with dobutamine. Defer to cardio.    Hypokalemia replace po  Anemia low Fe stores  Start po Fe cont epo         Alem Osei MD  3/3/2017

## 2017-03-03 NOTE — PROGRESS NOTES
[facility]      CARDIOLOGY PROGRESS NOTE  RECS:      1. Acute on chronic diastolic congestive heart failure-  Improving but Still with se edema. Continue with Fluid restriction. Stricts I&O. Monitor electrolytes and renal function. ECHO with mod AS, MR likely causing CHF. 2. Anasarca- agressive diuresis with Lasix drip decreased to 3 mg/hr. CO2 has increased will add diamox for 2 days. continue iv dobut due to MR/AS  3. S/p CABG in 2013 in [facility]- stable denies chest pain or pressure continue with asa, statin and bb.  4. S/p AICD- [employer]. normal function interrogated on 02/27/17  5. Hx Carotid endarterectomy- bilateral in 2015    6. KYLEE on CKD- monitor renal function nephrology following   7. Pancytopenia- consider oncology consult   8. Hypertension- normal low . continue with bb monitor closely   9. Mitral regurgitation -mild to moderate regurgitation echo on 2016   10.  Aortic stenosis : moderate, will f/u          ASSESSMENT:  Hospital Problems  Date Reviewed: 8/13/2016          Codes Class Noted POA    Acute on chronic diastolic (congestive) heart failure (HCC) ICD-10-CM: I50.33  ICD-9-CM: 428.33, 428.0  2/27/2017 Unknown        Acute CHF (congestive heart failure) ([facility] ICD-10-CM: I50.9  ICD-9-CM: 428.0  2/26/2017 Unknown        HTN (hypertension) (Chronic) ICD-10-CM: I10  ICD-9-CM: 401.9  9/4/2016 Yes        HLD (hyperlipidemia) (Chronic) ICD-10-CM: E78.5  ICD-9-CM: 272.4  9/4/2016 Yes        Diastolic CHF, acute on chronic ([facility] ICD-10-CM: I50.33  ICD-9-CM: 428.33, 428.0  8/17/2016 Yes        Acute exacerbation of CHF (congestive heart failure) ([facility] ICD-10-CM: I50.9  ICD-9-CM: 428.0  8/12/2016 Yes        Hx of CABG ICD-10-CM: Z95.1  ICD-9-CM: V45.81  7/22/2016 Yes    Overview Signed 7/22/2016  5:23 PM by Ghulam Mauro NP     2013             H/O carotid endarterectomy ICD-10-CM: Z98.890  ICD-9-CM: V45.89  7/22/2016 Yes    Overview Signed 7/22/2016  5:27 PM by Sloan King NP     2015             AICD (automatic cardioverter/defibrillator) present ICD-10-CM: Z95.810  ICD-9-CM: V45.02  7/22/2016 Yes    Overview Signed 7/22/2016  5:27 PM by Sloan King NP     [id number]             CHF (congestive heart failure) ([facility] ICD-10-CM: I50.9  ICD-9-CM: 428.0  7/21/2016 Unknown        Hematospermia ICD-10-CM: R36.1  ICD-9-CM: 608.82  3/5/2014 Yes        CAD (coronary artery disease) ICD-10-CM: I25.10  ICD-9-CM: 414.00  3/5/2014 Yes        PVD (peripheral vascular disease) ([facility] ICD-10-CM: I73.9  ICD-9-CM: 443.9  3/5/2014 Yes                SUBJECTIVE:  No CP  Improving SOB    OBJECTIVE:    VS:   Visit Vitals    /68 (BP 1 Location: Right arm, BP Patient Position: At rest)    Pulse (!) 56    Temp 97.8 °F (36.6 °C)    Resp 18    Ht 6' (1.829 m)    Wt 99.5 kg (219 lb 6.4 oz)    SpO2 94%    BMI 29.76 kg/m2         Intake/Output Summary (Last 24 hours) at 03/03/17 1257  Last data filed at 03/02/17 1859   Gross per 24 hour   Intake           456.74 ml   Output                0 ml   Net           456.74 ml     TELE: normal sinus rhythm    General: alert and in no apparent distress  HENT: Normocephalic, atraumatic. Normal external eye.   Neck :  bruit present, increased JVP  Cardiac:  regular rate and rhythm, systolic murmur: early systolic 3/6, crescendo at 2nd right intercostal space, radiates to carotids, 2nd systolic murmur: late systolic 3/6, blowing at apex  Chest/Lungs:mild rales heard lung bases  Abdomen: Soft, nontender, no masses, +ascites/abd wall edema  Extremities:  No c/c/2+edema UE/LE, peripheral pulses present      Labs: Results:       Chemistry Recent Labs      03/02/17   0315  03/01/17   0326   GLU  90  96   NA  136  137   K  3.6  3.6   CL  91*  94*   CO2  41*  39*   BUN  37*  37*   CREA  1.81*  1.99*   CA  8.9  9.0   MG  1.8  1.8   AGAP  4  4   BUCR  20  19      CBC w/Diff Recent Labs      03/02/17   0315  03/01/17   0326   WBC  4.0*  4.0*   RBC  2.93* 2.97*   HGB  8.1*  8.2*   HCT  26.9*  27.3*   PLT  70*  UNABLE TO REPORT ACCURATE COUNT DUE TO PLATELET AGGREGATION, HOWEVER, PLATELETS APPEAR DECREASED IN NUMBER ON SMEAR. PLEASE RESUBMIT SODIUM CITRATE (BLUE) AND EDTA (LAVENDAR) TUBES FOR HEMATOLOGICAL TESTING. GRANS  63  64   LYMPH  18*  21   EOS  10*  7*      Cardiac Enzymes No results for input(s): CPK, CKND1, TYSON in the last 72 hours. No lab exists for component: CKRMB, TROIP   Coagulation No results for input(s): PTP, INR, APTT in the last 72 hours. No lab exists for component: INREXT, INREXT    Lipid Panel Lab Results   Component Value Date/Time    Cholesterol, total 100 07/22/2016 05:20 AM    HDL Cholesterol 31 07/22/2016 05:20 AM    LDL, calculated 54.4 07/22/2016 05:20 AM    VLDL, calculated 14.6 07/22/2016 05:20 AM    Triglyceride 73 07/22/2016 05:20 AM    CHOL/HDL Ratio 3.2 07/22/2016 05:20 AM      BNP No results for input(s): BNPP in the last 72 hours. Liver Enzymes No results for input(s): TP, ALB, TBIL, AP, SGOT, GPT in the last 72 hours. No lab exists for component: DBIL   Digoxin    Thyroid Studies Lab Results   Component Value Date/Time    TSH 4.23 07/22/2016 05:20 AM              Kamille Mo NP  [phone] supervised. I have independently evaluated and examined the patient. All relevant labs and testing data's are reviewed. Care plan discussed and updated after review.     Anabell Sutherland MD

## 2017-03-04 NOTE — PROGRESS NOTES
Jenifer Hagen M.D. PROGRESS NOTE    Name: Joey Martino MRN: [id number]   : 1940 Hospital: [facility]   Date: 3/4/2017  Admission Date: 2017     Subjective/Objective/Plans  1. Acute-on-chronic diastolic heart failure. 2. History of coronary artery bypass surgery. 3. Hypertension. 4. Hyperlipidemia. 5. History of coronary artery bypass and defibrillator placement, as well  as stent placement. 6. Obstructive sleep apnea. 7. History of mini strokes. 8. Ascites    Asleep during rounds  VSS. Large urine output    Plan  Lasix drip    Vital Signs:  Visit Vitals    /71 (BP 1 Location: Right arm, BP Patient Position: At rest)    Pulse 70    Temp 98 °F (36.7 °C)    Resp 18    Ht 6' (1.829 m)    Wt 101.1 kg (222 lb 14.4 oz)    SpO2 99%    BMI 30.23 kg/m2       O2 Device: Nasal cannula   O2 Flow Rate (L/min): 4 l/min   Temp (24hrs), Av.3 °F (36.8 °C), Min:97.8 °F (36.6 °C), Max:98.8 °F (37.1 °C)     12:30 PM  Intake/Output:   Last shift:      701 -  1900  In: -   Out: 1850 [Urine:1850]  Last 3 shifts:  190 -  0700  In: 685.1 [P.O.:480; I.V.:205.1]  Out: 3750 [Urine:3750]    Intake/Output Summary (Last 24 hours) at 17 1230  Last data filed at 17 1112   Gross per 24 hour   Intake            205.1 ml   Output             4500 ml   Net          -4294.9 ml         Physical Exam:    General: in no apparent distress and in no respiratory distress and acyanotic    HEENT: pupils equal, no ear discharge   Neck: No abnormally enlarged lymph nodes. , no JDV   Mouth: MMM no lesions    Chest: normal, no breast masses   Lungs: decreased air exchange bilaterally   Heart: Regular rate and rhythm   Abdomen: gross ascites noted   Extremity: 2+ edema   Neuro: alert    Skin: Skin color, texture, turgor normal. No rashes or lesions    Data Review:    Labs: Results:       Chemistry Recent Labs      17   0159  17   1357  17   0315   GLU  110*  114* 90   NA  135*  135*  136   K  3.4*  3.2*  3.6   CL  87*  88*  91*   CO2  41*  42*  41*   BUN  38*  40*  37*   CREA  1.93*  1.86*  1.81*   CA  9.0  9.1  8.9   AGAP  7  5  4   BUCR  20  22*  20      CBC w/Diff Recent Labs      03/04/17   0159  03/03/17   1151  03/02/17   0315   WBC  4.1*  4.4*  4.0*   RBC  3.02*  3.09*  2.93*   HGB  8.4*  8.5*  8.1*   HCT  27.7*  28.5*  26.9*   PLT  48*  56*  70*   GRANS  67  63  63   LYMPH  15*  17*  18*   EOS  6*  9*  10*      Cardiac Enzymes No results for input(s): CPK, CKND1, TYSON in the last 72 hours. No lab exists for component: CKRMB, TROIP   Coagulation No results for input(s): PTP, INR, APTT in the last 72 hours. No lab exists for component: INREXT    Lipid Panel Lab Results   Component Value Date/Time    Cholesterol, total 100 07/22/2016 05:20 AM    HDL Cholesterol 31 07/22/2016 05:20 AM    LDL, calculated 54.4 07/22/2016 05:20 AM    VLDL, calculated 14.6 07/22/2016 05:20 AM    Triglyceride 73 07/22/2016 05:20 AM    CHOL/HDL Ratio 3.2 07/22/2016 05:20 AM      BNP No results for input(s): BNPP in the last 72 hours. Liver Enzymes No results for input(s): TP, ALB, TBILI, AP, SGOT, ALT, CBIL in the last 72 hours. Thyroid Studies Lab Results   Component Value Date/Time    TSH 4.23 07/22/2016 05:20 AM          Procedures/imaging: see electronic medical records for all procedures, Xrays and details which were not copied into this note but were reviewed. Allergies:  No Known Allergies    Home Medications:  Prior to Admission Medications   Prescriptions Last Dose Informant Patient Reported? Taking? Cholecalciferol, Vitamin D3, (VITAMIN D3) 1,000 unit cap   Yes No   Sig: Take 2,000 Units by mouth daily. Omega-3 Fatty Acids 300 mg cap   Yes No   Sig: Take 1 Cap by mouth two (2) times a day. allopurinol (ZYLOPRIM) 100 mg tablet   Yes No   Sig: Take 100 mg by mouth daily. aspirin delayed-release 81 mg tablet   Yes No   Sig: Take 162 mg by mouth daily.    carvedilol (COREG) 6.25 mg tablet   No No   Sig: Take 1 Tab by mouth two (2) times daily (with meals). furosemide (LASIX) 40 mg tablet   No No   Sig: One tab daily  Indications: PERIPHERAL EDEMA DUE TO CHRONIC HEART FAILURE   lisinopril (PRINIVIL, ZESTRIL) 5 mg tablet   No No   Sig: Take 1 Tab by mouth daily. multivitamin (ONE A DAY) tablet   Yes No   Sig: Take 1 Tab by mouth daily. nitroglycerin (NITROSTAT) 0.4 mg SL tablet   Yes No   Sig: by SubLINGual route every five (5) minutes as needed for Chest Pain. omeprazole (PRILOSEC) 20 mg capsule   Yes No   Sig: Take 20 mg by mouth daily. pravastatin (PRAVACHOL) 80 mg tablet   Yes No   Sig: Take 80 mg by mouth nightly.       Facility-Administered Medications: None       Current Medications:  Current Facility-Administered Medications   Medication Dose Route Frequency    epoetin ron (EPOGEN;PROCRIT) injection 20,000 Units  20,000 Units SubCUTAneous Q MON, WED & FRI    acetaZOLAMIDE (DIAMOX) tablet 250 mg  250 mg Oral DAILY    potassium chloride (K-DUR, KLOR-CON) SR tablet 20 mEq  20 mEq Oral DAILY    ferrous sulfate tablet 325 mg  1 Tab Oral DAILY WITH BREAKFAST    zolpidem (AMBIEN) tablet 5 mg  5 mg Oral QHS PRN    albuterol-ipratropium (DUO-NEB) 2.5 MG-0.5 MG/3 ML  3 mL Nebulization Q6H PRN    sodium chloride (NS) flush 5-10 mL  5-10 mL IntraVENous PRN    acetaminophen (TYLENOL) tablet 650 mg  650 mg Oral Q4H PRN    HYDROcodone-acetaminophen (NORCO) 5-325 mg per tablet 1 Tab  1 Tab Oral Q4H PRN    furosemide (LASIX) 100 mg in 0.9% sodium chloride 100 mL infusion  3 mg/hr IntraVENous CONTINUOUS    fluticasone (FLONASE) 50 mcg/actuation nasal spray 2 Spray  2 Spray Both Nostrils DAILY    aspirin chewable tablet 81 mg  81 mg Oral DAILY    DOBUTamine (DOBUTREX) 500 mg/250 mL (2,000 mcg/mL) infusion  5 mcg/kg/min IntraVENous CONTINUOUS    allopurinol (ZYLOPRIM) tablet 100 mg  100 mg Oral DAILY    nitroglycerin (NITROSTAT) tablet 0.4 mg  0.4 mg SubLINGual PRN  pantoprazole (PROTONIX) tablet 40 mg  40 mg Oral ACB    pravastatin (PRAVACHOL) tablet 80 mg  80 mg Oral QHS    therapeutic multivitamin (THERAGRAN) tablet 1 Tab  1 Tab Oral DAILY    cholecalciferol (VITAMIN D3) tablet 2,000 Units  2,000 Units Oral DAILY    fish oil-omega-3 fatty acids 340-1,000 mg capsule 1 Cap  1 Cap Oral BID    carvedilol (COREG) tablet 6.25 mg  6.25 mg Oral BID WITH MEALS    sodium chloride (NS) flush 5-10 mL  5-10 mL IntraVENous Q8H    naloxone (NARCAN) injection 0.4 mg  0.4 mg IntraVENous PRN    ondansetron (ZOFRAN) injection 4 mg  4 mg IntraVENous Q4H PRN       Chart and notes reviewed. Data reviewed. I have evaluated and examined the patient.         IMPRESSION:   Patient Active Problem List   Diagnosis Code    Hematospermia R36.1    CAD (coronary artery disease) I25.10    PVD (peripheral vascular disease) ([facility] I73.9    CHF (congestive heart failure) (HCC) I50.9    Anasarca R60.1    Hx of CABG Z95.1    H/O carotid endarterectomy Z98.890    AICD (automatic cardioverter/defibrillator) present Z95.810    Acute exacerbation of CHF (congestive heart failure) (HCC) R44.5    Diastolic CHF, acute on chronic (HCC) I50.33    HTN (hypertension) I10    HLD (hyperlipidemia) E78.5    Acute CHF (congestive heart failure) (HCC) I50.9    Acute on chronic diastolic (congestive) heart failure (HCC) I50.33 ·         PLAN:/DISCUSSION:   · Dani Valente MD  3/4/2017, 12:30 PM

## 2017-03-04 NOTE — PROGRESS NOTES
Hematology/Medical oncology Progress Note      NAME: Jacob Gaming   :  1940  MRM:  [id number]    Date/Time: 3/4/2017  9:24 AM         Subjective:     Mr Shana Kramer is a 77 y/o man with CHF who was admitted with increasing leg swelling and increasing abdominal girth. He also has progressive anemia and thrombocytopenia. Past Medical History reviewed and unchanged from Admission History and Physical    Review of Systems   Constitutional: Positive for fatigue. Negative for activity change, appetite change, chills, diaphoresis, fever and unexpected weight change. HENT: Negative for congestion, facial swelling, mouth sores, nosebleeds, postnasal drip, trouble swallowing and voice change. Eyes: Negative for photophobia, pain, discharge and itching. Respiratory: Negative for apnea, cough, choking, chest tightness, wheezing and stridor. Cardiovascular: Positive for leg swelling. Negative for chest pain and palpitations. Gastrointestinal: Positive for abdominal distention. Negative for abdominal pain, blood in stool, constipation, diarrhea, nausea and rectal pain. Genitourinary: Negative for difficulty urinating, dysuria, flank pain, hematuria and urgency. Musculoskeletal: Negative for arthralgias, back pain, gait problem, joint swelling, neck pain and neck stiffness. Skin: Negative for color change, pallor, rash and wound. Neurological: Negative for dizziness, facial asymmetry, speech difficulty, weakness, light-headedness and headaches. Hematological: Negative for adenopathy. Does not bruise/bleed easily. Psychiatric/Behavioral: Negative for agitation, confusion, hallucinations and sleep disturbance. Objective:       Vitals:      Last 24hrs VS reviewed since prior progress note.  Most recent are:    Visit Vitals    /71 (BP 1 Location: Right arm, BP Patient Position: At rest)    Pulse 70    Temp 98 °F (36.7 °C)    Resp 18    Ht 6' (1.829 m)    Wt 101.1 kg (222 lb 14.4 oz)    SpO2 99%    BMI 30.23 kg/m2     SpO2 Readings from Last 6 Encounters:   03/04/17 99%   08/23/16 91%   08/02/16 90%   03/17/15 100%    O2 Flow Rate (L/min): 4 l/min     Intake/Output Summary (Last 24 hours) at 03/04/17 0924  Last data filed at 03/04/17 0751   Gross per 24 hour   Intake            685.1 ml   Output             4300 ml   Net          -3614.9 ml          Exam:      Physical Exam   Nursing note and vitals reviewed. Constitutional: He is oriented to person, place, and time. He appears well-developed and well-nourished. No distress. HENT:   Head: Normocephalic and atraumatic. Mouth/Throat: No oropharyngeal exudate. Eyes: Conjunctivae and EOM are normal. Pupils are equal, round, and reactive to light. Right eye exhibits no discharge. Left eye exhibits no discharge. No scleral icterus. Neck: Normal range of motion. Neck supple. No tracheal deviation present. No thyromegaly present. Cardiovascular: Normal rate and regular rhythm. Exam reveals no gallop and no friction rub. No murmur heard. Pulmonary/Chest: Effort normal and breath sounds normal. No apnea. No respiratory distress. He has no wheezes. He has no rales. Chest wall is not dull to percussion. He exhibits no mass, no tenderness, no bony tenderness, no laceration, no crepitus, no edema, no deformity, no swelling and no retraction. Right breast exhibits no inverted nipple, no mass, no nipple discharge, no skin change and no tenderness. Left breast exhibits no inverted nipple, no mass, no nipple discharge, no skin change and no tenderness. Breasts are symmetrical.   Abdominal: Soft. Bowel sounds are normal. He exhibits distension. There is no tenderness. There is no rebound and no guarding. Musculoskeletal: Normal range of motion. He exhibits edema. He exhibits no tenderness. Lymphadenopathy:     He has no cervical adenopathy. Neurological: He is alert and oriented to person, place, and time.  Coordination normal.   Skin: Skin is warm and dry. No rash noted. He is not diaphoretic. No erythema. No pallor. Psychiatric: He has a normal mood and affect.  His behavior is normal. Thought content normal.       Telemetry reviewed:   normal sinus rhythm  Tubes:   Emerson    Lab Data Reviewed: (see below)      Medications:  Current Facility-Administered Medications   Medication Dose Route Frequency    epoetin ron (EPOGEN;PROCRIT) injection 20,000 Units  20,000 Units SubCUTAneous Q MON, WED & FRI    acetaZOLAMIDE (DIAMOX) tablet 250 mg  250 mg Oral DAILY    potassium chloride (K-DUR, KLOR-CON) SR tablet 20 mEq  20 mEq Oral DAILY    ferrous sulfate tablet 325 mg  1 Tab Oral DAILY WITH BREAKFAST    zolpidem (AMBIEN) tablet 5 mg  5 mg Oral QHS PRN    albuterol-ipratropium (DUO-NEB) 2.5 MG-0.5 MG/3 ML  3 mL Nebulization Q6H PRN    sodium chloride (NS) flush 5-10 mL  5-10 mL IntraVENous PRN    acetaminophen (TYLENOL) tablet 650 mg  650 mg Oral Q4H PRN    HYDROcodone-acetaminophen (NORCO) 5-325 mg per tablet 1 Tab  1 Tab Oral Q4H PRN    furosemide (LASIX) 100 mg in 0.9% sodium chloride 100 mL infusion  3 mg/hr IntraVENous CONTINUOUS    fluticasone (FLONASE) 50 mcg/actuation nasal spray 2 Spray  2 Spray Both Nostrils DAILY    aspirin chewable tablet 81 mg  81 mg Oral DAILY    DOBUTamine (DOBUTREX) 500 mg/250 mL (2,000 mcg/mL) infusion  5 mcg/kg/min IntraVENous CONTINUOUS    allopurinol (ZYLOPRIM) tablet 100 mg  100 mg Oral DAILY    nitroglycerin (NITROSTAT) tablet 0.4 mg  0.4 mg SubLINGual PRN    pantoprazole (PROTONIX) tablet 40 mg  40 mg Oral ACB    pravastatin (PRAVACHOL) tablet 80 mg  80 mg Oral QHS    therapeutic multivitamin (THERAGRAN) tablet 1 Tab  1 Tab Oral DAILY    cholecalciferol (VITAMIN D3) tablet 2,000 Units  2,000 Units Oral DAILY    fish oil-omega-3 fatty acids 340-1,000 mg capsule 1 Cap  1 Cap Oral BID    carvedilol (COREG) tablet 6.25 mg  6.25 mg Oral BID WITH MEALS    sodium chloride (NS) flush 5-10 mL  5-10 mL IntraVENous Q8H    naloxone (NARCAN) injection 0.4 mg  0.4 mg IntraVENous PRN    ondansetron (ZOFRAN) injection 4 mg  4 mg IntraVENous Q4H PRN       ______________________________________________________________________      Lab Review:     Recent Labs      03/04/17   0159 03/03/17   1151  03/02/17   0315   WBC  4.1*  4.4*  4.0*   HGB  8.4*  8.5*  8.1*   HCT  27.7*  28.5*  26.9*   PLT  48*  56*  70*     Recent Labs      03/04/17   0159 03/03/17   1357  03/02/17   0315   NA  135*  135*  136   K  3.4*  3.2*  3.6   CL  87*  88*  91*   CO2  41*  42*  41*   GLU  110*  114*  90   BUN  38*  40*  37*   CREA  1.93*  1.86*  1.81*   CA  9.0  9.1  8.9   MG  1.9   --   1.8     No components found for: GLPOC  No results for input(s): PH, PCO2, PO2, HCO3, FIO2 in the last 72 hours. No results for input(s): INR in the last 72 hours. No lab exists for component: INREXT, INREXT    Other pertinent lab:          Assessment:     Active Problems:    Hematospermia (3/5/2014)      CAD (coronary artery disease) (3/5/2014)      PVD (peripheral vascular disease) ([facility] (3/5/2014)      CHF (congestive heart failure) [facility] (7/21/2016)      Hx of CABG (7/22/2016)      Overview: 2013      H/O carotid endarterectomy (7/22/2016)      Overview: 2015      AICD (automatic cardioverter/defibrillator) present (7/22/2016)      Overview: 2015      Acute exacerbation of CHF (congestive heart failure) ([facility] (1/47/5441)      Diastolic CHF, acute on chronic ([facility] (8/17/2016)      HTN (hypertension) (9/4/2016)      HLD (hyperlipidemia) (9/4/2016)      Acute CHF (congestive heart failure) ([facility] (2/26/2017)      Acute on chronic diastolic (congestive) heart failure ([facility] (2/27/2017)           Plan:     Risk of deterioration: low             1. ACKD: continue procrit at 20k units 3x weekly. 2. Thrombocytopenia: continue the current level of care; monitor platelets daily and begin gamma-globulin if the counts declines below 15k.   3. CHF: management per cardiology.          Total time spent with patient: 30 minutes                  Care Plan discussed with: Patient, Nursing Staff and Consultant/Specialist    Discussed:  Care Plan    Prophylaxis:  H2B/PPI    Disposition:  Home w/Family           ___________________________________________________    Attending Physician: Sarah Bello MD

## 2017-03-04 NOTE — PROGRESS NOTES
RENAL DAILY PROGRESS NOTE    Subjective:   Admitted for Acute CHF seen for CKD stage 3    Complaint: sitting up eating lunch. Grand daughter at bedside.  Breathing better, appetite fair      Current Facility-Administered Medications   Medication Dose Route Frequency    epoetin ron (EPOGEN;PROCRIT) injection 20,000 Units  20,000 Units SubCUTAneous Q MON, WED & FRI    acetaZOLAMIDE (DIAMOX) tablet 250 mg  250 mg Oral DAILY    potassium chloride (K-DUR, KLOR-CON) SR tablet 20 mEq  20 mEq Oral DAILY    ferrous sulfate tablet 325 mg  1 Tab Oral DAILY WITH BREAKFAST    zolpidem (AMBIEN) tablet 5 mg  5 mg Oral QHS PRN    albuterol-ipratropium (DUO-NEB) 2.5 MG-0.5 MG/3 ML  3 mL Nebulization Q6H PRN    sodium chloride (NS) flush 5-10 mL  5-10 mL IntraVENous PRN    acetaminophen (TYLENOL) tablet 650 mg  650 mg Oral Q4H PRN    HYDROcodone-acetaminophen (NORCO) 5-325 mg per tablet 1 Tab  1 Tab Oral Q4H PRN    furosemide (LASIX) 100 mg in 0.9% sodium chloride 100 mL infusion  3 mg/hr IntraVENous CONTINUOUS    fluticasone (FLONASE) 50 mcg/actuation nasal spray 2 Spray  2 Spray Both Nostrils DAILY    aspirin chewable tablet 81 mg  81 mg Oral DAILY    DOBUTamine (DOBUTREX) 500 mg/250 mL (2,000 mcg/mL) infusion  5 mcg/kg/min IntraVENous CONTINUOUS    allopurinol (ZYLOPRIM) tablet 100 mg  100 mg Oral DAILY    nitroglycerin (NITROSTAT) tablet 0.4 mg  0.4 mg SubLINGual PRN    pantoprazole (PROTONIX) tablet 40 mg  40 mg Oral ACB    pravastatin (PRAVACHOL) tablet 80 mg  80 mg Oral QHS    therapeutic multivitamin (THERAGRAN) tablet 1 Tab  1 Tab Oral DAILY    cholecalciferol (VITAMIN D3) tablet 2,000 Units  2,000 Units Oral DAILY    fish oil-omega-3 fatty acids 340-1,000 mg capsule 1 Cap  1 Cap Oral BID    carvedilol (COREG) tablet 6.25 mg  6.25 mg Oral BID WITH MEALS    sodium chloride (NS) flush 5-10 mL  5-10 mL IntraVENous Q8H    naloxone (NARCAN) injection 0.4 mg  0.4 mg IntraVENous PRN    ondansetron (ZOFRAN) injection 4 mg  4 mg IntraVENous Q4H PRN           Objective:     Patient Vitals for the past 24 hrs:   Temp Pulse Resp BP SpO2   03/04/17 0831 98 °F (36.7 °C) 70 18 106/71 99 %   03/04/17 0400 98.8 °F (37.1 °C) 72 20 96/65 (!) 84 %   03/04/17 0032 97.8 °F (36.6 °C) 70 20 104/68 (!) 82 %   03/03/17 2000 98.5 °F (36.9 °C) 66 20 111/67 93 %   03/03/17 1601 98.2 °F (36.8 °C) 61 - 119/65 91 %        Weight change: 1.588 kg (3 lb 8 oz)     03/02 1901 - 03/04 0700  In: 685.1 [P.O.:480; I.V.:205.1]  Out: 3750 [Urine:3750]    Intake/Output Summary (Last 24 hours) at 03/04/17 1409  Last data filed at 03/04/17 1112   Gross per 24 hour   Intake            205.1 ml   Output             4500 ml   Net          -4294.9 ml     Physical Exam: alert, oriented x 3 afebrile    HEENT: non icteric  Cardiovascular: regular no rub + systolic Murmur  C/L: dec both bases, no rales or wheezing  Abdomen: + ascites  Ext: + LE edema    Data Review:     LABS:   Hematology:   Recent Labs      03/04/17   0159  03/03/17   1151  03/02/17   0315   WBC  4.1*  4.4*  4.0*   HGB  8.4*  8.5*  8.1*   HCT  27.7*  28.5*  26.9*   Pl 48K  Chemistry:   Recent Labs      03/04/17   0159  03/03/17   1357  03/02/17   0315   BUN  38*  40*  37*   CREA  1.93*  1.86*  1.81*   CA  9.0  9.1  8.9   K  3.4*  3.2*  3.6   NA  135*  135*  136   CL  87*  88*  91*   CO2  41*  42*  41*   GLU  110*  114*  90     24 H urine still pend    IMPRESSION AND PLAN:   CKD stage 3 renal function fairly stable good diuresis   Metabolic alkalosis pt on diamox and Lasix drip along with dobutamine. Defer to cardio.    Hypokalemia  From inc urinary loses, inc K supplement  Anemia low Fe stores  Contcpo Fe cont epo         Eliel Hare MD  3/4/2017

## 2017-03-04 NOTE — ROUTINE PROCESS
Bedside and Verbal shift change report given to Juanito Ventura, RN (oncoming nurse) by Irasema Bautista RN (offgoing nurse). Report included the following information SBAR, Kardex, MAR and Recent Results.     SITUATION:    Code Status: Full Code   Reason for Admission: Acute CHF (congestive heart failure) ([facility]   CHF (congestive heart failure) (HCC)   Acute on chronic diastolic (congestive) heart failure ([facility]    [facility] day: 4   Problem List:       Hospital Problems  Date Reviewed: 8/13/2016          Codes Class Noted POA    Acute on chronic diastolic (congestive) heart failure (HCC) ICD-10-CM: I50.33  ICD-9-CM: 428.33, 428.0  2/27/2017 Unknown        Acute CHF (congestive heart failure) ([facility] ICD-10-CM: I50.9  ICD-9-CM: 428.0  2/26/2017 Unknown        HTN (hypertension) (Chronic) ICD-10-CM: I10  ICD-9-CM: 401.9  9/4/2016 Yes        HLD (hyperlipidemia) (Chronic) ICD-10-CM: E78.5  ICD-9-CM: 272.4  9/4/2016 Yes        Diastolic CHF, acute on chronic ([facility] ICD-10-CM: I50.33  ICD-9-CM: 428.33, 428.0  8/17/2016 Yes        Acute exacerbation of CHF (congestive heart failure) ([facility] ICD-10-CM: I50.9  ICD-9-CM: 428.0  8/12/2016 Yes        Hx of CABG ICD-10-CM: Z95.1  ICD-9-CM: V45.81  7/22/2016 Yes    Overview Signed 7/22/2016  5:23 PM by Christian Brown NP     2013             H/O carotid endarterectomy ICD-10-CM: Z98.890  ICD-9-CM: V45.89  7/22/2016 Yes    Overview Signed 7/22/2016  5:27 PM by Christian Brown NP     2015             AICD (automatic cardioverter/defibrillator) present ICD-10-CM: Z95.810  ICD-9-CM: V45.02  7/22/2016 Yes    Overview Signed 7/22/2016  5:27 PM by Christian Brown NP     2015             CHF (congestive heart failure) (HCC) ICD-10-CM: I50.9  ICD-9-CM: 428.0  7/21/2016 Unknown        Hematospermia ICD-10-CM: R36.1  ICD-9-CM: 608.82  3/5/2014 Yes        CAD (coronary artery disease) ICD-10-CM: I25.10  ICD-9-CM: 414.00  3/5/2014 Yes        PVD (peripheral vascular disease) ([facility] ICD-10-CM: I73.9  ICD-9-CM: 443.9  3/5/2014 Yes              BACKGROUND:    Past Medical History:   Past Medical History:   Diagnosis Date    Arrhythmia     A fib; has external vest and belt he wears for this    Arthritis     High blood pressure     Hypercholesteremia     [facility] stroke [facility] 2000    Unspecified sleep apnea     does not wear machine         Patient taking anticoagulants yes     ASSESSMENT:    Changes in Assessment Throughout Shift:      Patient has Central Line: no Reasons if yes:    Patient has Emerson Cath: yes Reasons if yes:       Last Vitals:     Vitals:    03/02/17 2305 03/03/17 0327 03/03/17 1601 03/03/17 2000   BP: 121/70 104/68 119/65 111/67   Pulse: 68 (!) 56 61 66   Resp: 18 18  20   Temp: 98.1 °F (36.7 °C) 97.8 °F (36.6 °C) 98.2 °F (36.8 °C) 98.5 °F (36.9 °C)   SpO2: 90% 94% 91% 93%   Weight:  99.5 kg (219 lb 6.4 oz)     Height:            IV and DRAINS (will only show if present)   Peripheral IV 02/27/17 Left Forearm-Site Assessment: Clean, dry, & intact  Peripheral IV 02/26/17 Right Arm-Site Assessment: Clean, dry, & intact     WOUND (if present)   Wound Type:  none   Dressing present    Wound Concerns/Notes:  none     PAIN    Pain Assessment    Pain Intensity 1: 0 (03/03/17 1815)              Patient Stated Pain Goal: 0  o Interventions for Pain:  none  o Intervention effective:   o Time of last intervention:    o Reassessment Completed:       Last 3 Weights:  Last 3 Recorded Weights in this Encounter    03/02/17 0400 03/02/17 0637 03/03/17 0327   Weight: 105.7 kg (233 lb) 102.6 kg (226 lb 4.8 oz) 99.5 kg (219 lb 6.4 oz)     Weight change: -6.169 kg (-13 lb 9.6 oz)     INTAKE/OUPUT    Current Shift:      Last three shifts: 03/02 0701 - 03/03 1900  In: 1141.8 [P.O.:700;  I.V.:441.8]  Out: 3401 [Urine:3400]     LAB RESULTS     Recent Labs      03/03/17   1151  03/02/17   0315  03/01/17   0326   WBC  4.4*  4.0*  4.0*   HGB  8.5*  8.1*  8.2*   HCT  28.5*  26.9*  27.3*   PLT  56*  70* UNABLE TO REPORT ACCURATE COUNT DUE TO PLATELET AGGREGATION, HOWEVER, PLATELETS APPEAR DECREASED IN NUMBER ON SMEAR. PLEASE RESUBMIT SODIUM CITRATE (BLUE) AND EDTA (LAVENDAR) TUBES FOR HEMATOLOGICAL TESTING. Recent Labs      03/03/17   1357  03/02/17   0315  03/01/17   0326   NA  135*  136  137   K  3.2*  3.6  3.6   GLU  114*  90  96   BUN  40*  37*  37*   CREA  1.86*  1.81*  1.99*   CA  9.1  8.9  9.0   MG   --   1.8  1.8       RECOMMENDATIONS AND DISCHARGE PLANNING     1. Pending tests/procedures/ Plan of Care or Other Needs:      2. Discharge plan for patient and Needs/Barriers:     3. Estimated Discharge Date: 3/5/17 Posted on Whiteboard in [facility]: yes      4. The patient's care plan was reviewed with the oncoming nurse. \"HEALS\" SAFETY CHECK      Fall Risk    Total Score: 3    Safety Measures: Safety Measures: Bed/Chair-Wheels locked, Bed in low position, Call light within reach, Gripper socks, Side rails X 3    A safety check occurred in the patient's room between off going nurse and oncoming nurse listed above. The safety check included the below items  Area Items   H  High Alert Medications - Verify all high alert medication drips (heparin, PCA, etc.)   E  Equipment - Suction is set up for ALL patients (with phylicia)  - Red plugs utilized for all equipment (IV pumps, etc.)  - WOWs wiped down at end of shift.  - Room stocked with oxygen, suction, and other unit-specific supplies   A  Alarms - Bed alarm is set for fall risk patients  - Ensure chair alarm is in place and activated if patient is up in a chair   L  Lines - Check IV for any infiltration  - Emerson bag is empty if patient has a Emerson   - Tubing and IV bags are labeled   S  Safety   - Room is clean, patient is clean, and equipment is clean. - Hallways are clear from equipment besides carts.    - Fall bracelet on for fall risk patients  - Ensure room is clear and free of clutter  - Suction is set up for ALL patients (with phylicia)  - Hallways are clear from equipment besides carts.    - Isolation precautions followed, supplies available outside room, sign posted     Bessy Sanchez RN

## 2017-03-04 NOTE — PROGRESS NOTES
[facility]      CARDIOLOGY PROGRESS NOTE  RECS:      1. Acute on chronic diastolic congestive heart failure-  Improving but Still with significant  edema. Continue with Fluid restriction. Stricts I&O. Monitor electrolytes and renal function. ECHO with mod AS, MR likely causing CHF. 2. Anasarca- agressive diuresis with Lasix drip decreased to 3 mg/hr. CO2 has increased will add diamox for 2 days. continue iv dobut due to MR/AS  3. S/p CABG in 2013 in [facility]- stable denies chest pain or pressure continue with asa, statin and bb.  4. S/p AICD- [employer]. normal function interrogated on 02/27/17  5. Hx Carotid endarterectomy- bilateral in 2015    6. KYLEE on CKD- monitor renal function nephrology following   7. Pancytopenia- consider oncology consult   8. Hypertension- normal low . continue with bb monitor closely   9. Mitral regurgitation -mild to moderate regurgitation echo on 2016   10.  Aortic stenosis : moderate, will f/u          ASSESSMENT:  Hospital Problems  Date Reviewed: 8/13/2016          Codes Class Noted POA    Acute on chronic diastolic (congestive) heart failure (HCC) ICD-10-CM: I50.33  ICD-9-CM: 428.33, 428.0  2/27/2017 Unknown        Acute CHF (congestive heart failure) ([facility] ICD-10-CM: I50.9  ICD-9-CM: 428.0  2/26/2017 Unknown        HTN (hypertension) (Chronic) ICD-10-CM: I10  ICD-9-CM: 401.9  9/4/2016 Yes        HLD (hyperlipidemia) (Chronic) ICD-10-CM: E78.5  ICD-9-CM: 272.4  9/4/2016 Yes        Diastolic CHF, acute on chronic ([facility] ICD-10-CM: I50.33  ICD-9-CM: 428.33, 428.0  8/17/2016 Yes        Acute exacerbation of CHF (congestive heart failure) ([facility] ICD-10-CM: I50.9  ICD-9-CM: 428.0  8/12/2016 Yes        Hx of CABG ICD-10-CM: Z95.1  ICD-9-CM: V45.81  7/22/2016 Yes    Overview Signed 7/22/2016  5:23 PM by Darylene Albee, NP     2013             H/O carotid endarterectomy ICD-10-CM: Z98.890  ICD-9-CM: V45.89  7/22/2016 Yes    Overview Signed 7/22/2016 5:27 PM by Ilya Ly NP     [id number]             AICD (automatic cardioverter/defibrillator) present ICD-10-CM: Z95.810  ICD-9-CM: V45.02  7/22/2016 Yes    Overview Signed 7/22/2016  5:27 PM by Ilya Ly NP     2015             CHF (congestive heart failure) ([facility] ICD-10-CM: I50.9  ICD-9-CM: 428.0  7/21/2016 Unknown        Hematospermia ICD-10-CM: R36.1  ICD-9-CM: 608.82  3/5/2014 Yes        CAD (coronary artery disease) ICD-10-CM: I25.10  ICD-9-CM: 414.00  3/5/2014 Yes        PVD (peripheral vascular disease) ([facility] ICD-10-CM: I73.9  ICD-9-CM: 443.9  3/5/2014 Yes                SUBJECTIVE:  No CP  Improving SOB    OBJECTIVE:    VS:   Visit Vitals    /71 (BP 1 Location: Right arm, BP Patient Position: At rest)    Pulse 70    Temp 98 °F (36.7 °C)    Resp 18    Ht 6' (1.829 m)    Wt 101.1 kg (222 lb 14.4 oz)    SpO2 99%    BMI 30.23 kg/m2         Intake/Output Summary (Last 24 hours) at 03/04/17 1119  Last data filed at 03/04/17 1112   Gross per 24 hour   Intake            445.1 ml   Output             4900 ml   Net          -4454.9 ml     TELE: normal sinus rhythm    General: alert and in no apparent distress  HENT: Normocephalic, atraumatic. Normal external eye.   Neck :  bruit present, increased JVP  Cardiac:  regular rate and rhythm, systolic murmur: early systolic 3/6, crescendo at 2nd right intercostal space, radiates to carotids, 2nd systolic murmur: late systolic 3/6, blowing at apex  Chest/Lungs:mild rales heard lung bases  Abdomen: Soft, nontender, no masses, +ascites/abd wall edema  Extremities:  No c/c/2+edema UE/LE, peripheral pulses present      Labs: Results:       Chemistry Recent Labs      03/04/17   0159  03/03/17   1357  03/02/17   0315   GLU  110*  114*  90   NA  135*  135*  136   K  3.4*  3.2*  3.6   CL  87*  88*  91*   CO2  41*  42*  41*   BUN  38*  40*  37*   CREA  1.93*  1.86*  1.81*   CA  9.0  9.1  8.9   MG  1.9   --   1.8   AGAP  7  5  4   BUCR  20  22*  20      CBC w/Diff Recent Labs      03/04/17   0159  03/03/17   1151  03/02/17   0315   WBC  4.1*  4.4*  4.0*   RBC  3.02*  3.09*  2.93*   HGB  8.4*  8.5*  8.1*   HCT  27.7*  28.5*  26.9*   PLT  48*  56*  70*   GRANS  67  63  63   LYMPH  15*  17*  18*   EOS  6*  9*  10*      Cardiac Enzymes No results for input(s): CPK, CKND1, TYSON in the last 72 hours. No lab exists for component: CKRMB, TROIP   Coagulation No results for input(s): PTP, INR, APTT in the last 72 hours. No lab exists for component: INREXT, INREXT    Lipid Panel Lab Results   Component Value Date/Time    Cholesterol, total 100 07/22/2016 05:20 AM    HDL Cholesterol 31 07/22/2016 05:20 AM    LDL, calculated 54.4 07/22/2016 05:20 AM    VLDL, calculated 14.6 07/22/2016 05:20 AM    Triglyceride 73 07/22/2016 05:20 AM    CHOL/HDL Ratio 3.2 07/22/2016 05:20 AM      BNP No results for input(s): BNPP in the last 72 hours. Liver Enzymes No results for input(s): TP, ALB, TBIL, AP, SGOT, GPT in the last 72 hours.     No lab exists for component: DBIL   Digoxin    Thyroid Studies Lab Results   Component Value Date/Time    TSH 4.23 07/22/2016 05:20 AM              Arianna Sweet MD  .

## 2017-03-05 NOTE — ROUTINE PROCESS
Bedside and Verbal shift change report given to [address] (oncoming nurse) by Minoo Yin RN (offgoing nurse). Report given with Allison LOGAN and MAR.

## 2017-03-05 NOTE — PROGRESS NOTES
Hematology/Medical Oncology Progress Note             Name: Padmini Thomson   : 1940   MRN: [id number]   Date: 3/5/2017 9:28 AM     [x]I have reviewed the flowsheet and previous days notes. Events overnight reviewed and discussed with nursing staff. Vital signs and records reviewed. The patient is a 81-year-old man  who has a longstanding history of coronary artery disease, diastolic  heart failure, hypertension, hyperlipidemia, and chronic kidney disease. He was admitted with complaints of progressive swelling in his lower  extremities and an increase in his abdominal girth. The patient had  been on diuretic therapy with Lasix at a dose of 40 mg b.i.d. He  continued to have the problem and is now on the inpatient service. During the course of his evaluation, he was found to have progressive thrombocytopenia               ROS:  Constitutional: Positive for fatigue. Negative for fever, chills, diaphoresis, activity change, appetite change and unexpected weight change. HENT: Negative for nosebleeds, congestion, facial swelling, mouth sores, trouble swallowing, neck pain, neck stiffness, voice change and postnasal drip. Eyes: Negative for photophobia, pain, discharge and itching. Respiratory: Negative for apnea, cough, choking, chest tightness, wheezing and stridor. Cardiovascular: Negative for chest pain, palpitations and leg swelling. Gastrointestinal: Negative for abdominal pain. Negative for nausea, diarrhea, constipation, blood in stool and rectal pain. Genitourinary: Negative for dysuria, urgency, hematuria, flank pain and difficulty urinating. Musculoskeletal: Negative for back pain, joint swelling, arthralgias and gait problem. Skin: Negative for color change, pallor, rash and wound. Neurological: Positive for weakness. Negative for dizziness, facial asymmetry, speech difficulty, light-headedness and headaches. Hematological: Negative for adenopathy. Does not bruise/bleed easily. Psychiatric/Behavioral: Negative for hallucinations, confusion, disturbed wake/sleep cycle and agitation. Vital Signs:    Visit Vitals    /69    Pulse 76    Temp 97.6 °F (36.4 °C)    Resp 22    Ht 6' (1.829 m)    Wt 100.7 kg (222 lb)    SpO2 93%    BMI 30.11 kg/m2       O2 Device: Nasal cannula   O2 Flow Rate (L/min): 3 l/min   Temp (24hrs), Av.1 °F (36.7 °C), Min:97.6 °F (36.4 °C), Max:98.4 °F (36.9 °C)       Intake/Output:   Last shift:         Last 3 shifts:  1901 -  0700  In: 1095.2 [P.O.:680; I.V.:415.2]  Out: 5700 [Urine:5700]    Intake/Output Summary (Last 24 hours) at 17 09  Last data filed at 17 0437   Gross per 24 hour   Intake           1095.2 ml   Output             2200 ml   Net          -1104.8 ml       Physical Exam:    Constitutional: Appears comfortable, NAD  HENT: Head: Normocephalic and atraumatic. Mouth/Throat: No oropharyngeal exudate. Eyes:  No scleral icterus. Neck: Normal range of motion. Neck supple. No tracheal deviation present. No thyromegaly present. Cardiovascular: Normal rate and regular rhythm. Exam reveals no gallop and no friction rub. (+)murmur. Pulmonary/Chest: Decreased Bibasilar BS, no rhonchi, no wheeze. Abdominal: Soft.  Bowel sounds are normal. (+)ascitis Musculoskeletal: BLE edema   Neurologic:  Non-focal    DATA:   Current Facility-Administered Medications   Medication Dose Route Frequency    potassium chloride (K-DUR, KLOR-CON) SR tablet 20 mEq  20 mEq Oral BID    epoetin ron (EPOGEN;PROCRIT) injection 20,000 Units  20,000 Units SubCUTAneous Q MON, WED & FRI    acetaZOLAMIDE (DIAMOX) tablet 250 mg  250 mg Oral DAILY    ferrous sulfate tablet 325 mg  1 Tab Oral DAILY WITH BREAKFAST    zolpidem (AMBIEN) tablet 5 mg  5 mg Oral QHS PRN    albuterol-ipratropium (DUO-NEB) 2.5 MG-0.5 MG/3 ML  3 mL Nebulization Q6H PRN    sodium chloride (NS) flush 5-10 mL  5-10 mL IntraVENous PRN    acetaminophen (TYLENOL) tablet 650 mg  650 mg Oral Q4H PRN    HYDROcodone-acetaminophen (NORCO) 5-325 mg per tablet 1 Tab  1 Tab Oral Q4H PRN    furosemide (LASIX) 100 mg in 0.9% sodium chloride 100 mL infusion  3 mg/hr IntraVENous CONTINUOUS    fluticasone (FLONASE) 50 mcg/actuation nasal spray 2 Spray  2 Spray Both Nostrils DAILY    aspirin chewable tablet 81 mg  81 mg Oral DAILY    DOBUTamine (DOBUTREX) 500 mg/250 mL (2,000 mcg/mL) infusion  5 mcg/kg/min IntraVENous CONTINUOUS    allopurinol (ZYLOPRIM) tablet 100 mg  100 mg Oral DAILY    nitroglycerin (NITROSTAT) tablet 0.4 mg  0.4 mg SubLINGual PRN    pantoprazole (PROTONIX) tablet 40 mg  40 mg Oral ACB    pravastatin (PRAVACHOL) tablet 80 mg  80 mg Oral QHS    therapeutic multivitamin (THERAGRAN) tablet 1 Tab  1 Tab Oral DAILY    cholecalciferol (VITAMIN D3) tablet 2,000 Units  2,000 Units Oral DAILY    fish oil-omega-3 fatty acids 340-1,000 mg capsule 1 Cap  1 Cap Oral BID    carvedilol (COREG) tablet 6.25 mg  6.25 mg Oral BID WITH MEALS    sodium chloride (NS) flush 5-10 mL  5-10 mL IntraVENous Q8H    naloxone (NARCAN) injection 0.4 mg  0.4 mg IntraVENous PRN    ondansetron (ZOFRAN) injection 4 mg  4 mg IntraVENous Q4H PRN                    Labs:  Recent Labs      03/05/17   0256  03/04/17   0159  03/03/17   1151   WBC  4.2*  4.1*  4.4*   HGB  7.8*  8.4*  8.5*   HCT  25.3*  27.7*  28.5*   PLT  55*  48*  56*     Recent Labs      03/05/17   0256  03/04/17   1531  03/04/17   0159   NA  135*  133*  135*   K  3.4*  3.5  3.4*   CL  88*  88*  87*   CO2  40*  42*  41*   GLU  88  88  110*   BUN  41*  41*  38*   CREA  1.77*  1.99*  1.93*   CA  9.1  9.6  9.0   MG  2.0   --   1.9   PHOS  3.0   --    --      No results for input(s): PH, PCO2, PO2, HCO3, FIO2 in the last 72 hours.        IMPRESSION:   · Chronic Idiopathic Thrombocytopenia Purpura/Chronic Thrombocytopenia  · Anemia likely 2/2 CKD  · Chronic Kidney Disease  · CAD        PLAN:   · Continue Procrit 20,000 units 3 times weekly while he is in the hospital.        Upon discharge, we will try to provide Procrit at a dose of 60,000 units        every 2 weeks. · The patient has chronic idiopathic thrombocytopenic purpura or chronic thrombocytopenia. His platelet count is 18,774. No therapeutic intervention is warranted at this time. We will monitor his platelets along with his hemoglobin on a regular basis and therapeutic intervention will only be warranted if the platelet counts decline below 30,000.   ·            My assessment/plan was discussed with:  [x]nursing []PT/OT    []respiratory therapy [x]Dr.Lloyd Teodoro Hayes MD      []family []       Jam Fountain NP

## 2017-03-05 NOTE — PROGRESS NOTES
[facility]      CARDIOLOGY PROGRESS NOTE  RECS:      1. Acute on chronic diastolic congestive heart failure-  Improving but Still with significant  edema. Continue with Fluid restriction. Stricts I&O. Monitor electrolytes and renal function. ECHO with mod AS, MR likely causing CHF. Diuresing well  2. Anasarca- agressive diuresis with Lasix drip decreased to 3 mg/hr. CO2 has increased will add diamox for 2 days. continue iv dobut due to MR/AS  3. S/p CABG in 2013 in [facility]- stable denies chest pain or pressure continue with asa, statin and bb.  4. S/p AICD- [employer]. normal function interrogated on 02/27/17  5. Hx Carotid endarterectomy- bilateral in 2015    6. KYLEE on CKD- monitor renal function nephrology following   7. Pancytopenia- consider oncology consult   8. Hypertension- normal low . continue with bb monitor closely   9. Mitral regurgitation -mild to moderate regurgitation echo on 2016   10.  Aortic stenosis : moderate, will f/u          ASSESSMENT:  Hospital Problems  Date Reviewed: 8/13/2016          Codes Class Noted POA    Acute on chronic diastolic (congestive) heart failure (HCC) ICD-10-CM: I50.33  ICD-9-CM: 428.33, 428.0  2/27/2017 Unknown        Acute CHF (congestive heart failure) ([facility] ICD-10-CM: I50.9  ICD-9-CM: 428.0  2/26/2017 Unknown        HTN (hypertension) (Chronic) ICD-10-CM: I10  ICD-9-CM: 401.9  9/4/2016 Yes        HLD (hyperlipidemia) (Chronic) ICD-10-CM: E78.5  ICD-9-CM: 272.4  9/4/2016 Yes        Diastolic CHF, acute on chronic ([facility] ICD-10-CM: I50.33  ICD-9-CM: 428.33, 428.0  8/17/2016 Yes        Acute exacerbation of CHF (congestive heart failure) ([facility] ICD-10-CM: I50.9  ICD-9-CM: 428.0  8/12/2016 Yes        Hx of CABG ICD-10-CM: Z95.1  ICD-9-CM: V45.81  7/22/2016 Yes    Overview Signed 7/22/2016  5:23 PM by Li Garsia NP     2013             H/O carotid endarterectomy ICD-10-CM: Z98.890  ICD-9-CM: V45.89  7/22/2016 Yes    Overview Signed 7/22/2016  5:27 PM by [address], NP     [id number]             AICD (automatic cardioverter/defibrillator) present ICD-10-CM: Z95.810  ICD-9-CM: V45.02  7/22/2016 Yes    Overview Signed 7/22/2016  5:27 PM by [address], NP     2015             CHF (congestive heart failure) ([facility]) ICD-10-CM: I50.9  ICD-9-CM: 428.0  7/21/2016 Unknown        Hematospermia ICD-10-CM: R36.1  ICD-9-CM: 608.82  3/5/2014 Yes        CAD (coronary artery disease) ICD-10-CM: I25.10  ICD-9-CM: 414.00  3/5/2014 Yes        PVD (peripheral vascular disease) ([facility] ICD-10-CM: I73.9  ICD-9-CM: 443.9  3/5/2014 Yes                SUBJECTIVE:  No CP  Improving SOB    OBJECTIVE:    VS:   Visit Vitals    /69    Pulse 76    Temp 97.6 °F (36.4 °C)    Resp 22    Ht 6' (1.829 m)    Wt 100.7 kg (222 lb)    SpO2 93%    BMI 30.11 kg/m2         Intake/Output Summary (Last 24 hours) at 03/05/17 1032  Last data filed at 03/05/17 0437   Gross per 24 hour   Intake           1095.2 ml   Output             2200 ml   Net          -1104.8 ml     TELE: normal sinus rhythm    General: alert and in no apparent distress  HENT: Normocephalic, atraumatic. Normal external eye.   Neck :  bruit present, increased JVP  Cardiac:  regular rate and rhythm, systolic murmur: early systolic 3/6, crescendo at 2nd right intercostal space, radiates to carotids, 2nd systolic murmur: late systolic 3/6, blowing at apex  Chest/Lungs:mild rales heard lung bases  Abdomen: Soft, nontender, no masses, +ascites/abd wall edema  Extremities:  No c/c/2+edema UE/LE, peripheral pulses present      Labs: Results:       Chemistry Recent Labs      03/05/17   0256  03/04/17   1531  03/04/17   0159   GLU  88  88  110*   NA  135*  133*  135*   K  3.4*  3.5  3.4*   CL  88*  88*  87*   CO2  40*  42*  41*   BUN  41*  41*  38*   CREA  1.77*  1.99*  1.93*   CA  9.1  9.6  9.0   MG  2.0   --   1.9   PHOS  3.0   --    --    AGAP  7  3  7   BUCR  23*  21*  20      CBC w/Diff Recent Labs 03/05/17   0256  03/04/17   0159  03/03/17   1151   WBC  4.2*  4.1*  4.4*   RBC  2.75*  3.02*  3.09*   HGB  7.8*  8.4*  8.5*   HCT  25.3*  27.7*  28.5*   PLT  55*  48*  56*   GRANS  64  67  63   LYMPH  19*  15*  17*   EOS  8*  6*  9*      Cardiac Enzymes No results for input(s): CPK, CKND1, TYSON in the last 72 hours. No lab exists for component: CKRMB, TROIP   Coagulation No results for input(s): PTP, INR, APTT in the last 72 hours. No lab exists for component: INREXT, INREXT    Lipid Panel Lab Results   Component Value Date/Time    Cholesterol, total 100 07/22/2016 05:20 AM    HDL Cholesterol 31 07/22/2016 05:20 AM    LDL, calculated 54.4 07/22/2016 05:20 AM    VLDL, calculated 14.6 07/22/2016 05:20 AM    Triglyceride 73 07/22/2016 05:20 AM    CHOL/HDL Ratio 3.2 07/22/2016 05:20 AM      BNP No results for input(s): BNPP in the last 72 hours. Liver Enzymes No results for input(s): TP, ALB, TBIL, AP, SGOT, GPT in the last 72 hours.     No lab exists for component: DBIL   Digoxin    Thyroid Studies Lab Results   Component Value Date/Time    TSH 4.23 07/22/2016 05:20 AM              Leidy Vee MD  .

## 2017-03-05 NOTE — PROGRESS NOTES
RENAL DAILY PROGRESS NOTE    Subjective:   Admitted for Acute CHF seen for CKD stage 3    Complaint: sitting up eating lunch. Grand daughter at bedside.  Breathing better, he thinks he is down 22 lbs      Current Facility-Administered Medications   Medication Dose Route Frequency    potassium chloride (K-DUR, KLOR-CON) SR tablet 20 mEq  20 mEq Oral TID    epoetin ron (EPOGEN;PROCRIT) injection 20,000 Units  20,000 Units SubCUTAneous Q MON, WED & FRI    ferrous sulfate tablet 325 mg  1 Tab Oral DAILY WITH BREAKFAST    zolpidem (AMBIEN) tablet 5 mg  5 mg Oral QHS PRN    albuterol-ipratropium (DUO-NEB) 2.5 MG-0.5 MG/3 ML  3 mL Nebulization Q6H PRN    sodium chloride (NS) flush 5-10 mL  5-10 mL IntraVENous PRN    acetaminophen (TYLENOL) tablet 650 mg  650 mg Oral Q4H PRN    HYDROcodone-acetaminophen (NORCO) 5-325 mg per tablet 1 Tab  1 Tab Oral Q4H PRN    furosemide (LASIX) 100 mg in 0.9% sodium chloride 100 mL infusion  3 mg/hr IntraVENous CONTINUOUS    fluticasone (FLONASE) 50 mcg/actuation nasal spray 2 Spray  2 Spray Both Nostrils DAILY    aspirin chewable tablet 81 mg  81 mg Oral DAILY    DOBUTamine (DOBUTREX) 500 mg/250 mL (2,000 mcg/mL) infusion  5 mcg/kg/min IntraVENous CONTINUOUS    allopurinol (ZYLOPRIM) tablet 100 mg  100 mg Oral DAILY    nitroglycerin (NITROSTAT) tablet 0.4 mg  0.4 mg SubLINGual PRN    pantoprazole (PROTONIX) tablet 40 mg  40 mg Oral ACB    pravastatin (PRAVACHOL) tablet 80 mg  80 mg Oral QHS    therapeutic multivitamin (THERAGRAN) tablet 1 Tab  1 Tab Oral DAILY    cholecalciferol (VITAMIN D3) tablet 2,000 Units  2,000 Units Oral DAILY    fish oil-omega-3 fatty acids 340-1,000 mg capsule 1 Cap  1 Cap Oral BID    carvedilol (COREG) tablet 6.25 mg  6.25 mg Oral BID WITH MEALS    sodium chloride (NS) flush 5-10 mL  5-10 mL IntraVENous Q8H    naloxone (NARCAN) injection 0.4 mg  0.4 mg IntraVENous PRN    ondansetron (ZOFRAN) injection 4 mg  4 mg IntraVENous Q4H PRN Objective:     Patient Vitals for the past 24 hrs:   Temp Pulse Resp BP SpO2   03/05/17 1211 97.9 °F (36.6 °C) 69 20 107/73 99 %   03/05/17 0845 97.6 °F (36.4 °C) 76 22 137/69 93 %   03/05/17 0416 98.2 °F (36.8 °C) 67 18 126/66 100 %   03/04/17 1904 98.2 °F (36.8 °C) 71 18 111/71 100 %        Weight change: -0.408 kg (-14.4 oz)     03/03 1901 - 03/05 0700  In: 1095.2 [P.O.:680; I.V.:415.2]  Out: 5700 [Urine:5700]    Intake/Output Summary (Last 24 hours) at 03/05/17 1638  Last data filed at 03/05/17 1222   Gross per 24 hour   Intake           1095.2 ml   Output             2800 ml   Net          -1704.8 ml     Physical Exam: alert, oriented x 3 afebrile    HEENT: non icteric  Cardiovascular: regular no rub + systolic Murmur  C/L: dec both bases, no rales or wheezing  Abdomen: + ascites  Ext: + LE edema    Data Review:     LABS:   Hematology:   Recent Labs      03/05/17   0256  03/04/17   0159  03/03/17   1151   WBC  4.2*  4.1*  4.4*   HGB  7.8*  8.4*  8.5*   HCT  25.3*  27.7*  28.5*   Pl 55K  Chemistry:   Recent Labs      03/05/17   0256  03/04/17   1531  03/04/17   0159  03/03/17   1357   BUN  41*  41*  38*  40*   CREA  1.77*  1.99*  1.93*  1.86*   CA  9.1  9.6  9.0  9.1   K  3.4*  3.5  3.4*  3.2*   NA  135*  133*  135*  135*   CL  88*  88*  87*  88*   CO2  40*  42*  41*  42*   PHOS  3.0   --    --    --    GLU  88  88  110*  114*   Mag 2  24 H urine still pend    IMPRESSION AND PLAN:   CKD stage 3 renal function fairly stable good diuresis . Check UPCR  Metabolic alkalosis improving still on Lasix drip along with dobutamine. Defer to cardio.    Hypokalemia  From inc urinary loses, inc K supplement to TID and K in diet  Anemia low Fe stores  Cont po Fe cont epo         Rose Dunlap MD  3/5/2017

## 2017-03-05 NOTE — PROGRESS NOTES
Edna Alvarado M.D. PROGRESS NOTE    Name: Jacob Gaming MRN: [id number]   : 1940 Hospital: [address] [place] [address]   Date: 3/5/2017  Admission Date: 2017     Subjective/Objective/Plans  1. Acute-on-chronic diastolic heart failure. 2. History of coronary artery bypass surgery. 3. Hypertension. 4. Hyperlipidemia. 5. History of coronary artery bypass and defibrillator placement, as well  as stent placement. 6. Obstructive sleep apnea. 7. History of mini strokes. 8. Ascites       VSS. Large urine output  K+ 3.4     Plan  Lasix drip  KDUR 20 meq BID    Vital Signs:  Visit Vitals    /73    Pulse 69    Temp 97.9 °F (36.6 °C)    Resp 20    Ht 6' (1.829 m)    Wt 100.7 kg (222 lb)    SpO2 99%    BMI 30.11 kg/m2       O2 Device: Nasal cannula   O2 Flow Rate (L/min): 3 l/min   Temp (24hrs), Av.1 °F (36.7 °C), Min:97.6 °F (36.4 °C), Max:98.4 °F (36.9 °C)     12:29 PM  Intake/Output:   Last shift:      701 -  190  In: -   Out: 1200 [Urine:1200]  Last 3 shifts: 1901 -  07  In: 1095.2 [P.O.:680; I.V.:415.2]  Out: 5700 [Urine:5700]    Intake/Output Summary (Last 24 hours) at 17 1229  Last data filed at 17 1222   Gross per 24 hour   Intake           1095.2 ml   Output             2800 ml   Net          -1704.8 ml         Physical Exam:    General: in no apparent distress, in no respiratory distress and acyanotic and alert    HEENT: pupils equal, no ear discharge   Neck: No abnormally enlarged lymph nodes. , no JDV   Mouth: MMM no lesions    Chest: normal, no breast masses   Lungs: decreased air exchange RLL   Heart: Regular rate and rhythm   Abdomen: gross ascites noted   Extremity: 2+ edema   Neuro: alert    Skin: Skin color, texture, turgor normal. No rashes or lesions    Data Review:    Labs: Results:       Chemistry Recent Labs      17   0256  17   1531  17   0159   GLU  88  88  110*   NA  135*  133*  135*   K  3.4*  3.5  3.4*   CL  88* 88*  87*   CO2  40*  42*  41*   BUN  41*  41*  38*   CREA  1.77*  1.99*  1.93*   CA  9.1  9.6  9.0   AGAP  7  3  7   BUCR  23*  21*  20      CBC w/Diff Recent Labs      03/05/17   0256  03/04/17   0159  03/03/17   1151   WBC  4.2*  4.1*  4.4*   RBC  2.75*  3.02*  3.09*   HGB  7.8*  8.4*  8.5*   HCT  25.3*  27.7*  28.5*   PLT  55*  48*  56*   GRANS  64  67  63   LYMPH  19*  15*  17*   EOS  8*  6*  9*      Cardiac Enzymes No results for input(s): CPK, CKND1, TYSON in the last 72 hours. No lab exists for component: CKRMB, TROIP   Coagulation No results for input(s): PTP, INR, APTT in the last 72 hours. No lab exists for component: INREXT    Lipid Panel Lab Results   Component Value Date/Time    Cholesterol, total 100 07/22/2016 05:20 AM    HDL Cholesterol 31 07/22/2016 05:20 AM    LDL, calculated 54.4 07/22/2016 05:20 AM    VLDL, calculated 14.6 07/22/2016 05:20 AM    Triglyceride 73 07/22/2016 05:20 AM    CHOL/HDL Ratio 3.2 07/22/2016 05:20 AM      BNP No results for input(s): BNPP in the last 72 hours. Liver Enzymes No results for input(s): TP, ALB, TBILI, AP, SGOT, ALT, CBIL in the last 72 hours. Thyroid Studies Lab Results   Component Value Date/Time    TSH 4.23 07/22/2016 05:20 AM          Procedures/imaging: see electronic medical records for all procedures, Xrays and details which were not copied into this note but were reviewed. Allergies:  No Known Allergies    Home Medications:  Prior to Admission Medications   Prescriptions Last Dose Informant Patient Reported? Taking? Cholecalciferol, Vitamin D3, (VITAMIN D3) 1,000 unit cap   Yes No   Sig: Take 2,000 Units by mouth daily. Omega-3 Fatty Acids 300 mg cap   Yes No   Sig: Take 1 Cap by mouth two (2) times a day. allopurinol (ZYLOPRIM) 100 mg tablet   Yes No   Sig: Take 100 mg by mouth daily. aspirin delayed-release 81 mg tablet   Yes No   Sig: Take 162 mg by mouth daily.    carvedilol (COREG) 6.25 mg tablet   No No   Sig: Take 1 Tab by mouth two (2) times daily (with meals). furosemide (LASIX) 40 mg tablet   No No   Sig: One tab daily  Indications: PERIPHERAL EDEMA DUE TO CHRONIC HEART FAILURE   lisinopril (PRINIVIL, ZESTRIL) 5 mg tablet   No No   Sig: Take 1 Tab by mouth daily. multivitamin (ONE A DAY) tablet   Yes No   Sig: Take 1 Tab by mouth daily. nitroglycerin (NITROSTAT) 0.4 mg SL tablet   Yes No   Sig: by SubLINGual route every five (5) minutes as needed for Chest Pain. omeprazole (PRILOSEC) 20 mg capsule   Yes No   Sig: Take 20 mg by mouth daily. pravastatin (PRAVACHOL) 80 mg tablet   Yes No   Sig: Take 80 mg by mouth nightly.       Facility-Administered Medications: None       Current Medications:  Current Facility-Administered Medications   Medication Dose Route Frequency    potassium chloride (K-DUR, KLOR-CON) SR tablet 20 mEq  20 mEq Oral BID    epoetin ron (EPOGEN;PROCRIT) injection 20,000 Units  20,000 Units SubCUTAneous Q MON, WED & FRI    ferrous sulfate tablet 325 mg  1 Tab Oral DAILY WITH BREAKFAST    zolpidem (AMBIEN) tablet 5 mg  5 mg Oral QHS PRN    albuterol-ipratropium (DUO-NEB) 2.5 MG-0.5 MG/3 ML  3 mL Nebulization Q6H PRN    sodium chloride (NS) flush 5-10 mL  5-10 mL IntraVENous PRN    acetaminophen (TYLENOL) tablet 650 mg  650 mg Oral Q4H PRN    HYDROcodone-acetaminophen (NORCO) 5-325 mg per tablet 1 Tab  1 Tab Oral Q4H PRN    furosemide (LASIX) 100 mg in 0.9% sodium chloride 100 mL infusion  3 mg/hr IntraVENous CONTINUOUS    fluticasone (FLONASE) 50 mcg/actuation nasal spray 2 Spray  2 Spray Both Nostrils DAILY    aspirin chewable tablet 81 mg  81 mg Oral DAILY    DOBUTamine (DOBUTREX) 500 mg/250 mL (2,000 mcg/mL) infusion  5 mcg/kg/min IntraVENous CONTINUOUS    allopurinol (ZYLOPRIM) tablet 100 mg  100 mg Oral DAILY    nitroglycerin (NITROSTAT) tablet 0.4 mg  0.4 mg SubLINGual PRN    pantoprazole (PROTONIX) tablet 40 mg  40 mg Oral ACB    pravastatin (PRAVACHOL) tablet 80 mg  80 mg Oral QHS    therapeutic multivitamin (THERAGRAN) tablet 1 Tab  1 Tab Oral DAILY    cholecalciferol (VITAMIN D3) tablet 2,000 Units  2,000 Units Oral DAILY    fish oil-omega-3 fatty acids 340-1,000 mg capsule 1 Cap  1 Cap Oral BID    carvedilol (COREG) tablet 6.25 mg  6.25 mg Oral BID WITH MEALS    sodium chloride (NS) flush 5-10 mL  5-10 mL IntraVENous Q8H    naloxone (NARCAN) injection 0.4 mg  0.4 mg IntraVENous PRN    ondansetron (ZOFRAN) injection 4 mg  4 mg IntraVENous Q4H PRN       Chart and notes reviewed. Data reviewed. I have evaluated and examined the patient.         IMPRESSION:   Patient Active Problem List   Diagnosis Code    Hematospermia R36.1    CAD (coronary artery disease) I25.10    PVD (peripheral vascular disease) ([facility] I73.9    CHF (congestive heart failure) ([facility] I50.9    Anasarca R60.1    Hx of CABG Z95.1    H/O carotid endarterectomy Z98.890    AICD (automatic cardioverter/defibrillator) present Z95.810    Acute exacerbation of CHF (congestive heart failure) (HCC) P22.3    Diastolic CHF, acute on chronic (HCC) I50.33    HTN (hypertension) I10    HLD (hyperlipidemia) E78.5    Acute CHF (congestive heart failure) ([facility]) I50.9    Acute on chronic diastolic (congestive) heart failure ([facility] I50.33 ·         PLAN:/DISCUSSION:   · kcl as ordered        Antonette Duque MD  3/5/2017, 12:29 PM

## 2017-03-05 NOTE — PROGRESS NOTES
1904 - Received bedside report from [facility], patient was sitting on side of bed, oriented to call button. 12 - Gave report to Dustin Gresham RN, using SBAR, MAR, and Kardex.

## 2017-03-06 NOTE — PROGRESS NOTES
[facility]      CARDIOLOGY PROGRESS NOTE  RECS:      1. Acute on chronic diastolic congestive heart failure-  Improving but still with significant  edema. Continue with Fluid restriction. Stricts I&O. Monitor electrolytes and renal function. ECHO with mod AS, MR likely causing CHF. Diuresing well  2. Anasarca- improving continue with lasix drip plans to change to po tomorrow. CO2 stable monitor. continue iv dobutamine due to MR/AS  3. S/p CABG in 2013 in [facility] 7- stable denies chest pain or pressure continue with asa, statin and bb.  4. S/p AICD- [employer]. normal function interrogated on 02/27/17  5. Hx Carotid endarterectomy- bilateral in 2015    6. KYLEE on CKD- monitor renal function nephrology following   7. Pancytopenia- consider oncology consult   8. Hypertension- normal low . continue with bb monitor closely   9. Mitral regurgitation -mild to moderate regurgitation echo on 2016   10.  Aortic stenosis : moderate, will f/u          ASSESSMENT:  Hospital Problems  Date Reviewed: 8/13/2016          Codes Class Noted POA    Acute on chronic diastolic (congestive) heart failure (HCC) ICD-10-CM: I50.33  ICD-9-CM: 428.33, 428.0  2/27/2017 Unknown        Acute CHF (congestive heart failure) ([facility] ICD-10-CM: I50.9  ICD-9-CM: 428.0  2/26/2017 Unknown        HTN (hypertension) (Chronic) ICD-10-CM: I10  ICD-9-CM: 401.9  9/4/2016 Yes        HLD (hyperlipidemia) (Chronic) ICD-10-CM: E78.5  ICD-9-CM: 272.4  9/4/2016 Yes        Diastolic CHF, acute on chronic ([facility] ICD-10-CM: I50.33  ICD-9-CM: 428.33, 428.0  8/17/2016 Yes        Acute exacerbation of CHF (congestive heart failure) ([facility] ICD-10-CM: I50.9  ICD-9-CM: 428.0  8/12/2016 Yes        Hx of CABG ICD-10-CM: Z95.1  ICD-9-CM: V45.81  7/22/2016 Yes    Overview Signed 7/22/2016  5:23 PM by Ghulam Mauro NP     2013             H/O carotid endarterectomy ICD-10-CM: Z98.890  ICD-9-CM: V45.89  7/22/2016 Yes    Overview Signed 7/22/2016 5:27 PM by Silvino Lazo NP     1160             AICD (automatic cardioverter/defibrillator) present ICD-10-CM: Z95.810  ICD-9-CM: V45.02  7/22/2016 Yes    Overview Signed 7/22/2016  5:27 PM by Silvino Lazo NP     2015             CHF (congestive heart failure) ([facility] ICD-10-CM: I50.9  ICD-9-CM: 428.0  7/21/2016 Unknown        Hematospermia ICD-10-CM: R36.1  ICD-9-CM: 608.82  3/5/2014 Yes        CAD (coronary artery disease) ICD-10-CM: I25.10  ICD-9-CM: 414.00  3/5/2014 Yes        PVD (peripheral vascular disease) ([facility] ICD-10-CM: I73.9  ICD-9-CM: 443.9  3/5/2014 Yes                SUBJECTIVE:  No CP  Improving SOB    OBJECTIVE:    VS:   Visit Vitals    /71 (BP 1 Location: Right arm, BP Patient Position: At rest)    Pulse 66    Temp 97.6 °F (36.4 °C)    Resp 18    Ht 6' (1.829 m)    Wt 99.4 kg (219 lb 2.2 oz)    SpO2 92%    BMI 29.72 kg/m2         Intake/Output Summary (Last 24 hours) at 03/06/17 1201  Last data filed at 03/06/17 0936   Gross per 24 hour   Intake           1105.2 ml   Output             3650 ml   Net          -2544.8 ml     TELE: normal sinus rhythm    General: alert and in no apparent distress  HENT: Normocephalic, atraumatic. Normal external eye.   Neck :  bruit present, increased JVP  Cardiac:  regular rate and rhythm, systolic murmur: early systolic 3/6, crescendo at 2nd right intercostal space, radiates to carotids, 2nd systolic murmur: late systolic 3/6, blowing at apex  Chest/Lungs:mild rales heard lung bases  Abdomen: Soft, nontender, no masses, +ascites/abd wall edema  Extremities:  No c/c/2+edema UE/LE, peripheral pulses present      Labs: Results:       Chemistry Recent Labs      03/06/17   0311  03/05/17   0256  03/04/17   1531  03/04/17   0159   GLU  80  88  88  110*   NA  133*  135*  133*  135*   K  3.6  3.4*  3.5  3.4*   CL  91*  88*  88*  87*   CO2  39*  40*  42*  41*   BUN  46*  41*  41*  38*   CREA  2.00*  1.77*  1.99*  1.93*   CA  8.9  9.1  9.6  9.0   MG  1.9 2.0   --   1.9   PHOS   --   3.0   --    --    AGAP  3  7  3  7   BUCR  23*  23*  21*  20   ALB  2.8*   --    --    --       CBC w/Diff Recent Labs      03/06/17   0311 03/05/17   0256  03/04/17   0159   WBC  4.8  4.2*  4.1*   RBC  2.90*  2.75*  3.02*   HGB  8.0*  7.8*  8.4*   HCT  26.6*  25.3*  27.7*   PLT  CLUMPED PLATELETS  55*  48*   GRANS  63  64  67   LYMPH  20*  19*  15*   EOS  7*  8*  6*      Cardiac Enzymes No results for input(s): CPK, CKND1, TYSON in the last 72 hours. No lab exists for component: CKRMB, TROIP   Coagulation No results for input(s): PTP, INR, APTT in the last 72 hours. No lab exists for component: INREXT, INREXT    Lipid Panel Lab Results   Component Value Date/Time    Cholesterol, total 100 07/22/2016 05:20 AM    HDL Cholesterol 31 07/22/2016 05:20 AM    LDL, calculated 54.4 07/22/2016 05:20 AM    VLDL, calculated 14.6 07/22/2016 05:20 AM    Triglyceride 73 07/22/2016 05:20 AM    CHOL/HDL Ratio 3.2 07/22/2016 05:20 AM      BNP No results for input(s): BNPP in the last 72 hours. Liver Enzymes Recent Labs      03/06/17 0311   ALB  2.8*      Digoxin    Thyroid Studies Lab Results   Component Value Date/Time    TSH 4.23 07/22/2016 05:20 AM              Greer Viera NP  [phone] supervised. I have independently evaluated and examined the patient. All relevant labs and testing data's are reviewed. Care plan discussed and updated after review.     Shara Hilario MD

## 2017-03-06 NOTE — PROGRESS NOTES
Roshan Cortes M.D. PROGRESS NOTE    Name: Jacob Church MRN: [id number]   : 1940 Hospital: [facility]   Date: 3/6/2017  Admission Date: 2017     Subjective/Objective/Plans    1. Acute-on-chronic diastolic heart failure. 2. History of coronary artery bypass surgery. 3. Hypertension. 4. Hyperlipidemia. 5. History of coronary artery bypass and defibrillator placement, as well  as stent placement. 6. Obstructive sleep apnea. 7. History of mini strokes. 8. Ascites    Tolerating Lasix drip and responding to treatment except ascites still much    Plan  Continue Lasix drip  Vital Signs:  Visit Vitals    /70 (BP 1 Location: Left arm, BP Patient Position: Sitting)    Pulse 67    Temp 97.6 °F (36.4 °C)    Resp 18    Ht 6' (1.829 m)    Wt 99.4 kg (219 lb 2.2 oz)    SpO2 94%    BMI 29.72 kg/m2       O2 Device: Nasal cannula   O2 Flow Rate (L/min): 3 l/min   Temp (24hrs), Av.8 °F (36.6 °C), Min:97.6 °F (36.4 °C), Max:98.1 °F (36.7 °C)     3:54 PM  Intake/Output:   Last shift:       07 -  1900  In: 150 [P.O.:150]  Out: -   Last 3 shifts:  1901 -  0700  In: 1382.8 [P.O.:760; I.V.:622.8]  Out: 5250 [Urine:5250]    Intake/Output Summary (Last 24 hours) at 17 1554  Last data filed at 17 0936   Gross per 24 hour   Intake           1105.2 ml   Output             2450 ml   Net          -1344.8 ml         Physical Exam:    General: in no apparent distress, in no respiratory distress and acyanotic and alert    HEENT: pupils equal, no ear discharge   Neck: No abnormally enlarged lymph nodes. , no JDV   Mouth: MMM no lesions    Chest: normal, no breast masses   Lungs: decreased air exchange bilaterally   Heart: Regular rate and rhythm   Abdomen: abdomen is soft without significant tenderness, masses, organomegaly or guarding   Extremity: 1+ edema   Neuro: alert    Skin: Skin color, texture, turgor normal. No rashes or lesions    Data Review:    Labs: Results:       Chemistry Recent Labs      03/06/17 0311 03/05/17 0256 03/04/17   1531   GLU  80  88  88   NA  133*  135*  133*   K  3.6  3.4*  3.5   CL  91*  88*  88*   CO2  39*  40*  42*   BUN  46*  41*  41*   CREA  2.00*  1.77*  1.99*   CA  8.9  9.1  9.6   AGAP  3  7  3   BUCR  23*  23*  21*   ALB  2.8*   --    --       CBC w/Diff Recent Labs      03/06/17 0311 03/05/17 0256 03/04/17   0159   WBC  4.8  4.2*  4.1*   RBC  2.90*  2.75*  3.02*   HGB  8.0*  7.8*  8.4*   HCT  26.6*  25.3*  27.7*   PLT  CLUMPED PLATELETS  55*  48*   GRANS  63  64  67   LYMPH  20*  19*  15*   EOS  7*  8*  6*      Cardiac Enzymes No results for input(s): CPK, CKND1, TYSON in the last 72 hours. No lab exists for component: CKRMB, TROIP   Coagulation No results for input(s): PTP, INR, APTT in the last 72 hours. No lab exists for component: INREXT    Lipid Panel Lab Results   Component Value Date/Time    Cholesterol, total 100 07/22/2016 05:20 AM    HDL Cholesterol 31 07/22/2016 05:20 AM    LDL, calculated 54.4 07/22/2016 05:20 AM    VLDL, calculated 14.6 07/22/2016 05:20 AM    Triglyceride 73 07/22/2016 05:20 AM    CHOL/HDL Ratio 3.2 07/22/2016 05:20 AM      BNP No results for input(s): BNPP in the last 72 hours. Liver Enzymes Recent Labs      03/06/17 0311   ALB  2.8*      Thyroid Studies Lab Results   Component Value Date/Time    TSH 4.23 07/22/2016 05:20 AM          Procedures/imaging: see electronic medical records for all procedures, Xrays and details which were not copied into this note but were reviewed. Allergies:  No Known Allergies    Home Medications:  Prior to Admission Medications   Prescriptions Last Dose Informant Patient Reported? Taking? Cholecalciferol, Vitamin D3, (VITAMIN D3) 1,000 unit cap   Yes No   Sig: Take 2,000 Units by mouth daily. Omega-3 Fatty Acids 300 mg cap   Yes No   Sig: Take 1 Cap by mouth two (2) times a day.    allopurinol (ZYLOPRIM) 100 mg tablet   Yes No   Sig: Take 100 mg by mouth daily. aspirin delayed-release 81 mg tablet   Yes No   Sig: Take 162 mg by mouth daily. carvedilol (COREG) 6.25 mg tablet   No No   Sig: Take 1 Tab by mouth two (2) times daily (with meals). furosemide (LASIX) 40 mg tablet   No No   Sig: One tab daily  Indications: PERIPHERAL EDEMA DUE TO CHRONIC HEART FAILURE   lisinopril (PRINIVIL, ZESTRIL) 5 mg tablet   No No   Sig: Take 1 Tab by mouth daily. multivitamin (ONE A DAY) tablet   Yes No   Sig: Take 1 Tab by mouth daily. nitroglycerin (NITROSTAT) 0.4 mg SL tablet   Yes No   Sig: by SubLINGual route every five (5) minutes as needed for Chest Pain. omeprazole (PRILOSEC) 20 mg capsule   Yes No   Sig: Take 20 mg by mouth daily. pravastatin (PRAVACHOL) 80 mg tablet   Yes No   Sig: Take 80 mg by mouth nightly.       Facility-Administered Medications: None       Current Medications:  Current Facility-Administered Medications   Medication Dose Route Frequency    potassium chloride (K-DUR, KLOR-CON) SR tablet 20 mEq  20 mEq Oral TID    epoetin ron (EPOGEN;PROCRIT) injection 20,000 Units  20,000 Units SubCUTAneous Q MON, WED & FRI    ferrous sulfate tablet 325 mg  1 Tab Oral DAILY WITH BREAKFAST    zolpidem (AMBIEN) tablet 5 mg  5 mg Oral QHS PRN    albuterol-ipratropium (DUO-NEB) 2.5 MG-0.5 MG/3 ML  3 mL Nebulization Q6H PRN    sodium chloride (NS) flush 5-10 mL  5-10 mL IntraVENous PRN    acetaminophen (TYLENOL) tablet 650 mg  650 mg Oral Q4H PRN    HYDROcodone-acetaminophen (NORCO) 5-325 mg per tablet 1 Tab  1 Tab Oral Q4H PRN    furosemide (LASIX) 100 mg in 0.9% sodium chloride 100 mL infusion  3 mg/hr IntraVENous CONTINUOUS    fluticasone (FLONASE) 50 mcg/actuation nasal spray 2 Spray  2 Spray Both Nostrils DAILY    aspirin chewable tablet 81 mg  81 mg Oral DAILY    DOBUTamine (DOBUTREX) 500 mg/250 mL (2,000 mcg/mL) infusion  5 mcg/kg/min IntraVENous CONTINUOUS    allopurinol (ZYLOPRIM) tablet 100 mg  100 mg Oral DAILY    nitroglycerin (NITROSTAT) tablet 0.4 mg  0.4 mg SubLINGual PRN    pantoprazole (PROTONIX) tablet 40 mg  40 mg Oral ACB    pravastatin (PRAVACHOL) tablet 80 mg  80 mg Oral QHS    therapeutic multivitamin (THERAGRAN) tablet 1 Tab  1 Tab Oral DAILY    cholecalciferol (VITAMIN D3) tablet 2,000 Units  2,000 Units Oral DAILY    fish oil-omega-3 fatty acids 340-1,000 mg capsule 1 Cap  1 Cap Oral BID    carvedilol (COREG) tablet 6.25 mg  6.25 mg Oral BID WITH MEALS    sodium chloride (NS) flush 5-10 mL  5-10 mL IntraVENous Q8H    naloxone (NARCAN) injection 0.4 mg  0.4 mg IntraVENous PRN    ondansetron (ZOFRAN) injection 4 mg  4 mg IntraVENous Q4H PRN       Chart and notes reviewed. Data reviewed. I have evaluated and examined the patient.         IMPRESSION:   Patient Active Problem List   Diagnosis Code    Hematospermia R36.1    CAD (coronary artery disease) I25.10    PVD (peripheral vascular disease) ([facility] I73.9    CHF (congestive heart failure) (HCC) I50.9    Anasarca R60.1    Hx of CABG Z95.1    H/O carotid endarterectomy Z98.890    AICD (automatic cardioverter/defibrillator) present Z95.810    Acute exacerbation of CHF (congestive heart failure) (HCC) V33.6    Diastolic CHF, acute on chronic (HCC) I50.33    HTN (hypertension) I10    HLD (hyperlipidemia) E78.5    Acute CHF (congestive heart failure) (HCC) I50.9    Acute on chronic diastolic (congestive) heart failure (HCC) I50.33 ·         PLAN:/DISCUSSION:   · Continue current treatment        Familia Larry MD  3/6/2017, 3:54 PM

## 2017-03-06 NOTE — ROUTINE PROCESS
Bedside and Verbal shift change report received from BREA Davies RN. Report included the following information SBAR, Kardex, MAR and Cardiac Rhythm NSR.

## 2017-03-06 NOTE — INTERDISCIPLINARY ROUNDS
IDR/SLIDR Summary          Patient: Asmita Zepeda MRN: [id number]    Age: 78 y.o. YOB: 1940 Room/Bed: [facility] [place]   Admit Diagnosis: Acute CHF (congestive heart failure) (HCC)  CHF (congestive heart failure) (HCC)  Acute on chronic diastolic (congestive) heart failure (HCC)  Principal Diagnosis: <principal problem not specified>   Goals: ***  Readmission: {YES/NO:84477}  Quality Measure: {Quality Measures:21346}  VTE Prophylaxis: {VTE Prophylaxis:21798}  Influenza Vaccine screening completed? {YES/NO:40083}  Pneumococcal Vaccine screening completed? {YES/NO:89409}  Mobility needs: {Yes/No Caps:22126}   Nutrition plan:{Yes/No Caps:22126}  Consults:{Consult needed:21347}    Financial concerns:{Yes/No Caps:22126}  Escalated to CM? {YES/NO:20626}  RRAT Score: ***   Interventions:{Intervention:21348}  Testing due for pt today?  {YES/NO:62057}  LOS: 7 days Expected length of stay *** days  Discharge plan: ***   PCP: Regan Moss MD  Transportation needs: {Yes/No Caps:22126}    Days before discharge:{Days before Discharge:21344}  Discharge disposition: {Discharge Destination:77147::\"Home\"}    Signed:     Lizbet Bazzi RN  3/6/2017  9:30 AM

## 2017-03-06 NOTE — ROUTINE PROCESS
Bedside and Verbal shift change report given to [address] (oncoming nurse) by Cristhian Peña RN (offgoing nurse). Report given with SBAR, Kardex and MAR.

## 2017-03-06 NOTE — PROGRESS NOTES
NUTRITION    Nutrition Screen      RECOMMENDATIONS / PLAN:     - No nutrition intervention indicated at this time. Will re-screen as appropriate. NUTRITION INTERVENTIONS & DIAGNOSIS:     Nutrition Diagnosis: No nutrition diagnosis at this time. ASSESSMENT:     Subjective/Objective:  Excellent appetite, consuming all of meals. Follows low sugar, low sodium diet at home. Current Appetite:   [x] Good     [] Fair     [] Poor     [] Other:  Appetite/meal intake prior to admission:   [x] Good     [] Fair     [] Poor     [] Other:    Diet: DIET CARDIAC Regular; FR 1000ML      Food Allergies: NKFA  Feeding Limitations:  [] Swallowing difficulty    [] Chewing difficulty    [] Other:  Current Meal Intake: Patient Vitals for the past 100 hrs:   % Diet Eaten   03/06/17 0936 100 %   03/03/17 1000 50 %     Average po intake adequate to meet patients estimated nutritional needs:   [x] Yes     [] No   [] Unable to determine at this time    BM:  3/3  Skin Integrity: calf blister   Edema: 3+ weeping LEs   Pertinent Medications: Reviewed    Recent Labs      03/06/17   0311  03/05/17   0256  03/04/17   1531  03/04/17   0159   NA  133*  135*  133*  135*   K  3.6  3.4*  3.5  3.4*   CL  91*  88*  88*  87*   CO2  39*  40*  42*  41*   GLU  80  88  88  110*   BUN  46*  41*  41*  38*   CREA  2.00*  1.77*  1.99*  1.93*   CA  8.9  9.1  9.6  9.0   MG  1.9  2.0   --   1.9   PHOS   --   3.0   --    --    ALB  2.8*   --    --    --        Intake/Output Summary (Last 24 hours) at 03/06/17 1444  Last data filed at 03/06/17 0936   Gross per 24 hour   Intake           1105.2 ml   Output             2450 ml   Net          -1344.8 ml       Anthropometrics:  Ht Readings from Last 1 Encounters:   02/26/17 6' (1.829 m)     Last 3 Recorded Weights in this Encounter    03/04/17 0615 03/05/17 0400 03/06/17 0400   Weight: 101.1 kg (222 lb 14.4 oz) 100.7 kg (222 lb) 99.4 kg (219 lb 2.2 oz)     Body mass index is 29.72 kg/(m^2).           Weight History: patient denies change in weight PTA, usual weight is 185-190 lb     Weight Metrics 3/6/2017 8/23/2016 8/12/2016 8/2/2016 3/23/2015 3/17/2015 3/16/2015   Weight 219 lb 2.2 oz 197 lb 9.6 oz - 214 lb 9.6 oz - - 165 lb   BMI 29.72 kg/m2 - 26.8 kg/m2 29.1 kg/m2 22.37 kg/m2 21.77 kg/m2 -        Admitting Diagnosis: Acute CHF (congestive heart failure) (HCC)  CHF (congestive heart failure) (HCC)  Acute on chronic diastolic (congestive) heart failure (HCC)  Past Medical History:   Diagnosis Date    Arrhythmia     A fib; has external vest and belt he wears for this    Arthritis     High blood pressure     Hypercholesteremia     Mini stroke ([facility] 2000    Unspecified sleep apnea     does not wear machine       Education Needs:        [x] None identified  [] Identified - Not appropriate at this time  []  Identified and addressed - refer to education log  Learning Limitations:   [x] None identified  [] Identified    Cultural, Confucianism & ethnic food preferences:  [x] None identified    [] Identified and addressed     ESTIMATED NUTRITION NEEDS:     Calories: 0092-4103 kcal (MSJx1.2-1.3) based on  [x] Actual BW: 100 kg     [] IBW:   Protein:  gm (0.8-1 gm/kg) based on  [x] Actual BW: 100 kg     [] IBW:   Fluid: 1 mL/kcal     MONITORING & EVALUATION:     Nutrition Goal(s):   1. Po intake of meals will meet >75% of patient estimated nutritional needs within the next 7 days.   Outcome:  [] Met/Ongoing    []  Not Met    [x] New/Initial Goal     Monitoring:   [x] Diet tolerance   [x] Meal intake   [] Supplement intake   [] GI symptoms/ability to tolerate po diet   [] Respiratory status   [] Plan of care      Previous Recommendations (for follow-up assessments only):     []   Implemented       []   Not Implemented (RD to address)     [] No Recommendation Made     Discharge Planning: cardiac diet   [x] Participated in care planning, discharge planning, & interdisciplinary rounds as appropriate      Ivan Douglas [address], [address] [place]    Pager: [phone]

## 2017-03-06 NOTE — PROGRESS NOTES
Hematology/Medical oncology Progress Note      NAME: Sheldon Castro   :  1940  MRM:  [id number]    Date/Time: 3/6/2017  8:14 AM         Subjective:     Mr Sahara Sumner is a 77 y/o man with CHF who was admitted with increasing leg swelling and increasing abdominal girth. He also has progressive anemia and thrombocytopenia. Past Medical History reviewed and unchanged from Admission History and Physical    Review of Systems   Constitutional: Positive for fatigue. Negative for activity change, appetite change, chills, diaphoresis, fever and unexpected weight change. HENT: Negative for congestion, facial swelling, mouth sores, nosebleeds, postnasal drip, trouble swallowing and voice change. Eyes: Negative for photophobia, pain, discharge and itching. Respiratory: Negative for apnea, cough, choking, chest tightness, wheezing and stridor. Cardiovascular: Positive for leg swelling. Negative for chest pain and palpitations. Gastrointestinal: Positive for abdominal distention. Negative for abdominal pain, blood in stool, constipation, diarrhea, nausea and rectal pain. Genitourinary: Negative for difficulty urinating, dysuria, flank pain, hematuria and urgency. Musculoskeletal: Negative for arthralgias, back pain, gait problem, joint swelling, neck pain and neck stiffness. Skin: Negative for color change, pallor, rash and wound. Neurological: Negative for dizziness, facial asymmetry, speech difficulty, weakness, light-headedness and headaches. Hematological: Negative for adenopathy. Does not bruise/bleed easily. Psychiatric/Behavioral: Negative for agitation, confusion, hallucinations and sleep disturbance. Objective:       Vitals:      Last 24hrs VS reviewed since prior progress note.  Most recent are:    Visit Vitals    /69 (BP 1 Location: Left arm, BP Patient Position: At rest)    Pulse 73    Temp 98.1 °F (36.7 °C)    Resp 20    Ht 6' (1.829 m)    Wt 100.7 kg (222 lb)    SpO2 94%    BMI 30.11 kg/m2     SpO2 Readings from Last 6 Encounters:   03/06/17 94%   08/23/16 91%   08/02/16 90%   03/17/15 100%    O2 Flow Rate (L/min): 3 l/min       Intake/Output Summary (Last 24 hours) at 03/06/17 0814  Last data filed at 03/05/17 1817   Gross per 24 hour   Intake            747.6 ml   Output             2200 ml   Net          -1452.4 ml          Exam:      Physical Exam   Nursing note and vitals reviewed. Constitutional: He is oriented to person, place, and time. He appears well-developed and well-nourished. No distress. HENT:   Head: Normocephalic and atraumatic. Mouth/Throat: No oropharyngeal exudate. Eyes: Conjunctivae and EOM are normal. Pupils are equal, round, and reactive to light. Right eye exhibits no discharge. Left eye exhibits no discharge. No scleral icterus. Neck: Normal range of motion. Neck supple. No tracheal deviation present. No thyromegaly present. Cardiovascular: Normal rate and regular rhythm. Exam reveals no gallop and no friction rub. No murmur heard. Pulmonary/Chest: Effort normal and breath sounds normal. No apnea. No respiratory distress. He has no wheezes. He has no rales. Chest wall is not dull to percussion. He exhibits no mass, no tenderness, no bony tenderness, no laceration, no crepitus, no edema, no deformity, no swelling and no retraction. Right breast exhibits no inverted nipple, no mass, no nipple discharge, no skin change and no tenderness. Left breast exhibits no inverted nipple, no mass, no nipple discharge, no skin change and no tenderness. Breasts are symmetrical.   Abdominal: Soft. Bowel sounds are normal. He exhibits distension. There is no tenderness. There is no rebound and no guarding. Musculoskeletal: Normal range of motion. He exhibits edema. He exhibits no tenderness. Lymphadenopathy:     He has no cervical adenopathy. Neurological: He is alert and oriented to person, place, and time.  Coordination normal.   Skin: Skin is warm and dry. No rash noted. He is not diaphoretic. No erythema. No pallor. Psychiatric: He has a normal mood and affect.  His behavior is normal. Thought content normal.       Telemetry reviewed:   normal sinus rhythm  Tubes:   Emerson    Lab Data Reviewed: (see below)      Medications:  Current Facility-Administered Medications   Medication Dose Route Frequency    potassium chloride (K-DUR, KLOR-CON) SR tablet 20 mEq  20 mEq Oral TID    epoetin ron (EPOGEN;PROCRIT) injection 20,000 Units  20,000 Units SubCUTAneous Q MON, WED & FRI    ferrous sulfate tablet 325 mg  1 Tab Oral DAILY WITH BREAKFAST    zolpidem (AMBIEN) tablet 5 mg  5 mg Oral QHS PRN    albuterol-ipratropium (DUO-NEB) 2.5 MG-0.5 MG/3 ML  3 mL Nebulization Q6H PRN    sodium chloride (NS) flush 5-10 mL  5-10 mL IntraVENous PRN    acetaminophen (TYLENOL) tablet 650 mg  650 mg Oral Q4H PRN    HYDROcodone-acetaminophen (NORCO) 5-325 mg per tablet 1 Tab  1 Tab Oral Q4H PRN    furosemide (LASIX) 100 mg in 0.9% sodium chloride 100 mL infusion  3 mg/hr IntraVENous CONTINUOUS    fluticasone (FLONASE) 50 mcg/actuation nasal spray 2 Spray  2 Spray Both Nostrils DAILY    aspirin chewable tablet 81 mg  81 mg Oral DAILY    DOBUTamine (DOBUTREX) 500 mg/250 mL (2,000 mcg/mL) infusion  5 mcg/kg/min IntraVENous CONTINUOUS    allopurinol (ZYLOPRIM) tablet 100 mg  100 mg Oral DAILY    nitroglycerin (NITROSTAT) tablet 0.4 mg  0.4 mg SubLINGual PRN    pantoprazole (PROTONIX) tablet 40 mg  40 mg Oral ACB    pravastatin (PRAVACHOL) tablet 80 mg  80 mg Oral QHS    therapeutic multivitamin (THERAGRAN) tablet 1 Tab  1 Tab Oral DAILY    cholecalciferol (VITAMIN D3) tablet 2,000 Units  2,000 Units Oral DAILY    fish oil-omega-3 fatty acids 340-1,000 mg capsule 1 Cap  1 Cap Oral BID    carvedilol (COREG) tablet 6.25 mg  6.25 mg Oral BID WITH MEALS    sodium chloride (NS) flush 5-10 mL  5-10 mL IntraVENous Q8H    naloxone (NARCAN) injection 0.4 mg  0.4 mg IntraVENous PRN    ondansetron [facility] injection 4 mg  4 mg IntraVENous Q4H PRN       ______________________________________________________________________      Lab Review:     Recent Labs      03/06/17 0311 03/05/17 0256 03/04/17 0159   WBC  4.8  4.2*  4.1*   HGB  8.0*  7.8*  8.4*   HCT  26.6*  25.3*  27.7*   PLT  CLUMPED PLATELETS  55*  48*     Recent Labs      03/06/17 0311 03/05/17 0256 03/04/17   1531  03/04/17 0159   NA  133*  135*  133*  135*   K  3.6  3.4*  3.5  3.4*   CL  91*  88*  88*  87*   CO2  39*  40*  42*  41*   GLU  80  88  88  110*   BUN  46*  41*  41*  38*   CREA  2.00*  1.77*  1.99*  1.93*   CA  8.9  9.1  9.6  9.0   MG  1.9  2.0   --   1.9   PHOS   --   3.0   --    --    ALB  2.8*   --    --    --      No components found for: GLPOC  No results for input(s): PH, PCO2, PO2, HCO3, FIO2 in the last 72 hours. No results for input(s): INR in the last 72 hours. No lab exists for component: INREXT, Maricarmen King, INREXT    Other pertinent lab:          Assessment:     Active Problems:    Hematospermia (3/5/2014)      CAD (coronary artery disease) (3/5/2014)      PVD (peripheral vascular disease) ([facility] (3/5/2014)      CHF (congestive heart failure) ([facility] (7/21/2016)      Hx of CABG (7/22/2016)      Overview: 2013      H/O carotid endarterectomy (7/22/2016)      Overview: 2015      AICD (automatic cardioverter/defibrillator) present (7/22/2016)      Overview: 2015      Acute exacerbation of CHF (congestive heart failure) ([facility] (3/72/7061)      Diastolic CHF, acute on chronic ([facility] (8/17/2016)      HTN (hypertension) (9/4/2016)      HLD (hyperlipidemia) (9/4/2016)      Acute CHF (congestive heart failure) ([facility] (2/26/2017)      Acute on chronic diastolic (congestive) heart failure ([facility] (2/27/2017)           Plan:     Risk of deterioration: low             1. ACKD: continue procrit at 20k units 3x weekly.   2. Thrombocytopenia: continue the current level of care; monitor platelets daily and begin gamma-globulin if the counts declines below 15k. 3. CHF: management per cardiology.          Total time spent with patient: 30 minutes                  Care Plan discussed with: Patient, Nursing Staff and Consultant/Specialist    Discussed:  Care Plan    Prophylaxis:  H2B/PPI    Disposition:  Home w/Family           ___________________________________________________    Attending Physician: Connor Wyman MD

## 2017-03-06 NOTE — PROGRESS NOTES
attended the interdisciplinary rounds for Tanya Patino, who is a 78 y.o.,male. Patients Primary Language is: [place]. According to the patients EMR Adventism Affiliation is: Deyanira Lyle.     The reason the Patient came to the hospital is:   Patient Active Problem List    Diagnosis Date Noted    Acute on chronic diastolic (congestive) heart failure ([address] 02/27/2017    Acute CHF (congestive heart failure) ([facility] 02/26/2017    HTN (hypertension) 09/04/2016    HLD (hyperlipidemia) 79/47/7188    Diastolic CHF, acute on chronic ([facility] 08/17/2016    Acute exacerbation of CHF (congestive heart failure) ([facility] 08/12/2016    Anasarca 07/22/2016    Hx of CABG 07/22/2016    H/O carotid endarterectomy 07/22/2016    AICD (automatic cardioverter/defibrillator) present 07/22/2016    CHF (congestive heart failure) ([facility] 07/21/2016    Hematospermia 03/05/2014    CAD (coronary artery disease) 03/05/2014    PVD (peripheral vascular disease) ([facility] 03/05/2014      Plan:  Chaplains will continue to follow and will provide pastoral care on an as needed/requested basis.     [facility]  Board Certified [address] [place] [address]   [phone]

## 2017-03-06 NOTE — PROGRESS NOTES
200 - Received report from [address] [place] [address], patient was sleeping in bed.

## 2017-03-07 NOTE — PROGRESS NOTES
Roland Thompson M.D. PROGRESS NOTE    Name: Sriram Giles MRN: [id number]   : 1940 Hospital: [facility]   Date: 3/7/2017  Admission Date: 2017     Subjective/Objective/Plans  1. Acute and chronic systolic CHF  2. Ascites  3. Thrombocytopenia  4. CAD/ CABGS\5. HLD  5. Hypokalemia  6. HTN  7. Gout  8. HLD    Lasix drip with good results  VSS  Dr Selin Palacios following thrombocytopenia    Plan  DC Lasix drip, give Lasix po  Coreg  Allopurinol for gout  Pravastatin  ASA  Epogen  DC rene    Vital Signs:  Visit Vitals    /69    Pulse 65    Temp 98.2 °F (36.8 °C)    Resp 20    Ht 6' (1.829 m)    Wt 99.4 kg (219 lb 2.2 oz)    SpO2 92%    BMI 29.72 kg/m2       O2 Device: Nasal cannula   O2 Flow Rate (L/min): 3.5 l/min   Temp (24hrs), Av.9 °F (36.6 °C), Min:97.6 °F (36.4 °C), Max:98.4 °F (36.9 °C)     8:05 AM  Intake/Output:   Last shift:         Last 3 shifts:  1901 -  0700  In: 1096.8 [P.O.:630; I.V.:466.8]  Out: 3950 [Urine:3950]    Intake/Output Summary (Last 24 hours) at 17 0805  Last data filed at 17 0529   Gross per 24 hour   Intake           889.21 ml   Output             2500 ml   Net         -1610.79 ml         Physical Exam:    General: in no apparent distress    HEENT: pupils equal, no ear discharge   Neck: No abnormally enlarged lymph nodes. , no JDV   Mouth: MMM no lesions    Chest: normal,    Lungs: decreased air exchange bilaterally   Heart: Regular rate and rhythm   Abdomen: abdomen is soft without significant tenderness, masses, organomegaly or guarding   Extremity: 2+ edema   Neuro: alert    Skin: Skin color, texture, turgor normal. No rashes or lesions    Data Review:    Labs: Results:       Chemistry Recent Labs      17   0235  17   0311  17   0256   GLU  93  80  88   NA  133*  133*  135*   K  4.0  3.6  3.4*   CL  90*  91*  88*   CO2  37*  39*  40*   BUN  46*  46*  41*   CREA  2.12*  2.00*  1.77*   CA  9.1  8.9  9.1   AGAP  6 3  7   BUCR  22*  23*  23*   ALB   --   2.8*   --       CBC w/Diff Recent Labs      03/07/17   0235  03/06/17   0311  03/05/17   0256   WBC  4.4*  4.8  4.2*   RBC  3.02*  2.90*  2.75*   HGB  8.3*  8.0*  7.8*   HCT  27.7*  26.6*  25.3*   PLT  59*  CLUMPED PLATELETS  55*   GRANS  65  63  64   LYMPH  17*  20*  19*   EOS  9*  7*  8*      Cardiac Enzymes No results for input(s): CPK, CKND1, TYSON in the last 72 hours. No lab exists for component: CKRMB, TROIP   Coagulation No results for input(s): PTP, INR, APTT in the last 72 hours. No lab exists for component: INREXT    Lipid Panel Lab Results   Component Value Date/Time    Cholesterol, total 100 07/22/2016 05:20 AM    HDL Cholesterol 31 07/22/2016 05:20 AM    LDL, calculated 54.4 07/22/2016 05:20 AM    VLDL, calculated 14.6 07/22/2016 05:20 AM    Triglyceride 73 07/22/2016 05:20 AM    CHOL/HDL Ratio 3.2 07/22/2016 05:20 AM      BNP No results for input(s): BNPP in the last 72 hours. Liver Enzymes Recent Labs      03/06/17   0311   ALB  2.8*      Thyroid Studies Lab Results   Component Value Date/Time    TSH 4.23 07/22/2016 05:20 AM          Procedures/imaging: see electronic medical records for all procedures, Xrays and details which were not copied into this note but were reviewed. Allergies:  No Known Allergies    Home Medications:  Prior to Admission Medications   Prescriptions Last Dose Informant Patient Reported? Taking? Cholecalciferol, Vitamin D3, (VITAMIN D3) 1,000 unit cap   Yes No   Sig: Take 2,000 Units by mouth daily. Omega-3 Fatty Acids 300 mg cap   Yes No   Sig: Take 1 Cap by mouth two (2) times a day. allopurinol (ZYLOPRIM) 100 mg tablet   Yes No   Sig: Take 100 mg by mouth daily. aspirin delayed-release 81 mg tablet   Yes No   Sig: Take 162 mg by mouth daily. carvedilol (COREG) 6.25 mg tablet   No No   Sig: Take 1 Tab by mouth two (2) times daily (with meals).    furosemide (LASIX) 40 mg tablet   No No   Sig: One tab daily Indications: PERIPHERAL EDEMA DUE TO CHRONIC HEART FAILURE   lisinopril (PRINIVIL, ZESTRIL) 5 mg tablet   No No   Sig: Take 1 Tab by mouth daily. multivitamin (ONE A DAY) tablet   Yes No   Sig: Take 1 Tab by mouth daily. nitroglycerin (NITROSTAT) 0.4 mg SL tablet   Yes No   Sig: by SubLINGual route every five (5) minutes as needed for Chest Pain. omeprazole (PRILOSEC) 20 mg capsule   Yes No   Sig: Take 20 mg by mouth daily. pravastatin (PRAVACHOL) 80 mg tablet   Yes No   Sig: Take 80 mg by mouth nightly.       Facility-Administered Medications: None       Current Medications:  Current Facility-Administered Medications   Medication Dose Route Frequency    potassium chloride (K-DUR, KLOR-CON) SR tablet 20 mEq  20 mEq Oral TID    epoetin ron (EPOGEN;PROCRIT) injection 20,000 Units  20,000 Units SubCUTAneous Q MON, WED & FRI    ferrous sulfate tablet 325 mg  1 Tab Oral DAILY WITH BREAKFAST    zolpidem (AMBIEN) tablet 5 mg  5 mg Oral QHS PRN    albuterol-ipratropium (DUO-NEB) 2.5 MG-0.5 MG/3 ML  3 mL Nebulization Q6H PRN    sodium chloride (NS) flush 5-10 mL  5-10 mL IntraVENous PRN    acetaminophen (TYLENOL) tablet 650 mg  650 mg Oral Q4H PRN    HYDROcodone-acetaminophen (NORCO) 5-325 mg per tablet 1 Tab  1 Tab Oral Q4H PRN    furosemide (LASIX) 100 mg in 0.9% sodium chloride 100 mL infusion  3 mg/hr IntraVENous CONTINUOUS    fluticasone (FLONASE) 50 mcg/actuation nasal spray 2 Spray  2 Spray Both Nostrils DAILY    aspirin chewable tablet 81 mg  81 mg Oral DAILY    DOBUTamine (DOBUTREX) 500 mg/250 mL (2,000 mcg/mL) infusion  5 mcg/kg/min IntraVENous CONTINUOUS    allopurinol (ZYLOPRIM) tablet 100 mg  100 mg Oral DAILY    nitroglycerin (NITROSTAT) tablet 0.4 mg  0.4 mg SubLINGual PRN    pantoprazole (PROTONIX) tablet 40 mg  40 mg Oral ACB    pravastatin (PRAVACHOL) tablet 80 mg  80 mg Oral QHS    therapeutic multivitamin (THERAGRAN) tablet 1 Tab  1 Tab Oral DAILY    cholecalciferol (VITAMIN D3) tablet 2,000 Units  2,000 Units Oral DAILY    fish oil-omega-3 fatty acids 340-1,000 mg capsule 1 Cap  1 Cap Oral BID    carvedilol (COREG) tablet 6.25 mg  6.25 mg Oral BID WITH MEALS    sodium chloride (NS) flush 5-10 mL  5-10 mL IntraVENous Q8H    naloxone (NARCAN) injection 0.4 mg  0.4 mg IntraVENous PRN    ondansetron (ZOFRAN) injection 4 mg  4 mg IntraVENous Q4H PRN       Chart and notes reviewed. Data reviewed. I have evaluated and examined the patient.         IMPRESSION:   Patient Active Problem List   Diagnosis Code    Hematospermia R36.1    CAD (coronary artery disease) I25.10    PVD (peripheral vascular disease) ([facility] I73.9    CHF (congestive heart failure) ([facility] I50.9    Anasarca R60.1    Hx of CABG Z95.1    H/O carotid endarterectomy Z98.890    AICD (automatic cardioverter/defibrillator) present Z95.810    Acute exacerbation of CHF (congestive heart failure) (HCC) M84.6    Diastolic CHF, acute on chronic (HCC) I50.33    HTN (hypertension) I10    HLD (hyperlipidemia) E78.5    Acute CHF (congestive heart failure) (HCC) I50.9    Acute on chronic diastolic (congestive) heart failure (HCC) I50.33 ·         PLAN:/DISCUSSION:   · John Lasix drip  · Home soon        Joss Lares MD  3/7/2017, 8:05 AM

## 2017-03-07 NOTE — PROGRESS NOTES
[facility]      CARDIOLOGY PROGRESS NOTE  RECS:      1. Acute on chronic diastolic congestive heart failure-  Improving but still with  edema. Continue with Fluid restriction. Stricts I&O. Monitor electrolytes and renal function. ECHO with mod AS, MR likely causing CHF. Diuresing well  2. Anasarca- improved. lasix drip d/c. Added lasix po bid. CO2 stable monitor. Reduced dobutamine. 3. S/p CABG in 2013 in [facility] 7- stable. 4. S/p AICD- [employer]. normal function interrogated on 02/27/17  5. Hx Carotid endarterectomy- bilateral in 2015    6. KYLEE on CKD- monitor renal function nephrology following   7. Pancytopenia- consider oncology consult   8. Hypertension- normal low. continue with bb monitor closely. No ton ACEi due to low bp and elevated creatinine. Continue with statin. 9. Mitral regurgitation -mild to moderate regurgitation echo on 2016   10.  Aortic stenosis : moderate, will f/u          ASSESSMENT:  Hospital Problems  Date Reviewed: 8/13/2016          Codes Class Noted POA    Acute on chronic diastolic (congestive) heart failure (HCC) ICD-10-CM: I50.33  ICD-9-CM: 428.33, 428.0  2/27/2017 Unknown        Acute CHF (congestive heart failure) ([facility] ICD-10-CM: I50.9  ICD-9-CM: 428.0  2/26/2017 Unknown        HTN (hypertension) (Chronic) ICD-10-CM: I10  ICD-9-CM: 401.9  9/4/2016 Yes        HLD (hyperlipidemia) (Chronic) ICD-10-CM: E78.5  ICD-9-CM: 272.4  9/4/2016 Yes        Diastolic CHF, acute on chronic ([facility] ICD-10-CM: I50.33  ICD-9-CM: 428.33, 428.0  8/17/2016 Yes        Acute exacerbation of CHF (congestive heart failure) ([facility] ICD-10-CM: I50.9  ICD-9-CM: 428.0  8/12/2016 Yes        Hx of CABG ICD-10-CM: Z95.1  ICD-9-CM: V45.81  7/22/2016 Yes    Overview Signed 7/22/2016  5:23 PM by Peggy Brooks NP     2013             H/O carotid endarterectomy ICD-10-CM: Z98.890  ICD-9-CM: V45.89  7/22/2016 Yes    Overview Signed 7/22/2016  5:27 PM by Peggy Brooks NP 2015             AICD (automatic cardioverter/defibrillator) present ICD-10-CM: Z95.810  ICD-9-CM: V45.02  7/22/2016 Yes    Overview Signed 7/22/2016  5:27 PM by Mackenzie Zepeda NP     2015             CHF (congestive heart failure) ([facility] ICD-10-CM: I50.9  ICD-9-CM: 428.0  7/21/2016 Unknown        Hematospermia ICD-10-CM: R36.1  ICD-9-CM: 608.82  3/5/2014 Yes        CAD (coronary artery disease) ICD-10-CM: I25.10  ICD-9-CM: 414.00  3/5/2014 Yes        PVD (peripheral vascular disease) ([facility] ICD-10-CM: I73.9  ICD-9-CM: 443.9  3/5/2014 Yes                SUBJECTIVE:  No CP  Improving JAIMES    OBJECTIVE:    VS:   Visit Vitals    /69    Pulse 65    Temp 98.2 °F (36.8 °C)    Resp 20    Ht 6' (1.829 m)    Wt 99.4 kg (219 lb 2.2 oz)    SpO2 92%    BMI 29.72 kg/m2         Intake/Output Summary (Last 24 hours) at 03/07/17 1106  Last data filed at 03/07/17 0830   Gross per 24 hour   Intake          1039.21 ml   Output             2500 ml   Net         -1460.79 ml     TELE: normal sinus rhythm    General: alert and in no apparent distress  HENT: Normocephalic, atraumatic. Normal external eye. Neck :  bruit present, increased JVP  Cardiac:  regular rate and rhythm, systolic murmur: early systolic 3/6, crescendo at 2nd right intercostal space, radiates to carotids, 2nd systolic murmur: late systolic 3/6, blowing at apex  Chest/Lungs:mild rales heard lung bases  Abdomen: Soft, nontender, no masses, +ascites/abd wall edema  Extremities:  2+edema LE, and upper thighs.  peripheral pulses present      Labs: Results:       Chemistry Recent Labs      03/07/17   0235  03/06/17   0311  03/05/17   0256   GLU  93  80  88   NA  133*  133*  135*   K  4.0  3.6  3.4*   CL  90*  91*  88*   CO2  37*  39*  40*   BUN  46*  46*  41*   CREA  2.12*  2.00*  1.77*   CA  9.1  8.9  9.1   MG  2.0  1.9  2.0   PHOS   --    --   3.0   AGAP  6  3  7   BUCR  22*  23*  23*   ALB   --   2.8*   --       CBC w/Diff Recent Labs      03/07/17   023 03/06/17 0311 03/05/17   0256   WBC  4.4*  4.8  4.2*   RBC  3.02*  2.90*  2.75*   HGB  8.3*  8.0*  7.8*   HCT  27.7*  26.6*  25.3*   PLT  59*  CLUMPED PLATELETS  55*   GRANS  65  63  64   LYMPH  17*  20*  19*   EOS  9*  7*  8*      Cardiac Enzymes No results for input(s): CPK, CKND1, TYSON in the last 72 hours. No lab exists for component: CKRMB, TROIP   Coagulation No results for input(s): PTP, INR, APTT in the last 72 hours. No lab exists for component: INREXT, INREXT    Lipid Panel Lab Results   Component Value Date/Time    Cholesterol, total 100 07/22/2016 05:20 AM    HDL Cholesterol 31 07/22/2016 05:20 AM    LDL, calculated 54.4 07/22/2016 05:20 AM    VLDL, calculated 14.6 07/22/2016 05:20 AM    Triglyceride 73 07/22/2016 05:20 AM    CHOL/HDL Ratio 3.2 07/22/2016 05:20 AM      BNP No results for input(s): BNPP in the last 72 hours.    Liver Enzymes Recent Labs      03/06/17 0311   ALB  2.8*      Digoxin    Thyroid Studies Lab Results   Component Value Date/Time    TSH 4.23 07/22/2016 05:20 AM              Joe Beltran NP  [phone]

## 2017-03-07 NOTE — INTERDISCIPLINARY ROUNDS
IDR/SLIDR Summary          Patient: Promise Varela MRN: [id number]    Age: 78 y.o. YOB: 1940 Room/Bed: [facility]   Admit Diagnosis: Acute CHF (congestive heart failure) (HCC)  CHF (congestive heart failure) (HCC)  Acute on chronic diastolic (congestive) heart failure (HCC)  Principal Diagnosis: <principal problem not specified>   Goals: get fluid off and go home  Readmission: NO  Quality Measure: CHF  VTE Prophylaxis: Mechanical  Influenza Vaccine screening completed? YES  Pneumococcal Vaccine screening completed? NO  Mobility needs: No   Nutrition plan:No  Consults:Case Management    Financial concerns:No  Escalated to CM? NO  RRAT Score: 23     Interventions:Home Health  Testing due for pt today?  NO  LOS: 8 days Expected length of stay 4.6 days  [place] [address]   PCP: Alisia Chandra MD  Transportation needs: No    Days before discharge:two or more days before discharge   Discharge disposition: Home    Signed:     Courtney Coronado RN  3/7/2017  10:21 AM

## 2017-03-07 NOTE — ROUTINE PROCESS
Patient has IV that is  but refuses to let RN change,infusing medication, no s/sx of infection.  Julianna Bruno RN

## 2017-03-07 NOTE — ROUTINE PROCESS
Bedside and Verbal shift change report given to CLINTON Bennett (oncoming nurse) by   Jose Martin Asencio RN (offgoing nurse). Report included the following information SBAR, Kardex, MAR and Recent Results. Patient resting in bed, denies needs. Call light within reach.

## 2017-03-07 NOTE — ROUTINE PROCESS
Bedside and Verbal shift change report received from [address] Report included the following information SBAR, Kardex, MAR and Recent Results. Patient resting in bed, family at bedside. Denies needs, call light in reach.  Linh Lira RN

## 2017-03-07 NOTE — ROUTINE PROCESS
Bedside and Verbal shift change report given to MARCELO Aldridge RN (oncoming nurse) by BRANDEN Jama (offgoing nurse). Report included the following information SBAR, Kardex and MAR.

## 2017-03-07 NOTE — PROGRESS NOTES
Hematology/Medical oncology Progress Note      NAME: Kehinde Muhammad   :  1940  MRM:  [id number]    Date/Time: 3/7/2017  10:45 AM         Subjective:     Mr Dorothy Nagel is a 79 y/o man with CHF who was admitted with increasing leg swelling and increasing abdominal girth. He also has progressive anemia and thrombocytopenia. Today he complains of scrotal pain after sliding across the bed. He denies SOB or chest pain. He is sitting up on the side of the bed eating breakfast.     Past Medical History reviewed and unchanged from Admission History and Physical    Review of Systems   Constitutional: Positive for fatigue. Negative for activity change, appetite change, chills, diaphoresis, fever and unexpected weight change. HENT: Negative for congestion, facial swelling, mouth sores, nosebleeds, postnasal drip, trouble swallowing and voice change. Eyes: Negative for photophobia, pain, discharge and itching. Respiratory: Negative for apnea, cough, choking, chest tightness, wheezing and stridor. Cardiovascular: Positive for leg swelling. Negative for chest pain and palpitations. Gastrointestinal: Negative for abdominal distention, abdominal pain, blood in stool, constipation, diarrhea, nausea and rectal pain. Genitourinary: Negative for difficulty urinating, dysuria, flank pain, hematuria and urgency. Musculoskeletal: Negative for arthralgias, back pain, gait problem, joint swelling, neck pain and neck stiffness. Skin: Negative for color change, pallor, rash and wound. Neurological: Negative for dizziness, facial asymmetry, speech difficulty, weakness, light-headedness and headaches. Hematological: Negative for adenopathy. Does not bruise/bleed easily. Psychiatric/Behavioral: Negative for agitation, confusion, hallucinations and sleep disturbance. Objective:       Vitals:      Last 24hrs VS reviewed since prior progress note.  Most recent are:    Visit Vitals    /72 (BP 1 Location: Left arm, BP Patient Position: During activity; Sitting)    Pulse 68    Temp 97.3 °F (36.3 °C)    Resp 16    Ht 6' (1.829 m)    Wt 99.4 kg (219 lb 2.2 oz)    SpO2 98%    BMI 29.72 kg/m2     SpO2 Readings from Last 6 Encounters:   03/07/17 98%   08/23/16 91%   08/02/16 90%   03/17/15 100%    O2 Flow Rate (L/min): 3.5 l/min       Intake/Output Summary (Last 24 hours) at 03/07/17 1328  Last data filed at 03/07/17 1200   Gross per 24 hour   Intake          1159.21 ml   Output             2750 ml   Net         -1590.79 ml          Exam:      Physical Exam   Nursing note and vitals reviewed. Constitutional: He is oriented to person, place, and time. He appears well-developed and well-nourished. No distress. HENT:   Head: Normocephalic and atraumatic. Mouth/Throat: No oropharyngeal exudate. Eyes: Conjunctivae and EOM are normal. Pupils are equal, round, and reactive to light. Right eye exhibits no discharge. Left eye exhibits no discharge. No scleral icterus. Neck: Normal range of motion. Neck supple. No tracheal deviation present. No thyromegaly present. Cardiovascular: Normal rate and regular rhythm. Exam reveals no gallop and no friction rub. No murmur heard. Pulmonary/Chest: Effort normal and breath sounds normal. No apnea. No respiratory distress. He has no wheezes. He has no rales. Chest wall is not dull to percussion. He exhibits no mass, no tenderness, no bony tenderness, no laceration, no crepitus, no edema, no deformity, no swelling and no retraction. Right breast exhibits no inverted nipple, no mass, no nipple discharge, no skin change and no tenderness. Left breast exhibits no inverted nipple, no mass, no nipple discharge, no skin change and no tenderness. Breasts are symmetrical.   Abdominal: Soft. Bowel sounds are normal. He exhibits no distension. There is no tenderness. There is no rebound and no guarding. Musculoskeletal: Normal range of motion. He exhibits edema.  He exhibits no tenderness. Lymphadenopathy:     He has no cervical adenopathy. Neurological: He is alert and oriented to person, place, and time. Coordination normal.   Skin: Skin is warm and dry. No rash noted. He is not diaphoretic. No erythema. No pallor. Psychiatric: He has a normal mood and affect.  His behavior is normal. Thought content normal.       Telemetry reviewed:   normal sinus rhythm  Tubes:   [place]    Lab Data Reviewed: (see below)      Medications:  Current Facility-Administered Medications   Medication Dose Route Frequency    influenza vaccine 2016-17 (36mos+)(PF) (FLUZONE/FLUARIX/FLULAVAL QUAD) injection 0.5 mL  0.5 mL IntraMUSCular PRIOR TO DISCHARGE    furosemide (LASIX) tablet 40 mg  40 mg Oral BID    potassium chloride (K-DUR, KLOR-CON) SR tablet 20 mEq  20 mEq Oral TID    epoetin ron (EPOGEN;PROCRIT) injection 20,000 Units  20,000 Units SubCUTAneous Q MON, WED & FRI    ferrous sulfate tablet 325 mg  1 Tab Oral DAILY WITH BREAKFAST    zolpidem (AMBIEN) tablet 5 mg  5 mg Oral QHS PRN    albuterol-ipratropium (DUO-NEB) 2.5 MG-0.5 MG/3 ML  3 mL Nebulization Q6H PRN    sodium chloride (NS) flush 5-10 mL  5-10 mL IntraVENous PRN    acetaminophen (TYLENOL) tablet 650 mg  650 mg Oral Q4H PRN    HYDROcodone-acetaminophen (NORCO) 5-325 mg per tablet 1 Tab  1 Tab Oral Q4H PRN    fluticasone (FLONASE) 50 mcg/actuation nasal spray 2 Spray  2 Spray Both Nostrils DAILY    aspirin chewable tablet 81 mg  81 mg Oral DAILY    DOBUTamine (DOBUTREX) 500 mg/250 mL (2,000 mcg/mL) infusion  2.5 mcg/kg/min IntraVENous CONTINUOUS    allopurinol (ZYLOPRIM) tablet 100 mg  100 mg Oral DAILY    nitroglycerin (NITROSTAT) tablet 0.4 mg  0.4 mg SubLINGual PRN    pantoprazole (PROTONIX) tablet 40 mg  40 mg Oral ACB    pravastatin (PRAVACHOL) tablet 80 mg  80 mg Oral QHS    therapeutic multivitamin (THERAGRAN) tablet 1 Tab  1 Tab Oral DAILY    cholecalciferol (VITAMIN D3) tablet 2,000 Units  2,000 Units Oral DAILY  fish oil-omega-3 fatty acids 340-1,000 mg capsule 1 Cap  1 Cap Oral BID    carvedilol (COREG) tablet 6.25 mg  6.25 mg Oral BID WITH MEALS    sodium chloride (NS) flush 5-10 mL  5-10 mL IntraVENous Q8H    naloxone (NARCAN) injection 0.4 mg  0.4 mg IntraVENous PRN    ondansetron (ZOFRAN) injection 4 mg  4 mg IntraVENous Q4H PRN       ______________________________________________________________________      Lab Review:     Recent Labs      03/07/17 0235 03/06/17 0311 03/05/17 0256   WBC  4.4*  4.8  4.2*   HGB  8.3*  8.0*  7.8*   HCT  27.7*  26.6*  25.3*   PLT  59*  CLUMPED PLATELETS  55*     Recent Labs      03/07/17 0235 03/06/17 0311 03/05/17 0256   NA  133*  133*  135*   K  4.0  3.6  3.4*   CL  90*  91*  88*   CO2  37*  39*  40*   GLU  93  80  88   BUN  46*  46*  41*   CREA  2.12*  2.00*  1.77*   CA  9.1  8.9  9.1   MG  2.0  1.9  2.0   PHOS   --    --   3.0   ALB   --   2.8*   --      No components found for: GLPOC  No results for input(s): PH, PCO2, PO2, HCO3, FIO2 in the last 72 hours. No results for input(s): INR in the last 72 hours.     No lab exists for component: DARLENETAngella INREXT    Other pertinent lab:          Assessment:     Active Problems:    Hematospermia (3/5/2014)      CAD (coronary artery disease) (3/5/2014)      PVD [facility]peripheral vascular [facility] (3/5/2014)      CHF (congestive heart failure) ([facility] (7/21/2016)      Hx of CABG (7/22/2016)      Overview: 2013      H/O carotid endarterectomy (7/22/2016)      Overview: 2015      AICD (automatic cardioverter/defibrillator) present (7/22/2016)      Overview: 2015      Acute exacerbation of CHF (congestive heart failure) ([address] (3/57/2039)      Diastolic CHF, acute on chronic ([address] (8/17/2016)      HTN (hypertension) (9/4/2016)      HLD (hyperlipidemia) (9/4/2016)      Acute CHF (congestive heart failure) ([address] (2/26/2017)      Acute on chronic diastolic (congestive) heart failure ([address] (2/27/2017)           Plan: Risk of deterioration: low             1. Anemia secondary CKD: continue procrit at 20k units 3x weekly. H/H 8.3/27. 7. Reserve transfusion of PRBC for Hgb <7.0 g/dl  2. Thrombocytopenia: continue the current level of care; monitor platelets daily and begin gamma-globulin if the counts declines below 15k. Plt count today 59,000  3. CHF: management per cardiology.          Total time spent with patient: 25 minutes                  Care Plan discussed with: Patient, Nursing Staff and Consultant/Specialist    Discussed:  Care Plan    Prophylaxis:  H2B/PPI    Disposition:  Home w/Family           ___________________________________________________    Attending Physician: Aydee Ware NP

## 2017-03-07 NOTE — PROGRESS NOTES
RENAL DAILY PROGRESS NOTE    Subjective:   Admitted for Acute CHF seen for CKD stage 3    Complaint: sitting up no CP    Current Facility-Administered Medications   Medication Dose Route Frequency    influenza vaccine 2016-17 (36mos+)(PF) (FLUZONE/FLUARIX/FLULAVAL QUAD) injection 0.5 mL  0.5 mL IntraMUSCular PRIOR TO DISCHARGE    furosemide (LASIX) tablet 40 mg  40 mg Oral BID    potassium chloride (K-DUR, KLOR-CON) SR tablet 20 mEq  20 mEq Oral TID    epoetin ron (EPOGEN;PROCRIT) injection 20,000 Units  20,000 Units SubCUTAneous Q MON, WED & FRI    ferrous sulfate tablet 325 mg  1 Tab Oral DAILY WITH BREAKFAST    zolpidem (AMBIEN) tablet 5 mg  5 mg Oral QHS PRN    albuterol-ipratropium (DUO-NEB) 2.5 MG-0.5 MG/3 ML  3 mL Nebulization Q6H PRN    sodium chloride (NS) flush 5-10 mL  5-10 mL IntraVENous PRN    acetaminophen (TYLENOL) tablet 650 mg  650 mg Oral Q4H PRN    HYDROcodone-acetaminophen (NORCO) 5-325 mg per tablet 1 Tab  1 Tab Oral Q4H PRN    fluticasone (FLONASE) 50 mcg/actuation nasal spray 2 Spray  2 Spray Both Nostrils DAILY    aspirin chewable tablet 81 mg  81 mg Oral DAILY    DOBUTamine (DOBUTREX) 500 mg/250 mL (2,000 mcg/mL) infusion  2.5 mcg/kg/min IntraVENous CONTINUOUS    allopurinol (ZYLOPRIM) tablet 100 mg  100 mg Oral DAILY    nitroglycerin (NITROSTAT) tablet 0.4 mg  0.4 mg SubLINGual PRN    pantoprazole (PROTONIX) tablet 40 mg  40 mg Oral ACB    pravastatin (PRAVACHOL) tablet 80 mg  80 mg Oral QHS    therapeutic multivitamin (THERAGRAN) tablet 1 Tab  1 Tab Oral DAILY    cholecalciferol (VITAMIN D3) tablet 2,000 Units  2,000 Units Oral DAILY    fish oil-omega-3 fatty acids 340-1,000 mg capsule 1 Cap  1 Cap Oral BID    carvedilol (COREG) tablet 6.25 mg  6.25 mg Oral BID WITH MEALS    sodium chloride (NS) flush 5-10 mL  5-10 mL IntraVENous Q8H    naloxone (NARCAN) injection 0.4 mg  0.4 mg IntraVENous PRN    ondansetron (ZOFRAN) injection 4 mg  4 mg IntraVENous Q4H PRN Objective:     Patient Vitals for the past 24 hrs:   Temp Pulse Resp BP SpO2   03/07/17 1600 97.5 °F (36.4 °C) 63 17 107/60 98 %   03/07/17 1132 97.3 °F (36.3 °C) 68 16 147/72 98 %   03/07/17 0630 - 65 - 133/69 -   03/07/17 0528 98.2 °F (36.8 °C) 66 20 92/59 92 %   03/07/17 0037 98.4 °F (36.9 °C) 69 18 96/52 92 %   03/06/17 2214 97.6 °F (36.4 °C) 69 18 158/79 94 %   03/06/17 1711 - 65 18 100/61 93 %        Weight change:      03/05 1901 - 03/07 0700  In: 1096.8 [P.O.:630; I.V.:466.8]  Out: 3950 [Urine:3950]    Intake/Output Summary (Last 24 hours) at 03/07/17 1656  Last data filed at 03/07/17 1605   Gross per 24 hour   Intake          1159.21 ml   Output             3100 ml   Net         -1940.79 ml     Physical Exam: alert, oriented x 3 afebrile    HEENT: non icteric  Cardiovascular: regular no rub + systolic Murmur  C/L: dec both bases, no rales or wheezing  Abdomen: + ascites  Ext: + LE edema    Data Review:     LABS:   Hematology:   Recent Labs      03/07/17   0235  03/06/17   0311  03/05/17   0256   WBC  4.4*  4.8  4.2*   HGB  8.3*  8.0*  7.8*   HCT  27.7*  26.6*  25.3*   Pl 59K  Chemistry:   Recent Labs      03/07/17   0235  03/06/17   0311  03/05/17   0256   BUN  46*  46*  41*   CREA  2.12*  2.00*  1.77*   CA  9.1  8.9  9.1   ALB   --   2.8*   --    K  4.0  3.6  3.4*   NA  133*  133*  135*   CL  90*  91*  88*   CO2  37*  39*  40*   PHOS   --    --   3.0   GLU  93  80  88   Mag 2  UPCR 1.0    IMPRESSION AND PLAN:   CKD stage 3  With non nephrotic range proteinuria. Slight inc in BUN/crea noted most likely prerenal from aggressive diuresis. Agree with dec lasix. Metabolic alkalosis improving off Lasix drip. Cont on IV dobutamine. Defer to cardio. Hypokalemia resolved.  Cont inc K in diet, dec K tabs  Anemia low Fe stores  Cont po Fe ,cont epo per Dr Jazmine Klein MD  3/7/2017

## 2017-03-08 NOTE — DISCHARGE SUMMARY
[facility] [address] SUMMARY    Name:  Michial Lombard  MR#:  [id number]  :  1940  Account #:  [de-identified]  Date of Adm:  2017  Date of Transfer:      FINAL DIAGNOSES  1. Acute-on-chronic diastolic heart failure. 2. History of coronary artery bypass surgery, coronary artery disease. 3. History of defibrillator placement and stent placement. 4. Hypertension. 5. Hyperlipidemia. 6. Obstructive sleep apnea. 7. History of mini strokes. 8. Ascites. 9. Thrombocytopenia, chronic. 10. Gastroesophageal reflux. 11. Dyslipidemia. 12. History of gout. DIET: Cardiac. ACTIVITY: As tolerated. MEDICATION ON DISCHARGE  1. Allopurinol 100 mg daily. 2. Coreg 6.25 mg b.i.d.  3. Vitamin D3, 2000 units daily. 4. Procrit 20,000 units 3 times a week. 5. Ferrous sulfate 325 mg daily. 6. Fish oil 1 capsule b.i.d.  7. Flonase nasal spray 2 sprays each nostril daily. 8. Lasix 40 mg b.i.d.  9. Flu shot before discharge. 10. Protonix 40 mg daily. 11. KCl 20 mEq daily. 12. Pravachol 80 mg daily. 13. Theragran vitamin 1 tablet daily. 14. Tylenol 650 every 4 hours p.r.n. pain or fever. 15. DuoNeb every 6 hours p.r.n. shortness of breath. 16. Norco 5/325, 1 every 4 hours p.r.n. pain. 17. Nitroglycerin 0.4 mg sublingual p.r.n. chest pain. 18. Ambien 5 mg at bedtime p.r.n. sleep. FOLLOWUP: The patient to be followed by Dr. Adair Kinney. SUMMARY: This is a 68-year-old white male with history of  hypertension, coronary bypass surgery, hyperlipidemia and gout,  chronic diastolic heart failure, chronic kidney disease stage 3 admitted  because of increasing abdominal girth for the past 2 weeks prior to  admission, also increasing leg swelling. He was already on Lasix 40  b.i.d. and he continued to have large amount of swelling, practically  anasarca. CLINICAL COURSE AND MANAGEMENT: The patient was admitted  for acute on chronic diastolic heart failure.  He was put on dobutamine  drip as well as Lasix drip, which he tolerated very well. He lost a  significant amount of weight that is from diuresis alone. At this time, the  creatinine has started to creep up. Lasix drip was discontinued and he  was put on oral Lasix. Creatinine iis around 2. He is still on  dobutamine drip. Once off the dobutamine drip, the patient can be  discharged and to follow up with Dr. Dianna Barreto. Please see medication  reconciliation for complete list of medications.         MD Cecelia Dozier / Bebo Molina  D:  03/08/2017   07:53  T:  03/08/2017   08:16  Job #:  [id number]

## 2017-03-08 NOTE — PROGRESS NOTES
Hematology/Medical oncology Progress Note      NAME: Sheldon Castro   :  1940  MRM:  [id number]    Date/Time: 3/8/2017  7:57 AM         Subjective:     Mr Sahara Sumner is a 79 y/o man with CHF who was admitted with increasing leg swelling and increasing abdominal girth. He also has progressive anemia and thrombocytopenia. Past Medical History reviewed and unchanged from Admission History and Physical    Review of Systems   Constitutional: Positive for fatigue. Negative for activity change, appetite change, chills, diaphoresis, fever and unexpected weight change. HENT: Negative for congestion, facial swelling, mouth sores, nosebleeds, postnasal drip, trouble swallowing and voice change. Eyes: Negative for photophobia, pain, discharge and itching. Respiratory: Negative for apnea, cough, choking, chest tightness, wheezing and stridor. Cardiovascular: Positive for leg swelling. Negative for chest pain and palpitations. Gastrointestinal: Positive for abdominal distention. Negative for abdominal pain, blood in stool, constipation, diarrhea, nausea and rectal pain. Genitourinary: Negative for difficulty urinating, dysuria, flank pain, hematuria and urgency. Musculoskeletal: Negative for arthralgias, back pain, gait problem, joint swelling, neck pain and neck stiffness. Skin: Negative for color change, pallor, rash and wound. Neurological: Negative for dizziness, facial asymmetry, speech difficulty, weakness, light-headedness and headaches. Hematological: Negative for adenopathy. Does not bruise/bleed easily. Psychiatric/Behavioral: Negative for agitation, confusion, hallucinations and sleep disturbance. Objective:       Vitals:      Last 24hrs VS reviewed since prior progress note.  Most recent are:    Visit Vitals    /68 (BP 1 Location: Right arm, BP Patient Position: At rest)    Pulse 73    Temp 98.1 °F (36.7 °C)    Resp 18    Ht 6' (1.829 m)    Wt 86.5 kg (190 lb 9.6 oz)    SpO2 99%    BMI 25.85 kg/m2     SpO2 Readings from Last 6 Encounters:   03/08/17 99%   08/23/16 91%   08/02/16 90%   03/17/15 100%    O2 Flow Rate (L/min): 4 l/min       Intake/Output Summary (Last 24 hours) at 03/08/17 0757  Last data filed at 03/08/17 0114   Gross per 24 hour   Intake              780 ml   Output              750 ml   Net               30 ml          Exam:      Physical Exam   Nursing note and vitals reviewed. Constitutional: He is oriented to person, place, and time. He appears well-developed and well-nourished. No distress. HENT:   Head: Normocephalic and atraumatic. Mouth/Throat: No oropharyngeal exudate. Eyes: Conjunctivae and EOM are normal. Pupils are equal, round, and reactive to light. Right eye exhibits no discharge. Left eye exhibits no discharge. No scleral icterus. Neck: Normal range of motion. Neck supple. No tracheal deviation present. No thyromegaly present. Cardiovascular: Normal rate and regular rhythm. Exam reveals no gallop and no friction rub. No murmur heard. Pulmonary/Chest: Effort normal and breath sounds normal. No apnea. No respiratory distress. He has no wheezes. He has no rales. Chest wall is not dull to percussion. He exhibits no mass, no tenderness, no bony tenderness, no laceration, no crepitus, no edema, no deformity, no swelling and no retraction. Right breast exhibits no inverted nipple, no mass, no nipple discharge, no skin change and no tenderness. Left breast exhibits no inverted nipple, no mass, no nipple discharge, no skin change and no tenderness. Breasts are symmetrical.   Abdominal: Soft. Bowel sounds are normal. He exhibits distension. There is no tenderness. There is no rebound and no guarding. Musculoskeletal: Normal range of motion. He exhibits edema. He exhibits no tenderness. Lymphadenopathy:     He has no cervical adenopathy. Neurological: He is alert and oriented to person, place, and time.  Coordination normal.   Skin: Skin is warm and dry. No rash noted. He is not diaphoretic. No erythema. No pallor. Psychiatric: He has a normal mood and affect.  His behavior is normal. Thought content normal.       Telemetry reviewed:   normal sinus rhythm  Tubes:   [place]    Lab Data Reviewed: (see below)      Medications:  Current Facility-Administered Medications   Medication Dose Route Frequency    influenza vaccine 2016-17 (36mos+)(PF) (FLUZONE/FLUARIX/FLULAVAL QUAD) injection 0.5 mL  0.5 mL IntraMUSCular PRIOR TO DISCHARGE    furosemide (LASIX) tablet 40 mg  40 mg Oral BID    potassium chloride (K-DUR, KLOR-CON) SR tablet 20 mEq  20 mEq Oral DAILY    epoetin ron (EPOGEN;PROCRIT) injection 20,000 Units  20,000 Units SubCUTAneous Q MON, WED & FRI    ferrous sulfate tablet 325 mg  1 Tab Oral DAILY WITH BREAKFAST    zolpidem (AMBIEN) tablet 5 mg  5 mg Oral QHS PRN    albuterol-ipratropium (DUO-NEB) 2.5 MG-0.5 MG/3 ML  3 mL Nebulization Q6H PRN    sodium chloride (NS) flush 5-10 mL  5-10 mL IntraVENous PRN    acetaminophen (TYLENOL) tablet 650 mg  650 mg Oral Q4H PRN    HYDROcodone-acetaminophen (NORCO) 5-325 mg per tablet 1 Tab  1 Tab Oral Q4H PRN    fluticasone (FLONASE) 50 mcg/actuation nasal spray 2 Spray  2 Spray Both Nostrils DAILY    aspirin chewable tablet 81 mg  81 mg Oral DAILY    DOBUTamine (DOBUTREX) 500 mg/250 mL (2,000 mcg/mL) infusion  2.5 mcg/kg/min IntraVENous CONTINUOUS    allopurinol (ZYLOPRIM) tablet 100 mg  100 mg Oral DAILY    nitroglycerin (NITROSTAT) tablet 0.4 mg  0.4 mg SubLINGual PRN    pantoprazole (PROTONIX) tablet 40 mg  40 mg Oral ACB    pravastatin (PRAVACHOL) tablet 80 mg  80 mg Oral QHS    therapeutic multivitamin (THERAGRAN) tablet 1 Tab  1 Tab Oral DAILY    cholecalciferol (VITAMIN D3) tablet 2,000 Units  2,000 Units Oral DAILY    fish oil-omega-3 fatty acids 340-1,000 mg capsule 1 Cap  1 Cap Oral BID    carvedilol (COREG) tablet 6.25 mg  6.25 mg Oral BID WITH MEALS    sodium chloride (NS) flush 5-10 mL  5-10 mL IntraVENous Q8H    naloxone (NARCAN) injection 0.4 mg  0.4 mg IntraVENous PRN    ondansetron (ZOFRAN) injection 4 mg  4 mg IntraVENous Q4H PRN       ______________________________________________________________________      Lab Review:     Recent Labs      03/08/17 0340 03/07/17 0235 03/06/17   0311   WBC  4.4*  4.4*  4.8   HGB  8.3*  8.3*  8.0*   HCT  27.6*  27.7*  26.6*   PLT  72*  59*  CLUMPED PLATELETS     Recent Labs      03/08/17 0340 03/07/17 0235 03/06/17 0311   NA  134*  133*  133*   K  3.8  4.0  3.6   CL  91*  90*  91*   CO2  37*  37*  39*   GLU  98  93  80   BUN  51*  46*  46*   CREA  2.03*  2.12*  2.00*   CA  8.8  9.1  8.9   MG  2.0  2.0  1.9   ALB   --    --   2.8*     No components found for: GLPOC  No results for input(s): PH, PCO2, PO2, HCO3, FIO2 in the last 72 hours. No results for input(s): INR in the last 72 hours. No lab exists for component: Rodney LIZAMA INREXT    Other pertinent lab:          Assessment:     Active Problems:    Hematospermia (3/5/2014)      CAD (coronary artery disease) (3/5/2014)      PVD (peripheral vascular disease) ([facility] (3/5/2014)      CHF (congestive heart failure) ([facility] (7/21/2016)      Hx of CABG (7/22/2016)      Overview: 2013      H/O carotid endarterectomy (7/22/2016)      Overview: 2015      AICD (automatic cardioverter/defibrillator) present (7/22/2016)      Overview: 2015      Acute exacerbation of CHF (congestive heart failure) ([facility] (9/22/7141)      Diastolic CHF, acute on chronic ([facility] (8/17/2016)      HTN (hypertension) (9/4/2016)      HLD (hyperlipidemia) (9/4/2016)      Acute CHF (congestive heart failure) ([facility] (2/26/2017)      Acute on chronic diastolic (congestive) heart failure ([facility] (2/27/2017)           Plan:     Risk of deterioration: low             1. ACKD: continue procrit at 20k units 3x weekly.   2. Thrombocytopenia/pseudothrombocytopenia: continue the current level of care; monitor platelets daily and begin gamma-globulin if the counts declines below 15k. Peripheral smear has demonstrated platelet count playing suggesting that the patient platelet counts are actually within normal limits but I actually clumping in the presence of preservatives at the time of blood draws. We will continue to monitor this on a regular basis. 3. CHF: management per cardiology.          Total time spent with patient: 30 minutes                  Care Plan discussed with: Patient, Nursing Staff and Consultant/Specialist    Discussed:  Care Plan    Prophylaxis:  H2B/PPI    Disposition:  Home w/Family           ___________________________________________________    Attending Physician: Brian Zavala MD

## 2017-03-08 NOTE — PROGRESS NOTES
Christine Ratliff M.D. PROGRESS NOTE    Name: Alejandrina Wallis MRN: [id number]   : 1940 Hospital: [address] [place] [address]   Date: 3/8/2017  Admission Date: 2017     Subjective/Objective/Plans  1. Acute-on-chronic diastolic heart failure. 2. History of coronary artery bypass surgery, coronary artery disease. 3. History of defibrillator placement and stent placement. 4. Hypertension. 5. Hyperlipidemia. 6. Obstructive sleep apnea. 7. History of mini strokes. 8. Ascites. 9. Thrombocytopenia, chronic. 10. Gastroesophageal reflux. 11. Dyslipidemia. 12. History of gout. Off Lasix drip, reducing Dobutamine drip  PT/OT working on him  DC soon in a few days   Cr stable around 2    Vital Signs:  Visit Vitals    /60 (BP 1 Location: Right arm, BP Patient Position: At rest)    Pulse 64    Temp 97.2 °F (36.2 °C)    Resp 18    Ht 6' (1.829 m)    Wt 86.5 kg (190 lb 9.6 oz)    SpO2 90%    BMI 25.85 kg/m2       O2 Device: Nasal cannula   O2 Flow Rate (L/min): 4 l/min   Temp (24hrs), Av.7 °F (36.5 °C), Min:97.2 °F (36.2 °C), Max:98.6 °F (37 °C)     3:21 PM  Intake/Output:   Last shift:       07 -  190  In: 150 [P.O.:150]  Out: 150 [Urine:150]  Last 3 shifts:  190 -  0700  In: 780 [P.O.:480]  Out: 1550 [Urine:1550]    Intake/Output Summary (Last 24 hours) at 17 1521  Last data filed at 17 1116   Gross per 24 hour   Intake              150 ml   Output              650 ml   Net             -500 ml         Physical Exam:    General: in no apparent distress and alert    HEENT: pupils equal, no ear discharge   Neck: No abnormally enlarged lymph nodes. , no JDV   Mouth: MMM no lesions    Chest: normal,    Lungs: decreased air exchange bilaterally   Heart: Regular rate and rhythm   Abdomen: abdomen is soft without significant tenderness, masses, organomegaly or guarding   Extremity: 1+ edema   Neuro: alert    Skin: Skin color, texture, turgor normal. No rashes or lesions    Data Review:    Labs: Results:       Chemistry Recent Labs      03/08/17 0340 03/07/17 0235 03/06/17 0311   GLU  98  93  80   NA  134*  133*  133*   K  3.8  4.0  3.6   CL  91*  90*  91*   CO2  37*  37*  39*   BUN  51*  46*  46*   CREA  2.03*  2.12*  2.00*   CA  8.8  9.1  8.9   AGAP  6  6  3   BUCR  25*  22*  23*   ALB   --    --   2.8*      CBC w/Diff Recent Labs      03/08/17 0340 03/07/17 0235 03/06/17 0311   WBC  4.4*  4.4*  4.8   RBC  2.99*  3.02*  2.90*   HGB  8.3*  8.3*  8.0*   HCT  27.6*  27.7*  26.6*   PLT  72*  59*  CLUMPED PLATELETS   GRANS  65  65  63   LYMPH  18*  17*  20*   EOS  9*  9*  7*      Cardiac Enzymes No results for input(s): CPK, CKND1, TYSON in the last 72 hours. No lab exists for component: CKRMB, TROIP   Coagulation No results for input(s): PTP, INR, APTT in the last 72 hours. No lab exists for component: INREXT    Lipid Panel Lab Results   Component Value Date/Time    Cholesterol, total 100 07/22/2016 05:20 AM    HDL Cholesterol 31 07/22/2016 05:20 AM    LDL, calculated 54.4 07/22/2016 05:20 AM    VLDL, calculated 14.6 07/22/2016 05:20 AM    Triglyceride 73 07/22/2016 05:20 AM    CHOL/HDL Ratio 3.2 07/22/2016 05:20 AM      BNP No results for input(s): BNPP in the last 72 hours. Liver Enzymes Recent Labs      03/06/17 0311   ALB  2.8*      Thyroid Studies Lab Results   Component Value Date/Time    TSH 4.23 07/22/2016 05:20 AM          Procedures/imaging: see electronic medical records for all procedures, Xrays and details which were not copied into this note but were reviewed. Allergies:  No Known Allergies    Home Medications:  Prior to Admission Medications   Prescriptions Last Dose Informant Patient Reported? Taking? Cholecalciferol, Vitamin D3, (VITAMIN D3) 1,000 unit cap   Yes No   Sig: Take 2,000 Units by mouth daily. Omega-3 Fatty Acids 300 mg cap   Yes No   Sig: Take 1 Cap by mouth two (2) times a day.    allopurinol (ZYLOPRIM) 100 mg tablet Yes No   Sig: Take 100 mg by mouth daily. aspirin delayed-release 81 mg tablet   Yes No   Sig: Take 162 mg by mouth daily. carvedilol (COREG) 6.25 mg tablet   No No   Sig: Take 1 Tab by mouth two (2) times daily (with meals). furosemide (LASIX) 40 mg tablet   No No   Sig: One tab daily  Indications: PERIPHERAL EDEMA DUE TO CHRONIC HEART FAILURE   lisinopril (PRINIVIL, ZESTRIL) 5 mg tablet   No No   Sig: Take 1 Tab by mouth daily. multivitamin (ONE A DAY) tablet   Yes No   Sig: Take 1 Tab by mouth daily. nitroglycerin (NITROSTAT) 0.4 mg SL tablet   Yes No   Sig: by SubLINGual route every five (5) minutes as needed for Chest Pain. omeprazole (PRILOSEC) 20 mg capsule   Yes No   Sig: Take 20 mg by mouth daily. pravastatin (PRAVACHOL) 80 mg tablet   Yes No   Sig: Take 80 mg by mouth nightly.       Facility-Administered Medications: None       Current Medications:  Current Facility-Administered Medications   Medication Dose Route Frequency    acetaZOLAMIDE (DIAMOX) tablet 250 mg  250 mg Oral DAILY    influenza vaccine 2016-17 (36mos+)(PF) (FLUZONE/FLUARIX/FLULAVAL QUAD) injection 0.5 mL  0.5 mL IntraMUSCular PRIOR TO DISCHARGE    furosemide (LASIX) tablet 40 mg  40 mg Oral BID    potassium chloride (K-DUR, KLOR-CON) SR tablet 20 mEq  20 mEq Oral DAILY    epoetin ron (EPOGEN;PROCRIT) injection 20,000 Units  20,000 Units SubCUTAneous Q MON, WED & FRI    ferrous sulfate tablet 325 mg  1 Tab Oral DAILY WITH BREAKFAST    zolpidem (AMBIEN) tablet 5 mg  5 mg Oral QHS PRN    albuterol-ipratropium (DUO-NEB) 2.5 MG-0.5 MG/3 ML  3 mL Nebulization Q6H PRN    sodium chloride (NS) flush 5-10 mL  5-10 mL IntraVENous PRN    acetaminophen (TYLENOL) tablet 650 mg  650 mg Oral Q4H PRN    HYDROcodone-acetaminophen (NORCO) 5-325 mg per tablet 1 Tab  1 Tab Oral Q4H PRN    fluticasone (FLONASE) 50 mcg/actuation nasal spray 2 Spray  2 Spray Both Nostrils DAILY    aspirin chewable tablet 81 mg  81 mg Oral DAILY    DOBUTamine (DOBUTREX) 500 mg/250 mL (2,000 mcg/mL) infusion  2.5 mcg/kg/min IntraVENous CONTINUOUS    allopurinol (ZYLOPRIM) tablet 100 mg  100 mg Oral DAILY    nitroglycerin (NITROSTAT) tablet 0.4 mg  0.4 mg SubLINGual PRN    pantoprazole (PROTONIX) tablet 40 mg  40 mg Oral ACB    pravastatin (PRAVACHOL) tablet 80 mg  80 mg Oral QHS    therapeutic multivitamin (THERAGRAN) tablet 1 Tab  1 Tab Oral DAILY    cholecalciferol (VITAMIN D3) tablet 2,000 Units  2,000 Units Oral DAILY    fish oil-omega-3 fatty acids 340-1,000 mg capsule 1 Cap  1 Cap Oral BID    carvedilol (COREG) tablet 6.25 mg  6.25 mg Oral BID WITH MEALS    sodium chloride (NS) flush 5-10 mL  5-10 mL IntraVENous Q8H    naloxone (NARCAN) injection 0.4 mg  0.4 mg IntraVENous PRN    ondansetron (ZOFRAN) injection 4 mg  4 mg IntraVENous Q4H PRN       Chart and notes reviewed. Data reviewed. I have evaluated and examined the patient.         IMPRESSION:   Patient Active Problem List   Diagnosis Code    Hematospermia R36.1    CAD (coronary artery disease) I25.10    PVD (peripheral vascular disease) ([facility] I73.9    CHF (congestive heart failure) ([facility]) I50.9    Anasarca R60.1    Hx of CABG Z95.1    H/O carotid endarterectomy Z98.890    AICD (automatic cardioverter/defibrillator) present Z95.810    Acute exacerbation of CHF (congestive heart failure) (HCC) G25.1    Diastolic CHF, acute on chronic (HCC) I50.33    HTN (hypertension) I10    HLD (hyperlipidemia) E78.5    Acute CHF (congestive heart failure) (HCC) I50.9    Acute on chronic diastolic (congestive) heart failure (HCC) I50.33 ·         PLAN:/DISCUSSION:   · John Esparza MD  3/8/2017, 3:21 PM

## 2017-03-08 NOTE — PROGRESS NOTES
Problem: Mobility Impaired (Adult and Pediatric)  Goal: *Acute Goals and Plan of Care (Insert Text)  Physical Therapy Goals  Initiated 3/3/2017 and to be accomplished within 7 day(s)  1. Patient will move from supine to sit and sit to supine in bed with independence. 2. Patient will transfer from bed to chair and chair to bed with modified independence using the least restrictive device. 3. Patient will perform sit to stand with modified independence. 4. Patient will ambulate with modified independence for 150 feet with the least restrictive device. 5. Patient will ascend/descend 3 stairs with 1 handrail(s) with supervision/set-up. PHYSICAL THERAPY TREATMENT     Patient: Iris Washington (69 y.o. male)  Date: 3/8/2017  Diagnosis: Acute CHF (congestive heart failure) ([facility])  CHF (congestive heart failure) ([facility])  Acute on chronic diastolic (congestive) heart failure ([facility] <principal problem not specified>  Precautions: Fall  Chart, physical therapy assessment, plan of care and goals were reviewed. ASSESSMENT:  Pt presents with decreased balance and decreased activity tolerance which limits his functional mobility. Pt is making progress with mobility and is able to ambulate a further distance than during previous treatments although he needed a sitting rest break between ambulation attempts. Progression toward goals:  [X]      Improving appropriately and progressing toward goals  [ ]      Improving slowly and progressing toward goals  [ ]      Not making progress toward goals and plan of care will be adjusted       PLAN:  Patient continues to benefit from skilled intervention to address the above impairments. Continue treatment per established plan of care. Discharge Recommendations:  Home Health  Further Equipment Recommendations for Discharge:  Rolling walker       G-CODES:      Mobility  Current  CJ= 20-39%.   The severity rating is based on the Level of Assistance required for Functional Mobility and ADLs. SUBJECTIVE:   Patient stated I don't think I can walk in the hallway.       OBJECTIVE DATA SUMMARY:   Critical Behavior:  Neurologic State: Alert, Appropriate for age  Orientation Level: Oriented X4  Cognition: Appropriate decision making, Appropriate for age attention/concentration, Appropriate safety awareness, Follows commands  Safety/Judgement: Awareness of environment, Good awareness of safety precautions, Insight into deficits  Functional Mobility Training:  Bed Mobility:  Rolling: Independent  Supine to Sit: Independent  Sit to Supine: Independent  Scooting: Independent  Transfers:  Sit to Stand: Supervision (with RW)  Stand to Sit: Supervision (with RW)  Stand Pivot Transfers: Supervision (with RW)     Bed to Chair: Supervision (with RW)  Balance:  Sitting: Intact; Without support  Sitting - Static: Normal   Sitting - Dynamic: Normal  Standing: Intact; With support  Standing - Static: Good (with RW)  Standing - Dynamic : Fair (with RW)  Ambulation/Gait Training:  Distance (ft): 50 Feet (ft) (x2)  Assistive Device: Walker, rolling  Ambulation - Level of Assistance: Supervision (for IV pole and Oxygen mgmt)   Gait Description (WDL): Exceptions to WDL  Gait Abnormalities: Decreased step clearance  Base of Support: Center of gravity altered  Stance: Right decreased; Left decreased;Time;Weight shift  Speed/Hailee: Slow  Step Length: Right shortened;Left shortened  Swing Pattern: Left symmetrical;Right symmetrical  Interventions: Verbal cues  Pain:  Pain Scale 1: Numeric (0 - 10)  Pain Intensity 1: 0  Activity Tolerance:   Decreased activity tolerance, needed seated rest break between ambulation attempts. Please refer to the flowsheet for vital signs taken during this treatment.   After treatment:   [ ] Patient left in no apparent distress sitting up in chair  [X] Patient left in no apparent distress in bed  [X] Call bell left within reach  [ ] Nursing notified  [ ] Caregiver present  [ ] Bed alarm activated      Estle Favors, PT   Time Calculation: 30 mins

## 2017-03-08 NOTE — PROGRESS NOTES
[facility]      CARDIOLOGY PROGRESS NOTE  RECS:      1. Acute on chronic diastolic congestive heart failure-  Improving but still with  edema. Continue with Fluid restriction. Stricts I&O. Monitor electrolytes and renal function. ECHO with mod AS, MR likely causing CHF. Diuresing, add diamox for high CO2. Needs PT and D/C planning  2. Anasarca- improved. lasix drip d/c. Added lasix po bid. CO2 stable monitor. Reduced dobutamine. 3. S/p CABG in 2013 in [facility] 7- stable. 4. S/p AICD- [employer]. normal function interrogated on 02/27/17  5. Hx Carotid endarterectomy- bilateral in 2015    6. KYLEE on CKD- monitor renal function nephrology following   7. Pancytopenia- consider oncology consult   8. Hypertension- normal low. continue with bb monitor closely. No ACEi due to elevated creatinine. Continue with statin. 9. Mitral regurgitation -mild to moderate regurgitation echo on 2016   10. Aortic stenosis : moderate, will f/u. Will consider TAVR if pt compliant with office f/u.           ASSESSMENT:  Hospital Problems  Date Reviewed: 8/13/2016          Codes Class Noted POA    Acute on chronic diastolic (congestive) heart failure (HCC) ICD-10-CM: I50.33  ICD-9-CM: 428.33, 428.0  2/27/2017 Unknown        Acute CHF (congestive heart failure) ([facility] ICD-10-CM: I50.9  ICD-9-CM: 428.0  2/26/2017 Unknown        HTN (hypertension) (Chronic) ICD-10-CM: I10  ICD-9-CM: 401.9  9/4/2016 Yes        HLD (hyperlipidemia) (Chronic) ICD-10-CM: E78.5  ICD-9-CM: 272.4  9/4/2016 Yes        Diastolic CHF, acute on chronic ([facility] ICD-10-CM: I50.33  ICD-9-CM: 428.33, 428.0  8/17/2016 Yes        Acute exacerbation of CHF (congestive heart failure) ([facility] ICD-10-CM: I50.9  ICD-9-CM: 428.0  8/12/2016 Yes        Hx of CABG ICD-10-CM: Z95.1  ICD-9-CM: V45.81  7/22/2016 Yes    Overview Signed 7/22/2016  5:23 PM by Christiana Davies NP     2013             H/O carotid endarterectomy ICD-10-CM: Z98.890  ICD-9-CM: V45.89  7/22/2016 Yes    Overview Signed 7/22/2016  5:27 PM by Peggy Brooks NP     [id number]             AICD (automatic cardioverter/defibrillator) present ICD-10-CM: Z95.810  ICD-9-CM: V45.02  7/22/2016 Yes    Overview Signed 7/22/2016  5:27 PM by Peggy Brooks NP     2015             CHF (congestive heart failure) ([facility]) ICD-10-CM: I50.9  ICD-9-CM: 428.0  7/21/2016 Unknown        Hematospermia ICD-10-CM: R36.1  ICD-9-CM: 608.82  3/5/2014 Yes        CAD (coronary artery disease) ICD-10-CM: I25.10  ICD-9-CM: 414.00  3/5/2014 Yes        PVD (peripheral vascular disease) ([facility] ICD-10-CM: I73.9  ICD-9-CM: 443.9  3/5/2014 Yes                SUBJECTIVE:  No CP  Improving JAIMES    OBJECTIVE:    VS:   Visit Vitals    /68 (BP 1 Location: Right arm, BP Patient Position: At rest)    Pulse 73    Temp 98.1 °F (36.7 °C)    Resp 18    Ht 6' (1.829 m)    Wt 86.5 kg (190 lb 9.6 oz)    SpO2 99%    BMI 25.85 kg/m2         Intake/Output Summary (Last 24 hours) at 03/08/17 1018  Last data filed at 03/08/17 0902   Gross per 24 hour   Intake              390 ml   Output              800 ml   Net             -410 ml     TELE: normal sinus rhythm    General: alert and in no apparent distress  HENT: Normocephalic, atraumatic. Normal external eye.   Neck :  bruit present, increased JVP  Cardiac:  regular rate and rhythm, systolic murmur: early systolic 3/6, crescendo at 2nd right intercostal space, radiates to carotids, 2nd systolic murmur: late systolic 3/6, blowing at apex  Chest/Lungs:mild rales heard lung bases  Abdomen: Soft, nontender, no masses, +ascites/abd wall edema  Extremities:  2+edema LE, and upper thighs & abd wall. peripheral pulses present      Labs: Results:       Chemistry Recent Labs      03/08/17   0340  03/07/17   0235  03/06/17   0311   GLU  98  93  80   NA  134*  133*  133*   K  3.8  4.0  3.6   CL  91*  90*  91*   CO2  37*  37*  39*   BUN  51*  46*  46*   CREA  2.03*  2.12*  2.00*   CA  8.8  9.1  8.9 MG  2.0  2.0  1.9   AGAP  6  6  3   BUCR  25*  22*  23*   ALB   --    --   2.8*      CBC w/Diff Recent Labs      03/08/17   0340  03/07/17   0235  03/06/17   0311   WBC  4.4*  4.4*  4.8   RBC  2.99*  3.02*  2.90*   HGB  8.3*  8.3*  8.0*   HCT  27.6*  27.7*  26.6*   PLT  72*  59*  CLUMPED PLATELETS   GRANS  65  65  63   LYMPH  18*  17*  20*   EOS  9*  9*  7*      Cardiac Enzymes No results for input(s): CPK, CKND1, TYSON in the last 72 hours. No lab exists for component: CKRMB, TROIP   Coagulation No results for input(s): PTP, INR, APTT in the last 72 hours. No lab exists for component: INREXT, INREXT    Lipid Panel Lab Results   Component Value Date/Time    Cholesterol, total 100 07/22/2016 05:20 AM    HDL Cholesterol 31 07/22/2016 05:20 AM    LDL, calculated 54.4 07/22/2016 05:20 AM    VLDL, calculated 14.6 07/22/2016 05:20 AM    Triglyceride 73 07/22/2016 05:20 AM    CHOL/HDL Ratio 3.2 07/22/2016 05:20 AM      BNP No results for input(s): BNPP in the last 72 hours.    Liver Enzymes Recent Labs      03/06/17   0311   ALB  2.8*      Digoxin    Thyroid Studies Lab Results   Component Value Date/Time    TSH 4.23 07/22/2016 05:20 AM              Helio Hall MD  [phone]

## 2017-03-08 NOTE — PROGRESS NOTES
RENAL DAILY PROGRESS NOTE    Subjective:   Admitted for Acute CHF seen for CKD stage 3    Complaint: none    Current Facility-Administered Medications   Medication Dose Route Frequency    acetaZOLAMIDE (DIAMOX) tablet 250 mg  250 mg Oral DAILY    influenza vaccine 2016-17 (36mos+)(PF) (FLUZONE/FLUARIX/FLULAVAL QUAD) injection 0.5 mL  0.5 mL IntraMUSCular PRIOR TO DISCHARGE    furosemide (LASIX) tablet 40 mg  40 mg Oral BID    potassium chloride (K-DUR, KLOR-CON) SR tablet 20 mEq  20 mEq Oral DAILY    epoetin ron (EPOGEN;PROCRIT) injection 20,000 Units  20,000 Units SubCUTAneous Q MON, WED & FRI    ferrous sulfate tablet 325 mg  1 Tab Oral DAILY WITH BREAKFAST    zolpidem (AMBIEN) tablet 5 mg  5 mg Oral QHS PRN    albuterol-ipratropium (DUO-NEB) 2.5 MG-0.5 MG/3 ML  3 mL Nebulization Q6H PRN    sodium chloride (NS) flush 5-10 mL  5-10 mL IntraVENous PRN    acetaminophen (TYLENOL) tablet 650 mg  650 mg Oral Q4H PRN    HYDROcodone-acetaminophen (NORCO) 5-325 mg per tablet 1 Tab  1 Tab Oral Q4H PRN    fluticasone (FLONASE) 50 mcg/actuation nasal spray 2 Spray  2 Spray Both Nostrils DAILY    aspirin chewable tablet 81 mg  81 mg Oral DAILY    DOBUTamine (DOBUTREX) 500 mg/250 mL (2,000 mcg/mL) infusion  2.5 mcg/kg/min IntraVENous CONTINUOUS    allopurinol (ZYLOPRIM) tablet 100 mg  100 mg Oral DAILY    nitroglycerin (NITROSTAT) tablet 0.4 mg  0.4 mg SubLINGual PRN    pantoprazole (PROTONIX) tablet 40 mg  40 mg Oral ACB    pravastatin (PRAVACHOL) tablet 80 mg  80 mg Oral QHS    therapeutic multivitamin (THERAGRAN) tablet 1 Tab  1 Tab Oral DAILY    cholecalciferol (VITAMIN D3) tablet 2,000 Units  2,000 Units Oral DAILY    fish oil-omega-3 fatty acids 340-1,000 mg capsule 1 Cap  1 Cap Oral BID    carvedilol (COREG) tablet 6.25 mg  6.25 mg Oral BID WITH MEALS    sodium chloride (NS) flush 5-10 mL  5-10 mL IntraVENous Q8H    naloxone (NARCAN) injection 0.4 mg  0.4 mg IntraVENous PRN    ondansetron [facility]) injection 4 mg  4 mg IntraVENous Q4H PRN           Objective:     Patient Vitals for the past 24 hrs:   Temp Pulse Resp BP SpO2   03/08/17 1538 - 64 18 108/64 -   03/08/17 1212 97.2 °F (36.2 °C) 64 18 109/60 90 %   03/08/17 0752 98.1 °F (36.7 °C) 73 18 145/68 99 %   03/08/17 0518 97.3 °F (36.3 °C) 82 18 142/61 91 %   03/08/17 0016 98.6 °F (37 °C) 96 15 136/74 92 %   03/07/17 1933 97.7 °F (36.5 °C) 62 18 133/74 100 %        Weight change:      03/06 1901 - 03/08 0700  In: 780 [P.O.:480]  Out: 1550 [Urine:1550]    Intake/Output Summary (Last 24 hours) at 03/08/17 1740  Last data filed at 03/08/17 1116   Gross per 24 hour   Intake              150 ml   Output              300 ml   Net             -150 ml     Physical Exam: alert, oriented x 3 afebrile    HEENT: non icteric  Cardiovascular: regular no rub + systolic Murmur  C/L: dec both bases, no rales or wheezing  Abdomen: + ascites  Ext: + LE edema    Data Review:     LABS:   Hematology:   Recent Labs      03/08/17   0340  03/07/17   0235  03/06/17   0311   WBC  4.4*  4.4*  4.8   HGB  8.3*  8.3*  8.0*   HCT  27.6*  27.7*  26.6*   Pl 59K  Chemistry:   Recent Labs      03/08/17   0340  03/07/17   0235  03/06/17   0311   BUN  51*  46*  46*   CREA  2.03*  2.12*  2.00*   CA  8.8  9.1  8.9   ALB   --    --   2.8*   K  3.8  4.0  3.6   NA  134*  133*  133*   CL  91*  90*  91*   CO2  37*  37*  39*   GLU  98  93  80   Mag 2  UPCR 1.0    IMPRESSION AND PLAN:   CKD stage 3  With non nephrotic range proteinuria. Slight improvement in crea noted monitor with dec lasix. Dec urine output documented of IV lasix  Metabolic alkalosis stable. Cont on IV dobutamine. Defer to cardio. Mild hyponatremia 2 to aggressive diuresis, improving some with dec diuresis, monitor.   Anemia H/H stable low Fe stores  Cont po Fe ,cont epo per Dr Fernando Luque MD  3/8/2017

## 2017-03-08 NOTE — ROUTINE PROCESS
Bedside and Verbal shift change report received from [facility]. Report included the following information SBAR, Kardex, MAR and Recent Results.

## 2017-03-08 NOTE — ROUTINE PROCESS
Bedside and Verbal shift change report given to Javi 2 (oncoming nurse) by Elise Zelaya RN (offgoing nurse). Report given with Allison LOGAN and MAR.

## 2017-03-08 NOTE — ROUTINE PROCESS
0750 Pt received after bedside shift report from [facility] Devora CAR RN. Pt up sitting on side of bed with no distress noted. Dobutamine drip infusing as ordered. Bed locked and in low position. Instruct pt to call for assistance. End of shift bedside report given to [facility] - CLINTON CAR, Report included the following information. SBAR, MAR, KARDEX AND RECENT RESULTS.

## 2017-03-08 NOTE — ROUTINE PROCESS
Bedside and Verbal shift change report given to [employer] (oncoming nurse) by   Gato No RN (offgoing nurse). Report included the following information SBAR, Kardex, MAR and Recent Results. Patient resting in bed, denies needs. Call light within reach.

## 2017-03-09 NOTE — PROGRESS NOTES
[facility]      CARDIOLOGY PROGRESS NOTE  RECS:      1. Acute on chronic diastolic congestive heart failure-  Improving but still with  edema. Continue with Fluid restriction. Stricts I&O. Monitor electrolytes and renal function. ECHO with mod AS, MR likely causing CHF. Diuresing, cont diamox for high CO2. Needs PT and D/C planning. 2. Anasarca- improved. lasix drip d/c. Added lasix po bid. CO2 stable monitor. D/c  dobutamine. 3. S/p CABG in 2013 in [facility] 7- stable. 4. S/p AICD- [employer]. normal function interrogated on 02/27/17  5. Hx Carotid endarterectomy- bilateral in 2015    6. KYLEE on CKD- monitor renal function nephrology following   7. Pancytopenia- consider oncology consult   8. Hypertension- normal low. continue with bb monitor closely. No ACEi due to elevated creatinine. Continue with statin. 9. Mitral regurgitation -mild to moderate regurgitation echo on 2016   10. Aortic stenosis : moderate, will f/u. Will consider TAVR if pt compliant with office f/u.           ASSESSMENT:  Hospital Problems  Date Reviewed: 8/13/2016          Codes Class Noted POA    Acute on chronic diastolic (congestive) heart failure (HCC) ICD-10-CM: I50.33  ICD-9-CM: 428.33, 428.0  2/27/2017 Unknown        Acute CHF (congestive heart failure) ([facility] ICD-10-CM: I50.9  ICD-9-CM: 428.0  2/26/2017 Unknown        HTN (hypertension) (Chronic) ICD-10-CM: I10  ICD-9-CM: 401.9  9/4/2016 Yes        HLD (hyperlipidemia) (Chronic) ICD-10-CM: E78.5  ICD-9-CM: 272.4  9/4/2016 Yes        Diastolic CHF, acute on chronic ([facility] ICD-10-CM: I50.33  ICD-9-CM: 428.33, 428.0  8/17/2016 Yes        Acute exacerbation of CHF (congestive heart failure) ([facility] ICD-10-CM: I50.9  ICD-9-CM: 428.0  8/12/2016 Yes        Hx of CABG ICD-10-CM: Z95.1  ICD-9-CM: V45.81  7/22/2016 Yes    Overview Signed 7/22/2016  5:23 PM by Nayeli Borrego NP     2013             H/O carotid endarterectomy ICD-10-CM: Z98.890  ICD-9-CM: V45.89  7/22/2016 Yes    Overview Signed 7/22/2016  5:27 PM by Nemo Ellis NP     2018             AICD (automatic cardioverter/defibrillator) present ICD-10-CM: Z95.810  ICD-9-CM: V45.02  7/22/2016 Yes    Overview Signed 7/22/2016  5:27 PM by Nemo Ellis NP     2015             CHF (congestive heart failure) (HCC) ICD-10-CM: I50.9  ICD-9-CM: 428.0  7/21/2016 Unknown        Hematospermia ICD-10-CM: R36.1  ICD-9-CM: 608.82  3/5/2014 Yes        CAD (coronary artery disease) ICD-10-CM: I25.10  ICD-9-CM: 414.00  3/5/2014 Yes        PVD (peripheral vascular disease) ([facility] ICD-10-CM: I73.9  ICD-9-CM: 443.9  3/5/2014 Yes                SUBJECTIVE:  No CP  Improving JAIMES    OBJECTIVE:    VS:   Visit Vitals    /77 (BP 1 Location: Right arm, BP Patient Position: At rest)    Pulse 68    Temp 97.1 °F (36.2 °C)    Resp 20    Ht 6' (1.829 m)    Wt 85.6 kg (188 lb 12.8 oz)    SpO2 98%    BMI 25.61 kg/m2         Intake/Output Summary (Last 24 hours) at 03/09/17 1221  Last data filed at 03/09/17 1301   Gross per 24 hour   Intake           323.07 ml   Output              550 ml   Net          -226.93 ml     TELE: normal sinus rhythm    General: alert and in no apparent distress  HENT: Normocephalic, atraumatic. Normal external eye.   Neck :  bruit present, increased JVP  Cardiac:  regular rate and rhythm, systolic murmur: early systolic 3/6, crescendo at 2nd right intercostal space, radiates to carotids, 2nd systolic murmur: late systolic 3/6, blowing at apex  Chest/Lungs:mild rales heard lung bases  Abdomen: Soft, nontender, no masses, +ascites/abd wall edema  Extremities:  2+edema LE, and upper thighs & abd wall. peripheral pulses present      Labs: Results:       Chemistry Recent Labs      03/09/17   0418  03/08/17   0340  03/07/17   0235   GLU  91  98  93   NA  132*  134*  133*   K  3.8  3.8  4.0   CL  92*  91*  90*   CO2  36*  37*  37*   BUN  56*  51*  46*   CREA  2.16*  2.03*  2.12*   CA  8.9  8.8  9.1 MG  2.0  2.0  2.0   AGAP  4  6  6   BUCR  26*  25*  22*      CBC w/Diff Recent Labs      03/09/17   0418  03/08/17   0340  03/07/17   0235   WBC  4.4*  4.4*  4.4*   RBC  2.84*  2.99*  3.02*   HGB  8.0*  8.3*  8.3*   HCT  26.3*  27.6*  27.7*   PLT  CLUMPED PLATELETS  72*  59*   GRANS  58  65  65   LYMPH  22  18*  17*   EOS  10*  9*  9*      Cardiac Enzymes No results for input(s): CPK, CKND1, TYSON in the last 72 hours. No lab exists for component: CKRMB, TROIP   Coagulation No results for input(s): PTP, INR, APTT in the last 72 hours. No lab exists for component: INREXT, INREXT    Lipid Panel Lab Results   Component Value Date/Time    Cholesterol, total 100 07/22/2016 05:20 AM    HDL Cholesterol 31 07/22/2016 05:20 AM    LDL, calculated 54.4 07/22/2016 05:20 AM    VLDL, calculated 14.6 07/22/2016 05:20 AM    Triglyceride 73 07/22/2016 05:20 AM    CHOL/HDL Ratio 3.2 07/22/2016 05:20 AM      BNP No results for input(s): BNPP in the last 72 hours. Liver Enzymes No results for input(s): TP, ALB, TBIL, AP, SGOT, GPT in the last 72 hours.     No lab exists for component: DBIL   Digoxin    Thyroid Studies Lab Results   Component Value Date/Time    TSH 4.23 07/22/2016 05:20 AM              Bakari Lindquist NP  [phone]

## 2017-03-09 NOTE — ROUTINE PROCESS
Bedside and Verbal shift change report given to CLINTON Jones (oncoming nurse) by   Kristy Amaya RN (offgoing nurse). Report included the following information SBAR, Kardex, MAR and Recent Results. Patient resting in bed, denies needs. Call light within reach.

## 2017-03-09 NOTE — PROGRESS NOTES
Hematology/Medical oncology Progress Note      NAME: Rosemary Terrazas   :  1940  MRM:  [id number]    Date/Time: 3/9/2017  8:25 AM         Subjective:     Mr Abimbola Evans is a 79 y/o man with CHF who was admitted with increasing leg swelling and increasing abdominal girth. He also has progressive anemia and thrombocytopenia. Past Medical History reviewed and unchanged from Admission History and Physical    Review of Systems   Constitutional: Positive for fatigue. Negative for activity change, appetite change, chills, diaphoresis, fever and unexpected weight change. HENT: Negative for congestion, facial swelling, mouth sores, nosebleeds, postnasal drip, trouble swallowing and voice change. Eyes: Negative for photophobia, pain, discharge and itching. Respiratory: Negative for apnea, cough, choking, chest tightness, wheezing and stridor. Cardiovascular: Positive for leg swelling. Negative for chest pain and palpitations. Gastrointestinal: Positive for abdominal distention. Negative for abdominal pain, blood in stool, constipation, diarrhea, nausea and rectal pain. Genitourinary: Negative for difficulty urinating, dysuria, flank pain, hematuria and urgency. Musculoskeletal: Negative for arthralgias, back pain, gait problem, joint swelling, neck pain and neck stiffness. Skin: Negative for color change, pallor, rash and wound. Neurological: Negative for dizziness, facial asymmetry, speech difficulty, weakness, light-headedness and headaches. Hematological: Negative for adenopathy. Does not bruise/bleed easily. Psychiatric/Behavioral: Negative for agitation, confusion, hallucinations and sleep disturbance. Objective:       Vitals:      Last 24hrs VS reviewed since prior progress note.  Most recent are:    Visit Vitals    BP 96/65    Pulse 66    Temp 98.3 °F (36.8 °C)    Resp 20    Ht 6' (1.829 m)    Wt 85.6 kg (188 lb 12.8 oz)    SpO2 94%    BMI 25.61 kg/m2     SpO2 Readings from Last 6 Encounters:   03/09/17 94%   08/23/16 91%   08/02/16 90%   03/17/15 100%    O2 Flow Rate (L/min): 4 l/min       Intake/Output Summary (Last 24 hours) at 03/09/17 0825  Last data filed at 03/08/17 1842   Gross per 24 hour   Intake           233.07 ml   Output              600 ml   Net          -366.93 ml          Exam:      Physical Exam   Nursing note and vitals reviewed. Constitutional: He is oriented to person, place, and time. He appears well-developed and well-nourished. No distress. HENT:   Head: Normocephalic and atraumatic. Mouth/Throat: No oropharyngeal exudate. Eyes: Conjunctivae and EOM are normal. Pupils are equal, round, and reactive to light. Right eye exhibits no discharge. Left eye exhibits no discharge. No scleral icterus. Neck: Normal range of motion. Neck supple. No tracheal deviation present. No thyromegaly present. Cardiovascular: Normal rate and regular rhythm. Exam reveals no gallop and no friction rub. No murmur heard. Pulmonary/Chest: Effort normal and breath sounds normal. No apnea. No respiratory distress. He has no wheezes. He has no rales. Chest wall is not dull to percussion. He exhibits no mass, no tenderness, no bony tenderness, no laceration, no crepitus, no edema, no deformity, no swelling and no retraction. Right breast exhibits no inverted nipple, no mass, no nipple discharge, no skin change and no tenderness. Left breast exhibits no inverted nipple, no mass, no nipple discharge, no skin change and no tenderness. Breasts are symmetrical.   Abdominal: Soft. Bowel sounds are normal. He exhibits distension. There is no tenderness. There is no rebound and no guarding. Musculoskeletal: Normal range of motion. He exhibits edema. He exhibits no tenderness. Lymphadenopathy:     He has no cervical adenopathy. Neurological: He is alert and oriented to person, place, and time. Coordination normal.   Skin: Skin is warm and dry. No rash noted. He is not diaphoretic.  No erythema. No pallor. Dressing right arm C,D,I   Psychiatric: He has a normal mood and affect.  His behavior is normal. Thought content normal.       Telemetry reviewed:   normal sinus rhythm  Tubes:   [place]    Lab Data Reviewed: (see below)      Medications:  Current Facility-Administered Medications   Medication Dose Route Frequency    acetaZOLAMIDE (DIAMOX) tablet 250 mg  250 mg Oral DAILY    influenza vaccine 2016-17 (36mos+)(PF) (FLUZONE/FLUARIX/FLULAVAL QUAD) injection 0.5 mL  0.5 mL IntraMUSCular PRIOR TO DISCHARGE    furosemide (LASIX) tablet 40 mg  40 mg Oral BID    potassium chloride (K-DUR, KLOR-CON) SR tablet 20 mEq  20 mEq Oral DAILY    epoetin ron (EPOGEN;PROCRIT) injection 20,000 Units  20,000 Units SubCUTAneous Q MON, WED & FRI    ferrous sulfate tablet 325 mg  1 Tab Oral DAILY WITH BREAKFAST    zolpidem (AMBIEN) tablet 5 mg  5 mg Oral QHS PRN    albuterol-ipratropium (DUO-NEB) 2.5 MG-0.5 MG/3 ML  3 mL Nebulization Q6H PRN    sodium chloride (NS) flush 5-10 mL  5-10 mL IntraVENous PRN    acetaminophen (TYLENOL) tablet 650 mg  650 mg Oral Q4H PRN    HYDROcodone-acetaminophen (NORCO) 5-325 mg per tablet 1 Tab  1 Tab Oral Q4H PRN    fluticasone (FLONASE) 50 mcg/actuation nasal spray 2 Spray  2 Spray Both Nostrils DAILY    aspirin chewable tablet 81 mg  81 mg Oral DAILY    DOBUTamine (DOBUTREX) 500 mg/250 mL (2,000 mcg/mL) infusion  2.5 mcg/kg/min IntraVENous CONTINUOUS    allopurinol (ZYLOPRIM) tablet 100 mg  100 mg Oral DAILY    nitroglycerin (NITROSTAT) tablet 0.4 mg  0.4 mg SubLINGual PRN    pantoprazole (PROTONIX) tablet 40 mg  40 mg Oral ACB    pravastatin (PRAVACHOL) tablet 80 mg  80 mg Oral QHS    therapeutic multivitamin (THERAGRAN) tablet 1 Tab  1 Tab Oral DAILY    cholecalciferol (VITAMIN D3) tablet 2,000 Units  2,000 Units Oral DAILY    fish oil-omega-3 fatty acids 340-1,000 mg capsule 1 Cap  1 Cap Oral BID    carvedilol (COREG) tablet 6.25 mg  6.25 mg Oral BID WITH MEALS    sodium chloride (NS) flush 5-10 mL  5-10 mL IntraVENous Q8H    naloxone (NARCAN) injection 0.4 mg  0.4 mg IntraVENous PRN    ondansetron (ZOFRAN) injection 4 mg  4 mg IntraVENous Q4H PRN       ______________________________________________________________________      Lab Review:     Recent Labs      03/09/17 0418 03/08/17 0340 03/07/17   0235   WBC  4.4*  4.4*  4.4*   HGB  8.0*  8.3*  8.3*   HCT  26.3*  27.6*  27.7*   PLT  CLUMPED PLATELETS  72*  59*     Recent Labs      03/09/17 0418 03/08/17 0340 03/07/17   0235   NA  132*  134*  133*   K  3.8  3.8  4.0   CL  92*  91*  90*   CO2  36*  37*  37*   GLU  91  98  93   BUN  56*  51*  46*   CREA  2.16*  2.03*  2.12*   CA  8.9  8.8  9.1   MG  2.0  2.0  2.0     No components found for: GLPOC  No results for input(s): PH, PCO2, PO2, HCO3, FIO2 in the last 72 hours. No results for input(s): INR in the last 72 hours. No lab exists for component: INREXT, Alablossom Warrena, INREXT    Other pertinent lab:          Assessment:     Active Problems:    Hematospermia (3/5/2014)      CAD (coronary artery disease) (3/5/2014)      PVD (peripheral vascular disease) ([facility] [address] (3/5/2014)      CHF (congestive heart failure) ([facility] [address] (7/21/2016)      Hx of CABG (7/22/2016)      Overview: 2013      H/O carotid endarterectomy (7/22/2016)      Overview: 2015      AICD (automatic cardioverter/defibrillator) present (7/22/2016)      Overview: 2015      Acute exacerbation of CHF (congestive heart failure) ([address] (2/01/7304)      Diastolic CHF, acute on chronic ([facility] (8/17/2016)      HTN (hypertension) (9/4/2016)      HLD (hyperlipidemia) (9/4/2016)      Acute CHF (congestive heart failure) ([facility] (2/26/2017)      Acute on chronic diastolic (congestive) heart failure ([facility] (2/27/2017)           Plan:     Risk of deterioration: low             1. ACKD: continue procrit at 20k units 3x weekly.   2. Thrombocytopenia/pseudothrombocytopenia: continue the current level of care; monitor platelets daily and begin gamma-globulin if the counts declines below 15k. Peripheral smear has demonstrated platelet count playing suggesting that the patient platelet counts are actually within normal limits but I actually clumping in the presence of preservatives at the time of blood draws. We will continue to monitor this on a regular basis. Platelets clumped this morning. 3. CHF: management per cardiology.          Total time spent with patient: 30 minutes                  Care Plan discussed with: Patient    Discussed:  Care Plan    Prophylaxis:  H2B/PPI    Disposition:  Home w/Family           ___________________________________________________    Attending Physician: Cristina Ribera NP

## 2017-03-09 NOTE — PROGRESS NOTES
Alex Trinidad M.D. PROGRESS NOTE    Name: Antionette Huff MRN: [id number]   : 1940 Hospital: [facility]   Date: 3/9/2017  Admission Date: 2017     Subjective/Objective/Plans  1. Acute-on-chronic diastolic heart failure. 2. History of coronary artery bypass surgery, coronary artery disease. 3. History of defibrillator placement and stent placement. 4. Hypertension. 5. Hyperlipidemia. 6. Obstructive sleep apnea. 7. History of mini strokes. 8. Ascites. 9. Thrombocytopenia, chronic. 10. Gastroesophageal reflux. 11. Dyslipidemia. 12. History of gout.     Off Lasix drip, reducing Dobutamine drip  PT/OT working on him  DC soon in a few days   Cr stable around 2  Discussed with Cardiology, SNF would be best but wants to go home    Plan  DC in am    Vital Signs:  Visit Vitals    /77 (BP 1 Location: Right arm, BP Patient Position: At rest)    Pulse 68    Temp 97.1 °F (36.2 °C)    Resp 20    Ht 6' (1.829 m)    Wt 85.6 kg (188 lb 12.8 oz)    SpO2 98%    BMI 25.61 kg/m2       O2 Device: Nasal cannula   O2 Flow Rate (L/min): 4 l/min   Temp (24hrs), Av.7 °F (36.5 °C), Min:97.1 °F (36.2 °C), Max:98.3 °F (36.8 °C)     9:53 AM  Intake/Output:   Last shift:      701 - 1900  In: 240 [P.O.:240]  Out: 100 [Urine:100]  Last 3 shifts: 1901 -  07  In: 233.1 [P.O.:150; I.V.:83.1]  Out: 750 [Urine:750]    Intake/Output Summary (Last 24 hours) at 17 0953  Last data filed at 17 3890   Gross per 24 hour   Intake           323.07 ml   Output              650 ml   Net          -326.93 ml         Physical Exam:    General: in no apparent distress, in no respiratory distress and acyanotic and alert    HEENT: pupils equal, no ear discharge   Neck: No abnormally enlarged lymph nodes. , no JDV   Mouth: MMM no lesions    Chest: normal, no breast masses   Lungs: decreased air exchange RLL   Heart: Regular rate and rhythm   Abdomen: gross ascites noted   Extremity: 2+ edema   Neuro: alert    Skin: Skin color, texture, turgor normal. No rashes or lesions    Data Review:    Labs: Results:       Chemistry Recent Labs      03/09/17 0418 03/08/17 0340 03/07/17   0235   GLU  91  98  93   NA  132*  134*  133*   K  3.8  3.8  4.0   CL  92*  91*  90*   CO2  36*  37*  37*   BUN  56*  51*  46*   CREA  2.16*  2.03*  2.12*   CA  8.9  8.8  9.1   AGAP  4  6  6   BUCR  26*  25*  22*      CBC w/Diff Recent Labs      03/09/17 0418 03/08/17 0340 03/07/17   0235   WBC  4.4*  4.4*  4.4*   RBC  2.84*  2.99*  3.02*   HGB  8.0*  8.3*  8.3*   HCT  26.3*  27.6*  27.7*   PLT  CLUMPED PLATELETS  72*  59*   GRANS  58  65  65   LYMPH  22  18*  17*   EOS  10*  9*  9*      Cardiac Enzymes No results for input(s): CPK, CKND1, TYSON in the last 72 hours. No lab exists for component: CKRMB, TROIP   Coagulation No results for input(s): PTP, INR, APTT in the last 72 hours. No lab exists for component: INREXT    Lipid Panel Lab Results   Component Value Date/Time    Cholesterol, total 100 07/22/2016 05:20 AM    HDL Cholesterol 31 07/22/2016 05:20 AM    LDL, calculated 54.4 07/22/2016 05:20 AM    VLDL, calculated 14.6 07/22/2016 05:20 AM    Triglyceride 73 07/22/2016 05:20 AM    CHOL/HDL Ratio 3.2 07/22/2016 05:20 AM      BNP No results for input(s): BNPP in the last 72 hours. Liver Enzymes No results for input(s): TP, ALB, TBILI, AP, SGOT, ALT, CBIL in the last 72 hours. Thyroid Studies Lab Results   Component Value Date/Time    TSH 4.23 07/22/2016 05:20 AM          Procedures/imaging: see electronic medical records for all procedures, Xrays and details which were not copied into this note but were reviewed. Allergies:  No Known Allergies    Home Medications:  Prior to Admission Medications   Prescriptions Last Dose Informant Patient Reported? Taking? Cholecalciferol, Vitamin D3, (VITAMIN D3) 1,000 unit cap   Yes No   Sig: Take 2,000 Units by mouth daily.    Omega-3 Fatty Acids 300 mg cap Yes No   Sig: Take 1 Cap by mouth two (2) times a day. allopurinol (ZYLOPRIM) 100 mg tablet   Yes No   Sig: Take 100 mg by mouth daily. aspirin delayed-release 81 mg tablet   Yes No   Sig: Take 162 mg by mouth daily. carvedilol (COREG) 6.25 mg tablet   No No   Sig: Take 1 Tab by mouth two (2) times daily (with meals). furosemide (LASIX) 40 mg tablet   No No   Sig: One tab daily  Indications: PERIPHERAL EDEMA DUE TO CHRONIC HEART FAILURE   lisinopril (PRINIVIL, ZESTRIL) 5 mg tablet   No No   Sig: Take 1 Tab by mouth daily. multivitamin (ONE A DAY) tablet   Yes No   Sig: Take 1 Tab by mouth daily. nitroglycerin (NITROSTAT) 0.4 mg SL tablet   Yes No   Sig: by SubLINGual route every five (5) minutes as needed for Chest Pain. omeprazole (PRILOSEC) 20 mg capsule   Yes No   Sig: Take 20 mg by mouth daily. pravastatin (PRAVACHOL) 80 mg tablet   Yes No   Sig: Take 80 mg by mouth nightly.       Facility-Administered Medications: None       Current Medications:  Current Facility-Administered Medications   Medication Dose Route Frequency    acetaZOLAMIDE (DIAMOX) tablet 250 mg  250 mg Oral DAILY    influenza vaccine 2016-17 (36mos+)(PF) (FLUZONE/FLUARIX/FLULAVAL QUAD) injection 0.5 mL  0.5 mL IntraMUSCular PRIOR TO DISCHARGE    furosemide (LASIX) tablet 40 mg  40 mg Oral BID    potassium chloride (K-DUR, KLOR-CON) SR tablet 20 mEq  20 mEq Oral DAILY    epoetin ron (EPOGEN;PROCRIT) injection 20,000 Units  20,000 Units SubCUTAneous Q MON, WED & FRI    ferrous sulfate tablet 325 mg  1 Tab Oral DAILY WITH BREAKFAST    zolpidem (AMBIEN) tablet 5 mg  5 mg Oral QHS PRN    albuterol-ipratropium (DUO-NEB) 2.5 MG-0.5 MG/3 ML  3 mL Nebulization Q6H PRN    sodium chloride (NS) flush 5-10 mL  5-10 mL IntraVENous PRN    acetaminophen (TYLENOL) tablet 650 mg  650 mg Oral Q4H PRN    HYDROcodone-acetaminophen (NORCO) 5-325 mg per tablet 1 Tab  1 Tab Oral Q4H PRN    fluticasone (FLONASE) 50 mcg/actuation nasal spray 2 Spray  2 Spray Both Nostrils DAILY    aspirin chewable tablet 81 mg  81 mg Oral DAILY    DOBUTamine (DOBUTREX) 500 mg/250 mL (2,000 mcg/mL) infusion  2.5 mcg/kg/min IntraVENous CONTINUOUS    allopurinol (ZYLOPRIM) tablet 100 mg  100 mg Oral DAILY    nitroglycerin (NITROSTAT) tablet 0.4 mg  0.4 mg SubLINGual PRN    pantoprazole (PROTONIX) tablet 40 mg  40 mg Oral ACB    pravastatin (PRAVACHOL) tablet 80 mg  80 mg Oral QHS    therapeutic multivitamin (THERAGRAN) tablet 1 Tab  1 Tab Oral DAILY    cholecalciferol (VITAMIN D3) tablet 2,000 Units  2,000 Units Oral DAILY    fish oil-omega-3 fatty acids 340-1,000 mg capsule 1 Cap  1 Cap Oral BID    carvedilol (COREG) tablet 6.25 mg  6.25 mg Oral BID WITH MEALS    sodium chloride (NS) flush 5-10 mL  5-10 mL IntraVENous Q8H    naloxone (NARCAN) injection 0.4 mg  0.4 mg IntraVENous PRN    ondansetron (ZOFRAN) injection 4 mg  4 mg IntraVENous Q4H PRN       Chart and notes reviewed. Data reviewed. I have evaluated and examined the patient.         IMPRESSION:   Patient Active Problem List   Diagnosis Code    Hematospermia R36.1    CAD (coronary artery disease) I25.10    PVD (peripheral vascular disease) ([facility] [address] I73.9    CHF (congestive heart failure) ([facility]) I50.9    Anasarca R60.1    Hx of CABG Z95.1    H/O carotid endarterectomy Z98.890    AICD (automatic cardioverter/defibrillator) present Z95.810    Acute exacerbation of CHF (congestive heart failure) (HCC) I12.3    Diastolic CHF, acute on chronic (HCC) I50.33    HTN (hypertension) I10    HLD (hyperlipidemia) E78.5    Acute CHF (congestive heart failure) (HCC) I50.9    Acute on chronic diastolic (congestive) heart failure (HCC) I50.33 ·         PLAN:/DISCUSSION:   · Dc in am  · Switched to po lasix, taper and DC Dobutamine        Randall Rodas MD  3/9/2017, 9:53 AM

## 2017-03-09 NOTE — PROGRESS NOTES
RENAL DAILY PROGRESS NOTE    Subjective:   Admitted for Acute CHF seen for CKD stage 3    Complaint: feeling better ( from 10 to a 2).  No CP    Current Facility-Administered Medications   Medication Dose Route Frequency    acetaZOLAMIDE (DIAMOX) tablet 250 mg  250 mg Oral DAILY    influenza vaccine 2016-17 (36mos+)(PF) (FLUZONE/FLUARIX/FLULAVAL QUAD) injection 0.5 mL  0.5 mL IntraMUSCular PRIOR TO DISCHARGE    furosemide (LASIX) tablet 40 mg  40 mg Oral BID    potassium chloride (K-DUR, KLOR-CON) SR tablet 20 mEq  20 mEq Oral DAILY    epoetin ron (EPOGEN;PROCRIT) injection 20,000 Units  20,000 Units SubCUTAneous Q MON, WED & FRI    ferrous sulfate tablet 325 mg  1 Tab Oral DAILY WITH BREAKFAST    zolpidem (AMBIEN) tablet 5 mg  5 mg Oral QHS PRN    albuterol-ipratropium (DUO-NEB) 2.5 MG-0.5 MG/3 ML  3 mL Nebulization Q6H PRN    sodium chloride (NS) flush 5-10 mL  5-10 mL IntraVENous PRN    acetaminophen (TYLENOL) tablet 650 mg  650 mg Oral Q4H PRN    HYDROcodone-acetaminophen (NORCO) 5-325 mg per tablet 1 Tab  1 Tab Oral Q4H PRN    fluticasone (FLONASE) 50 mcg/actuation nasal spray 2 Spray  2 Spray Both Nostrils DAILY    aspirin chewable tablet 81 mg  81 mg Oral DAILY    DOBUTamine (DOBUTREX) 500 mg/250 mL (2,000 mcg/mL) infusion  2.5 mcg/kg/min IntraVENous CONTINUOUS    allopurinol (ZYLOPRIM) tablet 100 mg  100 mg Oral DAILY    nitroglycerin (NITROSTAT) tablet 0.4 mg  0.4 mg SubLINGual PRN    pantoprazole (PROTONIX) tablet 40 mg  40 mg Oral ACB    pravastatin (PRAVACHOL) tablet 80 mg  80 mg Oral QHS    therapeutic multivitamin (THERAGRAN) tablet 1 Tab  1 Tab Oral DAILY    cholecalciferol (VITAMIN D3) tablet 2,000 Units  2,000 Units Oral DAILY    fish oil-omega-3 fatty acids 340-1,000 mg capsule 1 Cap  1 Cap Oral BID    carvedilol (COREG) tablet 6.25 mg  6.25 mg Oral BID WITH MEALS    sodium chloride (NS) flush 5-10 mL  5-10 mL IntraVENous Q8H    naloxone (NARCAN) injection 0.4 mg  0.4 mg IntraVENous PRN    ondansetron (ZOFRAN) injection 4 mg  4 mg IntraVENous Q4H PRN           Objective:     Patient Vitals for the past 24 hrs:   Temp Pulse Resp BP SpO2   03/09/17 0857 97.1 °F (36.2 °C) 68 20 150/77 98 %   03/09/17 0000 98.3 °F (36.8 °C) 66 20 96/65 94 %   03/08/17 2200 - - - - 93 %   03/08/17 2103 98.2 °F (36.8 °C) 67 25 93/62 (!) 89 %   03/08/17 1538 - 64 18 108/64 -        Weight change:      03/07 1901 - 03/09 0700  In: 233.1 [P.O.:150; I.V.:83.1]  Out: 750 [Urine:750]    Intake/Output Summary (Last 24 hours) at 03/09/17 1342  Last data filed at 03/09/17 8379   Gross per 24 hour   Intake           323.07 ml   Output              200 ml   Net           123.07 ml     Physical Exam: alert, oriented x 3 afebrile    HEENT: non icteric  Cardiovascular: regular no rub + systolic Murmur  C/L: dec both bases, no rales or wheezing  Abdomen: + ascites  Ext: + LE edema    Data Review:     LABS:   Hematology:   Recent Labs      03/09/17 0418 03/08/17   0340  03/07/17   0235   WBC  4.4*  4.4*  4.4*   HGB  8.0*  8.3*  8.3*   HCT  26.3*  27.6*  27.7*   Pl 59K  Chemistry:   Recent Labs      03/09/17 0418 03/08/17   0340  03/07/17   0235   BUN  56*  51*  46*   CREA  2.16*  2.03*  2.12*   CA  8.9  8.8  9.1   K  3.8  3.8  4.0   NA  132*  134*  133*   CL  92*  91*  90*   CO2  36*  37*  37*   GLU  91  98  93   Mag 2  UPCR 1.0    IMPRESSION AND PLAN:   CKD stage 3  With non nephrotic range proteinuria. No significant change,monitor with dec lasix. Dec urine output documented. Discuss at length with pt need fluid restriction 1L/day along with salt restriction and cont diuretics. Discussed with Dr. Terence Mason. Difficult situation but patient absolutely does not want any dialysis started so will need to optimize his fluid status with conservative management. Suggest to discuss code status. Metabolic alkalosis stable. Stable. Defer to cardio.   Mild hyponatremia 2 to aggressive diuresis dec diuretics  Anemia H/H stable low Fe stores  Cont po Fe ,cont epo per Dr Beni Mukherjee  Hypokalemia much better discontinue K supplement         Luis Armando Ruth MD  3/9/2017

## 2017-03-09 NOTE — ROUTINE PROCESS
0750 Pt received after bedside shift report from [facility] Devora CAR RN. Pt up in bed with no distress noted. Able to make needs known. Denies needs at this time. Bed locked and in low position. Dobutamine drip infusing as ordered. Instruct pt to call for assistance. Call bell in reach.     End of shift bedside report given to Juana Amor (Nila), RN. Report included the following. SBAR, MAR, KARDEX AND RECENT RESULTS.

## 2017-03-10 NOTE — PROGRESS NOTES
[facility]      CARDIOLOGY PROGRESS NOTE  RECS:      1. Acute on chronic diastolic congestive heart failure-  Improving but still with  edema. Continue with Fluid restriction. Stricts I&O. Monitor electrolytes and renal function. ECHO with mod AS, MR likely causing CHF. Diuresing, cont diamox CO2  Improving. Dobutamine can be d/c tomorrow prior d/c . Added metolazone 2.5 mg daily   2. Anasarca- improved. lasix po bid. CO2 stable monitor. 3. S/p CABG in 2013 in [facility] 7- stable. 4. S/p AICD- [employer]. normal function interrogated on 02/27/17  5. Hx Carotid endarterectomy- bilateral in 2015    6. KYLEE on CKD- monitor renal function nephrology following   7. Pancytopenia- consider oncology consult   8. Hypertension- normal low. continue with bb monitor closely. No ACEi due to elevated creatinine. Continue with statin. 9. Mitral regurgitation -mild to moderate regurgitation echo on 2016   10. Aortic stenosis : moderate, will f/u. Will consider TAVR if pt compliant with office f/u.           ASSESSMENT:  Hospital Problems  Date Reviewed: 8/13/2016          Codes Class Noted POA    Acute on chronic diastolic (congestive) heart failure (HCC) ICD-10-CM: I50.33  ICD-9-CM: 428.33, 428.0  2/27/2017 Unknown        Acute CHF (congestive heart failure) ([facility] ICD-10-CM: I50.9  ICD-9-CM: 428.0  2/26/2017 Unknown        HTN (hypertension) (Chronic) ICD-10-CM: I10  ICD-9-CM: 401.9  9/4/2016 Yes        HLD (hyperlipidemia) (Chronic) ICD-10-CM: E78.5  ICD-9-CM: 272.4  9/4/2016 Yes        Diastolic CHF, acute on chronic ([facility] ICD-10-CM: I50.33  ICD-9-CM: 428.33, 428.0  8/17/2016 Yes        Acute exacerbation of CHF (congestive heart failure) ([facility] ICD-10-CM: I50.9  ICD-9-CM: 428.0  8/12/2016 Yes        Hx of CABG ICD-10-CM: Z95.1  ICD-9-CM: V45.81  7/22/2016 Yes    Overview Signed 7/22/2016  5:23 PM by Nemo Ellis NP     2013             H/O carotid endarterectomy ICD-10-CM: K16.370  ICD-9-CM: V45.89  7/22/2016 Yes    Overview Signed 7/22/2016  5:27 PM by John Huff NP     [id number]             AICD (automatic cardioverter/defibrillator) present ICD-10-CM: Z95.810  ICD-9-CM: V45.02  7/22/2016 Yes    Overview Signed 7/22/2016  5:27 PM by John Huff NP     2015             CHF (congestive heart failure) (HCC) ICD-10-CM: I50.9  ICD-9-CM: 428.0  7/21/2016 Unknown        Hematospermia ICD-10-CM: R36.1  ICD-9-CM: 608.82  3/5/2014 Yes        CAD (coronary artery disease) ICD-10-CM: I25.10  ICD-9-CM: 414.00  3/5/2014 Yes        PVD (peripheral vascular disease) ([facility] ICD-10-CM: I73.9  ICD-9-CM: 443.9  3/5/2014 Yes                SUBJECTIVE:  No CP  Improving JAIMES    OBJECTIVE:    VS:   Visit Vitals    /76 (BP 1 Location: Right arm, BP Patient Position: At rest)    Pulse 62    Temp 97.4 °F (36.3 °C)    Resp 20    Ht 6' (1.829 m)    Wt 88.4 kg (194 lb 14.4 oz)    SpO2 97%    BMI 26.43 kg/m2         Intake/Output Summary (Last 24 hours) at 03/10/17 1138  Last data filed at 03/10/17 0944   Gross per 24 hour   Intake           925.08 ml   Output             1250 ml   Net          -324.92 ml     TELE: normal sinus rhythm    General: alert and in no apparent distress  HENT: Normocephalic, atraumatic. Normal external eye.   Neck :  bruit present, increased JVP  Cardiac:  regular rate and rhythm, systolic murmur: early systolic 3/6, crescendo at 2nd right intercostal space, radiates to carotids, 2nd systolic murmur: late systolic 3/6, blowing at apex  Chest/Lungs:mild rales heard lung bases  Abdomen: Soft, nontender, no masses, +ascites/abd wall edema  Extremities:  2+edema LE, and upper thighs & abd wall. peripheral pulses present      Labs: Results:       Chemistry Recent Labs      03/10/17   0324  03/09/17   0418  03/08/17   0340   GLU  116*  91  98   NA  134*  132*  134*   K  3.6  3.8  3.8   CL  93*  92*  91*   CO2  35*  36*  37*   BUN  57*  56*  51*   CREA  2.09*  2.16*  2.03* CA  8.8  8.9  8.8   MG  2.0  2.0  2.0   AGAP  6  4  6   BUCR  27*  26*  25*      CBC w/Diff Recent Labs      03/10/17   1008  03/09/17   0418  03/08/17   0340   WBC  4.3*  4.4*  4.4*   RBC  2.96*  2.84*  2.99*   HGB  8.3*  8.0*  8.3*   HCT  27.3*  26.3*  27.6*   PLT  104*  CLUMPED PLATELETS  72*   GRANS  64  58  65   LYMPH  20*  22  18*   EOS  10*  10*  9*      Cardiac Enzymes No results for input(s): CPK, CKND1, TYSON in the last 72 hours. No lab exists for component: CKRMB, TROIP   Coagulation No results for input(s): PTP, INR, APTT in the last 72 hours. No lab exists for component: INREXT, INREXT    Lipid Panel Lab Results   Component Value Date/Time    Cholesterol, total 100 07/22/2016 05:20 AM    HDL Cholesterol 31 07/22/2016 05:20 AM    LDL, calculated 54.4 07/22/2016 05:20 AM    VLDL, calculated 14.6 07/22/2016 05:20 AM    Triglyceride 73 07/22/2016 05:20 AM    CHOL/HDL Ratio 3.2 07/22/2016 05:20 AM      BNP No results for input(s): BNPP in the last 72 hours. Liver Enzymes No results for input(s): TP, ALB, TBIL, AP, SGOT, GPT in the last 72 hours. No lab exists for component: DBIL   Digoxin    Thyroid Studies Lab Results   Component Value Date/Time    TSH 4.23 07/22/2016 05:20 AM              Merline Lieu, NP  [phone] supervised. I have independently evaluated and examined the patient. All relevant labs and testing data's are reviewed. Care plan discussed and updated after review.     Jamari Ugarte MD

## 2017-03-10 NOTE — PROGRESS NOTES
RENAL DAILY PROGRESS NOTE      IMPRESSION:   CKD 3 with proteinuria,   Volume overload, partially response to diuretics  Metabolic alkalosis   Hyponatremia, improving  Anemia, on epo per Dr. Wong Hernandez  Hypokalemia , better    PLAN:    From renal stand point, Mr. Dorothy Nagel can be discharged with current diuretics as his renal function stable. Please provide follow up outpatient  within two weeks     Discussed with Dr. Gen Celis, Dr. Mccall Bowden:     71y M with KYLEE, volume overload   Complaint:   Overnight events noted  Breathing is okay   Encourage for   no nausea, vomiting, chest pain,  cough, seizure.      Current Facility-Administered Medications   Medication Dose Route Frequency    acetaZOLAMIDE (DIAMOX) tablet 250 mg  250 mg Oral DAILY    influenza vaccine 2016-17 (36mos+)(PF) (FLUZONE/FLUARIX/FLULAVAL QUAD) injection 0.5 mL  0.5 mL IntraMUSCular PRIOR TO DISCHARGE    furosemide (LASIX) tablet 40 mg  40 mg Oral BID    epoetin ron (EPOGEN;PROCRIT) injection 20,000 Units  20,000 Units SubCUTAneous Q MON, WED & FRI    ferrous sulfate tablet 325 mg  1 Tab Oral DAILY WITH BREAKFAST    zolpidem (AMBIEN) tablet 5 mg  5 mg Oral QHS PRN    albuterol-ipratropium (DUO-NEB) 2.5 MG-0.5 MG/3 ML  3 mL Nebulization Q6H PRN    sodium chloride (NS) flush 5-10 mL  5-10 mL IntraVENous PRN    acetaminophen (TYLENOL) tablet 650 mg  650 mg Oral Q4H PRN    HYDROcodone-acetaminophen (NORCO) 5-325 mg per tablet 1 Tab  1 Tab Oral Q4H PRN    fluticasone (FLONASE) 50 mcg/actuation nasal spray 2 Spray  2 Spray Both Nostrils DAILY    aspirin chewable tablet 81 mg  81 mg Oral DAILY    DOBUTamine (DOBUTREX) 500 mg/250 mL (2,000 mcg/mL) infusion  2.5 mcg/kg/min IntraVENous CONTINUOUS    allopurinol (ZYLOPRIM) tablet 100 mg  100 mg Oral DAILY    nitroglycerin (NITROSTAT) tablet 0.4 mg  0.4 mg SubLINGual PRN    pantoprazole (PROTONIX) tablet 40 mg  40 mg Oral ACB    pravastatin (PRAVACHOL) tablet 80 mg  80 mg Oral QHS  therapeutic multivitamin (THERAGRAN) tablet 1 Tab  1 Tab Oral DAILY    cholecalciferol (VITAMIN D3) tablet 2,000 Units  2,000 Units Oral DAILY    fish oil-omega-3 fatty acids 340-1,000 mg capsule 1 Cap  1 Cap Oral BID    carvedilol (COREG) tablet 6.25 mg  6.25 mg Oral BID WITH MEALS    sodium chloride (NS) flush 5-10 mL  5-10 mL IntraVENous Q8H    naloxone (NARCAN) injection 0.4 mg  0.4 mg IntraVENous PRN    ondansetron (ZOFRAN) injection 4 mg  4 mg IntraVENous Q4H PRN       Review of Symptoms: comprehensive ROS negative except above.    Objective:   Patient Vitals for the past 24 hrs:   Temp Pulse Resp BP SpO2   03/10/17 1144 97.7 °F (36.5 °C) 61 18 124/61 99 %   03/10/17 0823 97.4 °F (36.3 °C) 62 20 139/76 97 %   03/10/17 0352 96.5 °F (35.8 °C) 69 20 101/53 97 %   03/09/17 2317 97.2 °F (36.2 °C) 72 23 117/67 95 %   03/09/17 2007 97.2 °F (36.2 °C) (!) 59 20 97/63 100 %   03/09/17 1619 97.2 °F (36.2 °C) 63 22 116/64 95 %        Weight change: -0.816 kg (-1 lb 12.8 oz)     03/08 1901 - 03/10 0700  In: 625.1 [P.O.:540; I.V.:85.1]  Out: 1150 [Urine:1150]    Intake/Output Summary (Last 24 hours) at 03/10/17 1232  Last data filed at 03/10/17 1144   Gross per 24 hour   Intake           925.08 ml   Output             1400 ml   Net          -474.92 ml     Physical Exam:   General: comfortable, no acute distress   HEENT supple ,   CVS: S1S2 heard,  no rub  RS: + air entry b/l, poor inspiratory effort   Abd: Soft, distended, ascitis  Neuro: non focal, awake, alert , CN II-XII are grossly intact  Extrm: ++ edema,  Skin: no visible  Rash  Musculoskeletal: No gross joints or bone deformities         Data Review:     LABS:   Hematology: Recent Labs      03/10/17   1008  03/09/17   0418  03/08/17   0340   WBC  4.3*  4.4*  4.4*   HGB  8.3*  8.0*  8.3*   HCT  27.3*  26.3*  27.6*     Chemistry: Recent Labs      03/10/17   0324  03/09/17   0418  03/08/17   0340   BUN  57*  56*  51*   CREA  2.09*  2.16*  2.03*   CA  8.8  8.9 8.8   K  3.6  3.8  3.8   NA  134*  132*  134*   CL  93*  92*  91*   CO2  35*  36*  37*   GLU  116*  91  98              Procedures/imaging: see electronic medical records for all procedures, Xrays and details which were not copied into this note but were reviewed prior to creation of Plan          Assessment & Plan:     As above       Aaliyah Chilel MD  3/10/2017  12:32 PM

## 2017-03-10 NOTE — PROGRESS NOTES
Hematology/Medical oncology Progress Note      NAME: Lucina Thomas   :  1940  MRM:  [id number]    Date/Time: 3/10/2017  9:40 AM         Subjective:     Mr Radha Lopez is a 79 y/o man with CHF who was admitted with increasing leg swelling and increasing abdominal girth. He also has progressive anemia and thrombocytopenia. He is sitting up on side of the bed. NAD. He reports he feels great, just ready for breakfast. He denies pains, shortness of breath or dizziness. He has no complaints. Past Medical History reviewed and unchanged from Admission History and Physical    Review of Systems   Constitutional: Negative for activity change, appetite change, chills, diaphoresis, fatigue, fever and unexpected weight change. HENT: Negative for congestion, facial swelling, mouth sores, nosebleeds, postnasal drip, trouble swallowing and voice change. Eyes: Negative for photophobia, pain, discharge and itching. Respiratory: Negative for apnea, cough, choking, chest tightness, wheezing and stridor. Cardiovascular: Positive for leg swelling. Negative for chest pain and palpitations. Gastrointestinal: Positive for abdominal distention. Negative for abdominal pain, blood in stool, constipation, diarrhea, nausea and rectal pain. Genitourinary: Negative for difficulty urinating, dysuria, flank pain, hematuria and urgency. Musculoskeletal: Negative for arthralgias, back pain, gait problem, joint swelling, neck pain and neck stiffness. Skin: Negative for color change, pallor, rash and wound. Neurological: Negative for dizziness, facial asymmetry, speech difficulty, weakness, light-headedness and headaches. Hematological: Negative for adenopathy. Does not bruise/bleed easily. Psychiatric/Behavioral: Negative for agitation, confusion, hallucinations and sleep disturbance. Objective:       Vitals:      Last 24hrs VS reviewed since prior progress note.  Most recent are:    Visit Vitals    /76 (BP 1 Location: Right arm, BP Patient Position: At rest)    Pulse 62    Temp 97.4 °F (36.3 °C)    Resp 20    Ht 6' (1.829 m)    Wt 88.4 kg (194 lb 14.4 oz)    SpO2 97%    BMI 26.43 kg/m2     SpO2 Readings from Last 6 Encounters:   03/10/17 97%   08/23/16 91%   08/02/16 90%   03/17/15 100%    O2 Flow Rate (L/min): 4 l/min       Intake/Output Summary (Last 24 hours) at 03/10/17 0944  Last data filed at 03/10/17 0426   Gross per 24 hour   Intake           385.08 ml   Output             1050 ml   Net          -664.92 ml          Exam:      Physical Exam   Nursing note and vitals reviewed. Constitutional: He is oriented to person, place, and time. He appears well-developed and well-nourished. No distress. HENT:   Head: Normocephalic and atraumatic. Mouth/Throat: No oropharyngeal exudate. Eyes: Conjunctivae and EOM are normal. Pupils are equal, round, and reactive to light. Right eye exhibits no discharge. Left eye exhibits no discharge. No scleral icterus. Neck: Normal range of motion. Neck supple. No tracheal deviation present. No thyromegaly present. Cardiovascular: Normal rate and regular rhythm. Exam reveals no gallop and no friction rub. No murmur heard. Pulmonary/Chest: Effort normal and breath sounds normal. No apnea. No respiratory distress. He has no wheezes. He has no rales. Chest wall is not dull to percussion. He exhibits no mass, no tenderness, no bony tenderness, no laceration, no crepitus, no edema, no deformity, no swelling and no retraction. Right breast exhibits no inverted nipple, no mass, no nipple discharge, no skin change and no tenderness. Left breast exhibits no inverted nipple, no mass, no nipple discharge, no skin change and no tenderness. Breasts are symmetrical.   Abdominal: Soft. Bowel sounds are normal. He exhibits distension. There is no tenderness. There is no rebound and no guarding. Musculoskeletal: Normal range of motion. He exhibits edema. He exhibits no tenderness. Lymphadenopathy:     He has no cervical adenopathy. Neurological: He is alert and oriented to person, place, and time. Coordination normal.   Skin: Skin is warm and dry. No rash noted. He is not diaphoretic. No erythema. No pallor. Dressing right arm and lower extremities C,D,I   Psychiatric: He has a normal mood and affect.  His behavior is normal. Thought content normal.       Telemetry reviewed:   normal sinus rhythm  Tubes:   [place]    Lab Data Reviewed: (see below)      Medications:  Current Facility-Administered Medications   Medication Dose Route Frequency    acetaZOLAMIDE (DIAMOX) tablet 250 mg  250 mg Oral DAILY    influenza vaccine 2016-17 (36mos+)(PF) (FLUZONE/FLUARIX/FLULAVAL QUAD) injection 0.5 mL  0.5 mL IntraMUSCular PRIOR TO DISCHARGE    furosemide (LASIX) tablet 40 mg  40 mg Oral BID    epoetin ron (EPOGEN;PROCRIT) injection 20,000 Units  20,000 Units SubCUTAneous Q MON, WED & FRI    ferrous sulfate tablet 325 mg  1 Tab Oral DAILY WITH BREAKFAST    zolpidem (AMBIEN) tablet 5 mg  5 mg Oral QHS PRN    albuterol-ipratropium (DUO-NEB) 2.5 MG-0.5 MG/3 ML  3 mL Nebulization Q6H PRN    sodium chloride (NS) flush 5-10 mL  5-10 mL IntraVENous PRN    acetaminophen (TYLENOL) tablet 650 mg  650 mg Oral Q4H PRN    HYDROcodone-acetaminophen (NORCO) 5-325 mg per tablet 1 Tab  1 Tab Oral Q4H PRN    fluticasone (FLONASE) 50 mcg/actuation nasal spray 2 Spray  2 Spray Both Nostrils DAILY    aspirin chewable tablet 81 mg  81 mg Oral DAILY    DOBUTamine (DOBUTREX) 500 mg/250 mL (2,000 mcg/mL) infusion  2.5 mcg/kg/min IntraVENous CONTINUOUS    allopurinol (ZYLOPRIM) tablet 100 mg  100 mg Oral DAILY    nitroglycerin (NITROSTAT) tablet 0.4 mg  0.4 mg SubLINGual PRN    pantoprazole (PROTONIX) tablet 40 mg  40 mg Oral ACB    pravastatin (PRAVACHOL) tablet 80 mg  80 mg Oral QHS    therapeutic multivitamin (THERAGRAN) tablet 1 Tab  1 Tab Oral DAILY    cholecalciferol (VITAMIN D3) tablet 2,000 Units 2,000 Units Oral DAILY    fish oil-omega-3 fatty acids 340-1,000 mg capsule 1 Cap  1 Cap Oral BID    carvedilol (COREG) tablet 6.25 mg  6.25 mg Oral BID WITH MEALS    sodium chloride (NS) flush 5-10 mL  5-10 mL IntraVENous Q8H    naloxone (NARCAN) injection 0.4 mg  0.4 mg IntraVENous PRN    ondansetron (ZOFRAN) injection 4 mg  4 mg IntraVENous Q4H PRN       ______________________________________________________________________      Lab Review:     Recent Labs      03/09/17 0418 03/08/17   0340   WBC  4.4*  4.4*   HGB  8.0*  8.3*   HCT  26.3*  27.6*   PLT  CLUMPED PLATELETS  72*     Recent Labs      03/10/17   0324  03/09/17 0418 03/08/17   0340   NA  134*  132*  134*   K  3.6  3.8  3.8   CL  93*  92*  91*   CO2  35*  36*  37*   GLU  116*  91  98   BUN  57*  56*  51*   CREA  2.09*  2.16*  2.03*   CA  8.8  8.9  8.8   MG  2.0  2.0  2.0     No components found for: GLPOC  No results for input(s): PH, PCO2, PO2, HCO3, FIO2 in the last 72 hours. No results for input(s): INR in the last 72 hours.     No lab exists for component: INREXT, [place], INREXT    Other pertinent lab:          Assessment:     Active Problems:    Hematospermia (3/5/2014)      CAD (coronary artery disease) (3/5/2014)      PVD (peripheral vascular disease) ([facility] (3/5/2014)      CHF (congestive heart failure) ([facility] (7/21/2016)      Hx of CABG (7/22/2016)      Overview: 2013      H/O carotid endarterectomy (7/22/2016)      Overview: 2015      AICD (automatic cardioverter/defibrillator) present (7/22/2016)      Overview: 2015      Acute exacerbation of CHF (congestive heart failure) ([facility] (0/92/6817)      Diastolic CHF, acute on chronic ([address] (8/17/2016)      HTN (hypertension) (9/4/2016)      HLD (hyperlipidemia) (9/4/2016)      Acute CHF (congestive heart failure) ([facility] (2/26/2017)      Acute on chronic diastolic (congestive) heart failure ([facility] (2/27/2017)           Plan:     Risk of deterioration: low             1. ACKD: continue procrit at [facility] units 3x weekly. 2. Thrombocytopenia/pseudothrombocytopenia: continue the current level of care; monitor platelets daily and begin gamma-globulin if the counts declines below 15k. No CBC for today, will order. No evidence of bleeding. We will continue to monitor this on a regular basis. 3. CHF: management per cardiology.          Total time spent with patient: 15 minutes                  Care Plan discussed with: Patient    Discussed:  Care Plan    Prophylaxis:  H2B/PPI    Disposition:  Home w/Family           ___________________________________________________    Attending Physician: Low Palencia NP

## 2017-03-10 NOTE — PROGRESS NOTES
PT attempted re-eval, however pt refusing at this time stating, \"I'm going home today. \" PT encouraged patient that we can still work together for therapy but pt states, \"I'd rather save my energy for going home and I've got a busy afternoon. \" Pt left resting with call bell by his side and denies need for any further assistance at this time. Max Harris, PT, DPT.

## 2017-03-10 NOTE — ROUTINE PROCESS
0750  Pt received after bedside shift report from Juana Dawn "Fiath" 103, RN. Pt up sitting on side of bed with no distress noted. Dobutamine drip infusing as ordered. Bed locked and in low position. Pt denies needs at this time. Call bell in reach. End of shift bedside report given to Juana Dawn "Faith" Mt, RN.  Report included the following information SBAR, MAR, KARDEX AND RECENT RESULT

## 2017-03-10 NOTE — PROGRESS NOTES
Dariel Feliz M.D. PROGRESS NOTE    Name: Lucina Thomas MRN: [id number]   : 1940 Hospital: [address] [place] [address] Dr   Date: 3/10/2017  Admission Date: 2017     Subjective/Objective/Plans  1. Acute-on-chronic diastolic heart failure. 2. History of coronary artery bypass surgery, coronary artery disease. 3. History of defibrillator placement and stent placement. 4. Hypertension. 5. Hyperlipidemia. 6. Obstructive sleep apnea. 7. History of mini strokes. 8. Ascites. 9. Thrombocytopenia, chronic. 10. Gastroesophageal reflux. 11. Dyslipidemia. 12. History of gout    VSS  Looks weak  Will benefit from more PT/ SNF but declined    Plan  Dc in am  Continue PT  Vital Signs:  Visit Vitals    /76 (BP 1 Location: Right arm, BP Patient Position: At rest)    Pulse 62    Temp 97.4 °F (36.3 °C)    Resp 20    Ht 6' (1.829 m)    Wt 88.4 kg (194 lb 14.4 oz)  Comment: refused to stand     SpO2 97%    BMI 26.43 kg/m2       O2 Device: Nasal cannula   O2 Flow Rate (L/min): 4 l/min   Temp (24hrs), Av.2 °F (36.2 °C), Min:96.5 °F (35.8 °C), Max:97.7 °F (36.5 °C)     9:00 AM  Intake/Output:   Last shift:         Last 3 shifts:  1901 - 03/10 0700  In: 625.1 [P.O.:540; I.V.:85.1]  Out: 1150 [Urine:1150]    Intake/Output Summary (Last 24 hours) at 03/10/17 0900  Last data filed at 03/10/17 0426   Gross per 24 hour   Intake           625.08 ml   Output             1150 ml   Net          -524.92 ml         Physical Exam:    General: in no apparent distress, in no respiratory distress and acyanotic and alert    HEENT: pupils equal, no ear discharge   Neck: No abnormally enlarged lymph nodes. , no JDV   Mouth: MMM no lesions    Chest: normal,    Lungs: decreased air exchange bilaterally   Heart: Regular rate and rhythm   Abdomen: gross ascites noted   Extremity: 2+ edema   Neuro: alert    Skin: Skin color, texture, turgor normal. No rashes or lesions    Data Review:    Labs: Results:       Chemistry Recent Labs      03/10/17   0324  03/09/17 0418 03/08/17   0340   GLU  116*  91  98   NA  134*  132*  134*   K  3.6  3.8  3.8   CL  93*  92*  91*   CO2  35*  36*  37*   BUN  57*  56*  51*   CREA  2.09*  2.16*  2.03*   CA  8.8  8.9  8.8   AGAP  6  4  6   BUCR  27*  26*  25*      CBC w/Diff Recent Labs      03/09/17 0418  03/08/17   0340   WBC  4.4*  4.4*   RBC  2.84*  2.99*   HGB  8.0*  8.3*   HCT  26.3*  27.6*   PLT  CLUMPED PLATELETS  72*   GRANS  58  65   LYMPH  22  18*   EOS  10*  9*      Cardiac Enzymes No results for input(s): CPK, CKND1, TYSON in the last 72 hours. No lab exists for component: CKRMB, TROIP   Coagulation No results for input(s): PTP, INR, APTT in the last 72 hours. No lab exists for component: INREXT    Lipid Panel Lab Results   Component Value Date/Time    Cholesterol, total 100 07/22/2016 05:20 AM    HDL Cholesterol 31 07/22/2016 05:20 AM    LDL, calculated 54.4 07/22/2016 05:20 AM    VLDL, calculated 14.6 07/22/2016 05:20 AM    Triglyceride 73 07/22/2016 05:20 AM    CHOL/HDL Ratio 3.2 07/22/2016 05:20 AM      BNP No results for input(s): BNPP in the last 72 hours. Liver Enzymes No results for input(s): TP, ALB, TBILI, AP, SGOT, ALT, CBIL in the last 72 hours. Thyroid Studies Lab Results   Component Value Date/Time    TSH 4.23 07/22/2016 05:20 AM          Procedures/imaging: see electronic medical records for all procedures, Xrays and details which were not copied into this note but were reviewed. Allergies:  No Known Allergies    Home Medications:  Prior to Admission Medications   Prescriptions Last Dose Informant Patient Reported? Taking? Cholecalciferol, Vitamin D3, (VITAMIN D3) 1,000 unit cap   Yes No   Sig: Take 2,000 Units by mouth daily. Omega-3 Fatty Acids 300 mg cap   Yes No   Sig: Take 1 Cap by mouth two (2) times a day. allopurinol (ZYLOPRIM) 100 mg tablet   Yes No   Sig: Take 100 mg by mouth daily.    aspirin delayed-release 81 mg tablet   Yes No   Sig: Take 162 mg by mouth daily. carvedilol (COREG) 6.25 mg tablet   No No   Sig: Take 1 Tab by mouth two (2) times daily (with meals). furosemide (LASIX) 40 mg tablet   No No   Sig: One tab daily  Indications: PERIPHERAL EDEMA DUE TO CHRONIC HEART FAILURE   lisinopril (PRINIVIL, ZESTRIL) 5 mg tablet   No No   Sig: Take 1 Tab by mouth daily. multivitamin (ONE A DAY) tablet   Yes No   Sig: Take 1 Tab by mouth daily. nitroglycerin (NITROSTAT) 0.4 mg SL tablet   Yes No   Sig: by SubLINGual route every five (5) minutes as needed for Chest Pain. omeprazole (PRILOSEC) 20 mg capsule   Yes No   Sig: Take 20 mg by mouth daily. pravastatin (PRAVACHOL) 80 mg tablet   Yes No   Sig: Take 80 mg by mouth nightly.       Facility-Administered Medications: None       Current Medications:  Current Facility-Administered Medications   Medication Dose Route Frequency    acetaZOLAMIDE (DIAMOX) tablet 250 mg  250 mg Oral DAILY    influenza vaccine 2016-17 (36mos+)(PF) (FLUZONE/FLUARIX/FLULAVAL QUAD) injection 0.5 mL  0.5 mL IntraMUSCular PRIOR TO DISCHARGE    furosemide (LASIX) tablet 40 mg  40 mg Oral BID    epoetin ron (EPOGEN;PROCRIT) injection 20,000 Units  20,000 Units SubCUTAneous Q MON, WED & FRI    ferrous sulfate tablet 325 mg  1 Tab Oral DAILY WITH BREAKFAST    zolpidem (AMBIEN) tablet 5 mg  5 mg Oral QHS PRN    albuterol-ipratropium (DUO-NEB) 2.5 MG-0.5 MG/3 ML  3 mL Nebulization Q6H PRN    sodium chloride (NS) flush 5-10 mL  5-10 mL IntraVENous PRN    acetaminophen (TYLENOL) tablet 650 mg  650 mg Oral Q4H PRN    HYDROcodone-acetaminophen (NORCO) 5-325 mg per tablet 1 Tab  1 Tab Oral Q4H PRN    fluticasone (FLONASE) 50 mcg/actuation nasal spray 2 Spray  2 Spray Both Nostrils DAILY    aspirin chewable tablet 81 mg  81 mg Oral DAILY    DOBUTamine (DOBUTREX) 500 mg/250 mL (2,000 mcg/mL) infusion  2.5 mcg/kg/min IntraVENous CONTINUOUS    allopurinol (ZYLOPRIM) tablet 100 mg  100 mg Oral DAILY    nitroglycerin (NITROSTAT) tablet 0.4 mg  0.4 mg SubLINGual PRN    pantoprazole (PROTONIX) tablet 40 mg  40 mg Oral ACB    pravastatin (PRAVACHOL) tablet 80 mg  80 mg Oral QHS    therapeutic multivitamin (THERAGRAN) tablet 1 Tab  1 Tab Oral DAILY    cholecalciferol (VITAMIN D3) tablet 2,000 Units  2,000 Units Oral DAILY    fish oil-omega-3 fatty acids 340-1,000 mg capsule 1 Cap  1 Cap Oral BID    carvedilol (COREG) tablet 6.25 mg  6.25 mg Oral BID WITH MEALS    sodium chloride (NS) flush 5-10 mL  5-10 mL IntraVENous Q8H    naloxone (NARCAN) injection 0.4 mg  0.4 mg IntraVENous PRN    ondansetron (ZOFRAN) injection 4 mg  4 mg IntraVENous Q4H PRN       Chart and notes reviewed. Data reviewed. I have evaluated and examined the patient.         IMPRESSION:   Patient Active Problem List   Diagnosis Code    Hematospermia R36.1    CAD (coronary artery disease) I25.10    PVD (peripheral vascular disease) ([facility] [address] I73.9    CHF (congestive heart failure) (HCC) I50.9    Anasarca R60.1    Hx of CABG Z95.1    H/O carotid endarterectomy Z98.890    AICD (automatic cardioverter/defibrillator) present Z95.810    Acute exacerbation of CHF (congestive heart failure) (HCC) U54.1    Diastolic CHF, acute on chronic (HCC) I50.33    HTN (hypertension) I10    HLD (hyperlipidemia) E78.5    Acute CHF (congestive heart failure) (HCC) I50.9    Acute on chronic diastolic (congestive) heart failure (HCC) I50.33 ·         PLAN:/DISCUSSION:   · DC in am        Kenia Lowe MD  3/10/2017, 9:00 AM

## 2017-03-10 NOTE — ROUTINE PROCESS
Bedside and Verbal shift change report given to Vianey Mcgrath (oncoming nurse) by Gui Sue (offgoing nurse). Report included the following information Recent Results. 0720-Bedside and Verbal shift change report given to Gui Sue (oncoming nurse) by Vianey Mcgrath (offgoing nurse). Report included the following information Recent Results.

## 2017-03-11 PROBLEM — I35.0 AORTIC STENOSIS, MODERATE: Chronic | Status: ACTIVE | Noted: 2017-01-01

## 2017-03-11 PROBLEM — D69.6 THROMBOCYTOPENIA (HCC): Status: ACTIVE | Noted: 2017-01-01

## 2017-03-11 PROBLEM — E87.6 HYPOKALEMIA: Status: ACTIVE | Noted: 2017-01-01

## 2017-03-11 NOTE — DISCHARGE INSTRUCTIONS
DISCHARGE SUMMARY from Nurse    The following personal items are in your possession at time of discharge:       Visual Aid: Glasses, With patient     Home Medications: Kept at bedside  Jewelry: None  Clothing: Other (comment), Undergarments, Shirt, Slippers, Pajamas (blanket)  Other Valuables: Cell Phone, Other (comment) ( )             PATIENT INSTRUCTIONS:    After general anesthesia or intravenous sedation, for 24 hours or while taking prescription Narcotics:  · Limit your activities  · Do not drive and operate hazardous machinery  · Do not make important personal or business decisions  · Do  not drink alcoholic beverages  · If you have not urinated within 8 hours after discharge, please contact your surgeon on call. Report the following to your surgeon:  · Excessive pain, swelling, redness or odor of or around the surgical area  · Temperature over 100.5  · Nausea and vomiting lasting longer than 4 hours or if unable to take medications  · Any signs of decreased circulation or nerve impairment to extremity: change in color, persistent  numbness, tingling, coldness or increase pain  · Any questions        What to do at Home:  Recommended activity: Activity as tolerated, please use fall precautions at all times. If you experience any of the following symptoms chest pain, difficulty breathing, or any unusual symptoms please, call and inform your primary care provider or call 911 please follow up with all scheduled appointments as ordered. *  Please give a list of your current medications to your Primary Care Provider. *  Please update this list whenever your medications are discontinued, doses are      changed, or new medications (including over-the-counter products) are added. *  Please carry medication information at all times in case of emergency situations.           These are general instructions for a healthy lifestyle:    No smoking/ No tobacco products/ Avoid exposure to second hand smoke    Surgeon General's Warning:  Quitting smoking now greatly reduces serious risk to your health. Obesity, smoking, and sedentary lifestyle greatly increases your risk for illness    A healthy diet, regular physical exercise & weight monitoring are important for maintaining a healthy lifestyle    You may be retaining fluid if you have a history of heart failure or if you experience any of the following symptoms:  Weight gain of 3 pounds or more overnight or 5 pounds in a week, increased swelling in our hands or feet or shortness of breath while lying flat in bed. Please call your doctor as soon as you notice any of these symptoms; do not wait until your next office visit. Recognize signs and symptoms of STROKE:    F-face looks uneven    A-arms unable to move or move unevenly    S-speech slurred or non-existent    T-time-call 911 as soon as signs and symptoms begin-DO NOT go       Back to bed or wait to see if you get better-TIME IS BRAIN. Warning Signs of HEART ATTACK     Call 911 if you have these symptoms:   Chest discomfort. Most heart attacks involve discomfort in the center of the chest that lasts more than a few minutes, or that goes away and comes back. It can feel like uncomfortable pressure, squeezing, fullness, or pain.  Discomfort in other areas of the upper body. Symptoms can include pain or discomfort in one or both arms, the back, neck, jaw, or stomach.  Shortness of breath with or without chest discomfort.  Other signs may include breaking out in a cold sweat, nausea, or lightheadedness. Don't wait more than five minutes to call 911 - MINUTES MATTER! Fast action can save your life. Calling 911 is almost always the fastest way to get lifesaving treatment. Emergency Medical Services staff can begin treatment when they arrive -- up to an hour sooner than if someone gets to the hospital by car. The discharge information has been reviewed with the patient.   The patient verbalized understanding. Discharge medications reviewed with the patient and appropriate educational materials and side effects teaching were provided. MyChart Activation    Thank you for requesting access to [employer]. Please follow the instructions below to securely access and download your online medical record. [employer] allows you to send messages to your doctor, view your test results, renew your prescriptions, schedule appointments, and more. How Do I Sign Up? 1. In your internet browser, go to [email or web address]. 2. Click on the First Time User? Click Here link in the Sign In box. You will see the New Member Sign Up page. 3. Enter your [employer] Access Code exactly as it appears below. You will not need to use this code after youve completed the sign-up process. If you do not sign up before the expiration date, you must request a new code. [employer] Access Code: [email or web address]  Expires: 2017 11:53 AM (This is the date your [employer] access code will )    4. Enter the last four digits of your Social Security Number (xxxx) and Date of Birth (mm/dd/yyyy) as indicated and click Submit. You will be taken to the next sign-up page. 5. Create a [employer] ID. This will be your [employer] login ID and cannot be changed, so think of one that is secure and easy to remember. 6. Create a [employer] password. You can change your password at any time. 7. Enter your Password Reset Question and Answer. This can be used at a later time if you forget your password. 8. Enter your e-mail address. You will receive e-mail notification when new information is available in [address] 9. Click Sign Up. You can now view and download portions of your medical record. 10. Click the Download Summary menu link to download a portable copy of your medical information.     Additional Information    If you have questions, please visit the Frequently Asked Questions section of the [employer] website at [email or web address] Remember, [employer] is NOT to be used for urgent needs. For medical emergencies, dial 911.     Patient armband removed and shredded

## 2017-03-11 NOTE — PROGRESS NOTES
Hematology/Medical Oncology Progress Note             Name: Megan Benitez   : 1940   MRN: [id number]   Date: 3/11/2017 9:14 AM     [x]I have reviewed the flowsheet and previous days notes. Events overnight reviewed and discussed with nursing staff. Vital signs and records reviewed. Mr Radha Wolf is a 77 y/o man with CHF who was admitted with increasing leg swelling and increasing abdominal girth. He also has progressive anemia and thrombocytopenia. Past Medical History reviewed and unchanged from Admission History and Physical              ROS:  Constitutional: Negative for activity change, appetite change, chills, diaphoresis, fatigue, fever and unexpected weight change. HENT: Negative for congestion, facial swelling, mouth sores, nosebleeds, postnasal drip, trouble swallowing and voice change. Eyes: Negative for photophobia, pain, discharge and itching. Respiratory: Negative for apnea, cough, choking, chest tightness, wheezing and stridor. Cardiovascular: Positive for leg swelling. Negative for chest pain and palpitations. Gastrointestinal: Positive for abdominal distention. Negative for abdominal pain, blood in stool, constipation, diarrhea, nausea and rectal pain. Genitourinary: Negative for difficulty urinating, dysuria, flank pain, hematuria and urgency. Musculoskeletal: Negative for arthralgias, back pain, gait problem, joint swelling, neck pain and neck stiffness. Skin: Negative for color change, pallor, rash and wound. Neurological: Negative for dizziness, facial asymmetry, speech difficulty, weakness, light-headedness and headaches. Hematological: Negative for adenopathy. Does not bruise/bleed easily.    Psychiatric/Behavioral: Negative for agitation, confusion, hallucinations and sleep disturbance.             Vital Signs:    Visit Vitals    /68 (BP 1 Location: Right arm, BP Patient Position: At rest)    Pulse 63    Temp 97.8 °F (36.6 °C)    Resp 18    Ht 6' (1.829 m)  Wt 85.3 kg (188 lb)    SpO2 100%    BMI 25.5 kg/m2       O2 Device: Nasal cannula   O2 Flow Rate (L/min): 4 l/min   Temp (24hrs), Av.5 °F (36.4 °C), Min:97.2 °F (36.2 °C), Max:97.8 °F (36.6 °C)       Intake/Output:   Last shift:         Last 3 shifts:  1901 -  0700  In: 2319.1 [P.O.:860; I.V.:1459.1]  Out: 0134 [Urine:3275]    Intake/Output Summary (Last 24 hours) at 17 0914  Last data filed at 17 8073   Gross per 24 hour   Intake          2019.08 ml   Output             2725 ml   Net          -705.92 ml       Physical Exam:  Constitutional: He is oriented to person, place, and time. He appears well-developed and well-nourished. NAD. HENT:   Head: Normocephalic and atraumatic. Mouth/Throat: No oropharyngeal exudate. Eyes: Conjunctivae and EOM are normal. Pupils are equal, round, and reactive to light. Right eye exhibits no discharge. Left eye exhibits no discharge. No scleral icterus. Neck: Normal range of motion. Neck supple. No tracheal deviation present. No thyromegaly present. Cardiovascular: Normal rate and regular rhythm. Exam reveals no gallop and no friction rub. No murmur heard. Pulmonary/Chest: CTA  Abdominal: Soft. Bowel sounds are normal. He exhibits distension. There is no tenderness. There is no rebound and no guarding. Musculoskeletal: Normal range of motion. He exhibits edema. He exhibits no tenderness. Lymphadenopathy:   He has no cervical adenopathy. Neurological: He is alert and oriented to person, place, and time. Coordination normal.   Skin: Dressings to RUE and BLE's- Dry and intact. Psychiatric: He has a normal mood and affect.  His behavior is normal. Thought content normal.            DATA:   Current Facility-Administered Medications   Medication Dose Route Frequency    metOLazone (ZAROXOLYN) tablet 2.5 mg  2.5 mg Oral DAILY    influenza vaccine  (36mos+)(PF) (FLUZONE/FLUARIX/FLULAVAL QUAD) injection 0.5 mL  0.5 mL IntraMUSCular PRIOR TO DISCHARGE    furosemide (LASIX) tablet 40 mg  40 mg Oral BID    epoetin ron (EPOGEN;PROCRIT) injection 20,000 Units  20,000 Units SubCUTAneous Q MON, WED & FRI    ferrous sulfate tablet 325 mg  1 Tab Oral DAILY WITH BREAKFAST    zolpidem (AMBIEN) tablet 5 mg  5 mg Oral QHS PRN    albuterol-ipratropium (DUO-NEB) 2.5 MG-0.5 MG/3 ML  3 mL Nebulization Q6H PRN    sodium chloride (NS) flush 5-10 mL  5-10 mL IntraVENous PRN    acetaminophen (TYLENOL) tablet 650 mg  650 mg Oral Q4H PRN    HYDROcodone-acetaminophen (NORCO) 5-325 mg per tablet 1 Tab  1 Tab Oral Q4H PRN    fluticasone (FLONASE) 50 mcg/actuation nasal spray 2 Spray  2 Spray Both Nostrils DAILY    aspirin chewable tablet 81 mg  81 mg Oral DAILY    DOBUTamine (DOBUTREX) 500 mg/250 mL (2,000 mcg/mL) infusion  2.5 mcg/kg/min IntraVENous CONTINUOUS    allopurinol (ZYLOPRIM) tablet 100 mg  100 mg Oral DAILY    nitroglycerin (NITROSTAT) tablet 0.4 mg  0.4 mg SubLINGual PRN    pantoprazole (PROTONIX) tablet 40 mg  40 mg Oral ACB    pravastatin (PRAVACHOL) tablet 80 mg  80 mg Oral QHS    therapeutic multivitamin (THERAGRAN) tablet 1 Tab  1 Tab Oral DAILY    cholecalciferol (VITAMIN D3) tablet 2,000 Units  2,000 Units Oral DAILY    fish oil-omega-3 fatty acids 340-1,000 mg capsule 1 Cap  1 Cap Oral BID    carvedilol (COREG) tablet 6.25 mg  6.25 mg Oral BID WITH MEALS    sodium chloride (NS) flush 5-10 mL  5-10 mL IntraVENous Q8H    naloxone (NARCAN) injection 0.4 mg  0.4 mg IntraVENous PRN    ondansetron (ZOFRAN) injection 4 mg  4 mg IntraVENous Q4H PRN                    Labs:  Recent Labs      03/11/17   0340  03/10/17   1008  03/09/17   0418   WBC  4.8  4.3*  4.4*   HGB  8.0*  8.3*  8.0*   HCT  26.4*  27.3*  26.3*   PLT  94*  104*  CLUMPED PLATELETS     Recent Labs      03/11/17   0340  03/10/17   0324  03/09/17   0418   NA  134*  134*  132*   K  3.5  3.6  3.8   CL  92*  93*  92*   CO2  34*  35*  36*   GLU  85  116*  91   BUN  57*  57* 56*   CREA  1.93*  2.09*  2.16*   CA  8.7  8.8  8.9   MG  2.1  2.0  2.0     No results for input(s): PH, PCO2, PO2, HCO3, FIO2 in the last 72 hours. IMPRESSION:   Active Problems:  Hematospermia (3/5/2014)     CAD (coronary artery disease) (3/5/2014)     PVD (peripheral vascular disease) ([facility] [address] (3/5/2014)     CHF (congestive heart failure) ([address] (7/21/2016)     Hx of CABG (7/22/2016)  Overview: 2013     H/O carotid endarterectomy (7/22/2016)  Overview: 2015     AICD (automatic cardioverter/defibrillator) present (7/22/2016)  Overview: 2015     Acute exacerbation of CHF (congestive heart failure) ([address] (2/27/3828)     Diastolic CHF, acute on chronic ([facility] [address] (8/17/2016)     HTN (hypertension) (9/4/2016)     HLD (hyperlipidemia) (9/4/2016)     Acute CHF (congestive heart failure) ([address] (2/26/2017)     Acute on chronic diastolic (congestive) heart failure ([address] (2/27/2017)              PLAN:   1. ACKD: continue procrit at 20k units 3x weekly. H/H today 8.0/26.4  2. Thrombocytopenia/pseudothrombocytopenia: continue the current level of care; monitor platelets daily and begin gamma-globulin if the counts declines below 15k. Platelets today 21,196. No evidence of bleeding. We will continue to monitor this on a regular basis. 3. CHF: management per cardiology.  ·            My assessment/plan was discussed with:  [x]nursing []PT/OT    []respiratory therapy [x]Dr.Lloyd Rebekah Nava MD      []family []       Harleen Salgado NP

## 2017-03-11 NOTE — PROGRESS NOTES
Reviewed written order on discharge RX format by Dr. Phu Cruz for [address] referral for PT/ OT & skilled nursing care. Reviewed  FOC with patient who chose [address] A referral entered into e- discharge and also faxed to [phone] per Svetlana's, after hours  direction. Dr. Joey Banks verbally informed me he will align cardiac nurse pathway home health visits with pt when he come to clinic next week.      Pt stated his daughter will transport home and take RX to his pharmacy

## 2017-03-11 NOTE — ROUTINE PROCESS
Bedside and Verbal shift change report given to Tasha Zaman (oncoming nurse) by Patricia Cordova (offgoing nurse). Report included the following information Recent Results. 0717-Bedside and Verbal shift change report given to Patricia Cordova (oncoming nurse) by Tasha Zaman (offgoing nurse). Report included the following information Recent Results.

## 2017-03-11 NOTE — ROUTINE PROCESS
0750 Pt received after bedside shift report from Juana Dawn "Faith" [facility], RN. Pt up in bed with no distress noted. Bed locked and in low position. Denies needs at this time. Instruct pt to call for assistance. Call bell in reach. [phone] Discharge instruction given to patient and daughter, [place]. Verbalized understanding. Opportunity for questions and clarification provided. All questions answered.

## 2017-03-11 NOTE — DISCHARGE SUMMARY
[place] [address]  [address] [place]. Dinesh Delarosa SUMMARY    Name:  Romana Ramirez  MR#:  [id number]  :  1940  Account #:  [de-identified]  Date of Adm:  2017  Date of Discharge:      FINAL DIAGNOSES  1. Acute on chronic diastolic heart failure. 2. Ascites. 3. History of coronary artery bypass surgery in . 4. Hypertension. 5. Hyperlipidemia. 6. Defibrillator placement as well as stent. 7. Obstructive sleep apnea. 8. History of mini strokes. 9. Chronic obstructive pulmonary disease. 10. Poor compliance to followup. DIET: Cardiac. ACTIVITY: As tolerated. MEDICATIONS ON DISCHARGE  1. Allopurinol 100 mg daily for a history of gout. 2. Aspirin 81 mg daily. 3. Coreg 6.25 mg b.i.d.  4. Vitamin D 2000 units daily. 5. Procrit 20,000 units 3 times a week, Monday, Wednesday, and  Friday. 6. Ferrous sulfate 325 mg daily. 7. Fish oil 1000 mg 1 capsule b.i.d.  8. Flonase nasal spray, 2 sprays in each nostril daily. 9. Lasix 40 mg b.i.d. 10. Flu vaccine before discharge. 11. Zaroxolyn 2.5 mg daily. 12. Protonix 40 mg daily. 13. Pravachol 80 mg daily. 14. Multivitamins 1 tablet daily. 15. DuoNeb every 6 hours p.r.n. shortness of breath. 16. Norco 5/325 one every 4 hours p.r.n. pain. 17. Nitroglycerin 0.4 mg sublingual p.r.n. chest pain. 18. Ambien 5 mg at bedtime p.r.n. SUMMARY: This is a 30-year-old white male with a history of  hypertension, coronary artery bypass surgery, cardiac stent,  hyperlipidemia, history of diastolic heart failure, history of chronic  kidney disease stage 3, history of moderate aortic stenosis, who was  admitted because of increasing abdominal girth for the past 2 weeks,  along with increased leg swelling. On admission, he had evidence of  acute on chronic diastolic heart failure. He had anasarca and ascites. The patient required IV Lasix drip and dobutamine drip for several  days. Renal also followed as he has chronic kidney disease.  The  patient tells me he lost about 27 pounds while in the hospital. He  clearly lost a lot of weight just by observation. He still had residual  ascites as well as 2+ leg edema. Creatinine started to creep up, so we  stopped the IV Lasix, and creatinine is now down to 1.9, just about  baseline. He still has residual edema. This can be managed with oral  Lasix. The patient would benefit from a skilled nursing home, but does  not want to go there and wants to go home. The patient will therefore  be discharged at this time when okay with Cardiology, who were in  agreement to discharge today. Because of debilitation, he will need  physical therapy and home health followup, which I will arrange. He is  to follow up with Dr. Prince Grove, Dr. Nelia Chester, Renal, and Dr. Katy Hines,  Cardiology, as well as Dr. Beltran Raza, who is following him for  thrombocytopenia and anemia. Patient requiring Epogen. CONDITION ON DISCHARGE: Good. MEDICATIONS: Please see medication reconciliation for complete list  of medications.         MD CM Valentino / KRISTAL  D:  03/11/2017   11:23  T:  03/11/2017   11:46  Job #:  [id number]

## 2017-03-11 NOTE — PROGRESS NOTES
Alicia Lei M.D. PROGRESS NOTE    Name: Sander Leung MRN: [id number]   : 1940 Hospital: [facility]   Date: 3/11/2017  Admission Date: 2017     Subjective/Objective/Plans  1. Acute-on-chronic diastolic heart failure. 2. History of coronary artery bypass surgery, coronary artery disease. 3. History of defibrillator placement and stent placement. 4. Hypertension. 5. Hyperlipidemia. 6. Obstructive sleep apnea. 7. History of mini strokes. 8. Ascites. 9. Thrombocytopenia, chronic. 10. Gastroesophageal reflux. 11. Dyslipidemia. 12. History of gout  13. Aortic stenosis    Feeling better  Cr down 1.9  2+ chronic edema  Ascitis about the same  Less  SOB    Plan  DC today if ok with Cardiology    Vital Signs:  Visit Vitals    /68 (BP 1 Location: Right arm, BP Patient Position: At rest)    Pulse 63    Temp 97.8 °F (36.6 °C)    Resp 18    Ht 6' (1.829 m)    Wt 85.3 kg (188 lb)    SpO2 100%    BMI 25.5 kg/m2       O2 Device: Nasal cannula   O2 Flow Rate (L/min): 4 l/min   Temp (24hrs), Av.5 °F (36.4 °C), Min:97.2 °F (36.2 °C), Max:97.8 °F (36.6 °C)     11:16 AM  Intake/Output:   Last shift:       07 -  190  In: 540 [P.O.:540]  Out: 301 [Urine:300]  Last 3 shifts:  190 -  0700  In: 2319.1 [P.O.:860; I.V.:1459.1]  Out: 3275 [Urine:3275]    Intake/Output Summary (Last 24 hours) at 17 1116  Last data filed at 17 1001   Gross per 24 hour   Intake          2319.08 ml   Output             2826 ml   Net          -506.92 ml         Physical Exam:    General: in no apparent distress    HEENT: pupils equal, no ear discharge   Neck: No abnormally enlarged lymph nodes. , no JDV   Mouth: MMM no lesions    Chest: normal, no breast masses   Lungs: normal air entry   Heart: Regular rate and rhythm   Abdomen: gross ascites noted   Extremity: 2+ and pitting edema   Neuro: alert    Skin: Skin color, texture, turgor normal. No rashes or lesions    Data Review:    Labs: Results:       Chemistry Recent Labs      03/11/17   0340  03/10/17   0324  03/09/17   0418   GLU  85  116*  91   NA  134*  134*  132*   K  3.5  3.6  3.8   CL  92*  93*  92*   CO2  34*  35*  36*   BUN  57*  57*  56*   CREA  1.93*  2.09*  2.16*   CA  8.7  8.8  8.9   AGAP  8  6  4   BUCR  30*  27*  26*      CBC w/Diff Recent Labs      03/11/17   0340  03/10/17   1008  03/09/17   0418   WBC  4.8  4.3*  4.4*   RBC  2.89*  2.96*  2.84*   HGB  8.0*  8.3*  8.0*   HCT  26.4*  27.3*  26.3*   PLT  94*  104*  CLUMPED PLATELETS   GRANS  64  64  58   LYMPH  18*  20*  22   EOS  9*  10*  10*      Cardiac Enzymes No results for input(s): CPK, CKND1, TYSON in the last 72 hours. No lab exists for component: CKRMB, TROIP   Coagulation No results for input(s): PTP, INR, APTT in the last 72 hours. No lab exists for component: INREXT    Lipid Panel Lab Results   Component Value Date/Time    Cholesterol, total 100 07/22/2016 05:20 AM    HDL Cholesterol 31 07/22/2016 05:20 AM    LDL, calculated 54.4 07/22/2016 05:20 AM    VLDL, calculated 14.6 07/22/2016 05:20 AM    Triglyceride 73 07/22/2016 05:20 AM    CHOL/HDL Ratio 3.2 07/22/2016 05:20 AM      BNP No results for input(s): BNPP in the last 72 hours. Liver Enzymes No results for input(s): TP, ALB, TBILI, AP, SGOT, ALT, CBIL in the last 72 hours. Thyroid Studies Lab Results   Component Value Date/Time    TSH 4.23 07/22/2016 05:20 AM          Procedures/imaging: see electronic medical records for all procedures, Xrays and details which were not copied into this note but were reviewed. Allergies:  No Known Allergies    Home Medications:  Prior to Admission Medications   Prescriptions Last Dose Informant Patient Reported? Taking? Cholecalciferol, Vitamin D3, (VITAMIN D3) 1,000 unit cap   Yes No   Sig: Take 2,000 Units by mouth daily. Omega-3 Fatty Acids 300 mg cap   Yes No   Sig: Take 1 Cap by mouth two (2) times a day.    allopurinol (ZYLOPRIM) 100 mg tablet Yes No   Sig: Take 100 mg by mouth daily. aspirin delayed-release 81 mg tablet   Yes No   Sig: Take 162 mg by mouth daily. carvedilol (COREG) 6.25 mg tablet   No No   Sig: Take 1 Tab by mouth two (2) times daily (with meals). furosemide (LASIX) 40 mg tablet   No No   Sig: One tab daily  Indications: PERIPHERAL EDEMA DUE TO CHRONIC HEART FAILURE   lisinopril (PRINIVIL, ZESTRIL) 5 mg tablet   No No   Sig: Take 1 Tab by mouth daily. multivitamin (ONE A DAY) tablet   Yes No   Sig: Take 1 Tab by mouth daily. nitroglycerin (NITROSTAT) 0.4 mg SL tablet   Yes No   Sig: by SubLINGual route every five (5) minutes as needed for Chest Pain. omeprazole (PRILOSEC) 20 mg capsule   Yes No   Sig: Take 20 mg by mouth daily. pravastatin (PRAVACHOL) 80 mg tablet   Yes No   Sig: Take 80 mg by mouth nightly.       Facility-Administered Medications: None       Current Medications:  Current Facility-Administered Medications   Medication Dose Route Frequency    metOLazone (ZAROXOLYN) tablet 2.5 mg  2.5 mg Oral DAILY    influenza vaccine 2016-17 (36mos+)(PF) (FLUZONE/FLUARIX/FLULAVAL QUAD) injection 0.5 mL  0.5 mL IntraMUSCular PRIOR TO DISCHARGE    furosemide (LASIX) tablet 40 mg  40 mg Oral BID    epoetin ron (EPOGEN;PROCRIT) injection 20,000 Units  20,000 Units SubCUTAneous Q MON, WED & FRI    ferrous sulfate tablet 325 mg  1 Tab Oral DAILY WITH BREAKFAST    zolpidem (AMBIEN) tablet 5 mg  5 mg Oral QHS PRN    albuterol-ipratropium (DUO-NEB) 2.5 MG-0.5 MG/3 ML  3 mL Nebulization Q6H PRN    sodium chloride (NS) flush 5-10 mL  5-10 mL IntraVENous PRN    acetaminophen (TYLENOL) tablet 650 mg  650 mg Oral Q4H PRN    HYDROcodone-acetaminophen (NORCO) 5-325 mg per tablet 1 Tab  1 Tab Oral Q4H PRN    fluticasone (FLONASE) 50 mcg/actuation nasal spray 2 Spray  2 Spray Both Nostrils DAILY    aspirin chewable tablet 81 mg  81 mg Oral DAILY    DOBUTamine (DOBUTREX) 500 mg/250 mL (2,000 mcg/mL) infusion  2.5 mcg/kg/min IntraVENous CONTINUOUS    allopurinol (ZYLOPRIM) tablet 100 mg  100 mg Oral DAILY    nitroglycerin (NITROSTAT) tablet 0.4 mg  0.4 mg SubLINGual PRN    pantoprazole (PROTONIX) tablet 40 mg  40 mg Oral ACB    pravastatin (PRAVACHOL) tablet 80 mg  80 mg Oral QHS    therapeutic multivitamin (THERAGRAN) tablet 1 Tab  1 Tab Oral DAILY    cholecalciferol (VITAMIN D3) tablet 2,000 Units  2,000 Units Oral DAILY    fish oil-omega-3 fatty acids 340-1,000 mg capsule 1 Cap  1 Cap Oral BID    carvedilol (COREG) tablet 6.25 mg  6.25 mg Oral BID WITH MEALS    sodium chloride (NS) flush 5-10 mL  5-10 mL IntraVENous Q8H    naloxone (NARCAN) injection 0.4 mg  0.4 mg IntraVENous PRN    ondansetron (ZOFRAN) injection 4 mg  4 mg IntraVENous Q4H PRN       Chart and notes reviewed. Data reviewed. I have evaluated and examined the patient.         IMPRESSION:   Patient Active Problem List   Diagnosis Code    Hematospermia R36.1    CAD (coronary artery disease) I25.10    PVD (peripheral vascular disease) ([facility] [address] I73.9    CHF (congestive heart failure) (HCC) I50.9    Anasarca R60.1    Hx of CABG Z95.1    H/O carotid endarterectomy Z98.890    AICD (automatic cardioverter/defibrillator) present Z95.810    Acute exacerbation of CHF (congestive heart failure) (HCC) Y61.0    Diastolic CHF, acute on chronic (HCC) I50.33    HTN (hypertension) I10    HLD (hyperlipidemia) E78.5    Acute CHF (congestive heart failure) (HCC) I50.9    Acute on chronic diastolic (congestive) heart failure (HCC) I50.33    Thrombocytopenia (HCC) D69.6    Hypokalemia E87.6    Aortic stenosis, moderate I35.0 ·         PLAN:/DISCUSSION:   · [address], MD  3/11/2017, 11:16 AM

## 2017-03-22 NOTE — TELEPHONE ENCOUNTER
Cardiac Rehab called patient and discussed rehab options. He's not interested right now, but wants a call back in a month when his schedule is less hectic.     Thank you,  Martin Zafar

## 2017-04-04 PROBLEM — I27.20 PULMONARY HTN (HCC): Status: ACTIVE | Noted: 2017-01-01

## 2017-04-04 PROBLEM — N18.9 CKD (CHRONIC KIDNEY DISEASE): Status: ACTIVE | Noted: 2017-01-01

## 2017-04-04 NOTE — MR AVS SNAPSHOT
Visit Information Date & Time Provider Department Dept. Phone Encounter #  
 4/4/2017  1:30 PM Fanny Shaver MD [facility] [address] [place] [address] [id number] Follow-up Instructions Return in about 2 months (around 6/4/2017). Upcoming Health Maintenance Date Due DTaP/Tdap/Td series (1 - Tdap) 12/17/1963 FOBT Q 1 YEAR AGE 50-75 12/17/1992 ZOSTER VACCINE AGE 60> 12/17/2002 GLAUCOMA SCREENING Q2Y 12/17/2007 Pneumococcal 65+ Low/Medium Risk (1 of 2 - PCV13) 12/17/2007 MEDICARE YEARLY EXAM 12/17/2007 Allergies as of 4/4/2017  Review Complete On: 4/4/2017 By: Fanny Shaver MD  
 No Known Allergies Current Immunizations  Never Reviewed Name Date Influenza Vaccine (Quad) PF 3/11/2017  1:38 PM  
  
 Not reviewed this visit You Were Diagnosed With   
  
 Codes Comments Chronic diastolic congestive heart failure (HCC)    -  Primary ICD-10-CM: I50.32 
ICD-9-CM: 428.32, 428.0 NYHA class III Essential hypertension     ICD-10-CM: I10 
ICD-9-CM: 401.9 Hyperlipidemia, unspecified hyperlipidemia type     ICD-10-CM: E78.5 ICD-9-CM: 272.4 Aortic stenosis, moderate     ICD-10-CM: I35.0 ICD-9-CM: 424.1 Coronary artery disease of bypass graft of native heart with stable angina pectoris ([facility]     ICD-10-CM: I25.708 ICD-9-CM: 414.05, 413.9 PVD (peripheral vascular disease) ([facility]     ICD-10-CM: I73.9 ICD-9-CM: 443.9 Hx of CABG     ICD-10-CM: Z95.1 ICD-9-CM: V45.81   
 H/O carotid endarterectomy     ICD-10-CM: Z98.890 ICD-9-CM: V45.89   
 AICD (automatic cardioverter/defibrillator) present     ICD-10-CM: Z95.810 ICD-9-CM: V45.02   
 CKD (chronic kidney disease), unspecified stage     ICD-10-CM: N18.9 ICD-9-CM: 585.9 Pulmonary HTN ([address]     ICD-10-CM: I27.2 ICD-9-CM: 416.8 Vitals BP Pulse Height(growth percentile) Weight(growth percentile) BMI Smoking Status 146/75 79 6' (1.829 m) 196 lb (88.9 kg) 26.58 kg/m2 Former Smoker Vitals History BMI and BSA Data Body Mass Index Body Surface Area  
 26.58 kg/m 2 2.13 m 2 Your Updated Medication List  
  
   
This list is accurate as of: 4/4/17  2:32 PM.  Always use your most recent med list.  
  
  
  
  
 albuterol-ipratropium 2.5 mg-0.5 mg/3 ml Nebu Commonly known as:  DUO-NEB  
3 mL by Nebulization route every six (6) hours as needed. allopurinol 100 mg tablet Commonly known as:  Josh Darling Take 1 Tab by mouth daily. Indications: GOUT  
  
 aspirin delayed-release 81 mg tablet Take 2 Tabs by mouth daily. carvedilol 6.25 mg tablet Commonly known as:  Beebe Brod Take 1 Tab by mouth two (2) times daily (with meals). Indications: Chronic Heart Failure  
  
 cholecalciferol 1,000 unit tablet Commonly known as:  VITAMIN D3 Take 2 Tabs by mouth daily. colchicine 0.6 mg tablet Take 0.6 mg by mouth daily. ferrous sulfate 325 mg (65 mg iron) tablet Take 1 Tab by mouth daily (with breakfast). fish oil-omega-3 fatty acids 340-1,000 mg capsule Take 1 Cap by mouth two (2) times a day. Indications: HYPERTRIGLYCERIDEMIA  
  
 fluticasone 50 mcg/actuation nasal spray Commonly known as:  FLONASE  
2 spray each nostril daily  Indications: ALLERGIC RHINITIS  
  
 furosemide 40 mg tablet Commonly known as:  LASIX  
1 tab BID HYDROcodone-acetaminophen 5-325 mg per tablet Commonly known as:  Simmie Blonder Take 1 Tab by mouth every four (4) hours as needed. Max Daily Amount: 6 Tabs. influenza vaccine 2016-17 (36mos+)(PF) Syrg injection Commonly known as:  FLUZONE/FLUARIX/FLULAVAL QUAD  
0.5 mL by IntraMUSCular route PRIOR TO DISCHARGE. loperamide 2 mg tablet Commonly known as:  IMMODIUM Take 2 mg by mouth four (4) times daily as needed for Diarrhea.  
  
 metOLazone 2.5 mg tablet Commonly known as:  Jamila Haus Take 1 Tab by mouth daily. nitroglycerin 0.4 mg SL tablet Commonly known as:  NITROSTAT  
1 Tab by SubLINGual route as needed for Chest Pain.  
  
 pantoprazole 40 mg tablet Commonly known as:  PROTONIX Take 1 Tab by mouth Daily (before breakfast). pravastatin 80 mg tablet Commonly known as:  PRAVACHOL Take 1 Tab by mouth nightly. therapeutic multivitamin tablet Commonly known as:  [facility] Take 1 Tab by mouth daily. zolpidem 5 mg tablet Commonly known as:  AMBIEN Take 1 Tab by mouth nightly as needed for Sleep. Max Daily Amount: 5 mg. Follow-up Instructions Return in about 2 months (around 6/4/2017). Introducing [facility]! [facility] introduces VIEO patient portal. Now you can access parts of your medical record, email your doctor's office, and request medication refills online. 1. In your internet browser, go to [email or web address] 2. Click on the First Time User? Click Here link in the Sign In box. You will see the New Member Sign Up page. 3. Enter your VIEO Access Code exactly as it appears below. You will not need to use this code after youve completed the sign-up process. If you do not sign up before the expiration date, you must request a new code. · VIEO Access Code: [email or web address] Expires: 6/7/2017 12:53 PM 
 
4. Enter the last four digits of your Social Security Number (xxxx) and Date of Birth (mm/dd/yyyy) as indicated and click Submit. You will be taken to the next sign-up page. 5. Create a [employer] Mobilet ID. This will be your VIEO login ID and cannot be changed, so think of one that is secure and easy to remember. 6. Create a VIEO password. You can change your password at any time. 7. Enter your Password Reset Question and Answer. This can be used at a later time if you forget your password. 8. Enter your e-mail address. You will receive e-mail notification when new information is available in [address] 9. Click Sign Up. You can now view and download portions of your medical record. 10. Click the Download Summary menu link to download a portable copy of your medical information. If you have questions, please visit the Frequently Asked Questions section of the [employer] website. Remember, [employer] is NOT to be used for urgent needs. For medical emergencies, dial 911. Now available from your iPhone and Android! Please provide this summary of care documentation to your next provider. Your primary care clinician is listed as Jv Rodriguez. If you have any questions after today's visit, please call [phone].

## 2017-04-04 NOTE — PROGRESS NOTES
HISTORY OF PRESENT ILLNESS  Izaiah Saez is a 78 y.o. male. Hospital Follow Up   The history is provided by the patient. This is a chronic problem. The problem occurs daily. The problem has been gradually worsening. Associated symptoms include shortness of breath. Pertinent negatives include no chest pain, no abdominal pain and no headaches. CHF   The history is provided by the patient. This is a chronic problem. The current episode started more than 1 week ago. The problem occurs daily. The problem has been gradually improving. Associated symptoms include shortness of breath. Pertinent negatives include no chest pain, no abdominal pain and no headaches. Leg Swelling   The history is provided by the patient. This is a chronic problem. The problem occurs daily. The problem has been gradually worsening. Associated symptoms include shortness of breath. Pertinent negatives include no chest pain, no abdominal pain and no headaches. Review of Systems   Constitutional: Negative for chills, diaphoresis, fever, malaise/fatigue and weight loss. HENT: Negative for ear discharge, ear pain, hearing loss, nosebleeds and tinnitus. Eyes: Negative for blurred vision. Respiratory: Positive for shortness of breath. Negative for cough, hemoptysis, sputum production, wheezing and stridor. Cardiovascular: Negative for chest pain, palpitations, orthopnea, claudication, leg swelling and PND. Gastrointestinal: Negative for abdominal pain, heartburn, nausea and vomiting. Musculoskeletal: Negative for myalgias and neck pain. Skin: Negative for itching and rash. Neurological: Negative for dizziness, tingling, tremors, focal weakness, loss of consciousness, weakness and headaches. Psychiatric/Behavioral: Negative for depression and suicidal ideas. No family history on file.     Past Medical History:   Diagnosis Date    Arrhythmia     A fib; has external vest and belt he wears for this    Arthritis     High blood pressure     Hypercholesteremia     Mini stroke ([facility] [address] 2000    Unspecified sleep apnea     does not wear machine       Past Surgical History:   Procedure Laterality Date    CABG, ARTERY-VEIN, THREE  2013    HX CAROTID ENDARTERECTOMY Left     HX CAROTID ENDARTERECTOMY Right 2000    HX CORONARY STENT PLACEMENT         Social History   Substance Use Topics    Smoking status: Former Smoker    Smokeless tobacco: Never Used      Comment: quit in the 90s    Alcohol use No       No Known Allergies    Outpatient Prescriptions Marked as Taking for the 4/4/17 encounter (Office Visit) with Johnathon Moreira MD   Medication Sig Dispense Refill    colchicine 0.6 mg tablet Take 0.6 mg by mouth daily.  loperamide (IMMODIUM) 2 mg tablet Take 2 mg by mouth four (4) times daily as needed for Diarrhea.  allopurinol (ZYLOPRIM) 100 mg tablet Take 1 Tab by mouth daily. Indications: GOUT 30 Tab 0    aspirin delayed-release 81 mg tablet Take 2 Tabs by mouth daily. 30 Tab 0    carvedilol (COREG) 6.25 mg tablet Take 1 Tab by mouth two (2) times daily (with meals). Indications: Chronic Heart Failure 60 Tab 0    cholecalciferol (VITAMIN D3) 1,000 unit tablet Take 2 Tabs by mouth daily. 100 Tab 0    therapeutic multivitamin (THERAGRAN) tablet Take 1 Tab by mouth daily. 100 Tab 0    nitroglycerin (NITROSTAT) 0.4 mg SL tablet 1 Tab by SubLINGual route as needed for Chest Pain. 1 Bottle 0    fish oil-omega-3 fatty acids 340-1,000 mg capsule Take 1 Cap by mouth two (2) times a day. Indications: HYPERTRIGLYCERIDEMIA 100 Cap 0    pantoprazole (PROTONIX) 40 mg tablet Take 1 Tab by mouth Daily (before breakfast). 100 Tab 0    pravastatin (PRAVACHOL) 80 mg tablet Take 1 Tab by mouth nightly. 100 Tab 0    albuterol-ipratropium (DUO-NEB) 2.5 mg-0.5 mg/3 ml nebu 3 mL by Nebulization route every six (6) hours as needed.  30 Nebule 0    ferrous sulfate 325 mg (65 mg iron) tablet Take 1 Tab by mouth daily (with breakfast). 100 Tab 0    fluticasone (FLONASE) 50 mcg/actuation nasal spray 2 spray each nostril daily  Indications: ALLERGIC RHINITIS 1 Bottle 0    furosemide (LASIX) 40 mg tablet 1 tab  Tab 0    HYDROcodone-acetaminophen (NORCO) 5-325 mg per tablet Take 1 Tab by mouth every four (4) hours as needed. Max Daily Amount: 6 Tabs. 60 Tab 0    influenza vaccine 2016-17, 36mos+,,PF, (FLUZONE/FLUARIX/FLULAVAL QUAD) syrg injection 0.5 mL by IntraMUSCular route PRIOR TO DISCHARGE. 0.5 mL 0    metOLazone (ZAROXOLYN) 2.5 mg tablet Take 1 Tab by mouth daily. 100 Tab 0    zolpidem (AMBIEN) 5 mg tablet Take 1 Tab by mouth nightly as needed for Sleep. Max Daily Amount: 5 mg. 30 Tab 0        Visit Vitals    /75    Pulse 79    Ht 6' (1.829 m)    Wt 88.9 kg (196 lb)    BMI 26.58 kg/m2     Physical Exam   Constitutional: He is oriented to person, place, and time. He appears well-developed and well-nourished. No distress. HENT:   Head: Atraumatic. Mouth/Throat: No oropharyngeal exudate. Eyes: Conjunctivae are normal. No scleral icterus. Neck: Neck supple. No JVD present. Cardiovascular: Normal rate and regular rhythm. Exam reveals no gallop. Murmur (3/6 ejection systolic murmur best heard at aortic area) heard. Pulmonary/Chest: Effort normal and breath sounds normal. No stridor. He has no wheezes. He has no rales. Abdominal: Soft. There is no tenderness. There is no rebound. Musculoskeletal: Normal range of motion. He exhibits edema (ble edema extending to thigh). He exhibits no tenderness. Neurological: He is alert and oriented to person, place, and time. He exhibits normal muscle tone. Skin: Skin is warm. He is not diaphoretic. Psychiatric: He has a normal mood and affect. His behavior is normal.     Echo 02/2017:  SUMMARY:  Left ventricle: Systolic function was normal by visual assessment. Ejection fraction was estimated in the range of 55 % to 60 %.  No obvious  wall motion abnormalities identified in the views obtained. Wall thickness  was mildly increased. Features were consistent with a pseudonormal left  ventricular filling pattern, with concomitant abnormal relaxation and  increased filling pressure (grade 2 diastolic dysfunction). Right ventricle: The ventricle was mildly dilated. Systolic function was  mildly reduced. Systolic pressure was moderately increased. Estimated peak  pressure was 55 mmHg. Right atrium: The atrium was mildly dilated. Mitral valve: There was moderate thickening. Aortic valve: The valve was probably trileaflet. Leaflets exhibited  calcification and reduced mobility. There was moderate stenosis. Valve  peak gradient was 34 mmHg. Valve mean gradient was 21 mmHg. Estimated  aortic valve area (by Vmax) was 1.14 cmï¾². Pericardium: There was a right pleural effusion. There was a left pleural  Effusion. ASSESSMENT and PLAN    ICD-10-CM ICD-9-CM    1. Chronic diastolic congestive heart failure ([facility]) I50.32 428.32 colchicine 0.6 mg tablet     428.0 loperamide (IMMODIUM) 2 mg tablet      METABOLIC PANEL, BASIC      MAGNESIUM    NYHA class III   2. Essential hypertension I10 401.9 colchicine 0.6 mg tablet      loperamide (IMMODIUM) 2 mg tablet      METABOLIC PANEL, BASIC      MAGNESIUM   3. Hyperlipidemia, unspecified hyperlipidemia type E78.5 272.4 colchicine 0.6 mg tablet      loperamide (IMMODIUM) 2 mg tablet      METABOLIC PANEL, BASIC      MAGNESIUM   4. Aortic stenosis, moderate I35.0 424.1 colchicine 0.6 mg tablet      loperamide (IMMODIUM) 2 mg tablet      METABOLIC PANEL, BASIC      MAGNESIUM   5. Coronary artery disease of bypass graft of native heart with stable angina pectoris ([facility]) I25.708 414.05 colchicine 0.6 mg tablet     413.9 loperamide (IMMODIUM) 2 mg tablet      METABOLIC PANEL, BASIC      MAGNESIUM   6.  PVD (peripheral vascular disease) ([facility]) I73.9 443.9 colchicine 0.6 mg tablet      loperamide (IMMODIUM) 2 mg tablet METABOLIC PANEL, BASIC      MAGNESIUM   7. Hx of CABG Z95.1 V45.81 colchicine 0.6 mg tablet      loperamide (IMMODIUM) 2 mg tablet      METABOLIC PANEL, BASIC      MAGNESIUM   8. H/O carotid endarterectomy Z98.890 V45.89 colchicine 0.6 mg tablet      loperamide (IMMODIUM) 2 mg tablet      METABOLIC PANEL, BASIC      MAGNESIUM   9. AICD (automatic cardioverter/defibrillator) present Z95.810 V45.02 colchicine 0.6 mg tablet      loperamide (IMMODIUM) 2 mg tablet      METABOLIC PANEL, BASIC      MAGNESIUM   10. CKD (chronic kidney disease), unspecified stage N18.9 585.9 colchicine 0.6 mg tablet      loperamide (IMMODIUM) 2 mg tablet      METABOLIC PANEL, BASIC      MAGNESIUM   11. Pulmonary HTN (HCC) I27.2 416.8 colchicine 0.6 mg tablet      loperamide (IMMODIUM) 2 mg tablet      METABOLIC PANEL, BASIC      MAGNESIUM     Orders Placed This Encounter    METABOLIC PANEL, BASIC     Standing Status:   Future     Standing Expiration Date:   4/5/2018    MAGNESIUM     Standing Status:   Future     Standing Expiration Date:   4/5/2018     Follow-up Disposition:  Return in about 2 months (around 6/4/2017). the following changes in treatment are made: will increase lasix to 60mg bid  cardiovascular risk and specific lipid/LDL goals reviewed  use of aspirin to prevent MI and TIA's discussed. 1) moderate aortic stenosis- with normal LV function. 2) chronic diastolic heart failure- NYHA class III. Will increase lasix to 60mg bid and recheck BMP/mg in 1 week. Continue metolazone 2.5mg daily. 3) CAD s/p CABG  4) s/p ICD- will interrogate in 6months. 5) COPD on Home O2- recently increased O2 to 6l/min. Discussed with patient's daughter at length regarding above plan.

## 2017-04-04 NOTE — PROGRESS NOTES
1. Have you been to the ER, urgent care clinic since your last visit? Hospitalized since your last visit? Yes When: 2/22/17 Where: [facility] Reason for visit: CHF    2. Have you seen or consulted any other health care providers outside of the [address] [place] [address] since your last visit? Include any pap smears or colon screening. No     3. Since your last visit, have you had any of the following symptoms? Swelling in legs      4. Have you had any blood work, X-rays or cardiac testing? Yes     Requested: NO     In [facility]: YES    5. Where do you normally have your labs drawn? [facility]      6. Do you need any refills today?  No

## 2017-04-12 NOTE — TELEPHONE ENCOUNTER
BMP results 4/10/17  Dr. Pierre England reviewed BMP results and wrote on  Lab to change Metolazone to 2.5 mg every other day and to check BMP and Mag x 1 week       Home health nurse Leah Blakely called this AM and stated patient had weight on Friday 4/7 was 188 and today's weight is 196   Patient had a 8lb weight gain and denied any SOB.  Stated he had +2 swelling in lower legs

## 2017-04-12 NOTE — TELEPHONE ENCOUNTER
Nurse went back to patient's home and rechecked weight and weight is 190lb now   Per Olga Dickens think's he may have missed a dose Lasix and notice he had two urinal  full of urine.     Per Nurse Olga Dickens stated he's taking Lasix 30 mg 1 tab BID

## 2017-04-13 NOTE — TELEPHONE ENCOUNTER
Returned phone call to [address] [place] [address] and informed her patient should be taking Lasix 60 mg 1 tab BID and Metolazone 2.5 mg 1 tab every other day     She confirmed he's weight today is 186  She will be visiting pt.  Monday wed and Fridays

## 2017-04-14 NOTE — TELEPHONE ENCOUNTER
Home health called to update you patient's weight today is stable 184 and she made sure patient is taking Lasix 60 mg 1 tab BID and Metolazone 2.5 mg every other day     FYRADHA

## 2017-05-02 NOTE — PROGRESS NOTES
Verbal Order with read back per Dr. Shant Burciaga MD  For PFT smart panel. AMB POC PFT complete w/ bronchodilator  AMB POC PFT complete w/o bronchodilator    Dr. Shant Burciaga MD will co-sign the orders.

## 2017-05-03 NOTE — TELEPHONE ENCOUNTER
Per nurse Best Marcum patient wt today 192 2-3 plus edema in both feet, no SOB patient goodman's not take his lasix as directed due to makes him urinate a lot. Please advise. Keith Denny @ [phone]

## 2017-05-04 NOTE — TELEPHONE ENCOUNTER
Nurse Ermias Pete will stress the importance of taking fluid pills to avoid being admitted in hospital to patient.

## 2017-05-09 NOTE — ED TRIAGE NOTES
Patient c/o leg pain to the right calf, patient home health nurse spoke to his PCP and she sent him here for further evaluation.

## 2017-05-09 NOTE — PROCEDURES
[facility]  *** FINAL REPORT ***    Name: Mitesh Burnett  MRN: [id number]  : 17 Dec 1940  HIS Order #: [id number]  [address] [facility] Visit #: [id number]  Date: 09 May 2017    TYPE OF TEST: Peripheral Venous Testing    REASON FOR TEST  Limb swelling    Right Leg:-  Deep venous thrombosis:           No  Superficial venous thrombosis:    No  Deep venous insufficiency:        Not examined  Superficial venous insufficiency: Not examined      INTERPRETATION/FINDINGS  Duplex images were obtained using 2-D gray scale, color flow, and  spectral Doppler analysis. Right leg :  1. Deep vein(s) visualized include the common femoral, deep femoral,  proximal femoral, mid femoral, distal femoral, popliteal(above knee),  popliteal(fossa), popliteal(below knee), posterior tibial and peroneal   veins. 2. No evidence of deep venous thrombosis detected in the veins  visualized. 3. No evidence of deep vein thrombosis in the contralateral common  femoral vein. 4. Superficial vein(s) visualized include the great saphenous vein. 5. No evidence of superficial thrombosis detected. ADDITIONAL COMMENTS    I have personally reviewed the data relevant to the interpretation of  this  study. TECHNOLOGIST: David Cedeño, [facility], RVT/  Signed: 2017 12:11 PM    PHYSICIAN: Krupa Huynh D.O.   Signed: 2017 01:38 PM

## 2017-05-09 NOTE — DISCHARGE INSTRUCTIONS
Cellulitis: Care Instructions  Your Care Instructions    Cellulitis is a skin infection. It often occurs after a break in the skin from a scrape, cut, bite, or puncture, or after a rash. The doctor has checked you carefully, but problems can develop later. If you notice any problems or new symptoms, get medical treatment right away. Follow-up care is a key part of your treatment and safety. Be sure to make and go to all appointments, and call your doctor if you are having problems. It's also a good idea to know your test results and keep a list of the medicines you take. How can you care for yourself at home? · Take your antibiotics as directed. Do not stop taking them just because you feel better. You need to take the full course of antibiotics. · Prop up the infected area on pillows to reduce pain and swelling. Try to keep the area above the level of your heart as often as you can. · If your doctor told you how to care for your wound, follow your doctor's instructions. If you did not get instructions, follow this general advice:  ¨ Wash the wound with clean water 2 times a day. Don't use hydrogen peroxide or alcohol, which can slow healing. ¨ You may cover the wound with a thin layer of petroleum jelly, such as Vaseline, and a nonstick bandage. ¨ Apply more petroleum jelly and replace the bandage as needed. · Be safe with medicines. Take pain medicines exactly as directed. ¨ If the doctor gave you a prescription medicine for pain, take it as prescribed. ¨ If you are not taking a prescription pain medicine, ask your doctor if you can take an over-the-counter medicine. To prevent cellulitis in the future  · Try to prevent cuts, scrapes, or other injuries to your skin. Cellulitis most often occurs where there is a break in the skin. · If you get a scrape, cut, mild burn, or bite, wash the wound with clean water as soon as you can to help avoid infection.  Don't use hydrogen peroxide or alcohol, which can slow healing. · If you have swelling in your legs (edema), support stockings and good skin care may help prevent leg sores and cellulitis. · Take care of your feet, especially if you have diabetes or other conditions that increase the risk of infection. Wear shoes and socks. Do not go barefoot. If you have athlete's foot or other skin problems on your feet, talk to your doctor about how to treat them. When should you call for help? Call your doctor now or seek immediate medical care if:  · You have signs that your infection is getting worse, such as:  ¨ Increased pain, swelling, warmth, or redness. ¨ Red streaks leading from the area. ¨ Pus draining from the area. ¨ A fever. · You get a rash. Watch closely for changes in your health, and be sure to contact your doctor if:  · You are not getting better after 1 day (24 hours). · You do not get better as expected. Where can you learn more? Go to http://shiraAuthernativegaro.info/. Michael Zaldivar in the search box to learn more about \"Cellulitis: Care Instructions. \"  Current as of: October 13, 2016  Content Version: 11.2  © 2235-7940 [employer]. Care instructions adapted under license by [employer] (which disclaims liability or warranty for this information). If you have questions about a medical condition or this instruction, always ask your healthcare professional. [place] [address] any warranty or liability for your use of this information. Leg and Ankle Edema: Care Instructions  Your Care Instructions  Swelling in the legs, ankles, and feet is called edema. It is common after you sit or stand for a while. Long plane flights or car rides often cause swelling in the legs and feet. You may also have swelling if you have to stand for long periods of time at your job. Problems with the veins in the legs (varicose veins) and changes in hormones can also cause swelling.  Sometimes the swelling in the ankles and feet is caused by a more serious problem, such as heart failure, infection, blood clots, or liver or kidney disease. Follow-up care is a key part of your treatment and safety. Be sure to make and go to all appointments, and call your doctor if you are having problems. Its also a good idea to know your test results and keep a list of the medicines you take. How can you care for yourself at home? · If your doctor gave you medicine, take it as prescribed. Call your doctor if you think you are having a problem with your medicine. · Whenever you are resting, raise your legs up. Try to keep the swollen area higher than the level of your heart. · Take breaks from standing or sitting in one position. ¨ Walk around to increase the blood flow in your lower legs. ¨ Move your feet and ankles often while you stand, or tighten and relax your leg muscles. · Wear support stockings. Put them on in the morning, before swelling gets worse. · Eat a balanced diet. Lose weight if you need to. · Limit the amount of salt (sodium) in your diet. Salt holds fluid in the body and may increase swelling. When should you call for help? Call 911 anytime you think you may need emergency care. For example, call if:  · You have symptoms of a blood clot in your lung (called a pulmonary embolism). These may include:  ¨ Sudden chest pain. ¨ Trouble breathing. ¨ Coughing up blood. Call your doctor now or seek immediate medical care if:  · You have signs of a blood clot, such as:  ¨ Pain in your calf, back of the knee, thigh, or groin. ¨ Redness and swelling in your leg or groin. · You have symptoms of infection, such as:  ¨ Increased pain, swelling, warmth, or redness. ¨ Red streaks or pus. ¨ A fever. Watch closely for changes in your health, and be sure to contact your doctor if:  · Your swelling is getting worse. · You have new or worsening pain in your legs. · You do not get better as expected.   Where can you learn more?  Go to http://shira-garo.info/. Enter N727 in the search box to learn more about \"Leg and Ankle Edema: Care Instructions. \"  Current as of: May 27, 2016  Content Version: 11.2  © 8669-8654 [employer]. Care instructions adapted under license by [employer] (which disclaims liability or warranty for this information). If you have questions about a medical condition or this instruction, always ask your healthcare professional. [place] [address] any warranty or liability for your use of this information.

## 2017-05-14 PROBLEM — L03.90 CELLULITIS: Status: ACTIVE | Noted: 2017-01-01

## 2017-05-14 NOTE — IP AVS SNAPSHOT
[address] Patient: Amberly Enriquez MRN: [id number] :[id number] You are allergic to the following No active allergies Immunizations Administered for This Admission Name Date Influenza Vaccine (Quad) PF  Deferred () Recent Documentation Height Weight BMI Smoking Status 1.829 m 86.6 kg 25.9 kg/m2 Former Smoker Emergency Contacts Name Discharge Info Relation Home Work Mobile MeherrinPatterson, [place] DISCHARGE CAREGIVER [3] Daughter [21] [phone] Mingo Waddell  Daughter [21] [phone] Danielle Cramer  Daughter [21]   [phone] About your hospitalization You were admitted on:  May 14, 2017 You last received care in the: [facility] [address] You were discharged on:  May 25, 2017 Unit phone number:  [phone] Why you were hospitalized Your primary diagnosis was:  Not on File Your diagnoses also included:  Acute Exacerbation Of Chf (Congestive Heart Failure) (Hcc), Cellulitis, Pvd (Peripheral Vascular Disease) (Hcc), Pulmonary Htn (Hcc), Hx Of Cabg, Htn (Hypertension), Hld (Hyperlipidemia), H/O Carotid Endarterectomy, Chf (Congestive Heart Failure) (Hcc), Cad (Coronary Artery Disease), Aortic Stenosis, Moderate, Acute Chf (Congestive Heart Failure) (Hcc), Acute On Chronic Diastolic (Congestive) Heart Failure (Hcc), Diastolic Chf, Acute On Chronic (Hcc), Aicd (Automatic Cardioverter/Defibrillator) Present, Acute On Chronic Kidney Failure (Hcc) Providers Seen During Your Hospitalizations Provider Role Specialty Primary office phone Brady Howell MD Attending Provider Emergency Medicine [phone] Jock Lennox, MD Attending Provider [facility] [phone] Your Primary Care Physician (PCP) Primary Care Physician Office Phone Office Fax Olivia Wise [phone] Follow-up Information Follow up With Details Comments Contact Info Eli Duncan MD On 5/30/2017 @ 12:00 [address] [address] Shruthi Chow [address] 
[phone] Your Appointments Friday May 26, 2017 To Be Determined  with Jcarlos Mathews [address] CARE SCHEDULING[facility]TAKE [facility] [address] CARE SCHEDULING[facility]TAKE [facility] Friday May 26, 2017 To Be Determined [facility] EVALUATION with Randi Becker [address] CARE SCHEDULING[facility]TAKE [facility] [address] CARE SCHEDULING[facility]INTAKE [facility]) Tuesday June 13, 2017  2:30 PM EDT  
ESTABLISHED PATIENT with Constantino Gan MD  
[facility] ([facility]) [address] [place] [address]  
[phone] Current Discharge Medication List  
  
START taking these medications Dose & Instructions Dispensing Information Comments Morning Noon Evening Bedtime  
 acetaminophen 325 mg tablet Commonly known as:  TYLENOL Your last dose was: Your next dose is:    
   
   
 Dose:  650 mg Take 2 Tabs by mouth every six (6) hours as needed. Quantity:  100 Tab Refills:  0  
     
   
   
   
  
 budesonide-formoterol 160-4.5 mcg/actuation HFA inhaler Commonly known as:  SYMBICORT Your last dose was: Your next dose is:    
   
   
 Dose:  2 Puff Take 2 Puffs by inhalation two (2) times a day. Indications: PREVENTION OF BRONCHOSPASMS WITH EMPHYSEMA Quantity:  1 Inhaler Refills:  0  
     
   
   
   
  
 colchicine 0.6 mg capsule Commonly known as:  Keyur Cota Replaces:  colchicine 0.6 mg tablet Your last dose was: Your next dose is:    
   
   
 Dose:  0.6 mg Take 1 Cap by mouth Q TU, TH & SAT. Quantity:  100 Cap Refills:  0 CONTINUE these medications which have CHANGED Dose & Instructions Dispensing Information Comments Morning Noon Evening Bedtime  
 allopurinol 100 mg tablet Commonly known as:  Ever Gobble What changed:  how much to take Your last dose was: Your next dose is:    
   
   
 Dose:  50 mg Take 0.5 Tabs by mouth daily. Indications: GOUT  
 Quantity:  100 Tab Refills:  0  
     
   
   
   
  
 carvedilol 3.125 mg tablet Commonly known as:  Alberto Flannery What changed:   
- medication strength 
- how much to take - when to take this Your last dose was: Your next dose is:    
   
   
 Dose:  3.125 mg Take 1 Tab by mouth every twelve (12) hours. Quantity:  100 Tab Refills:  0  
     
   
   
   
  
 fluticasone 50 mcg/actuation nasal spray Commonly known as:  Adriane Allen What changed:  additional instructions Your last dose was: Your next dose is:    
   
   
 s prays each nostril daily  Indications: ALLERGIC RHINITIS Quantity:  1 Bottle Refills:  0 HYDROcodone-acetaminophen 5-325 mg per tablet Commonly known as:  Yesi Shvaont What changed:  Another medication with the same name was removed. Continue taking this medication, and follow the directions you see here. Your last dose was: Your next dose is:    
   
   
 Dose:  1 Tab Take 1 Tab by mouth every four (4) hours as needed. Max Daily Amount: 6 Tabs. Quantity:  60 Tab Refills:  0 CONTINUE these medications which have NOT CHANGED Dose & Instructions Dispensing Information Comments Morning Noon Evening Bedtime  
 albuterol-ipratropium 2.5 mg-0.5 mg/3 ml Nebu Commonly known as:  Ana Luke Your last dose was: Your next dose is:    
   
   
 Dose:  3 mL  
3 mL by Nebulization route every six (6) hours as needed. Quantity:  30 Nebule Refills:  0 ferrous sulfate 325 mg (65 mg iron) tablet Your last dose was: Your next dose is:    
   
   
 Dose:  325 mg Take 1 Tab by mouth daily (with breakfast). Quantity:  100 Tab Refills:  0  
     
   
   
   
  
 nitroglycerin 0.4 mg SL tablet Commonly known as:  NITROSTAT Your last dose was: Your next dose is:    
   
   
 Dose:  0.4 mg  
1 Tab by SubLINGual route as needed for Chest Pain. Quantity:  1 Bottle Refills:  0  
     
   
   
   
  
 pantoprazole 40 mg tablet Commonly known as:  PROTONIX Your last dose was: Your next dose is:    
   
   
 Dose:  40 mg Take 1 Tab by mouth Daily (before breakfast). Quantity:  100 Tab Refills:  0  
     
   
   
   
  
 therapeutic multivitamin tablet Commonly known as:  [facility] Your last dose was: Your next dose is:    
   
   
 Dose:  1 Tab Take 1 Tab by mouth daily. Quantity:  100 Tab Refills:  0  
     
   
   
   
  
 zolpidem 5 mg tablet Commonly known as:  AMBIEN Your last dose was: Your next dose is:    
   
   
 Dose:  5 mg Take 1 Tab by mouth nightly as needed for Sleep. Max Daily Amount: 5 mg. Quantity:  30 Tab Refills:  0 STOP taking these medications   
 aspirin delayed-release 81 mg tablet  
   
  
 cephALEXin 500 mg capsule Commonly known as:  KEFLEX  
   
  
 cholecalciferol 1,000 unit tablet Commonly known as:  VITAMIN D3  
   
  
 colchicine 0.6 mg tablet Replaced by:  colchicine 0.6 mg capsule  
   
  
 fish oil-omega-3 fatty acids 340-1,000 mg capsule  
   
  
 furosemide 40 mg tablet Commonly known as:  LASIX  
   
  
 influenza vaccine 2016-17 (36mos+)(PF) Syrg injection Commonly known as:  FLUZONE/FLUARIX/FLULAVAL QUAD  
   
  
 loperamide 2 mg tablet Commonly known as:  IMMODIUM  
   
  
 metOLazone 2.5 mg tablet Commonly known as:  ZAROXOLYN  
   
  
 pravastatin 80 mg tablet Commonly known as:  PRAVACHOL Where to Get Your Medications Information on where to get these meds will be given to you by the nurse or doctor. ! Ask your nurse or doctor about these medications  
  acetaminophen 325 mg tablet  
 albuterol-ipratropium 2.5 mg-0.5 mg/3 ml Nebu  
 allopurinol 100 mg tablet  
 budesonide-formoterol 160-4.5 mcg/actuation HFA inhaler  
 carvedilol 3.125 mg tablet  
 colchicine 0.6 mg capsule  
 ferrous sulfate 325 mg (65 mg iron) tablet  
 fluticasone 50 mcg/actuation nasal spray HYDROcodone-acetaminophen 5-325 mg per tablet  
 nitroglycerin 0.4 mg SL tablet  
 pantoprazole 40 mg tablet  
 therapeutic multivitamin tablet  
 zolpidem 5 mg tablet Discharge Instructions DISCHARGE SUMMARY from Nurse The following personal items are in your possession at time of discharge: 
 
Dental Appliances: Uppers, Lowers, With patient Visual Aid: None Home Medications: None Jewelry: None Clothing: Undergarments, Slippers, Shirt, Other (comment) (sweat shirt) Other Valuables: Cell Phone, Money clip, Money (comment) (\"close to a hundred\") Personal Items Sent to Safe: no (pt refuse to have money sent to the safekeeping of security) *  Please give a list of your current medications to your Primary Care Provider. *  Please update this list whenever your medications are discontinued, doses are 
    changed, or new medications (including over-the-counter products) are added. *  Please carry medication information at all times in case of emergency situations. These are general instructions for a healthy lifestyle: No smoking/ No tobacco products/ Avoid exposure to second hand smoke Surgeon General's Warning:  Quitting smoking now greatly reduces serious risk to your health. Obesity, smoking, and sedentary lifestyle greatly increases your risk for illness A healthy diet, regular physical exercise & weight monitoring are important for maintaining a healthy lifestyle You may be retaining fluid if you have a history of heart failure or if you experience any of the following symptoms:  Weight gain of 3 pounds or more overnight or 5 pounds in a week, increased swelling in our hands or feet or shortness of breath while lying flat in bed. Please call your doctor as soon as you notice any of these symptoms; do not wait until your next office visit. Recognize signs and symptoms of STROKE: 
 
F-face looks uneven A-arms unable to move or move unevenly S-speech slurred or non-existent T-time-call 911 as soon as signs and symptoms begin-DO NOT go Back to bed or wait to see if you get better-TIME IS BRAIN. Warning Signs of HEART ATTACK Call 911 if you have these symptoms: 
? Chest discomfort. Most heart attacks involve discomfort in the center of the chest that lasts more than a few minutes, or that goes away and comes back. It can feel like uncomfortable pressure, squeezing, fullness, or pain. ? Discomfort in other areas of the upper body. Symptoms can include pain or discomfort in one or both arms, the back, neck, jaw, or stomach. ? Shortness of breath with or without chest discomfort. ? Other signs may include breaking out in a cold sweat, nausea, or lightheadedness. Don't wait more than five minutes to call [address]! Fast action can save your life. Calling 911 is almost always the fastest way to get lifesaving treatment. Emergency Medical Services staff can begin treatment when they arrive  up to an hour sooner than if someone gets to the hospital by car. The discharge information has been reviewed with the caregiver. The caregiver verbalized understanding. Discharge medications reviewed with the caregiver and appropriate educational materials and side effects teaching were provided. Patient armband removed and shredded Anemia: Care Instructions Your Care Instructions Anemia is a low level of red blood cells, which carry oxygen throughout your body. Many things can cause anemia. Lack of iron is one of the most common causes. Your body needs iron to make hemoglobin, a substance in red blood cells that carries oxygen from the lungs to your body's cells. Without enough iron, the body produces fewer and smaller red blood cells. As a result, your body's cells do not get enough oxygen, and you feel tired and weak. And you may have trouble concentrating. Bleeding is the most common cause of a lack of iron. You may have heavy menstrual bleeding or bleeding caused by conditions such as ulcers, hemorrhoids, or cancer. Regular use of aspirin or other anti-inflammatory medicines (such as ibuprofen) also can cause bleeding in some people. A lack of iron in your diet also can cause anemia, especially at times when the body needs more iron, such as during pregnancy, infancy, and the teen years. Your doctor may have prescribed iron pills. It may take several months of treatment for your iron levels to return to normal. Your doctor also may suggest that you eat foods that are rich in iron, such as meat and beans. There are many other causes of anemia. It is not always due to a lack of iron. Finding the specific cause of your anemia will help your doctor find the right treatment for you. Follow-up care is a key part of your treatment and safety. Be sure to make and go to all appointments, and call your doctor if you are having problems. It's also a good idea to know your test results and keep a list of the medicines you take. How can you care for yourself at home? · Take your medicines exactly as prescribed. Call your doctor if you think you are having a problem with your medicine. · If your doctor recommends iron pills, take them as directed: ¨ Try to take the pills on an empty stomach about 1 hour before or 2 hours after meals.  But you may need to take iron with food to avoid an upset stomach. ¨ Do not take antacids or drink milk or caffeine drinks (such as coffee, tea, or cola) at the same time or within 2 hours of the time that you take your iron. They can make it hard for your body to absorb the iron. ¨ Vitamin C (from food or supplements) helps your body absorb iron. Try taking iron pills with a glass of orange juice or some other food that is high in vitamin C, such as citrus fruits. ¨ Iron pills may cause stomach problems, such as heartburn, nausea, diarrhea, constipation, and cramps. Be sure to drink plenty of fluids, and include fruits, vegetables, and fiber in your diet each day. Iron pills often make your bowel movements dark or green. ¨ If you forget to take an iron pill, do not take a double dose of iron the next time you take a pill. ¨ Keep iron pills out of the reach of small children. An overdose of iron can be very dangerous. · Follow your doctor's advice about eating iron-rich foods. These include red meat, shellfish, poultry, eggs, beans, raisins, whole-grain bread, and leafy green vegetables. · Steam vegetables to help them keep their iron content. When should you call for help? Call 911 anytime you think you may need emergency care. For example, call if: 
· You have symptoms of a heart attack. These may include: ¨ Chest pain or pressure, or a strange feeling in the chest. 
¨ Sweating. ¨ Shortness of breath. ¨ Nausea or vomiting. ¨ Pain, pressure, or a strange feeling in the back, neck, jaw, or upper belly or in one or both shoulders or arms. ¨ Lightheadedness or sudden weakness. ¨ A fast or irregular heartbeat. After you call 911, the  may tell you to chew 1 adult-strength or 2 to 4 low-dose aspirin. Wait for an ambulance. Do not try to drive yourself. · You passed out (lost consciousness). Call your doctor now or seek immediate medical care if: 
· You have new or increased shortness of breath. · You are dizzy or lightheaded, or you feel like you may faint. · Your fatigue and weakness continue or get worse. · You have any abnormal bleeding, such as: 
¨ Nosebleeds. ¨ Vaginal bleeding that is different (heavier, more frequent, at a different time of the month) than what you are used to. ¨ Bloody or black stools, or rectal bleeding. ¨ Bloody or pink urine. Watch closely for changes in your health, and be sure to contact your doctor if: 
· You do not get better as expected. Where can you learn more? Go to http://shiraAfinity Life Sciencesgaro.info/. Enter R301 in the search box to learn more about \"Anemia: Care Instructions. \" Current as of: October 13, 2016 Content Version: 11.2 © 4952-8604 [employer]. Care instructions adapted under license by [employer] (which disclaims liability or warranty for this information). If you have questions about a medical condition or this instruction, always ask your healthcare professional. [address] [address] any warranty or liability for your use of this information. Discharge Instructions Attachments/References HEART FAILURE (ENGLISH) ANEMIA (ENGLISH) CELLULITIS (ENGLISH) THROMBOCYTOPENIA (ENGLISH) Discharge Orders None [facility] Announcement We are excited to announce that we are making your provider's discharge notes available to you in Goby. You will see these notes when they are completed and signed by the physician that discharged you from your recent hospital stay. If you have any questions or concerns about any information you see in Goby, please call the Health Information Department where you were seen or reach out to your Primary Care Provider for more information about your plan of care. Introducing [facility]!    
 Cris Vidales introduces Goby patient portal. Now you can access parts of your medical record, email your doctor's office, and request medication refills online. 1. In your internet browser, go to [email or web address] 2. Click on the First Time User? Click Here link in the Sign In box. You will see the New Member Sign Up page. 3. Enter your sigmacare Access Code exactly as it appears below. You will not need to use this code after youve completed the sign-up process. If you do not sign up before the expiration date, you must request a new code. · sigmacare Access Code: [email or web address] Expires: 6/7/2017 12:53 PM 
 
4. Enter the last four digits of your Social Security Number (xxxx) and Date of Birth (mm/dd/yyyy) as indicated and click Submit. You will be taken to the next sign-up page. 5. Create a sigmacare ID. This will be your sigmacare login ID and cannot be changed, so think of one that is secure and easy to remember. 6. Create a sigmacare password. You can change your password at any time. 7. Enter your Password Reset Question and Answer. This can be used at a later time if you forget your password. 8. Enter your e-mail address. You will receive e-mail notification when new information is available in [address] 9. Click Sign Up. You can now view and download portions of your medical record. 10. Click the Download Summary menu link to download a portable copy of your medical information. If you have questions, please visit the Frequently Asked Questions section of the sigmacare website. Remember, sigmacare is NOT to be used for urgent needs. For medical emergencies, dial 911. Now available from your iPhone and Android! General Information Please provide this summary of care documentation to your next provider. Patient Signature:  ____________________________________________________________ Date:  ____________________________________________________________  
  
Brenda Lightning Provider Signature:  ____________________________________________________________ Date:  ____________________________________________________________ More Information Heart Failure: Care Instructions Your Care Instructions Heart failure occurs when your heart does not pump as much blood as the body needs. Failure does not mean that the heart has stopped pumping but rather that it is not pumping as well as it should. Over time, this causes fluid buildup in your lungs and other parts of your body. Fluid buildup can cause shortness of breath, fatigue, swollen ankles, and other problems. By taking medicines regularly, reducing sodium (salt) in your diet, checking your weight every day, and making lifestyle changes, you can feel better and live longer. Follow-up care is a key part of your treatment and safety. Be sure to make and go to all appointments, and call your doctor if you are having problems. It's also a good idea to know your test results and keep a list of the medicines you take. How can you care for yourself at home? Medicines · Be safe with medicines. Take your medicines exactly as prescribed. Call your doctor if you think you are having a problem with your medicine. · Do not take any vitamins, over-the-counter medicine, or herbal products without talking to your doctor first. Brant Men not take ibuprofen (Advil or Motrin) and naproxen (Aleve) without talking to your doctor first. They could make your heart failure worse. · You may be taking some of the following medicine. ¨ Beta-blockers can slow heart rate, decrease blood pressure, and improve your condition. Taking a beta-blocker may lower your chance of needing to be hospitalized. ¨ Angiotensin-converting enzyme inhibitors (ACEIs) reduce the heart's workload, lower blood pressure, and reduce swelling. Taking an ACEI may lower your chance of needing to be hospitalized again. ¨ Angiotensin II receptor blockers (ARBs) work like ACEIs. Your doctor may prescribe them instead of ACEIs. ¨ Diuretics, also called water pills, reduce swelling. ¨ Potassium supplements replace this important mineral, which is sometimes lost with diuretics. ¨ Aspirin and other blood thinners prevent blood clots, which can cause a stroke or heart attack. You will get more details on the specific medicines your doctor prescribes. Diet · Your doctor may suggest that you limit sodium to 2,000 milligrams (mg) a day or less. That is less than 1 teaspoon of salt a day, including all the salt you eat in cooking or in packaged foods. People get most of their sodium from processed foods. Fast food and restaurant meals also tend to be very high in sodium. · Ask your doctor how much liquid you can drink each day. You may have to limit liquids. Weight · Weigh yourself without clothing at the same time each day. Record your weight. Call your doctor if you gain more than 3 pounds in 2 to 3 days. A sudden weight gain may mean that your heart failure is getting worse. Activity level · Start light exercise (if your doctor says it is okay). Even if you can only do a small amount, exercise will help you get stronger, have more energy, and manage your weight and your stress. Walking is an easy way to get exercise. Start out by walking a little more than you did before. Bit by bit, increase the amount you walk. · When you exercise, watch for signs that your heart is working too hard. You are pushing yourself too hard if you cannot talk while you are exercising. If you become short of breath or dizzy or have chest pain, stop, sit down, and rest. 
· If you feel \"wiped out\" the day after you exercise, walk slower or for a shorter distance until you can work up to a better pace. · Get enough rest at night. Sleeping with 1 or 2 pillows under your upper body and head may help you breathe easier. Lifestyle changes · Do not smoke. Smoking can make a heart condition worse.  If you need help quitting, talk to your doctor about stop-smoking programs and medicines. These can increase your chances of quitting for good. Quitting smoking may be the most important step you can take to protect your heart. · Limit alcohol to 2 drinks a day for men and 1 drink a day for women. Too much alcohol can cause health problems. · Avoid getting sick from colds and the flu. Get a pneumococcal vaccine shot. If you have had one before, ask your doctor whether you need another dose. Get a flu shot each year. If you must be around people with colds or the flu, wash your hands often. When should you call for help? Call 911 if you have symptoms of sudden heart failure such as: 
· You have severe trouble breathing. · You cough up pink, foamy mucus. · You have a new irregular or rapid heartbeat. Call your doctor now or seek immediate medical care if: 
· You have new or increased shortness of breath. · You are dizzy or lightheaded, or you feel like you may faint. · You have sudden weight gain, such as 3 pounds or more in 2 to 3 days. · You have increased swelling in your legs, ankles, or feet. · You are suddenly so tired or weak that you cannot do your usual activities. Watch closely for changes in your health, and be sure to contact your doctor if: 
· You develop new symptoms. Where can you learn more? Go to http://shira-garo.info/. Enter U303 in the search box to learn more about \"Heart Failure: Care Instructions. \" Current as of: January 27, 2016 Content Version: 11.2 © 5365-1548 [employer]. Care instructions adapted under license by [employer] (which disclaims liability or warranty for this information). If you have questions about a medical condition or this instruction, always ask your healthcare professional. [place] [address] any warranty or liability for your use of this information. Anemia: Care Instructions Your Care Instructions Anemia is a low level of red blood cells, which carry oxygen throughout your body. Many things can cause anemia. Lack of iron is one of the most common causes. Your body needs iron to make hemoglobin, a substance in red blood cells that carries oxygen from the lungs to your body's cells. Without enough iron, the body produces fewer and smaller red blood cells. As a result, your body's cells do not get enough oxygen, and you feel tired and weak. And you may have trouble concentrating. Bleeding is the most common cause of a lack of iron. You may have heavy menstrual bleeding or bleeding caused by conditions such as ulcers, hemorrhoids, or cancer. Regular use of aspirin or other anti-inflammatory medicines (such as ibuprofen) also can cause bleeding in some people. A lack of iron in your diet also can cause anemia, especially at times when the body needs more iron, such as during pregnancy, infancy, and the teen years. Your doctor may have prescribed iron pills. It may take several months of treatment for your iron levels to return to normal. Your doctor also may suggest that you eat foods that are rich in iron, such as meat and beans. There are many other causes of anemia. It is not always due to a lack of iron. Finding the specific cause of your anemia will help your doctor find the right treatment for you. Follow-up care is a key part of your treatment and safety. Be sure to make and go to all appointments, and call your doctor if you are having problems. It's also a good idea to know your test results and keep a list of the medicines you take. How can you care for yourself at home? · Take your medicines exactly as prescribed. Call your doctor if you think you are having a problem with your medicine. · If your doctor recommends iron pills, take them as directed: ¨ Try to take the pills on an empty stomach about 1 hour before or 2 hours after meals. But you may need to take iron with food to avoid an upset stomach. ¨ Do not take antacids or drink milk or caffeine drinks (such as coffee, tea, or cola) at the same time or within 2 hours of the time that you take your iron. They can make it hard for your body to absorb the iron. ¨ Vitamin C (from food or supplements) helps your body absorb iron. Try taking iron pills with a glass of orange juice or some other food that is high in vitamin C, such as citrus fruits. ¨ Iron pills may cause stomach problems, such as heartburn, nausea, diarrhea, constipation, and cramps. Be sure to drink plenty of fluids, and include fruits, vegetables, and fiber in your diet each day. Iron pills often make your bowel movements dark or green. ¨ If you forget to take an iron pill, do not take a double dose of iron the next time you take a pill. ¨ Keep iron pills out of the reach of small children. An overdose of iron can be very dangerous. · Follow your doctor's advice about eating iron-rich foods. These include red meat, shellfish, poultry, eggs, beans, raisins, whole-grain bread, and leafy green vegetables. · Steam vegetables to help them keep their iron content. When should you call for help? Call 911 anytime you think you may need emergency care. For example, call if: 
· You have symptoms of a heart attack. These may include: ¨ Chest pain or pressure, or a strange feeling in the chest. 
¨ Sweating. ¨ Shortness of breath. ¨ Nausea or vomiting. ¨ Pain, pressure, or a strange feeling in the back, neck, jaw, or upper belly or in one or both shoulders or arms. ¨ Lightheadedness or sudden weakness. ¨ A fast or irregular heartbeat. After you call 911, the  may tell you to chew 1 adult-strength or 2 to 4 low-dose aspirin. Wait for an ambulance. Do not try to drive yourself. · You passed out (lost consciousness). Call your doctor now or seek immediate medical care if: · You have new or increased shortness of breath. · You are dizzy or lightheaded, or you feel like you may faint. · Your fatigue and weakness continue or get worse. · You have any abnormal bleeding, such as: 
¨ Nosebleeds. ¨ Vaginal bleeding that is different (heavier, more frequent, at a different time of the month) than what you are used to. ¨ Bloody or black stools, or rectal bleeding. ¨ Bloody or pink urine. Watch closely for changes in your health, and be sure to contact your doctor if: 
· You do not get better as expected. Where can you learn more? Go to http://shira-garo.info/. Enter R301 in the search box to learn more about \"Anemia: Care Instructions. \" Current as of: October 13, 2016 Content Version: 11.2 © 5687-7981 [employer]. Care instructions adapted under license by [employer] (which disclaims liability or warranty for this information). If you have questions about a medical condition or this instruction, always ask your healthcare professional. [place] [address] any warranty or liability for your use of this information. Cellulitis: Care Instructions Your Care Instructions Cellulitis is a skin infection. It often occurs after a break in the skin from a scrape, cut, bite, or puncture, or after a rash. The doctor has checked you carefully, but problems can develop later. If you notice any problems or new symptoms, get medical treatment right away. Follow-up care is a key part of your treatment and safety. Be sure to make and go to all appointments, and call your doctor if you are having problems. It's also a good idea to know your test results and keep a list of the medicines you take. How can you care for yourself at home? · Take your antibiotics as directed. Do not stop taking them just because you feel better. You need to take the full course of antibiotics. · Prop up the infected area on pillows to reduce pain and swelling. Try to keep the area above the level of your heart as often as you can. · If your doctor told you how to care for your wound, follow your doctor's instructions. If you did not get instructions, follow this general advice: ¨ Wash the wound with clean water 2 times a day. Don't use hydrogen peroxide or alcohol, which can slow healing. ¨ You may cover the wound with a thin layer of petroleum jelly, such as Vaseline, and a nonstick bandage. ¨ Apply more petroleum jelly and replace the bandage as needed. · Be safe with medicines. Take pain medicines exactly as directed. ¨ If the doctor gave you a prescription medicine for pain, take it as prescribed. ¨ If you are not taking a prescription pain medicine, ask your doctor if you can take an over-the-counter medicine. To prevent cellulitis in the future · Try to prevent cuts, scrapes, or other injuries to your skin. Cellulitis most often occurs where there is a break in the skin. · If you get a scrape, cut, mild burn, or bite, wash the wound with clean water as soon as you can to help avoid infection. Don't use hydrogen peroxide or alcohol, which can slow healing. · If you have swelling in your legs (edema), support stockings and good skin care may help prevent leg sores and cellulitis. · Take care of your feet, especially if you have diabetes or other conditions that increase the risk of infection. Wear shoes and socks. Do not go barefoot. If you have athlete's foot or other skin problems on your feet, talk to your doctor about how to treat them. When should you call for help? Call your doctor now or seek immediate medical care if: 
· You have signs that your infection is getting worse, such as: 
¨ Increased pain, swelling, warmth, or redness. ¨ Red streaks leading from the area. ¨ Pus draining from the area. ¨ A fever. · You get a rash. Watch closely for changes in your health, and be sure to contact your doctor if: 
· You are not getting better after 1 day (24 hours). · You do not get better as expected. Where can you learn more? Go to http://shira-garo.info/. Gaetano Downs in the search box to learn more about \"Cellulitis: Care Instructions. \" Current as of: October 13, 2016 Content Version: 11.2 © 0380-6797 [employer]. Care instructions adapted under license by [employer] (which disclaims liability or warranty for this information). If you have questions about a medical condition or this instruction, always ask your healthcare professional. [address] any warranty or liability for your use of this information. Thrombocytopenia: Care Instructions Your Care Instructions Thrombocytopenia is a low number of platelets in the blood. Platelets are the cells that help blood clot. If you don't have enough of them, your blood cannot clot well. So it is harder to stop bleeding. You may have low platelets because your bone marrow does not make them. Or your body's defenses (immune system) may destroy them. Having an enlarged spleen can also reduce the number of platelets in your blood. This is because they can get trapped in the enlarged spleen. Some diseases or medicines may also cause low platelets. But platelets may go back to normal levels if the disease is treated or the medicine is stopped. You may not need treatment if your problem is mild. If you do need treatment, you may have platelets added to your blood. Or you may get medicine to stop the loss of platelets or help your body make them. Follow-up care is a key part of your treatment and safety. Be sure to make and go to all appointments, and call your doctor if you are having problems. It's also a good idea to know your test results and keep a list of the medicines you take. How can you care for yourself at home? · Be safe with medicines. Take your medicines exactly as prescribed. Call your doctor if you think you are having a problem with your medicine. · Do not take aspirin or anti-inflammatory medicines unless your doctor says it is okay. Examples are ibuprofen (Advil, Motrin) and naproxen (Aleve). They may increase the risk of bleeding. · Avoid contact sports or activities that could cause you to fall. When should you call for help? Call 911 anytime you think you may need emergency care. For example, call if: 
· You have symptoms of a stroke. These may include: 
¨ Sudden numbness, tingling, weakness, or loss of movement in your face, arm, or leg, especially on only one side of your body. ¨ Sudden vision changes. ¨ Sudden trouble speaking. ¨ Sudden confusion or trouble understanding simple statements. ¨ Sudden problems with walking or balance. ¨ A sudden, severe headache that is different from past headaches. Call your doctor now or seek immediate medical care if: 
· You have any abnormal bleeding, such as: 
¨ Nosebleeds. ¨ Vaginal bleeding that is different (heavier, more frequent, at a different time of the month) than what you are used to. ¨ Bloody or black stools, or rectal bleeding. ¨ Bloody or pink urine. · You have severe pain that does not get better. Watch closely for changes in your health, and be sure to contact your doctor if: 
· You do not get better as expected. Where can you learn more? Go to http://shira-garo.info/. Enter C455 in the search box to learn more about \"Thrombocytopenia: Care Instructions. \" Current as of: October 13, 2016 Content Version: 11.2 © 1079-8286 [employer]. Care instructions adapted under license by [employer] (which disclaims liability or warranty for this information).  If you have questions about a medical condition or this instruction, always ask your healthcare professional. [place] [address] any warranty or liability for your use of this information.

## 2017-05-14 NOTE — ED NOTES
Patient repositioned in bed with two RN assistance, patient diaphoretic, blankets removed. Patient given new sheet and gown. VSS. In NAD at this time.

## 2017-05-14 NOTE — ED PROVIDER NOTES
HPI Comments: 77 yo M with h/o CHF, O2 dependence, anemia, and HTN c/o right leg pain and swelling x 1 week. Was seen on 5/9 in the ED for same, had negative PVL and was d/h with keflex and pain meds. Has been taking abx as directed and has finished his pain meds but he has not had any improvement in pain. Daughter is at bedside and reports pt more edematous and abdomen more distended than usual. Denies fever, CP, SOB, n/v. No other complaints. Patient is a 78 y.o. male presenting with leg pain. Leg Pain           Past Medical History:   Diagnosis Date    Arrhythmia     A fib; has external vest and belt he wears for this    Arthritis     High blood pressure     Hypercholesteremia     Mini stroke ([address] 2000    Unspecified sleep apnea     does not wear machine       Past Surgical History:   Procedure Laterality Date    CABG, ARTERY-VEIN, THREE  2013    HX CAROTID ENDARTERECTOMY Left     HX CAROTID ENDARTERECTOMY Right 2000    HX CORONARY STENT PLACEMENT           History reviewed. No pertinent family history. Social History     Social History    Marital status: UNKNOWN     Spouse name: N/A    Number of children: N/A    Years of education: N/A     Occupational History    Not on file. Social History Main Topics    Smoking status: Former Smoker    Smokeless tobacco: Never Used      Comment: quit in the 90s    Alcohol use No    Drug use: No    Sexual activity: Not on file     Other Topics Concern    Not on file     Social History Narrative         ALLERGIES: Review of patient's allergies indicates no known allergies. Review of Systems   Constitutional: Negative for chills and fever. Respiratory: Negative for cough and shortness of breath. Cardiovascular: Negative for chest pain. Gastrointestinal: Negative for abdominal pain.        Vitals:    05/14/17 0930 05/14/17 1033 05/14/17 1035 05/14/17 1100   BP:  (!) 154/92  165/86   Pulse: 66  79 88   Resp: 14  15    Temp:       SpO2: (!) 16%    Weight:       Height:                Physical Exam   Constitutional: He is oriented to person, place, and time. No distress. Chronically ill appearing   HENT:   Head: Normocephalic and atraumatic. Mouth/Throat: Mucous membranes are dry. Nasal cannula in mouth, patient states \"that's where I always wear it\". Eyes: Conjunctivae are normal.   Neck: Normal range of motion. Neck supple. Cardiovascular: Normal rate, regular rhythm and normal heart sounds. Pulmonary/Chest: Effort normal and breath sounds normal. No respiratory distress. He has no wheezes. He has no rales. Abdominal: Soft. He exhibits distension. There is no tenderness. There is no rebound and no guarding. Musculoskeletal: He exhibits edema. Right lower leg with 3+ edema, erythema. Clear fluid filled blisters noted to shin. Dopplered pulse strong and regular. Left lower leg with 2+ edema. Chronic skin changes from venous insufficiency noted. Neurological: He is alert and oriented to person, place, and time. Skin: Skin is warm and dry. There is pallor. Psychiatric: He has a normal mood and affect. His behavior is normal. Judgment and thought content normal.   Nursing note and vitals reviewed. MDM  Number of Diagnoses or Management Options  Acute on chronic congestive heart failure, unspecified congestive heart failure type [facility]):    Anemia, unspecified type:   Cellulitis of right lower extremity:     ED Course       Procedures    -------------------------------------------------------------------------------------------------------------------     EKG INTERPRETATIONS:      RADIOLOGY RESULTS:   DUPLEX LOWER EXT VENOUS RIGHT         XR CHEST PORT   Final Result          LABORATORY RESULTS:  Recent Results (from the past 12 hour(s))   CBC WITH AUTOMATED DIFF    Collection Time: 05/14/17  8:26 AM   Result Value Ref Range    WBC 6.6 4.6 - 13.2 K/uL    RBC 2.62 (L) 4.70 - 5.50 M/uL    HGB 7.2 (L) 13.0 - 16.0 g/dL    HCT 23.5 (L) 36.0 - 48.0 %    MCV 89.7 74.0 - 97.0 FL    MCH 27.5 24.0 - 34.0 PG    MCHC 30.6 (L) 31.0 - 37.0 g/dL    RDW 16.3 (H) 11.6 - 14.5 %    PLATELET 91 (L) 936 - 420 K/uL    MPV 10.3 9.2 - 11.8 FL    NEUTROPHILS 80 (H) 42 - 75 %    LYMPHOCYTES 11 (L) 20 - 51 %    MONOCYTES 4 2 - 9 %    EOSINOPHILS 5 0 - 5 %    BASOPHILS 0 0 - 3 %    ABS. NEUTROPHILS 5.3 1.8 - 8.0 K/UL    ABS. LYMPHOCYTES 0.7 (L) 0.8 - 3.5 K/UL    ABS. MONOCYTES 0.3 0 - 1.0 K/UL    ABS. EOSINOPHILS 0.3 0.0 - 0.4 K/UL    ABS. BASOPHILS 0.0 0.0 - 0.06 K/UL    DF MANUAL      PLATELET COMMENTS DECREASED PLATELETS      RBC COMMENTS ANISOCYTOSIS  1+        RBC COMMENTS POLYCHROMASIA  FEW       METABOLIC PANEL, BASIC    Collection Time: 05/14/17  8:26 AM   Result Value Ref Range    Sodium 136 136 - 145 mmol/L    Potassium 4.2 3.5 - 5.5 mmol/L    Chloride 97 (L) 100 - 108 mmol/L    CO2 32 21 - 32 mmol/L    Anion gap 7 3.0 - 18 mmol/L    Glucose 71 (L) 74 - 99 mg/dL    BUN 58 (H) 7.0 - 18 MG/DL    Creatinine 1.89 (H) 0.6 - 1.3 MG/DL    BUN/Creatinine ratio 31 (H) 12 - 20      GFR est AA 42 (L) >60 ml/min/1.73m2    GFR est non-AA 35 (L) >60 ml/min/1.73m2    Calcium 9.5 8.5 - 10.1 MG/DL   PRO-BNP    Collection Time: 05/14/17  8:26 AM   Result Value Ref Range    NT pro-BNP 58184 (H) 0 - 900 PG/ML           CONSULTATIONS:        PROGRESS NOTES:    12:01 PM Pt here with cellulitis not improved with outpatient abx. O2 sats in upper 80s, BNP increased from 5 days ago, Hgb dropped from 8.2 to 7.2. Kidney fx at baseline. Repeat PVL negative. CXR shows right sided effusion improved from February. EKG shows NSR with mild T wave inversion in V5, V6 unchanged from previous in February. Did not follow up as directed, reasonable to admit for CHF exacerbation as well as failure of outpatient abx for leg cellulitis. D/w Dr. Jessica Cano (ED attending) who agrees with plan. Spoke with Dr. Hilaria Foote who will admit.        DISPOSITION:  ED DIAGNOSIS & DISPOSITION INFORMATION  Diagnosis:   1. Acute on chronic congestive heart failure, unspecified congestive heart failure type ([facility] [address]    2. Cellulitis of right lower extremity    3. Anemia, unspecified type          Disposition: admit    Follow-up Information     None          Patient's Medications   Start Taking    No medications on file   Continue Taking    ALBUTEROL-IPRATROPIUM (DUO-NEB) 2.5 MG-0.5 MG/3 ML NEBU    3 mL by Nebulization route every six (6) hours as needed. ALLOPURINOL (ZYLOPRIM) 100 MG TABLET    Take 1 Tab by mouth daily. Indications: GOUT    ASPIRIN DELAYED-RELEASE 81 MG TABLET    Take 2 Tabs by mouth daily. CARVEDILOL (COREG) 6.25 MG TABLET    Take 1 Tab by mouth two (2) times daily (with meals). Indications: Chronic Heart Failure    CEPHALEXIN (KEFLEX) 500 MG CAPSULE    Take 1 Cap by mouth four (4) times daily for 7 days. CHOLECALCIFEROL (VITAMIN D3) 1,000 UNIT TABLET    Take 2 Tabs by mouth daily. COLCHICINE 0.6 MG TABLET    Take 0.6 mg by mouth daily. FERROUS SULFATE 325 MG (65 MG IRON) TABLET    Take 1 Tab by mouth daily (with breakfast). FISH OIL-OMEGA-3 FATTY ACIDS 340-1,000 MG CAPSULE    Take 1 Cap by mouth two (2) times a day. Indications: HYPERTRIGLYCERIDEMIA    FLUTICASONE (FLONASE) 50 MCG/ACTUATION NASAL SPRAY    2 spray each nostril daily  Indications: ALLERGIC RHINITIS    FUROSEMIDE (LASIX) 40 MG TABLET    1 tab BID    HYDROCODONE-ACETAMINOPHEN (NORCO) 5-325 MG PER TABLET    Take 1 Tab by mouth every four (4) hours as needed. Max Daily Amount: 6 Tabs. HYDROCODONE-ACETAMINOPHEN (NORCO) 5-325 MG PER TABLET    Take 1 Tab by mouth every six (6) hours as needed for Pain. Max Daily Amount: 4 Tabs. INFLUENZA VACCINE 2016-17, 36MOS+,,PF, (FLUZONE/FLUARIX/FLULAVAL QUAD) SYRG INJECTION    0.5 mL by IntraMUSCular route PRIOR TO DISCHARGE. LOPERAMIDE (IMMODIUM) 2 MG TABLET    Take 2 mg by mouth four (4) times daily as needed for Diarrhea.     METOLAZONE (ZAROXOLYN) 2.5 MG TABLET Take 1 Tab by mouth daily. NITROGLYCERIN (NITROSTAT) 0.4 MG SL TABLET    1 Tab by SubLINGual route as needed for Chest Pain. PANTOPRAZOLE (PROTONIX) 40 MG TABLET    Take 1 Tab by mouth Daily (before breakfast). PRAVASTATIN (PRAVACHOL) 80 MG TABLET    Take 1 Tab by mouth nightly. THERAPEUTIC MULTIVITAMIN (THERAGRAN) TABLET    Take 1 Tab by mouth daily. ZOLPIDEM (AMBIEN) 5 MG TABLET    Take 1 Tab by mouth nightly as needed for Sleep. Max Daily Amount: 5 mg.    These Medications have changed    No medications on file   Stop Taking    No medications on file

## 2017-05-14 NOTE — IP AVS SNAPSHOT
Current Discharge Medication List  
  
START taking these medications Dose & Instructions Dispensing Information Comments Morning Noon Evening Bedtime  
 acetaminophen 325 mg tablet Commonly known as:  TYLENOL Your last dose was: Your next dose is:    
   
   
 Dose:  650 mg Take 2 Tabs by mouth every six (6) hours as needed. Quantity:  100 Tab Refills:  0  
     
   
   
   
  
 budesonide-formoterol 160-4.5 mcg/actuation HFA inhaler Commonly known as:  SYMBICORT Your last dose was: Your next dose is:    
   
   
 Dose:  2 Puff Take 2 Puffs by inhalation two (2) times a day. Indications: PREVENTION OF BRONCHOSPASMS WITH EMPHYSEMA Quantity:  1 Inhaler Refills:  0  
     
   
   
   
  
 colchicine 0.6 mg capsule Commonly known as:  [place] Boast Replaces:  colchicine 0.6 mg tablet Your last dose was: Your next dose is:    
   
   
 Dose:  0.6 mg Take 1 Cap by mouth Q TU, TH & SAT. Quantity:  100 Cap Refills:  0 CONTINUE these medications which have CHANGED Dose & Instructions Dispensing Information Comments Morning Noon Evening Bedtime  
 allopurinol 100 mg tablet Commonly known as:  Tracy Crain What changed:  how much to take Your last dose was: Your next dose is:    
   
   
 Dose:  50 mg Take 0.5 Tabs by mouth daily. Indications: GOUT  
 Quantity:  100 Tab Refills:  0  
     
   
   
   
  
 carvedilol 3.125 mg tablet Commonly known as:  Priya Hall What changed:   
- medication strength 
- how much to take - when to take this Your last dose was: Your next dose is:    
   
   
 Dose:  3.125 mg Take 1 Tab by mouth every twelve (12) hours. Quantity:  100 Tab Refills:  0  
     
   
   
   
  
 fluticasone 50 mcg/actuation nasal spray Commonly known as:  Courtney Tirado What changed:  additional instructions Your last dose was: Your next dose is:    
   
   
 s prays each nostril daily  Indications: ALLERGIC RHINITIS Quantity:  1 Bottle Refills:  0 HYDROcodone-acetaminophen 5-325 mg per tablet Commonly known as:  [place] Serrano What changed:  Another medication with the same name was removed. Continue taking this medication, and follow the directions you see here. Your last dose was: Your next dose is:    
   
   
 Dose:  1 Tab Take 1 Tab by mouth every four (4) hours as needed. Max Daily Amount: 6 Tabs. Quantity:  60 Tab Refills:  0 CONTINUE these medications which have NOT CHANGED Dose & Instructions Dispensing Information Comments Morning Noon Evening Bedtime  
 albuterol-ipratropium 2.5 mg-0.5 mg/3 ml Nebu Commonly known as:  Kenji Langley Your last dose was: Your next dose is:    
   
   
 Dose:  3 mL  
3 mL by Nebulization route every six (6) hours as needed. Quantity:  30 Nebule Refills:  0  
     
   
   
   
  
 ferrous sulfate 325 mg (65 mg iron) tablet Your last dose was: Your next dose is:    
   
   
 Dose:  325 mg Take 1 Tab by mouth daily (with breakfast). Quantity:  100 Tab Refills:  0  
     
   
   
   
  
 nitroglycerin 0.4 mg SL tablet Commonly known as:  NITROSTAT Your last dose was: Your next dose is:    
   
   
 Dose:  0.4 mg  
1 Tab by SubLINGual route as needed for Chest Pain. Quantity:  1 Bottle Refills:  0  
     
   
   
   
  
 pantoprazole 40 mg tablet Commonly known as:  PROTONIX Your last dose was: Your next dose is:    
   
   
 Dose:  40 mg Take 1 Tab by mouth Daily (before breakfast). Quantity:  100 Tab Refills:  0  
     
   
   
   
  
 therapeutic multivitamin tablet Commonly known as:  [facility] Your last dose was: Your next dose is:    
   
   
 Dose:  1 Tab Take 1 Tab by mouth daily. Quantity:  100 Tab Refills:  0 zolpidem 5 mg tablet Commonly known as:  AMBIEN Your last dose was: Your next dose is:    
   
   
 Dose:  5 mg Take 1 Tab by mouth nightly as needed for Sleep. Max Daily Amount: 5 mg. Quantity:  30 Tab Refills:  0 STOP taking these medications   
 aspirin delayed-release 81 mg tablet  
   
  
 cephALEXin 500 mg capsule Commonly known as:  KEFLEX  
   
  
 cholecalciferol 1,000 unit tablet Commonly known as:  VITAMIN D3  
   
  
 colchicine 0.6 mg tablet Replaced by:  colchicine 0.6 mg capsule  
   
  
 fish oil-omega-3 fatty acids 340-1,000 mg capsule  
   
  
 furosemide 40 mg tablet Commonly known as:  LASIX  
   
  
 influenza vaccine 2016-17 (36mos+)(PF) Syrg injection Commonly known as:  FLUZONE/FLUARIX/FLULAVAL QUAD  
   
  
 loperamide 2 mg tablet Commonly known as:  IMMODIUM  
   
  
 metOLazone 2.5 mg tablet Commonly known as:  ZAROXOLYN  
   
  
 pravastatin 80 mg tablet Commonly known as:  PRAVACHOL Where to Get Your Medications Information on where to get these meds will be given to you by the nurse or doctor. ! Ask your nurse or doctor about these medications  
  acetaminophen 325 mg tablet  
 albuterol-ipratropium 2.5 mg-0.5 mg/3 ml Nebu  
 allopurinol 100 mg tablet  
 budesonide-formoterol 160-4.5 mcg/actuation HFA inhaler  
 carvedilol 3.125 mg tablet  
 colchicine 0.6 mg capsule  
 ferrous sulfate 325 mg (65 mg iron) tablet  
 fluticasone 50 mcg/actuation nasal spray HYDROcodone-acetaminophen 5-325 mg per tablet  
 nitroglycerin 0.4 mg SL tablet  
 pantoprazole 40 mg tablet  
 therapeutic multivitamin tablet  
 zolpidem 5 mg tablet

## 2017-05-14 NOTE — ED TRIAGE NOTES
Patient to ED by EMS with c/o right leg pain and cellulitis. Patient states he was seen for same complaint and discharged on abx, with no improvement. Patient rates pain 10/10. VSS for EMS. A+O x4.

## 2017-05-14 NOTE — Clinical Note
Status[de-identified] Inpatient [101] Type of Bed: Telemetry [19] Inpatient Hospitalization Certified Necessary for the Following Reasons: 3. Patient receiving treatment that can only be provided in an inpatient setting (further clarification in H&P documentation) Admitting Diagnosis: Acute exacerbation [facility] CHF (congestive [facility] [2016027] Admitting Physician: Lamar Bender Attending Physician: Lamar Bender Estimated Length of Stay: > or = to 2 Midnights Discharge Plan[de-identified] Home with Office Follow-up

## 2017-05-14 NOTE — PROCEDURES
[facility]  *** FINAL REPORT ***    Name: Saskia Mann  MRN: [id number]  : 17 Dec 1940  HIS Order #: [id number]  [id number] [facility] Visit #: [id number]  Date: 14 May 2017    TYPE OF TEST: Peripheral Venous Testing    REASON FOR TEST  Pain in limb, Limb swelling, Edema    Left Leg:-  Deep venous thrombosis:           No  Superficial venous thrombosis:    No  Deep venous insufficiency:        Not examined  Superficial venous insufficiency: Not examined      INTERPRETATION/FINDINGS  Duplex images were obtained using 2-D gray scale, color flow and  spectral doppler analysis. Right leg :  Note: No change since previous study 5 days ago. 1. No evidence of deep venous thrombosis detected in the veins  visualized. 2. Deep vein(s) visualized include the common femoral, femoral,  popliteal, and posterior tibial veins. 3. Peroneal veins not visualized, therefore, deep venous thrombosis  cannot be ruled out in these vessels. 4. No evidence of superficial thrombosis detected. 5. Superficial vein(s) visualized include the proximal great saphenous   vein. 6. Pulsatile flow detected. 7. No evidence of deep venous thrombosis in the contralateral common  femoral vein. Pulsatile flow detected. ADDITIONAL COMMENTS  Technically difficult and limited study due to edema. I have personally reviewed the data relevant to the interpretation of  this  study.     TECHNOLOGIST: Maryellen Schreiber RVT  Signed: 2017 10:32 AM    PHYSICIAN: Idania Villa MD  Signed: 2017 09:11 AM

## 2017-05-15 NOTE — CDMP QUERY
Please clarify if this patient is being treated/managed for:    => Chronic Resp Failure w/hypoxia  =>Other Explanation of clinical findings  =>Unable to Determine (no explanation of clinical findings)    The medical record reflects the following:    Risk: 77 yo M with h/o CHF, O2 dependence, anemia, and HTN c/o right leg pain and swelling x 1 week. Clinical Indicators:Home O2 at 4l/nc hx chf and anemia    Treatment: Continued O2 use and monitoring pulse ox    Please clarify and document your clinical opinion in the progress notes and discharge summary including the definitive and/or presumptive diagnosis, (suspected or probable), related to the above clinical findings. Please include clinical findings supporting your diagnosis. If you DECLINE this query or would like to communicate with [facility], please utilize the \"TrustAlert message box\" at the TOP of the Progress Note on the right.       Thank you,[facility]

## 2017-05-15 NOTE — ROUTINE PROCESS
TRANSFER - IN REPORT:    Verbal report received from Angeline Collins (name) on Dasia Rose  being received from ER(unit) for routine progression of care      Report consisted of patients Situation, Background, Assessment and   Recommendations(SBAR). Information from the following report(s) SBAR, Kardex, ED Summary, Intake/Output, MAR, Recent Results and Cardiac Rhythm SR was reviewed with the receiving nurse. Opportunity for questions and clarification was provided. Assessment completed upon patients arrival to unit and care assumed. Primary Nurse Walker Rowley RN and CLINTON Gibbons, RN performed a dual skin assessment on this patient No impairment noted  Shahzad score is 17      0052-Dr Dumont notified of pt's BP of 85/51, H/H of 6.6/21.3 from ER; ordered lab works stat. 8171 - pt's BP manually: 80/52, asymptomatic - notified Dr Mala Yo and also made aware of pt's latest H/H of 7/22.8. He ordered IVF bolus and maintenance rate, and to discontinue Lasix. Bedside and Verbal shift change report given to [facility] (oncoming nurse) by me (offgoing nurse).  Report included the following informatiSRSBAR, Kardex, ED Summary, Intake/Output, MAR, Recent Results and Cardiac Rhythm SR.

## 2017-05-15 NOTE — H&P
[address] [place] [address] PS    Name:  Africa Chicas  MR#:  [id number]  :  1940  Account #:  [de-identified]  Date of Adm:  2017      CHIEF COMPLAINT: Pain and swelling in the right leg. HISTORY OF PRESENT ILLNESS: This is a 79-year-old white male  with history of coronary artery disease, coronary artery bypass surgery,  history of diastolic heart failure, hypertension, hyperlipidemia, chronic  kidney disease, stage 3, chronic ascites, history of defibrillator  placement, stent placement, is admitted because of pain and swelling  of the right lower extremity. He was diagnosed with cellulitis about a  week ago, was given antibiotic, but is not better. He came today, is still  having pain and swelling in the right leg. He is more drowsy at this  time, possible sepsis. PAST MEDICAL HISTORY: As enumerated above, including  obstructive sleep apnea, mini-strokes. He was here in February of this  year for acute on chronic diastolic heart failure, as well as anasarca. SOCIAL HISTORY: He is a . Used to drink alcohol several  years ago. Does not smoke cigarettes. FAMILY HISTORY: Both parents  of a heart attack. REVIEW OF SYSTEMS: Patient too drowsy, unable to give me much  history. PHYSICAL EXAMINATION:  GENERAL: Patient is very drowsy. VITAL SIGNS: Blood pressure is 165/86, pulse 66, respiration 14. HEENT: Head: Normocephalic. Eyes: Pupils equal. Ears: No  discharge. Mouth: Tongue is dry. NECK: Thyroid not enlarged. No neck vein distention. CHEST: Symmetrical.  HEART: Regular. LUNGS: Decreased breath sounds. ABDOMEN: Revealed ascites. EXTREMITIES: Revealed 3+ leg edema. Right leg is swollen, almost  double the size of the left leg but test negative for acute DVT. IMPRESSION:  1. Acute on chronic diastolic heart failure. 2. Cellulitis. 3. Hypertension. 4. Hyperlipidemia. 5. History of mini-strokes and vascular dementia.   6. History of carotid endarterectomy. 7. Chronic respiratory failure with hypoxia  8. Acute exacerbation of CHF    DISCUSSION: I will review home meds. He will need IV antibiotic. Will  need cardiology evaluation for I believe ongoing acute on chronic  congestive heart failure.     CODE STATUS: FULL MD Andrew Rojas / [place]  D:  05/14/2017   17:05  T:  05/14/2017   21:50  Job #:  [id number]

## 2017-05-15 NOTE — CONSULTS
[facility] - Consult Note    Date of  Admission: 5/14/2017  8:05 AM     Primary Care Physician:  Glenroy Rodriges MD     Plan:       1. Acute combined systolic and diastolic congestive heart failure- presents with worsening CHF with BLE edema and elevated NT pro BNP. Stricts I&O. Monitor electrolytes and renal function. Will add dobutamine drip. D/c metolazone and Coreg for now. D/c ivf.  2. Hypotension-likely related to possible sepsis. On dobutamine drip. Monitor closely   3. Anemia-severe ok to transfuse 1 unit RBC. Monitor H&H  4. Thrombocytopenia?- hold asa for now   5. S/p CABG in 2013 in [facility]- stable denies chest pain or pressure. S/p AICD interrogate 2/17 normal function. 6. Hx Carotid endarterectomy- bilateral in 2015    7. Mitral regurgitation-mild to moderate regurgitation echo on 2016   8. Aortic stenosis : moderate  9. Leg pain b/l- follow arterial BLE  10. Possible sepsis- w/u and medications per medical team.    transfer to [address] [place] [address]. He has chronic abdominal pain and CHF NYHA class IV. Will start dobutamine drip. Monitor i/o- consider PRBC to keep HCT> 30    SUMMARY: echo 2/17  Left ventricle: Systolic function was normal by visual assessment. Ejection fraction was estimated in the range of 55 % to 60 %. No obvious  wall motion abnormalities identified in the views obtained. Wall thickness  was mildly increased. Features were consistent with a pseudonormal left  ventricular filling pattern, with concomitant abnormal relaxation and  increased filling pressure (grade 2 diastolic dysfunction). Right ventricle: The ventricle was mildly dilated. Systolic function was  mildly reduced. Systolic pressure was moderately increased. Estimated peak  pressure was 55 mmHg. Right atrium: The atrium was mildly dilated. Mitral valve: There was moderate thickening. Aortic valve: The valve was probably trileaflet. Leaflets exhibited  calcification and reduced mobility.  There was moderate stenosis. Valve  peak gradient was 34 mmHg. Valve mean gradient was 21 mmHg. Estimated  aortic valve area (by Vmax) was 1.14 cmï¾². Assessment:     Hospital Problems  Date Reviewed: 3/11/2017          Codes Class Noted POA    Cellulitis ICD-10-CM: L03.90  ICD-9-CM: 682.9  5/14/2017 Unknown        Pulmonary HTN ([facility] ICD-10-CM: I27.2  ICD-9-CM: 416.8  4/4/2017 Yes        Aortic stenosis, moderate (Chronic) ICD-10-CM: I35.0  ICD-9-CM: 424.1  3/11/2017 Yes        Acute on chronic diastolic (congestive) heart failure (HCC) ICD-10-CM: I50.33  ICD-9-CM: 428.33, 428.0  2/27/2017 Yes        Acute CHF (congestive heart failure) ([facility] ICD-10-CM: I50.9  ICD-9-CM: 428.0  2/26/2017 Yes        HTN (hypertension) (Chronic) ICD-10-CM: I10  ICD-9-CM: 401.9  9/4/2016 Yes        HLD (hyperlipidemia) (Chronic) ICD-10-CM: E78.5  ICD-9-CM: 272.4  9/4/2016 Yes        Diastolic CHF, acute on chronic ([facility] ICD-10-CM: I50.33  ICD-9-CM: 428.33, 428.0  8/17/2016 Yes        Acute exacerbation of CHF (congestive heart failure) ([facility] ICD-10-CM: I50.9  ICD-9-CM: 428.0  8/12/2016 Unknown        Hx of CABG ICD-10-CM: Z95.1  ICD-9-CM: V45.81  7/22/2016 Yes    Overview Signed 7/22/2016  5:23 PM by Debbie Leyva NP     2013             H/O carotid endarterectomy ICD-10-CM: Z98.890  ICD-9-CM: V45.89  7/22/2016 Yes    Overview Signed 7/22/2016  5:27 PM by Debbie Leyva NP     2015             AICD (automatic cardioverter/defibrillator) present ICD-10-CM: Z95.810  ICD-9-CM: V45.02  7/22/2016 Yes    Overview Signed 7/22/2016  5:27 PM by Debbie Leyva NP     2015             CHF (congestive heart failure) (HCC) ICD-10-CM: I50.9  ICD-9-CM: 428.0  7/21/2016 Yes        CAD (coronary artery disease) ICD-10-CM: I25.10  ICD-9-CM: 414.00  3/5/2014 Yes        PVD (peripheral vascular disease) ([facility] ICD-10-CM: I73.9  ICD-9-CM: 443.9  3/5/2014 Yes                   History of Present Illness:          This is a 78 y.o. male admitted for Acute exacerbation of CHF (congestive heart failure) (HCC)This is a 70-year-old white male with history of coronary artery disease, coronary artery bypass surgery, history of diastolic heart failure, hypertension, hyperlipidemia, chronic kidney disease, stage 3, chronic ascites, history of defibrillator placement, stent placement, is admitted because of pain and swelling of the right lower extremity. right leg pain and swelling started about a week ago. He was seen on 5/9 in the ED for same symptoms. Patient  had negative PVL and was d/c  with keflex and pain meds. Has been taking abx as directed and has finished his pain meds but he has not had any improvement in pain. Daughter is at bedside and reports patient  more edematous and abdomen more distended than usual.  He is resting in bed. Denies any chest pain or pressure. He c/o pain lower extremities. Patient is in moderated distress and unable to provide detail information. Past Medical History:     Past Medical History:   Diagnosis Date    Arrhythmia     A fib; has external vest and belt he wears for this    Arthritis     High blood pressure     Hypercholesteremia     Mini stroke (HCC) 2000    Unspecified sleep apnea     does not wear machine         Social History:     Social History     Social History    Marital status: UNKNOWN     Spouse name: N/A    Number of children: N/A    Years of education: N/A     Social History Main Topics    Smoking status: Former Smoker    Smokeless tobacco: Never Used      Comment: quit in the 90s    Alcohol use No    Drug use: No    Sexual activity: Not Asked     Other Topics Concern    None     Social History Narrative        Family History:   History reviewed. No pertinent family history.      Medications:   No Known Allergies     Current Facility-Administered Medications   Medication Dose Route Frequency    DOBUTamine (DOBUTREX) 500 mg/250 mL (2,000 mcg/mL) infusion  2.5 mcg/kg/min IntraVENous CONTINUOUS    allopurinol (ZYLOPRIM) tablet 100 mg  100 mg Oral DAILY    cholecalciferol (VITAMIN D3) tablet 2,000 Units  2,000 Units Oral DAILY    therapeutic multivitamin (THERAGRAN) tablet 1 Tab  1 Tab Oral DAILY    nitroglycerin (NITROSTAT) tablet 0.4 mg  0.4 mg SubLINGual PRN    fish oil-omega-3 fatty acids 340-1,000 mg capsule 1 Cap  1 Cap Oral BID    pantoprazole (PROTONIX) tablet 40 mg  40 mg Oral ACB    pravastatin (PRAVACHOL) tablet 80 mg  80 mg Oral QHS    albuterol-ipratropium (DUO-NEB) 2.5 MG-0.5 MG/3 ML  3 mL Nebulization Q6H PRN    ferrous sulfate tablet 325 mg  325 mg Oral DAILY WITH BREAKFAST    fluticasone (FLONASE) 50 mcg/actuation nasal spray 2 Spray  2 Spray Both Nostrils DAILY    HYDROcodone-acetaminophen (NORCO) 5-325 mg per tablet 1 Tab  1 Tab Oral Q4H PRN    influenza vaccine 2016-17 (36mos+)(PF) (FLUZONE/FLUARIX/FLULAVAL QUAD) injection 0.5 mL  0.5 mL IntraMUSCular PRIOR TO DISCHARGE    zolpidem (AMBIEN) tablet 5 mg  5 mg Oral QHS PRN    colchicine tablet 0.6 mg  0.6 mg Oral DAILY    sodium chloride (NS) flush 5-10 mL  5-10 mL IntraVENous Q8H    sodium chloride (NS) flush 5-10 mL  5-10 mL IntraVENous PRN    ondansetron (ZOFRAN) injection 4 mg  4 mg IntraVENous Q4H PRN    heparin (porcine) injection 5,000 Units  5,000 Units SubCUTAneous Q8H    piperacillin-tazobactam (ZOSYN) 2.25 g in 0.9% sodium chloride (MBP/ADV) 50 mL MBP  2.25 g IntraVENous Q6H        Review Of Systems:           Constitutional: No fever, no chills, no weight loss, no night sweats   HEENT: No epistaxis, no nasal drainage, no difficulty in swallowing, no redness in eyes  Respiratory: dyspnea on exertion   Cardiovascular:  Dyspnea, chronic leg edema,   Gastrointestinal: abd distension.    Genitourinary: No urinary symptoms or hematuria  Integument/breast: No ulcers or rashes  Musculoskeletal: BLE  pain,  Neurological: No focal weakness, no seizures, no headaches  Behvioral/Psych: No anxiety, no depression Physical Exam:     Visit Vitals    BP (!) 81/50 (BP 1 Location: Right arm, BP Patient Position: At rest)    Pulse 69    Temp 97.4 °F (36.3 °C)    Resp 18    Ht 6' (1.829 m)    Wt 87.5 kg (192 lb 14.4 oz)    SpO2 (!) 89%    BMI 26.16 kg/m2     BP Readings from Last 3 Encounters:   05/15/17 (!) 81/50   05/09/17 117/67   04/04/17 146/75     Pulse Readings from Last 3 Encounters:   05/15/17 69   05/09/17 61   04/04/17 79     Wt Readings from Last 3 Encounters:   05/15/17 87.5 kg (192 lb 14.4 oz)   05/09/17 87.5 kg (193 lb)   04/04/17 88.9 kg (196 lb)       General:  alert, distracted, mild distress, appears stated age, moderately obese  Skin: Warm and dry, acyanotic, normal color. Head: Normocephalic, atraumatic. Eyes: Sclerae anicteric, conjunctivae without injection. Neck:  nontender, no nuchal rigidity, no masses, no stridor, no carotid bruit, + JVD  Lungs:   diminished breath sounds b/l. Heart:  regular rate and rhythm, S1, S2 normal, no S3 or S4, no click  Abdomen:  abdomen is distented  without significant tenderness, masses, organomegaly or guarding  Extremities:  extremities normal, atraumatic, no cyanosis. +1- + 2 edema BLE up to his upper  thighs and abdominal wall . Neurological: grossly intact. No focal abnormalities, moves all extremities well. Psychiatric Affect: The patient is awake, alert and oriented x3. Nicolette Eloise is interactive and appropriate.        Data Review:     Recent Results (from the past 48 hour(s))   CBC WITH AUTOMATED DIFF    Collection Time: 05/14/17  8:26 AM   Result Value Ref Range    WBC 6.6 4.6 - 13.2 K/uL    RBC 2.62 (L) 4.70 - 5.50 M/uL    HGB 7.2 (L) 13.0 - 16.0 g/dL    HCT 23.5 (L) 36.0 - 48.0 %    MCV 89.7 74.0 - 97.0 FL    MCH 27.5 24.0 - 34.0 PG    MCHC 30.6 (L) 31.0 - 37.0 g/dL    RDW 16.3 (H) 11.6 - 14.5 %    PLATELET 91 (L) 776 - 420 K/uL    MPV 10.3 9.2 - 11.8 FL    NEUTROPHILS 80 (H) 42 - 75 %    LYMPHOCYTES 11 (L) 20 - 51 %    MONOCYTES 4 2 - 9 % EOSINOPHILS 5 0 - 5 %    BASOPHILS 0 0 - 3 %    ABS. NEUTROPHILS 5.3 1.8 - 8.0 K/UL    ABS. LYMPHOCYTES 0.7 (L) 0.8 - 3.5 K/UL    ABS. MONOCYTES 0.3 0 - 1.0 K/UL    ABS. EOSINOPHILS 0.3 0.0 - 0.4 K/UL    ABS.  BASOPHILS 0.0 0.0 - 0.06 K/UL    DF MANUAL      PLATELET COMMENTS DECREASED PLATELETS      RBC COMMENTS ANISOCYTOSIS  1+        RBC COMMENTS POLYCHROMASIA  FEW       METABOLIC PANEL, BASIC    Collection Time: 05/14/17  8:26 AM   Result Value Ref Range    Sodium 136 136 - 145 mmol/L    Potassium 4.2 3.5 - 5.5 mmol/L    Chloride 97 (L) 100 - 108 mmol/L    CO2 32 21 - 32 mmol/L    Anion gap 7 3.0 - 18 mmol/L    Glucose 71 (L) 74 - 99 mg/dL    BUN 58 (H) 7.0 - 18 MG/DL    Creatinine 1.89 (H) 0.6 - 1.3 MG/DL    BUN/Creatinine ratio 31 (H) 12 - 20      GFR est AA 42 (L) >60 ml/min/1.73m2    GFR est non-AA 35 (L) >60 ml/min/1.73m2    Calcium 9.5 8.5 - 10.1 MG/DL   PRO-BNP    Collection Time: 05/14/17  8:26 AM   Result Value Ref Range    NT pro-BNP 76729 (H) 0 - 900 PG/ML   EKG, 12 LEAD, INITIAL    Collection Time: 05/14/17 12:01 PM   Result Value Ref Range    Ventricular Rate 74 BPM    Atrial Rate 74 BPM    P-R Interval 192 ms    QRS Duration 82 ms    Q-T Interval 420 ms    QTC Calculation (Bezet) 466 ms    Calculated P Axis 22 degrees    Calculated R Axis -11 degrees    Calculated T Axis 179 degrees    Diagnosis       Normal sinus rhythm  Septal infarct (cited on or before 17-MAR-2015)  ST & T wave abnormality, consider lateral ischemia  Abnormal ECG  When compared with ECG of 27-FEB-2017 14:00,  No significant change was found  Confirmed by Sindy Navas MD, Roman ([id number]) on 5/14/2017 3:42:34 PM     METABOLIC PANEL, COMPREHENSIVE    Collection Time: 05/14/17  6:05 PM   Result Value Ref Range    Sodium 140 136 - 145 mmol/L    Potassium 4.0 3.5 - 5.5 mmol/L    Chloride 100 100 - 108 mmol/L    CO2 28 21 - 32 mmol/L    Anion gap 12 3.0 - 18 mmol/L    Glucose 48 (L) 74 - 99 mg/dL    BUN 62 (H) 7.0 - 18 MG/DL    Creatinine 2.41 (H) 0.6 - 1.3 MG/DL    BUN/Creatinine ratio 26 (H) 12 - 20      GFR est AA 32 (L) >60 ml/min/1.73m2    GFR est non-AA 26 (L) >60 ml/min/1.73m2    Calcium 8.9 8.5 - 10.1 MG/DL    Bilirubin, total 2.6 (H) 0.2 - 1.0 MG/DL    ALT (SGPT) 205 (H) 16 - 61 U/L    AST (SGOT) 372 (H) 15 - 37 U/L    Alk. phosphatase 121 (H) 45 - 117 U/L    Protein, total 6.7 6.4 - 8.2 g/dL    Albumin 2.7 (L) 3.4 - 5.0 g/dL    Globulin 4.0 2.0 - 4.0 g/dL    A-G Ratio 0.7 (L) 0.8 - 1.7     CBC WITH AUTOMATED DIFF    Collection Time: 05/14/17  6:05 PM   Result Value Ref Range    WBC 9.8 4.6 - 13.2 K/uL    RBC 2.36 (L) 4.70 - 5.50 M/uL    HGB 6.6 (L) 13.0 - 16.0 g/dL    HCT 21.3 (L) 36.0 - 48.0 %    MCV 90.3 74.0 - 97.0 FL    MCH 28.0 24.0 - 34.0 PG    MCHC 31.0 31.0 - 37.0 g/dL    RDW 16.7 (H) 11.6 - 14.5 %    PLATELET 64 (L) 648 - 420 K/uL    MPV 10.4 9.2 - 11.8 FL    NEUTROPHILS 84 (H) 42 - 75 %    BAND NEUTROPHILS 6 (H) 0 - 5 %    LYMPHOCYTES 2 (L) 20 - 51 %    MONOCYTES 8 2 - 9 %    EOSINOPHILS 0 0 - 5 %    BASOPHILS 0 0 - 3 %    ABS. NEUTROPHILS 8.8 (H) 1.8 - 8.0 K/UL    ABS. LYMPHOCYTES 0.2 (L) 0.8 - 3.5 K/UL    ABS. MONOCYTES 0.8 0 - 1.0 K/UL    ABS. EOSINOPHILS 0.0 0.0 - 0.4 K/UL    ABS.  BASOPHILS 0.0 0.0 - 0.06 K/UL    DF MANUAL      PLATELET COMMENTS DECREASED PLATELETS      RBC COMMENTS ANISOCYTOSIS  2+        RBC COMMENTS HYPOCHROMIA  2+        RBC COMMENTS POLYCHROMASIA  1+       GLUCOSE, POC    Collection Time: 05/14/17  9:20 PM   Result Value Ref Range    Glucose (POC) 108 70 - 110 mg/dL   PROTHROMBIN TIME + INR    Collection Time: 05/15/17  2:40 AM   Result Value Ref Range    Prothrombin time 20.6 (H) 11.5 - 15.2 sec    INR 1.9 (H) 0.8 - 1.2     PTT    Collection Time: 05/15/17  2:40 AM   Result Value Ref Range    aPTT 49.4 (H) 23.0 - 36.4 SEC   CBC WITH AUTOMATED DIFF    Collection Time: 05/15/17  2:40 AM   Result Value Ref Range    WBC 7.0 4.6 - 13.2 K/uL    RBC 2.52 (L) 4.70 - 5.50 M/uL    HGB 7.0 (L) 13.0 - 16.0 g/dL    HCT 22.8 (L) 36.0 - 48.0 %    MCV 90.5 74.0 - 97.0 FL    MCH 27.8 24.0 - 34.0 PG    MCHC 30.7 (L) 31.0 - 37.0 g/dL    RDW 16.7 (H) 11.6 - 14.5 %    PLATELET 49 (L) 508 - 420 K/uL    MPV 11.1 9.2 - 11.8 FL    NEUTROPHILS 82 (H) 40 - 73 %    LYMPHOCYTES 9 (L) 21 - 52 %    MONOCYTES 9 3 - 10 %    EOSINOPHILS 0 0 - 5 %    BASOPHILS 0 0 - 2 %    ABS. NEUTROPHILS 5.7 1.8 - 8.0 K/UL    ABS. LYMPHOCYTES 0.6 (L) 0.9 - 3.6 K/UL    ABS. MONOCYTES 0.6 0.05 - 1.2 K/UL    ABS. EOSINOPHILS 0.0 0.0 - 0.4 K/UL    ABS. BASOPHILS 0.0 0.0 - 0.1 K/UL    DF AUTOMATED     METABOLIC PANEL, BASIC    Collection Time: 05/15/17  2:40 AM   Result Value Ref Range    Sodium 138 136 - 145 mmol/L    Potassium 4.3 3.5 - 5.5 mmol/L    Chloride 98 (L) 100 - 108 mmol/L    CO2 29 21 - 32 mmol/L    Anion gap 11 3.0 - 18 mmol/L    Glucose 117 (H) 74 - 99 mg/dL    BUN 65 (H) 7.0 - 18 MG/DL    Creatinine 2.76 (H) 0.6 - 1.3 MG/DL    BUN/Creatinine ratio 24 (H) 12 - 20      GFR est AA 27 (L) >60 ml/min/1.73m2    GFR est non-AA 23 (L) >60 ml/min/1.73m2    Calcium 8.6 8.5 - 10.1 MG/DL   URIC ACID    Collection Time: 05/15/17  2:40 AM   Result Value Ref Range    Uric acid 10.9 (H) 2.6 - 7.2 MG/DL   GLUCOSE, POC    Collection Time: 05/15/17 10:38 AM   Result Value Ref Range    Glucose (POC) 125 (H) 70 - 110 mg/dL         Intake/Output Summary (Last 24 hours) at 05/15/17 1133  Last data filed at 05/15/17 0659   Gross per 24 hour   Intake              740 ml   Output              200 ml   Net              540 ml       Cardiographics:     ECG:     Echocardiogram:       Signed By: Hosea Hammonds NP supervised    May 15, 2017      I have independently evaluated and examined the patient. All relevant labs and testing data's are reviewed. Care plan discussed and updated after review.     Jacque Rouse MD

## 2017-05-15 NOTE — PROGRESS NOTES
Visited with Mr Osman Clinton who was lying in bed. Introduced self and role. He stated he was \"hurting\" in leg. Discussed with cardiology, RN notified. Dr Theresa Krause updated on Mr Menendez's status.

## 2017-05-15 NOTE — PROCEDURES
[facility]  *** [facility]AL [facility] ***    Name: Ada Lang  MRN: [id number]    Inpatient  : 17 Dec 1940  HIS Order #: [id number]  [id number] [facility] Visit #: [id number]  Date: 15 May 2017    TYPE OF TEST: Extremity Arterial Duplex    REASON FOR TEST  Diminished pulses, leg pain, limb coldness                            Right                     Left  Artery               PSV   Finding             PSV   Finding  ------------------  -----  ---------------    -----  ---------------  External iliac:  Common femoral:      98.0  Mild stenosis      129.0  Mild stenosis  Profunda femoris:   320.0  >75% stenosis      261.0  >50% stenosis  Proximal SFA:       257.0                     117.0  Mid SFA:            122.0  >50% stenosis       82.0  Occluded  Distal SFA:          77.0  Popliteal:           49.0  Mild stenosis       26.0  Mild stenosis  Anterior tibial:     25.0  Mild stenosis             Occluded  Posterior tibial:          Occluded            10.0  Mild stenosis    Pressures               Right     Left               -----     -----     Brachial:    60        76           DP:    50         0           PT:     0        40            DIONNE:  0.66      0.53            Toe: INTERPRETATION/FINDINGS  Duplex images were obtained using 2-D gray scale, color flow, and  spectral Doppler analysis. Right leg :  1. Moderate >50% stenosis of the superficial femoral artery. 2. Severe >75% stenosis of the profunda femoris artery. 3. Occlusion of the posterior tibial artery. 4. Mild <50% stenosis of the common femoral, popliteal, anterior  tibial, and peroneal arteries  5. A monophasic signal is demonstrated in the common femoral, profunda   femoris, superficial femoral, popliteal, anterior tibial and peroneal   arteries. 6. The right ankle/brachial index is 0.66. Left leg :  1. Moderate >50% stenosis of the profunda femoris artery. 2. Focal occlusion of the distal superficial femoral artery.   3. Occlusion of the anterior tibial and peroneal arteries. 4. Mild <50% stenosis of the common femoral, popliteal, and posterior  tibial arteries. 5. A monophasic signal is demonstrated in the common femoral, profunda   femoris, superficial femoral, popliteal, and posterior tibial  arteries. 6. The left ankle/brachial index is 0.53. ADDITIONAL COMMENTS  Patent right and left distal limb bypass grafts with attachment sites  at the common femoral arteries. Results to [place] @ 11:45 a.m. I have personally reviewed the data relevant to the interpretation of  this  study. TECHNOLOGIST: Lori Claros. Riccardo RVCARLOS  Signed: 05/15/2017 12:09 PM    PHYSICIAN: Jacqueline Saunders D.O.   Signed: 05/16/2017 10:08 AM

## 2017-05-15 NOTE — PROGRESS NOTES
NUTRITION    BPA/MST Referral       RECOMMENDATIONS / PLAN:     - Add supplements: Magic Cup TID.   - Continue RD inpatient monitoring and evaluation. NUTRITION INTERVENTIONS & DIAGNOSIS:     [x] Meals/Snacks: modified diet  [x] Medical food supplementation: initiate      Nutrition Diagnosis: Inadequate oral intake related to decreased appetite as evidenced by fair to poor po intake, consuming <50% of meals. ASSESSMENT:     Subjective/Objective:  Ate 25% of breakfast. Dislikes Ensure, agreeable to [employer]. Average po intake adequate to meet patients estimated nutritional needs:   [] Yes     [x] No   [] Unable to determine at this time    Diet: DIET MECHANICAL SOFT      Food Allergies: NKFA  Current Appetite:   [x] Good     [] Fair     [] Poor     [] Other:  Appetite/meal intake prior to admission:   [] Good     [x] Fair     [] Poor     [] Other:  Feeding Limitations:  [] Swallowing difficulty    [] Chewing difficulty    [] Other:  Current Meal Intake: No data found. BM: 5/12   Skin Integrity: WDL  Edema: 2-3+ pitting LEs   Pertinent Medications: Reviewed; cholecalciferol, ferrous sulfate, fish oil, theragran     Recent Labs      05/15/17   0240  05/14/17   1805  05/14/17   0826   NA  138  140  136   K  4.3  4.0  4.2   CL  98*  100  97*   CO2  29  28  32   GLU  117*  48*  71*   BUN  65*  62*  58*   CREA  2.76*  2.41*  1.89*   CA  8.6  8.9  9.5   ALB   --   2.7*   --    SGOT   --   372*   --    ALT   --   205*   --        Intake/Output Summary (Last 24 hours) at 05/15/17 1354  Last data filed at 05/15/17 0659   Gross per 24 hour   Intake              740 ml   Output              200 ml   Net              540 ml       Anthropometrics:  Ht Readings from Last 1 Encounters:   05/14/17 6' (1.829 m)     Last 3 Recorded Weights in this Encounter    05/14/17 0828 05/15/17 0669   Weight: 86.2 kg (190 lb) 87.5 kg (192 lb 14.4 oz)     Body mass index is 26.16 kg/(m^2).           Weight History: patient reports 4 lb, 2% weight loss over the past week PTA    Weight Metrics 5/15/2017 5/9/2017 4/4/2017 3/11/2017 8/23/2016 8/12/2016 8/2/2016   Weight 192 lb 14.4 oz 193 lb 196 lb 188 lb 197 lb 9.6 oz - 214 lb 9.6 oz   BMI 26.16 kg/m2 26.18 kg/m2 26.58 kg/m2 25.5 kg/m2 - 26.8 kg/m2 29.1 kg/m2        Admitting Diagnosis: Acute exacerbation of CHF (congestive heart failure) ([facility]  Cellulitis  Past Medical History:   Diagnosis Date    Arrhythmia     A fib; has external vest and belt he wears for this    Arthritis     High blood pressure     Hypercholesteremia     Mini stroke ([facility] 2000    Unspecified sleep apnea     does not wear machine       Education Needs:        [x] None identified  [] Identified - Not appropriate at this time  []  Identified and addressed - refer to education log  Learning Limitations:   [x] None identified  [] Identified    Cultural, Shinto & ethnic food preferences:  [x] None identified    [] Identified and addressed     ESTIMATED NUTRITION NEEDS:     Calories: 5409-7496 kcal (MSJx1.2-1.3) based on  [x] Actual BW 88 kg     [] IBW   Protein: 70-88 gm (0.8-1 gm/kg) based on  [x] Actual BW      [] IBW   Fluid: 1 mL/kcal     MONITORING & EVALUATION:     Nutrition Goal(s):   1. Po intake of meals will meet >75% of patient estimated nutritional needs within the next 7 days.   Outcome:  [] Met/Ongoing    []  Not Met    [x] New/Initial Goal     Monitoring:   [x] Diet tolerance   [x] Meal intake   [x] Supplement intake   [] GI symptoms/ability to tolerate po diet   [] Respiratory status   [] Plan of care      Previous Recommendations (for follow-up assessments only):     []   Implemented       []   Not Implemented (RD to address)     [] No Recommendation Made     Discharge Planning: cardiac diet   [x] Participated in care planning, discharge planning, & interdisciplinary rounds as appropriate      Anna Organ, [address]    Pager: [phone]

## 2017-05-15 NOTE — ED NOTES
TRANSFER - OUT REPORT:    Verbal report given to Santana Tabor RN (name) on Paddy Quezada  being transferred to 3N(unit) for routine progression of care       Report consisted of patients Situation, Background, Assessment and   Recommendations(SBAR). Information from the following report(s) SBAR and ED Summary was reviewed with the receiving nurse. Lines:   Peripheral IV 05/14/17 Right Antecubital (Active)   Site Assessment Clean, dry, & intact 5/14/2017  8:33 AM   Phlebitis Assessment 0 5/14/2017  8:33 AM   Infiltration Assessment 0 5/14/2017  8:33 AM   Dressing Status Clean, dry, & intact 5/14/2017  8:33 AM   Dressing Type Transparent 5/14/2017  8:33 AM   Hub Color/Line Status Blue 5/14/2017  8:33 AM        Opportunity for questions and clarification was provided.       Patient transported with:   Monitor  O2 @ 4 liters  Registered Nurse

## 2017-05-15 NOTE — PROGRESS NOTES
Consult per MD request.  Pt has COPD and does breathing treatments at home as needed for SOB. Currently on 3.5 LPM via N/C.  SPO2 91%. No c/o SOB at this time. Pt is a CO2 retainer per previous ABG results. Will order Duoneb nebulizer treatments Q4 PRN, maintain O2 SAT 88-93%. Encouraged deep breathing and coughing. Will continue to monitor respiratory status.

## 2017-05-15 NOTE — PROGRESS NOTES
Care Management Interventions  PCP Verified by CM: Yes (pt states last seen \"a couple weeks ago\")  Transition of Care Consult (CM Consult): Discharge Planning  Current Support Network: Lives Alone, Family Lives Nearby  Confirm Follow Up Transport: Family  Plan discussed with Pt/Family/Caregiver: Yes     Pt is a 78year old admitted for acute exacerbation of CHF and cellulitis. Pt is alert and oriented and alone in room. Pt reports that he lives alone and that his daughter [place] [phone]) lives close by in [address]. Pt reports that he is independent with ADLs but that he has pain with walking due to gout. Pt reports that he needs pain medication and nurse notified. Pt states that he has a walker, wheelchair, shower seat, Home O2, and a Home Health nurse named Kiet Lucas. \" Pt does not remember the company for the home O2 or Home Health. Pt reports that he has transportation home with family upon discharge. Spoke to pt's daughter [place] who said she she does not remember with company for the home O2 or home health but that she will be here this evening after 6pm and will bring the information for the home O2 and Home Health tomorrow.

## 2017-05-15 NOTE — CONSULTS
Vascular Surgery Consult      Patient: Roge Macias MRN: [id number]  CSN: [id number]      YOB: 1940    Age: 78 y.o. Sex: male      DOA: 5/14/2017       HPI:     Roge Macias is a 78 y.o. male with history of CAD s/p CABG, combined systolic and diastolic CHF, moderate AS/MR, HTN, hyperlipidemia, chronic ascites, history of bilateral carotid endarterectomies and CKD stage 3 who was admitted for worsening leg pain and swelling. Patient is a bit of a poor historian. He states that his leg pain started Friday after he stopped taking his gout medication. He states that prior to that he was feeling fine with no leg pain. Reportedly he had been taking ABX with no improvement and came to the ED. Of note he was recently discharged from [facility] in March of this year due to acute CHF exacerbation. Upon admission he was found to be anemic with Hbg of 7 and hypotensive with SBP in the low 80's and DBP in the 50's. He also has thrombocytopenia with platelet count in the 60's. He is scheduled to receive transfusion and Cardiology was consulted and pt has been started on Dobutamine drip. He has remained afebrile. No leukocytosis. Cultures ordered and pending. Venous US of right leg negative for DVT. He did also have arterial studies which showed moderate >50% stenosis of the right superficial femoral artery and severe >75% stenosis of the profunda femoris artery with occlusion of the posterior tibial artery. The right ankle/brachial index is 0.66. There was also moderate >50% stenosis of the left profunda femoris artery and focal occlusion of the distal superficial femoral artery with occlusion of the anterior tibial and peroneal arteries. The left ankle/brachial index is 0.53. Patient states that his leg is bilateral and that one leg is not worse than the other. He states that he did ambulate in the house and denies claudication although this is difficult to assess as he does not walk far.  He denies any previous rest pain. He is a former smoker. He is currently complaining of not being able to pass his urine, condom cath is in place. Past Medical History:   Diagnosis Date    Arrhythmia     A fib; has external vest and belt he wears for this    Arthritis     High blood pressure     Hypercholesteremia     Mini stroke ([facility] [address] 2000    Unspecified sleep apnea     does not wear machine       Past Surgical History:   Procedure Laterality Date    CABG, ARTERY-VEIN, THREE  2013    HX CAROTID ENDARTERECTOMY Left     HX CAROTID ENDARTERECTOMY Right 2000    HX CORONARY STENT PLACEMENT         History reviewed. No pertinent family history. Social History     Social History    Marital status: UNKNOWN     Spouse name: N/A    Number of children: N/A    Years of education: N/A     Social History Main Topics    Smoking status: Former Smoker    Smokeless tobacco: Never Used      Comment: quit in the 90s    Alcohol use No    Drug use: No    Sexual activity: Not Asked     Other Topics Concern    None     Social History Narrative       Prior to Admission medications    Medication Sig Start Date End Date Taking? Authorizing Provider   fish oil-omega-3 fatty acids 340-1,000 mg capsule Take 1 Cap by mouth two (2) times a day. Indications: HYPERTRIGLYCERIDEMIA 3/11/17  Yes Page Rosenbaum MD   cephALEXin (KEFLEX) 500 mg capsule Take 1 Cap by mouth four (4) times daily for 7 days. 5/9/17 5/16/17  Everett Boas, PA-C   HYDROcodone-acetaminophen (NORCO) 5-325 mg per tablet Take 1 Tab by mouth every six (6) hours as needed for Pain. Max Daily Amount: 4 Tabs. 5/9/17   Everett Boas, PA-C   carvedilol (COREG) 6.25 mg tablet Take 1 Tab by mouth two (2) times daily (with meals). Indications: Chronic Heart Failure 4/5/17   Kim Lewis NP   colchicine 0.6 mg tablet Take 0.6 mg by mouth daily. Historical Provider   loperamide (IMMODIUM) 2 mg tablet Take 2 mg by mouth four (4) times daily as needed for Diarrhea.     Historical Provider   allopurinol (ZYLOPRIM) 100 mg tablet Take 1 Tab by mouth daily. Indications: GOUT 3/11/17   Nilton Vallecillo MD   aspirin delayed-release 81 mg tablet Take 2 Tabs by mouth daily. 3/11/17   Nilton Vallecillo MD   cholecalciferol (VITAMIN D3) 1,000 unit tablet Take 2 Tabs by mouth daily. 3/11/17   Nilton Vallecillo MD   therapeutic multivitamin [facility]) tablet Take 1 Tab by mouth daily. 3/11/17   Nilton Vallecillo MD   nitroglycerin (NITROSTAT) 0.4 mg SL tablet 1 Tab by SubLINGual route as needed for Chest Pain. 3/11/17   Nilton Vallecillo MD   pantoprazole (PROTONIX) 40 mg tablet Take 1 Tab by mouth Daily (before breakfast). 3/11/17   Nilton Vallecillo MD   pravastatin (PRAVACHOL) 80 mg tablet Take 1 Tab by mouth nightly. 3/11/17   Nilton Vallecillo MD   albuterol-ipratropium (DUO-NEB) 2.5 mg-0.5 mg/3 ml nebu 3 mL by Nebulization route every six (6) hours as needed. 3/11/17   Nilton Vallecillo MD   ferrous sulfate 325 mg (65 mg iron) tablet Take 1 Tab by mouth daily (with breakfast). 3/11/17   Nilton Vallecillo MD   fluticasone [facility]) 50 mcg/actuation nasal spray 2 spray each nostril daily  Indications: ALLERGIC RHINITIS 3/11/17   Nilton Vallecillo MD   furosemide (LASIX) 40 mg tablet 1 tab BID 3/11/17   Nilton Vallecillo MD   influenza vaccine 2016-17, 36mos+,,PF, (FLUZONE/FLUARIX/FLULAVAL QUAD) syrg injection 0.5 mL by IntraMUSCular route PRIOR TO DISCHARGE. 3/11/17   Nilton Vallecillo MD   metOLazone (ZAROXOLYN) 2.5 mg tablet Take 1 Tab by mouth daily. Patient taking differently: Take 2.5 mg by mouth every other day. 3/11/17   Nilton Vallecillo MD   zolpidem (AMBIEN) 5 mg tablet Take 1 Tab by mouth nightly as needed for Sleep. Max Daily Amount: 5 mg. 3/11/17   Nilton Vallecillo MD       No Known Allergies    Review of Systems  Complete ROS is difficult to obtain due to pt poor historian.    Constitutional: negative   HEENT: negative   Respiratory: negative   Cardiovascular: negative   Gastrointestinal: positive for chronic ascites. Genitourinary:positive for difficulty urinating (condom cath in place)  Hematologic/lymphatic: negative   Musculoskeletal:positive for BLE edema and pain  Neurological: negative   Behavioral/Psych: negative   Endocrine: negative   Allergic/Immunologic: negative        Physical Exam:      Visit Vitals    BP 94/59 (BP 1 Location: Left arm, BP Patient Position: Supine)    Pulse 70    Temp 96.8 °F (36 °C)    Resp 18    Ht 6' (1.829 m)    Wt 192 lb 14.4 oz (87.5 kg)    SpO2 93%    BMI 26.16 kg/m2       Physical Exam:  General: elderly male in no acute distress. Pt appears lethargic and slightly confused  HEENT: EOMI, no scleral icterus is noted. Cardiovascular: RRR   Pulmonary: No increased work or breathing is noted. Abdomen: soft, distended. Extremities: mild edema, R>L. Left toes cool to touch, there are two surface deep ulcers on left shin. No drainage. Unable to palpate pulses. No signs of active cellulitis appreciated. Neuro: Cranial nerves II through XII are grossly intact. ? Mildly confused. ? baseline  Integument: No other ulcerations are identified visibly      Data Review:    CBC:   Lab Results   Component Value Date/Time    WBC 7.0 05/15/2017 02:40 AM    RBC 2.52 05/15/2017 02:40 AM    HGB 7.0 05/15/2017 02:40 AM    HCT 22.8 05/15/2017 02:40 AM    PLATELET 49 88/95/2855 02:40 AM      BMP:   Lab Results   Component Value Date/Time    Glucose 117 05/15/2017 02:40 AM    Sodium 138 05/15/2017 02:40 AM    Potassium 4.3 05/15/2017 02:40 AM    Chloride 98 05/15/2017 02:40 AM    CO2 29 05/15/2017 02:40 AM    BUN 65 05/15/2017 02:40 AM    Creatinine 2.76 05/15/2017 02:40 AM    Calcium 8.6 05/15/2017 02:40 AM     Coagulation:   Lab Results   Component Value Date/Time    Prothrombin time 20.6 05/15/2017 02:40 AM    INR 1.9 05/15/2017 02:40 AM    aPTT 49.4 05/15/2017 02:40 AM         Assessment/Plan     77 yo male with acute on chronic CHF exacerbation now on Dobutamine drip.  He is also quite anemic and scheduled to receive transfusion. He came in with complaint of leg pain and swelling. Arterial studies show occluded left SFA and moderate >50% stenosis of the right superficial femoral artery with severe >75% stenosis of the profunda femoris artery. Discussed that he will eventually need CO2 angiograms (CO2 due to his CKD) however needs to be medically stable prior to intervention. Hopefully once his overall perfusion is improved with Dobutamine and transfusion his pain will improve. Will continue to follow along. If rest pain does not subside or symptoms worsen may have to consider urgent vascular intervention.      Active Problems:    CAD (coronary artery disease) (3/5/2014)      PVD (peripheral vascular disease) ([address] (3/5/2014)      CHF (congestive heart failure) ([address] (7/21/2016)      Hx of CABG (7/22/2016)      Overview: 2013      H/O carotid endarterectomy (7/22/2016)      Overview: 2015      AICD (automatic cardioverter/defibrillator) present (7/22/2016)      Overview: 2015      Acute exacerbation of CHF (congestive heart failure) ([address] (2/49/2857)      Diastolic CHF, acute on chronic ([address] (8/17/2016)      HTN (hypertension) (9/4/2016)      HLD (hyperlipidemia) (9/4/2016)      Acute CHF (congestive heart failure) ([address] (2/26/2017)      Acute on chronic diastolic (congestive) heart failure ([address] (2/27/2017)      Aortic stenosis, moderate (3/11/2017)      Pulmonary HTN ([address] (4/4/2017)      Cellulitis (5/14/2017)        Katarina Goins [place]  May 15, 2017

## 2017-05-15 NOTE — ROUTINE PROCESS
Bedside and Verbal shift change report given to Lay Maldonado  (oncoming nurse) by Haider Cardenas RN  (offgoing nurse). Report included the following information SBAR, Kardex, ED Summary, [facility] and Recent Results.

## 2017-05-15 NOTE — PROGRESS NOTES
Pt with low SBP<80mmHg Pt asymptomatic. C/o bilateral lower extremity pain. RLE swelling noted. Dr. Mana Rodríguez notified/ Dr. Sonia Chong and RAMIRO Linares at bedside evaluating. Norco given for pain. Multiple skin tears noted on LLE covered with mepilex. Encourage PO intake. Will continue to closely follow.

## 2017-05-15 NOTE — WOUND CARE
Physical Exam   Room 351: wound assessment  Wound Pretibial Medial;Left (Active) Vascular wound POA   DRESSING STATUS Intact 5/15/2017  1:54 PM   DRESSING TYPE Silicone 2/45/0915  9:11 PM   Non-Pressure Injury Partial thickness (epider/derm) 5/15/2017  1:54 PM   Wound Length (cm) 2.5 cm 5/15/2017  1:54 PM   Wound Width (cm) 2 cm 5/15/2017  1:54 PM   Wound Depth (cm) 0.1 5/15/2017  1:54 PM   Wound Surface area (cm^3) 0.5 cm^2 5/15/2017  1:54 PM   Condition of Base Elias-Fela Solis 5/15/2017  1:54 PM   Condition of Edges Closed 5/15/2017  1:54 PM   Epithelialization (%) 0 5/15/2017  1:54 PM   Tissue Type Pink 5/15/2017  1:54 PM   Tissue Type Percent Pink 100 5/15/2017  1:54 PM   Drainage Amount  Scant 5/15/2017  1:54 PM   Drainage Color Serosanguinous 5/15/2017  1:54 PM   Wound Odor None 5/15/2017  1:54 PM   Periwound Skin Condition Edematous 5/15/2017  1:54 PM   Dressing Type Applied Silicone 8/72/8815  2:61 PM   Procedure Tolerated Well 5/15/2017  1:54 PM   Number of days:0       Wound Pretibial Lateral;Left (Active) Vascular wound POA   DRESSING STATUS Intact 5/15/2017  1:54 PM   DRESSING TYPE Silicone 1/01/1141  7:11 PM   Non-Pressure Injury Partial thickness (epider/derm) 5/15/2017  1:54 PM   Wound Length (cm) 6 cm 5/15/2017  1:54 PM   Wound Width (cm) 2 cm 5/15/2017  1:54 PM   Wound Depth (cm) 0.1 5/15/2017  1:54 PM   Wound Surface area (cm^3) 1.2 cm^2 5/15/2017  1:54 PM   Condition of Base Elias-Fela Solis 5/15/2017  1:54 PM   Condition of Edges Closed 5/15/2017  1:54 PM   Epithelialization (%) 0 5/15/2017  1:54 PM   Tissue Type Pink 5/15/2017  1:54 PM   Tissue Type Percent Pink 100 5/15/2017  1:54 PM   Drainage Amount  Scant 5/15/2017  1:54 PM   Drainage Color Serosanguinous 5/15/2017  1:54 PM   Wound Odor None 5/15/2017  1:54 PM   Periwound Skin Condition Edematous 5/15/2017  1:54 PM   Dressing Type Applied Silicone 4/72/0022  0:55 PM   Procedure Tolerated Well 5/15/2017  1:54 PM   Number of days:0       Pt agreeable to continue current treatment. Wound care education provided to pt at this time. Will turn over care to nursing staff at this time.   Jeni EDWARD, RN, [employer], [address]

## 2017-05-16 NOTE — PROGRESS NOTES
Selina Hedrick M.D. PROGRESS NOTE    Name: Renae Celis MRN: [id number]   : 1940 Hospital: [facility]   Date: 5/15/2017  Admission Date: 2017     Subjective/Objective/Plans  More alert  Low BP, Hypovolemic shock due to bleeding and diuresis  Low Hg  Low platelets    Plan  Transfuse  Cardiology, Vascular, ID consult  DC Heparin    Vital Signs:  Visit Vitals    BP 96/64    Pulse 61    Temp 97 °F (36.1 °C)    Resp 18    Ht 6' (1.829 m)    Wt 87.5 kg (192 lb 14.4 oz)    SpO2 91%    BMI 26.16 kg/m2       O2 Device: Nasal cannula   O2 Flow Rate (L/min): 3.5 l/min   Temp (24hrs), Av.2 °F (36.2 °C), Min:96.8 °F (36 °C), Max:97.6 °F (36.4 °C)     8:53 PM  Intake/Output:   Last shift:         Last 3 shifts:  07 - 05/15 1900  In: 1062.9 [P.O.:240; I.V.:500]  Out: 200 [Urine:200]    Intake/Output Summary (Last 24 hours) at 05/15/17 2053  Last data filed at 05/15/17 1859   Gross per 24 hour   Intake           1062.9 ml   Output              200 ml   Net            862.9 ml         Physical Exam:    General: in no apparent distress, moderately ill and cachectic    HEENT: pupils equal, no ear discharge   Neck: No abnormally enlarged lymph nodes. , no JDV   Mouth: MMM no lesions    Chest: normal,    Lungs: rhonchi   Heart: Regular rate and rhythm   Abdomen: abdomen is soft without significant tenderness, masses, organomegaly or guarding   Extremity: swelling right leg   Neuro: alert    Skin: Skin color, texture, turgor normal. No rashes or lesions    Data Review:    Labs: Results:       Chemistry Recent Labs      05/15/17   0240  17   1805  17   0826   GLU  117*  48*  71*   NA  138  140  136   K  4.3  4.0  4.2   CL  98*  100  97*   CO2  29  28  32   BUN  65*  62*  58*   CREA  2.76*  2.41*  1.89*   CA  8.6  8.9  9.5   AGAP  11  12  7   BUCR  24*  26*  31*   TBILI   --   2.6*   --    AP   --   121*   --    TP   --   6.7   --    ALB   --   2.7*   --    GLOB   --   4.0   -- AGRAT   --   0.7*   --       CBC w/Diff Recent Labs      05/15/17   0240  05/14/17   1805  05/14/17   0826   WBC  7.0  9.8  6.6   RBC  2.52*  2.36*  2.62*   HGB  7.0*  6.6*  7.2*   HCT  22.8*  21.3*  23.5*   PLT  49*  64*  91*   GRANS  82*  84*  80*   LYMPH  9*  2*  11*   EOS  0  0  5      Cardiac Enzymes No results for input(s): CPK, CKND1, TYSON in the last 72 hours. No lab exists for component: CKRMB, TROIP   Coagulation Recent Labs      05/15/17   0240   PTP  20.6*   INR  1.9*   APTT  49.4*       Lipid Panel Lab Results   Component Value Date/Time    Cholesterol, total 100 07/22/2016 05:20 AM    HDL Cholesterol 31 07/22/2016 05:20 AM    LDL, calculated 54.4 07/22/2016 05:20 AM    VLDL, calculated 14.6 07/22/2016 05:20 AM    Triglyceride 73 07/22/2016 05:20 AM    CHOL/HDL Ratio 3.2 07/22/2016 05:20 AM      BNP No results for input(s): BNPP in the last 72 hours. Liver Enzymes Recent Labs      05/14/17 1805   TP  6.7   ALB  2.7*   TBILI  2.6*   AP  121*   SGOT  372*   ALT  205*      Thyroid Studies Lab Results   Component Value Date/Time    TSH 4.23 07/22/2016 05:20 AM          Procedures/imaging: see electronic medical records for all procedures, Xrays and details which were not copied into this note but were reviewed. Allergies:  No Known Allergies    Home Medications:  Prior to Admission Medications   Prescriptions Last Dose Informant Patient Reported? Taking? HYDROcodone-acetaminophen (NORCO) 5-325 mg per tablet 5/12/2017 at 0300  No No   Sig: Take 1 Tab by mouth every six (6) hours as needed for Pain. Max Daily Amount: 4 Tabs. albuterol-ipratropium (DUO-NEB) 2.5 mg-0.5 mg/3 ml nebu 5/11/2017 at 1700  No No   Sig: 3 mL by Nebulization route every six (6) hours as needed. allopurinol (ZYLOPRIM) 100 mg tablet 5/11/2017 at 0300  No No   Sig: Take 1 Tab by mouth daily. Indications: GOUT   aspirin delayed-release 81 mg tablet 5/12/2017 at 0300  No No   Sig: Take 2 Tabs by mouth daily.    carvedilol (COREG) 6.25 mg tablet 2017 at 0300  No No   Sig: Take 1 Tab by mouth two (2) times daily (with meals). Indications: Chronic Heart Failure   cephALEXin (KEFLEX) 500 mg capsule Not Taking at Unknown time  No No   Sig: Take 1 Cap by mouth four (4) times daily for 7 days. cholecalciferol (VITAMIN D3) 1,000 unit tablet 2017 at 0300  No No   Sig: Take 2 Tabs by mouth daily. colchicine 0.6 mg tablet 2017 at 0300  Yes No   Sig: Take 0.6 mg by mouth daily. ferrous sulfate 325 mg (65 mg iron) tablet 2017 at 0300  No No   Sig: Take 1 Tab by mouth daily (with breakfast). fish oil-omega-3 fatty acids 340-1,000 mg capsule 2017 at 0300  No Yes   Sig: Take 1 Cap by mouth two (2) times a day. Indications: HYPERTRIGLYCERIDEMIA   fluticasone (FLONASE) 50 mcg/actuation nasal spray 2017 at 0300  No No   Si spray each nostril daily  Indications: ALLERGIC RHINITIS   furosemide (LASIX) 40 mg tablet 2017 at 0300  No No   Si tab BID   influenza vaccine 2016-17, 36mos+,,PF, (FLUZONE/FLUARIX/FLULAVAL QUAD) syrg injection   No No   Si.5 mL by IntraMUSCular route PRIOR TO DISCHARGE. loperamide (IMMODIUM) 2 mg tablet 2017 at 0300  Yes No   Sig: Take 2 mg by mouth four (4) times daily as needed for Diarrhea.   metOLazone (ZAROXOLYN) 2.5 mg tablet Not Taking at Unknown time  No No   Sig: Take 1 Tab by mouth daily. Patient taking differently: Take 2.5 mg by mouth every other day. nitroglycerin (NITROSTAT) 0.4 mg SL tablet Unknown at Unknown time  No No   Si Tab by SubLINGual route as needed for Chest Pain.   pantoprazole (PROTONIX) 40 mg tablet Unknown at Unknown time  No No   Sig: Take 1 Tab by mouth Daily (before breakfast). pravastatin (PRAVACHOL) 80 mg tablet 2017 at 0300  No No   Sig: Take 1 Tab by mouth nightly. therapeutic multivitamin (THERAGRAN) tablet 2017 at 0300  No No   Sig: Take 1 Tab by mouth daily.    zolpidem (AMBIEN) 5 mg tablet Unknown at Unknown time  No No   Sig: Take 1 Tab by mouth nightly as needed for Sleep. Max Daily Amount: 5 mg. Facility-Administered Medications: None       Current Medications:  Current Facility-Administered Medications   Medication Dose Route Frequency    DOBUTamine (DOBUTREX) 500 mg/250 mL (2,000 mcg/mL) infusion  2.5 mcg/kg/min IntraVENous CONTINUOUS    allopurinol (ZYLOPRIM) tablet 100 mg  100 mg Oral DAILY    cholecalciferol (VITAMIN D3) tablet 2,000 Units  2,000 Units Oral DAILY    therapeutic multivitamin (THERAGRAN) tablet 1 Tab  1 Tab Oral DAILY    nitroglycerin (NITROSTAT) tablet 0.4 mg  0.4 mg SubLINGual PRN    fish oil-omega-3 fatty acids 340-1,000 mg capsule 1 Cap  1 Cap Oral BID    pantoprazole (PROTONIX) tablet 40 mg  40 mg Oral ACB    pravastatin (PRAVACHOL) tablet 80 mg  80 mg Oral QHS    albuterol-ipratropium (DUO-NEB) 2.5 MG-0.5 MG/3 ML  3 mL Nebulization Q6H PRN    ferrous sulfate tablet 325 mg  325 mg Oral DAILY WITH BREAKFAST    fluticasone (FLONASE) 50 mcg/actuation nasal spray 2 Spray  2 Spray Both Nostrils DAILY    HYDROcodone-acetaminophen (NORCO) 5-325 mg per tablet 1 Tab  1 Tab Oral Q4H PRN    influenza vaccine 2016-17 (36mos+)(PF) (FLUZONE/FLUARIX/FLULAVAL QUAD) injection 0.5 mL  0.5 mL IntraMUSCular PRIOR TO DISCHARGE    zolpidem (AMBIEN) tablet 5 mg  5 mg Oral QHS PRN    colchicine tablet 0.6 mg  0.6 mg Oral DAILY    sodium chloride (NS) flush 5-10 mL  5-10 mL IntraVENous Q8H    sodium chloride (NS) flush 5-10 mL  5-10 mL IntraVENous PRN    ondansetron (ZOFRAN) injection 4 mg  4 mg IntraVENous Q4H PRN    piperacillin-tazobactam (ZOSYN) 2.25 g in 0.9% sodium chloride (MBP/ADV) 50 mL MBP  2.25 g IntraVENous Q6H       Chart and notes reviewed. Data reviewed. I have evaluated and examined the patient.         IMPRESSION:   Patient Active Problem List   Diagnosis Code    Hematospermia R36.1    CAD (coronary artery disease) I25.10    PVD (peripheral vascular disease) ([facility] I73.9    CHF (congestive heart failure) ([facility]) I50.9    Anasarca R60.1    Hx of CABG Z95.1    H/O carotid endarterectomy Z98.890    AICD (automatic cardioverter/defibrillator) present Z95.810    Acute exacerbation of CHF (congestive heart failure) ([facility]) D56.9    Diastolic CHF, acute on chronic ([facility]) I50.33    HTN (hypertension) I10    HLD (hyperlipidemia) E78.5    Acute CHF (congestive heart failure) ([facility]) I50.9    Acute on chronic diastolic (congestive) heart failure ([facility]) I50.33    Thrombocytopenia ([facility]) D69.6    Hypokalemia E87.6    Aortic stenosis, moderate I35.0    CKD (chronic kidney disease) N18.9    Pulmonary HTN ([facility]) I27.2    Cellulitis L03.90 ·         PLAN:/DISCUSSION:   · Transfuse  · Dobutamine drip  · Renal consult        Nilton Vallecillo MD  5/15/2017, 8:53 PM

## 2017-05-16 NOTE — CONSULTS
Consult Note  Consult requested by: dr Mick Coombs is a 78 y.o. male [facility] who is being seen on consult for renal failure  Chief Complaint   Patient presents with    Leg Pain     right     Admission diagnosis:   HPI:76 y o white male with hx of crf stage 3-4.admitted with leg pain. hx of cardiomyopathy,cad,cabg,chf ,shira,single functioning kidney. was on diuretics on admission. treated with iv dobutamin,antibiotics for possible cellulitis  Past Medical History:   Diagnosis Date    Arrhythmia     A fib; has external vest and belt he wears for this    Arthritis     High blood pressure     Hypercholesteremia     Mini stroke ([facility] [address] 2000    Unspecified sleep apnea     does not wear machine      Past Surgical History:   Procedure Laterality Date    CABG, ARTERY-VEIN, THREE  2013    HX CAROTID ENDARTERECTOMY Left     HX CAROTID ENDARTERECTOMY Right 2000    HX CORONARY STENT PLACEMENT         Social History     Social History    Marital status: UNKNOWN     Spouse name: N/A    Number of children: N/A    Years of education: N/A     Occupational History    Not on file. Social History Main Topics    Smoking status: Former Smoker    Smokeless tobacco: Never Used      Comment: quit in the 90s    Alcohol use No    Drug use: No    Sexual activity: Not on file     Other Topics Concern    Not on file     Social History Narrative       History reviewed. No pertinent family history. No Known Allergies     Home Medications:     Prior to Admission Medications   Prescriptions Last Dose Informant Patient Reported? Taking? HYDROcodone-acetaminophen (NORCO) 5-325 mg per tablet 5/12/2017 at 0300  No No   Sig: Take 1 Tab by mouth every six (6) hours as needed for Pain. Max Daily Amount: 4 Tabs. albuterol-ipratropium (DUO-NEB) 2.5 mg-0.5 mg/3 ml nebu 5/11/2017 at 1700  No No   Sig: 3 mL by Nebulization route every six (6) hours as needed.    allopurinol (ZYLOPRIM) 100 mg tablet 5/11/2017 at 0300 No No   Sig: Take 1 Tab by mouth daily. Indications: GOUT   aspirin delayed-release 81 mg tablet 2017 at 0300  No No   Sig: Take 2 Tabs by mouth daily. carvedilol (COREG) 6.25 mg tablet 2017 at 0300  No No   Sig: Take 1 Tab by mouth two (2) times daily (with meals). Indications: Chronic Heart Failure   cephALEXin (KEFLEX) 500 mg capsule Not Taking at Unknown time  No No   Sig: Take 1 Cap by mouth four (4) times daily for 7 days. cholecalciferol (VITAMIN D3) 1,000 unit tablet 2017 at 0300  No No   Sig: Take 2 Tabs by mouth daily. colchicine 0.6 mg tablet 2017 at 0300  Yes No   Sig: Take 0.6 mg by mouth daily. ferrous sulfate 325 mg (65 mg iron) tablet 2017 at 0300  No No   Sig: Take 1 Tab by mouth daily (with breakfast). fish oil-omega-3 fatty acids 340-1,000 mg capsule 2017 at 0300  No Yes   Sig: Take 1 Cap by mouth two (2) times a day. Indications: HYPERTRIGLYCERIDEMIA   fluticasone (FLONASE) 50 mcg/actuation nasal spray 2017 at 0300  No No   Si spray each nostril daily  Indications: ALLERGIC RHINITIS   furosemide (LASIX) 40 mg tablet 2017 at 0300  No No   Si tab BID   influenza vaccine 2016-, 36mos+,,PF, (FLUZONE/FLUARIX/FLULAVAL QUAD) syrg injection   No No   Si.5 mL by IntraMUSCular route PRIOR TO DISCHARGE. loperamide (IMMODIUM) 2 mg tablet 2017 at 0300  Yes No   Sig: Take 2 mg by mouth four (4) times daily as needed for Diarrhea.   metOLazone (ZAROXOLYN) 2.5 mg tablet Not Taking at Unknown time  No No   Sig: Take 1 Tab by mouth daily. Patient taking differently: Take 2.5 mg by mouth every other day. nitroglycerin (NITROSTAT) 0.4 mg SL tablet Unknown at Unknown time  No No   Si Tab by SubLINGual route as needed for Chest Pain.   pantoprazole (PROTONIX) 40 mg tablet Unknown at Unknown time  No No   Sig: Take 1 Tab by mouth Daily (before breakfast).    pravastatin (PRAVACHOL) 80 mg tablet 2017 at 0300  No No   Sig: Take 1 Tab by mouth nightly. therapeutic multivitamin (THERAGRAN) tablet 5/12/2017 at 0300  No No   Sig: Take 1 Tab by mouth daily. zolpidem (AMBIEN) 5 mg tablet Unknown at Unknown time  No No   Sig: Take 1 Tab by mouth nightly as needed for Sleep. Max Daily Amount: 5 mg. Facility-Administered Medications: None       Current Facility-Administered Medications   Medication Dose Route Frequency    colchicine (MITIGARE) capsule 0.6 mg  0.6 mg Oral DAILY    [START ON 5/17/2017] allopurinol (ZYLOPRIM) tablet 50 mg  50 mg Oral DAILY    DOBUTamine (DOBUTREX) 500 mg/250 mL (2,000 mcg/mL) infusion  2.5 mcg/kg/min IntraVENous CONTINUOUS    cholecalciferol (VITAMIN D3) tablet 2,000 Units  2,000 Units Oral DAILY    therapeutic multivitamin (THERAGRAN) tablet 1 Tab  1 Tab Oral DAILY    nitroglycerin (NITROSTAT) tablet 0.4 mg  0.4 mg SubLINGual PRN    fish oil-omega-3 fatty acids 340-1,000 mg capsule 1 Cap  1 Cap Oral BID    pantoprazole (PROTONIX) tablet 40 mg  40 mg Oral ACB    albuterol-ipratropium (DUO-NEB) 2.5 MG-0.5 MG/3 ML  3 mL Nebulization Q6H PRN    ferrous sulfate tablet 325 mg  325 mg Oral DAILY WITH BREAKFAST    fluticasone (FLONASE) 50 mcg/actuation nasal spray 2 Spray  2 Spray Both Nostrils DAILY    HYDROcodone-acetaminophen (NORCO) 5-325 mg per tablet 1 Tab  1 Tab Oral Q4H PRN    influenza vaccine 2016-17 (36mos+)(PF) (FLUZONE/FLUARIX/FLULAVAL QUAD) injection 0.5 mL  0.5 mL IntraMUSCular PRIOR TO DISCHARGE    zolpidem (AMBIEN) tablet 5 mg  5 mg Oral QHS PRN    sodium chloride (NS) flush 5-10 mL  5-10 mL IntraVENous Q8H    sodium chloride (NS) flush 5-10 mL  5-10 mL IntraVENous PRN    ondansetron (ZOFRAN) injection 4 mg  4 mg IntraVENous Q4H PRN       Review of Systems:   A comprehensive review of systems was negative except for that written in the HPI.   Data Review:    Labs: Results:       Chemistry Recent Labs      05/15/17   0240  05/14/17   1805  05/14/17   0826   GLU  117*  48*  71*   NA  138  140 136   K  4.3  4.0  4.2   CL  98*  100  97*   CO2  29  28  32   BUN  65*  62*  58*   CREA  2.76*  2.41*  1.89*   CA  8.6  8.9  9.5   AGAP  11  12  7   BUCR  24*  26*  31*   AP   --   121*   --    TP   --   6.7   --    ALB   --   2.7*   --    GLOB   --   4.0   --    AGRAT   --   0.7*   --       PTH  Lab Results   Component Value Date/Time    Calcium 8.6 05/15/2017 02:40 AM    Phosphorus 3.0 03/05/2017 02:56 AM      CBC w/Diff Recent Labs      05/15/17   0240  05/14/17   1805  05/14/17   0826   WBC  7.0  9.8  6.6   RBC  2.52*  2.36*  2.62*   HGB  7.0*  6.6*  7.2*   HCT  22.8*  21.3*  23.5*   PLT  49*  64*  91*   GRANS  82*  84*  80*   LYMPH  9*  2*  11*   EOS  0  0  5      Coagulation Recent Labs      05/15/17   0240   PTP  20.6*   INR  1.9*   APTT  49.4*       Iron/Ferritin No results for input(s): IRON in the last 72 hours. No lab exists for component: TIBCCALC   BNP No results for input(s): BNPP in the last 72 hours. Cardiac Enzymes No results for input(s): CPK, CKND1, TYSON in the last 72 hours.     No lab exists for component: CKRMB, TROIP   Liver Enzymes Recent Labs      05/14/17   1805   TP  6.7   ALB  2.7*   AP  121*   SGOT  372*      Thyroid Studies Lab Results   Component Value Date/Time    TSH 4.23 07/22/2016 05:20 AM        Urinalysis Lab Results   Component Value Date/Time    Color YELLOW 02/27/2017 01:45 AM    Appearance CLEAR 02/27/2017 01:45 AM    Specific gravity 1.009 02/27/2017 01:45 AM    pH (UA) 6.0 02/27/2017 01:45 AM    Protein NEGATIVE  02/27/2017 01:45 AM    Glucose NEGATIVE  02/27/2017 01:45 AM    Ketone NEGATIVE  02/27/2017 01:45 AM    Bilirubin NEGATIVE  02/27/2017 01:45 AM    Urobilinogen 1.0 02/27/2017 01:45 AM    Nitrites NEGATIVE  02/27/2017 01:45 AM    Leukocyte Esterase NEGATIVE  02/27/2017 01:45 AM    Epithelial cells FEW 02/27/2017 01:45 AM    Bacteria NEGATIVE  02/27/2017 01:45 AM    WBC NEGATIVE  02/27/2017 01:45 AM    RBC 4 to 10 02/27/2017 01:45 AM         IMAGES:   XR Results (maximum last 3): Results from Hospital Encounter encounter on 05/14/17   XR CHEST PORT   Narrative CHEST PORTABLE    CPT CODE: 43090    COMPARISON: February 26, 2017    INDICATIONS: Right leg pain and cellulitis. Chest pain with shortness of breath  and or arrhythmia    FINDINGS: There is a left-sided bipolar ICD. Status post median sternotomy. The  heart is slightly enlarged. There is a moderate sized right pleural effusion  with associated compressive atelectatic changes in the right lung base. The left  lung is clear. No significant osseous abnormalities. Impression Impression:    Mild cardiomegaly. Moderate-sized right-sided pleural effusion decreased in size  since February 26, 2017. Previously seen small left pleural effusion has  resolved. Results from Hospital Encounter encounter on 02/26/17   XR CHEST PORT   Narrative EXAM: Chest Radiograph    INDICATION: Shortness of breath    TECHNIQUE: AP view of the chest    COMPARISON: 8/12/2016, 8/2/2016 and 7/21/2016    FINDINGS: An unchanged left-sided pacemaker is present. No pneumothorax  identified. Interstitial infiltrates are noted. A large right pleural effusion  is present. A small layering left pleural effusion is present. The cardiac  silhouette is enlarged. Bony vasculature is mildly prominent. Degenerative  changes of the spine are noted. Impression Impression:  1. Large right pleural effusion and small left pleural effusion. 2.  Mild interstitial pulmonary edema. Results from Hospital Encounter encounter on 08/12/16   XR CHEST PORT   Narrative Portable chest    History: Dyspnoea    Comparison: Chest radiograph 8/2/2016    Findings:      Median sternotomy wires and dual lead AICD in left chest wall unchanged. The  cardiomediastinal silhouette is enlarged. The pulmonary vasculature is  prominent. Diffuse interstitial linear opacities are present throughout the  lungs.   Layering pleural effusions blunt the costophrenic angles, right greater  than left. There is silhouetting of the hemidiaphragms. Moderate degenerative  changes are present at the acromioclavicular joints. Surgical clips partially  visualized in the right neck. Impression Impression:    Cardiomegaly with central vascular congestion and interstitial edema. Pleural effusions, right greater than left, slightly increased on the left since  prior exam 8/2/2016. Similar bibasilar consolidations, atelectasis or infiltrate. Thank you for this referral.          CT Results (maximum last 3): Results from [place] encounter on 12/12/16   CT ABD WO CONT   Narrative CT ABDOMEN WITHOUT ENHANCEMENT     CPT CODE: 96787    INDICATION: Acute renal failure and chronic kidney disease. TECHNIQUE: 5 mm collimation axial images obtained from the diaphragm to the  level of the iliac crest without intravenous contrast. Oral contrast was  administered. All CT scans at this facility are performed using dose optimization technique as  appropriate to a performed exam, to include automated exposure control,  adjustment of the mA and/or kV according to patient size (including appropriate  matching first site-specific examinations), or use of iterative reconstruction  technique. COMPARISON: None. FINDINGS:    ABDOMEN FINDINGS:     Lack of intravenous contrast renders this study suboptimal for evaluating the  solid abdominal organs, vasculature and bowel. Liver: Grossly unremarkable. Spleen: No splenomegaly. Pancreas: Grossly unremarkable. Biliary: Status post cholecystectomy. Bowel: No small bowel or colon dilation. .   Peritoneum/ Retroperitoneum: Large volume ascites. No free air. Mohan Basket Lymph Nodes: 1.1 cm in short axis left periaortic node (series 2, image 45). 1  cm retrocaval node (43). .    Adrenal Glands: No focal nodule.   Kidneys: Right kidney is atrophic compared with left measuring 8.2 cm in  craniocaudal diameter compared with 9.5 cm on the left. . There are left greater  than right renal vascular calcifications. There is a 4.2 cm lesion at the right  interpolar kidney with density minimally above water. There are a few  subcentimeter left renal lesions which are too small to characterize. . No  hydronephrosis. Vessels: Decreased density within the aortic lumen. 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 Postsurgical changes  aortobiiliac graft with severe calcification in the excluded bilateral common  iliac arteries. Visceral artery calcifications. . Moderate atherosclerosis with  coarse calcification throughout the visceral ostia. Lung Base: Decreased density in the cardiac chambers. Incompletely visualized  cardiac device. Right atrium appears enlarged. Large right and moderate left  pleural effusions. There is complete atelectasis at the right lower lobe and at  atelectasis at the right middle lobe. Mild atelectasis at the left lung base. .    Bones: Moderate anasarca. Status post median sternotomy. Severe degenerative  disc disease L4-L5 with vacuum disc phenomenon and moderate degenerative disc  disease otherwise. Impression IMPRESSION:  1. Atrophic right kidney with bilateral renal vascular calcifications and  moderate atherosclerosis. 2.  Right renal lesion with density minimally above water is statistically most  likely a cyst.  3.  Large volume ascites, large right and moderate left pleural effusions and  moderate anasarca. 4.  Borderline retroperitoneal lymphadenopathy. 5.  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 6.  Anemia. MRI Results (maximum last 3): No results found for this or any previous visit. Nuclear Medicine Results (maximum last 3): No results found for this or any previous visit. US Results (maximum last 3):   No results found for this or any previous visit.    DEXA Results (maximum last 3): No results found for this or any previous visit. KIMBERLEY Results (maximum last 3): No results found for this or any previous visit. IR Results (maximum last 3): Results from [place] encounter on 05/30/02   TRANSCRIBED VENOUS MAPPING    VAS/US Results (maximum last 3): Results from [place] encounter on 05/14/17   DUPLEX LOWER EXT ARTERY BILAT  DUPLEX LOWER EXT VENOUS RIGHT  Results from Hospital Encounter encounter on 05/09/17   DUPLEX LOWER EXT VENOUS RIGHT    PET Results (maximum last 3): No results found for this or any previous visit. No results found for this or any previous visit. [id number]    CULTURE:   )  Recent Labs      05/15/17   1950  05/15/17   1940   CULT  NO GROWTH AFTER 11 HOURS  NO GROWTH AFTER 11 HOURS     Recent Labs      05/15/17   1950  05/15/17   1940   CULT  NO GROWTH AFTER 11 HOURS  NO GROWTH AFTER 11 HOURS       Physical Assessment:     Visit Vitals    BP 95/63 (BP 1 Location: Right arm, BP Patient Position: At rest)    Pulse 60    Temp 96.5 °F (35.8 °C)    Resp 18    Ht 6' (1.829 m)    Wt 91.4 kg (201 lb 6.4 oz)    SpO2 (!) 87%    BMI 27.31 kg/m2     Last 3 Recorded Weights in this Encounter    05/14/17 0828 05/15/17 0652 05/16/17 0325   Weight: 86.2 kg (190 lb) 87.5 kg (192 lb 14.4 oz) 91.4 kg (201 lb 6.4 oz)       Intake/Output Summary (Last 24 hours) at 05/16/17 1140  Last data filed at 05/16/17 0438   Gross per 24 hour   Intake            442.9 ml   Output              500 ml   Net            -57.1 ml       Physial Exam:  General appearance: alert, cooperative, mild distress, appears stated age  Skin: normal coloration and turgor, no rashes, no suspicious skin lesions noted. HEENT: Head; normocephalic, atraumatic. ADOLFO. ENT- ENT exam normal, no neck nodes or sinus tenderness.    Lungs: diminished breath sounds amy  Heart: regular rate and rhythm, S1, S2 normal, no murmur, click, rub or gallop  Abdomen: soft, non-tender. Bowel sounds normal. No masses,  no organomegaly  Extremities: edema chronic changes    PLAN / RECOMMENDATION:    Acute/crf stage 4,single functioning kidney. I had discussed eventual need for dialysis . however,he,s refusing. wants to continue conservative medical treatment  Anemia,transfused. candidate for epo therapy  Chf,chronic  Adjust meds for renal failure     Thank you for the consultation to participate in patient's care. I have personally discussed my plan with the referring physician.      Ramila Mullen MD  May 16, 2017

## 2017-05-16 NOTE — CARDIO/PULMONARY
Brief follow up with patient today. He has been seen by nurse navigator. He has been see during past admissions. He has no questions at the present time. Will continue to follow.

## 2017-05-16 NOTE — ROUTINE PROCESS
1900- Bedside shift change report given to Alejandra Christie Rn  (oncoming nurse) by Kena Hensley RN (offgoing nurse). Report included the following information SBAR, Kardex and MAR.

## 2017-05-16 NOTE — PROGRESS NOTES
Patient is not available to be assessed at this time. No family at bedside.     [address] [facility] Certified [address] [place] [address]   [phone]

## 2017-05-16 NOTE — PROGRESS NOTES
[facility]      CARDIOLOGY PROGRESS NOTE  RECS:    1. Acute  diastolic congestive heart failure- presents with worsening CHF with BLE edema and elevated NT pro BNP. Stricts I&O. Monitor electrolytes and renal function. Increase dobutamine drip to 5 mcg. Will need to reeval AS as CHF improves. 2. Hypotension-slowly improving. On dobutamine drip. Monitor closely   3. Anemia-was transfused yesderday  Monitor H&H  4. Thrombocytopenia?- hold asa for now. Follow CBC today   5. S/p CABG in 2013 in [facility] 7- stable denies chest pain or pressure. S/p AICD interrogate 2/17 normal function. 6. Hx Carotid endarterectomy- bilateral in 2015    7. Mitral regurgitation-mild to moderate regurgitation echo on 2016   8. Aortic stenosis : moderate last echo 2/17  9. KYLEE on CKD- elevated creatinine. Monitor closely consider renal consult  9. PAD- with BLE pain- vascular is following             SUMMARY: echo 2/17  Left ventricle: Systolic function was normal by visual assessment. Ejection fraction was estimated in the range of 55 % to 60 %. No obvious  wall motion abnormalities identified in the views obtained. Wall thickness  was mildly increased. Features were consistent with a pseudonormal left  ventricular filling pattern, with concomitant abnormal relaxation and  increased filling pressure (grade 2 diastolic dysfunction). Right ventricle: The ventricle was mildly dilated. Systolic function was  mildly reduced. Systolic pressure was moderately increased. Estimated peak  pressure was 55 mmHg. Right atrium: The atrium was mildly dilated. Mitral valve: There was moderate thickening. Aortic valve: The valve was probably trileaflet. Leaflets exhibited  calcification and reduced mobility. There was moderate stenosis. Valve  peak gradient was 34 mmHg. Valve mean gradient was 21 mmHg. Estimated  aortic valve area (by Vmax) was 1.14 cmï¾².       ASSESSMENT:  Hospital Problems  Date Reviewed: 3/11/2017 Codes Class Noted POA    Cellulitis ICD-10-CM: L03.90  ICD-9-CM: 682.9  5/14/2017 Unknown        Pulmonary HTN ([facility] ICD-10-CM: I27.2  ICD-9-CM: 416.8  4/4/2017 Yes        Aortic stenosis, moderate (Chronic) ICD-10-CM: I35.0  ICD-9-CM: 424.1  3/11/2017 Yes        Acute on chronic diastolic (congestive) heart failure (HCC) ICD-10-CM: I50.33  ICD-9-CM: 428.33, 428.0  2/27/2017 Yes        Acute CHF (congestive heart failure) ([facility] ICD-10-CM: I50.9  ICD-9-CM: 428.0  2/26/2017 Yes        HTN (hypertension) (Chronic) ICD-10-CM: I10  ICD-9-CM: 401.9  9/4/2016 Yes        HLD (hyperlipidemia) (Chronic) ICD-10-CM: E78.5  ICD-9-CM: 272.4  9/4/2016 Yes        Diastolic CHF, acute on chronic [facility]) ICD-10-CM: I50.33  ICD-9-CM: 428.33, 428.0  8/17/2016 Yes        Acute exacerbation of CHF (congestive heart failure) ([facility] ICD-10-CM: I50.9  ICD-9-CM: 428.0  8/12/2016 Unknown        Hx of CABG ICD-10-CM: Z95.1  ICD-9-CM: V45.81  7/22/2016 Yes    Overview Signed 7/22/2016  5:23 PM by Jeovanny Colby NP     2013             H/O carotid endarterectomy ICD-10-CM: Z98.890  ICD-9-CM: V45.89  7/22/2016 Yes    Overview Signed 7/22/2016  5:27 PM by Jeovanny Colby NP     2015             AICD (automatic cardioverter/defibrillator) present ICD-10-CM: Z95.810  ICD-9-CM: V45.02  7/22/2016 Yes    Overview Signed 7/22/2016  5:27 PM by Jeovanny Colby NP     2015             CHF (congestive heart failure) ([address] ICD-10-CM: I50.9  ICD-9-CM: 428.0  7/21/2016 Yes        CAD (coronary artery disease) ICD-10-CM: I25.10  ICD-9-CM: 414.00  3/5/2014 Yes        PVD (peripheral vascular disease) ([facility] ICD-10-CM: I73.9  ICD-9-CM: 443.9  3/5/2014 Yes                SUBJECTIVE:    No CP  Denies  SOB   Leg pain is slowly improving    OBJECTIVE:    VS:   Visit Vitals    BP 95/63 (BP 1 Location: Right arm, BP Patient Position: At rest)    Pulse 60    Temp 96.5 °F (35.8 °C)    Resp 18    Ht 6' (1.829 m)    Wt 91.4 kg (201 lb 6.4 oz)    SpO2 (!) 87%  BMI 27.31 kg/m2         Intake/Output Summary (Last 24 hours) at 05/16/17 1207  Last data filed at 05/16/17 4408   Gross per 24 hour   Intake            892.9 ml   Output              500 ml   Net            392.9 ml     TELE: normal sinus rhythm    General: alert, well developed, pleasant and in mild distress  HENT: Normocephalic, atraumatic. Normal external eye. Neck :  increased JVP  Cardiac:  regular rate and rhythm, S1, S2 normal, no S3 or S4, systolic murmur: holosystolic 3/6, harsh at lower left sternal border, at apex, throughout the precordium, no click, no rub  Lungs: fine rales lung bases. Abdomen: Soft, nontender, no masses  Extremities:  Edema +2 up to upper thighs and abdomen. BLE tender/cold  to touch       Labs: Results:       Chemistry Recent Labs      05/15/17   0240 05/14/17   1805 05/14/17   0826   GLU  117*  48*  71*   NA  138  140  136   K  4.3  4.0  4.2   CL  98*  100  97*   CO2  29  28  32   BUN  65*  62*  58*   CREA  2.76*  2.41*  1.89*   CA  8.6  8.9  9.5   AGAP  11  12  7   BUCR  24*  26*  31*   AP   --   121*   --    TP   --   6.7   --    ALB   --   2.7*   --    GLOB   --   4.0   --    AGRAT   --   0.7*   --       CBC w/Diff Recent Labs      05/15/17   0240 05/14/17   1805 05/14/17   0826   WBC  7.0  9.8  6.6   RBC  2.52*  2.36*  2.62*   HGB  7.0*  6.6*  7.2*   HCT  22.8*  21.3*  23.5*   PLT  49*  64*  91*   GRANS  82*  84*  80*   LYMPH  9*  2*  11*   EOS  0  0  5      Cardiac Enzymes No results for input(s): CPK, CKND1, TYSON in the last 72 hours.     No lab exists for component: CKRMB, TROIP   Coagulation Recent Labs      05/15/17   0240   PTP  20.6*   INR  1.9*   APTT  49.4*       Lipid Panel Lab Results   Component Value Date/Time    Cholesterol, total 100 07/22/2016 05:20 AM    HDL Cholesterol 31 07/22/2016 05:20 AM    LDL, calculated 54.4 07/22/2016 05:20 AM    VLDL, calculated 14.6 07/22/2016 05:20 AM    Triglyceride 73 07/22/2016 05:20 AM    CHOL/HDL Ratio 3.2 07/22/2016 05:20 AM      BNP No results for input(s): BNPP in the last 72 hours. Liver Enzymes Recent Labs      05/14/17   1805   TP  6.7   ALB  2.7*   AP  121*   SGOT  372*      Thyroid Studies Lab Results   Component Value Date/Time    TSH 4.23 07/22/2016 05:20 AM              Kim Lewis Casandra:[phone]  Patient seen independently  Discussed the details with NP and patient.  Please see orders & recommendations  King Vickie MD

## 2017-05-16 NOTE — PROGRESS NOTES
Ami Platt M.D. PROGRESS NOTE    Name: Christina Wilson MRN: [id number]   : 1940 Hospital: [address] [place] [address]   Date: 2017  Admission Date: 2017     Subjective/Objective/Plans  Right leg remain swollen, no DVT, no cellulitis  Discussed with ID  Cr up  Had 500 cc urine with cath urine  LFT up    Plan  [place] cath  Renal consult  DC Statin  Vital Signs:  Visit Vitals    BP 91/58 (BP 1 Location: Right arm, BP Patient Position: At rest)    Pulse 60    Temp 97.4 °F (36.3 °C)    Resp 18    Ht 6' (1.829 m)    Wt 91.4 kg (201 lb 6.4 oz)    SpO2 92%    BMI 27.31 kg/m2       O2 Device: Nasal cannula   O2 Flow Rate (L/min): 2 l/min   Temp (24hrs), Av.9 °F (36.1 °C), Min:96 °F (35.6 °C), Max:98.3 °F (36.8 °C)     10:02 AM  Intake/Output:   Last shift:         Last 3 shifts:  1901 -  0700  In: 1182.9 [P.O.:360; I.V.:500]  Out: 700 [Urine:700]    Intake/Output Summary (Last 24 hours) at 17 1002  Last data filed at 17 0438   Gross per 24 hour   Intake            442.9 ml   Output              500 ml   Net            -57.1 ml         Physical Exam:    General: in no respiratory distress and acyanotic and alert    HEENT: pupils equal, no ear discharge   Neck: No abnormally enlarged lymph nodes. , no JDV   Mouth: MMM no lesions    Chest: normal,    Lungs: decreased air exchange bilaterally   Heart: Regular rate and rhythm   Abdomen: abdomen is soft without significant tenderness, masses, organomegaly or guarding   Extremity: swelling right leg   Neuro: alert    Skin: Skin color, texture, turgor normal. No rashes or lesions    Data Review:    Labs: Results:       Chemistry Recent Labs      05/15/17   0240  17   1805  17   0826   GLU  117*  48*  71*   NA  138  140  136   K  4.3  4.0  4.2   CL  98*  100  97*   CO2  29  28  32   BUN  65*  62*  58*   CREA  2.76*  2.41*  1.89*   CA  8.6  8.9  9.5   AGAP  11  12  7   BUCR  24*  26*  31*   TBILI   --   2.6*   --    AP   -- 121*   --    TP   --   6.7   --    ALB   --   2.7*   --    GLOB   --   4.0   --    AGRAT   --   0.7*   --       CBC w/Diff Recent Labs      05/15/17   0240  05/14/17   1805  05/14/17   0826   WBC  7.0  9.8  6.6   RBC  2.52*  2.36*  2.62*   HGB  7.0*  6.6*  7.2*   HCT  22.8*  21.3*  23.5*   PLT  49*  64*  91*   GRANS  82*  84*  80*   LYMPH  9*  2*  11*   EOS  0  0  5      Cardiac Enzymes No results for input(s): CPK, CKND1, TYSON in the last 72 hours. No lab exists for component: CKRMB, TROIP   Coagulation Recent Labs      05/15/17   0240   PTP  20.6*   INR  1.9*   APTT  49.4*       Lipid Panel Lab Results   Component Value Date/Time    Cholesterol, total 100 07/22/2016 05:20 AM    HDL Cholesterol 31 07/22/2016 05:20 AM    LDL, calculated 54.4 07/22/2016 05:20 AM    VLDL, calculated 14.6 07/22/2016 05:20 AM    Triglyceride 73 07/22/2016 05:20 AM    CHOL/HDL Ratio 3.2 07/22/2016 05:20 AM      BNP No results for input(s): BNPP in the last 72 hours. Liver Enzymes Recent Labs      05/14/17   1805   TP  6.7   ALB  2.7*   TBILI  2.6*   AP  121*   SGOT  372*   ALT  205*      Thyroid Studies Lab Results   Component Value Date/Time    TSH 4.23 07/22/2016 05:20 AM          Procedures/imaging: see electronic medical records for all procedures, Xrays and details which were not copied into this note but were reviewed. Allergies:  No Known Allergies    Home Medications:  Prior to Admission Medications   Prescriptions Last Dose Informant Patient Reported? Taking? HYDROcodone-acetaminophen (NORCO) 5-325 mg per tablet 5/12/2017 at 0300  No No   Sig: Take 1 Tab by mouth every six (6) hours as needed for Pain. Max Daily Amount: 4 Tabs. albuterol-ipratropium (DUO-NEB) 2.5 mg-0.5 mg/3 ml nebu 5/11/2017 at 1700  No No   Sig: 3 mL by Nebulization route every six (6) hours as needed. allopurinol (ZYLOPRIM) 100 mg tablet 5/11/2017 at 0300  No No   Sig: Take 1 Tab by mouth daily.  Indications: GOUT   aspirin delayed-release 81 mg tablet 2017 at 0300  No No   Sig: Take 2 Tabs by mouth daily. carvedilol (COREG) 6.25 mg tablet 2017 at 0300  No No   Sig: Take 1 Tab by mouth two (2) times daily (with meals). Indications: Chronic Heart Failure   cephALEXin (KEFLEX) 500 mg capsule Not Taking at Unknown time  No No   Sig: Take 1 Cap by mouth four (4) times daily for 7 days. cholecalciferol (VITAMIN D3) 1,000 unit tablet 2017 at 0300  No No   Sig: Take 2 Tabs by mouth daily. colchicine 0.6 mg tablet 2017 at 0300  Yes No   Sig: Take 0.6 mg by mouth daily. ferrous sulfate 325 mg (65 mg iron) tablet 2017 at 0300  No No   Sig: Take 1 Tab by mouth daily (with breakfast). fish oil-omega-3 fatty acids 340-1,000 mg capsule 2017 at 0300  No Yes   Sig: Take 1 Cap by mouth two (2) times a day. Indications: HYPERTRIGLYCERIDEMIA   fluticasone (FLONASE) 50 mcg/actuation nasal spray 2017 at 0300  No No   Si spray each nostril daily  Indications: ALLERGIC RHINITIS   furosemide (LASIX) 40 mg tablet 2017 at 0300  No No   Si tab BID   influenza vaccine 2016-17, 36mos+,,PF, (FLUZONE/FLUARIX/FLULAVAL QUAD) syrg injection   No No   Si.5 mL by IntraMUSCular route PRIOR TO DISCHARGE. loperamide (IMMODIUM) 2 mg tablet 2017 at 0300  Yes No   Sig: Take 2 mg by mouth four (4) times daily as needed for Diarrhea.   metOLazone (ZAROXOLYN) 2.5 mg tablet Not Taking at Unknown time  No No   Sig: Take 1 Tab by mouth daily. Patient taking differently: Take 2.5 mg by mouth every other day. nitroglycerin (NITROSTAT) 0.4 mg SL tablet Unknown at Unknown time  No No   Si Tab by SubLINGual route as needed for Chest Pain.   pantoprazole (PROTONIX) 40 mg tablet Unknown at Unknown time  No No   Sig: Take 1 Tab by mouth Daily (before breakfast). pravastatin (PRAVACHOL) 80 mg tablet 2017 at 0300  No No   Sig: Take 1 Tab by mouth nightly.    therapeutic multivitamin (THERAGRAN) tablet 2017 at 0300  No No   Sig: Take 1 Tab by mouth daily. zolpidem (AMBIEN) 5 mg tablet Unknown at Unknown time  No No   Sig: Take 1 Tab by mouth nightly as needed for Sleep. Max Daily Amount: 5 mg. Facility-Administered Medications: None       Current Medications:  Current Facility-Administered Medications   Medication Dose Route Frequency    colchicine (MITIGARE) capsule 0.6 mg  0.6 mg Oral DAILY    DOBUTamine (DOBUTREX) 500 mg/250 mL (2,000 mcg/mL) infusion  2.5 mcg/kg/min IntraVENous CONTINUOUS    allopurinol (ZYLOPRIM) tablet 100 mg  100 mg Oral DAILY    cholecalciferol (VITAMIN D3) tablet 2,000 Units  2,000 Units Oral DAILY    therapeutic multivitamin (THERAGRAN) tablet 1 Tab  1 Tab Oral DAILY    nitroglycerin (NITROSTAT) tablet 0.4 mg  0.4 mg SubLINGual PRN    fish oil-omega-3 fatty acids 340-1,000 mg capsule 1 Cap  1 Cap Oral BID    pantoprazole (PROTONIX) tablet 40 mg  40 mg Oral ACB    pravastatin (PRAVACHOL) tablet 80 mg  80 mg Oral QHS    albuterol-ipratropium (DUO-NEB) 2.5 MG-0.5 MG/3 ML  3 mL Nebulization Q6H PRN    ferrous sulfate tablet 325 mg  325 mg Oral DAILY WITH BREAKFAST    fluticasone (FLONASE) 50 mcg/actuation nasal spray 2 Spray  2 Spray Both Nostrils DAILY    HYDROcodone-acetaminophen (NORCO) 5-325 mg per tablet 1 Tab  1 Tab Oral Q4H PRN    influenza vaccine 2016-17 (36mos+)(PF) (FLUZONE/FLUARIX/FLULAVAL QUAD) injection 0.5 mL  0.5 mL IntraMUSCular PRIOR TO DISCHARGE    zolpidem (AMBIEN) tablet 5 mg  5 mg Oral QHS PRN    sodium chloride (NS) flush 5-10 mL  5-10 mL IntraVENous Q8H    sodium chloride (NS) flush 5-10 mL  5-10 mL IntraVENous PRN    ondansetron (ZOFRAN) injection 4 mg  4 mg IntraVENous Q4H PRN       Chart and notes reviewed. Data reviewed. I have evaluated and examined the patient.         IMPRESSION:   Patient Active Problem List   Diagnosis Code    Hematospermia R36.1    CAD (coronary artery disease) I25.10    PVD (peripheral vascular disease) ([facility] [address] I73.9    CHF (congestive heart failure) ([facility]) I50.9    Anasarca R60.1    Hx of CABG Z95.1    H/O carotid endarterectomy Z98.890    AICD (automatic cardioverter/defibrillator) present Z95.810    Acute exacerbation of CHF (congestive heart failure) ([facility]) J64.0    Diastolic CHF, acute on chronic ([facility]) I50.33    HTN (hypertension) I10    HLD (hyperlipidemia) E78.5    Acute CHF (congestive heart failure) ([facility]) I50.9    Acute on chronic diastolic (congestive) heart failure ([facility]) I50.33    Thrombocytopenia ([facility]) D69.6    Hypokalemia E87.6    Aortic stenosis, moderate I35.0    CKD (chronic kidney disease) N18.9    Pulmonary HTN ([facility]) I27.2    Cellulitis L03.90 ·         PLAN:/DISCUSSION:   · Continue current treatment  · Renal consult  · Cardiology consult        Emmett Damico MD  5/16/2017, 10:02 AM

## 2017-05-16 NOTE — CONSULTS
Infectious Disease Consultation Note    Requested by: dr. Pete Walker    Reason: bilateral LE cellulitis    Current abx Prior abx   Piperacillin/tazobactam since 5/14/17      Lines:       Assessment :    77 yo M with h/o CHF, O2 dependence, anemia, and HTN admitted to [facility] on 5/14/17 with bilateral leg pain and swelling x 1 week. Clinical presentation not very consistent with cellulitis. The bilateral leg pain could be due to recent fall, worsening edema due to worsening renal function, concurrent vascular disease. I will discontinue antibiotics since low clinical suspicion of infection. 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 Discontinue pip/tazo  2. F/u nephrology recommendations  3. Dobutamine per cardiology  4. Agree with vascular surgery recommendations  4. Monitor off abx     Thank you for consultation request. Above plan was discussed in details with patient,  and dr Pete Walker. Please call me if any further questions or concerns. Will continue to participate in the care of this patient. HPI:    77 yo M with h/o CHF, O2 dependence, anemia, and HTN admitted to [facility] on 5/14/17 with bilateral leg pain and swelling x 1 week. Patient states that he was relatively well and ambulating without difficulties up until 5/5 when he slid on a plastic sheet at home and fell down. He was seen on 5/9 in the ED for leg pain. Per ed note, he complained of right leg pain - his right leg was red. He had negative PVL and was d/h with keflex and pain meds. Patient took his abx and finished his pain meds but did  not had any improvement in pain. He didn't eat or drink much since he had difficulty ambulating due to pain. He came to ed on 5/14/17 with these complaints. Upon admission he was found to be anemic with Hbg of 7 and hypotensive with SBP in the low 80's and DBP in the 50's.  He also has thrombocytopenia with platelet count in the 60's. He is scheduled to receive transfusion and Cardiology was consulted and pt has been started on Dobutamine drip. He has remained afebrile. No leukocytosis. Venous US of right leg negative for DVT. He did also have arterial studies which showed moderate >50% stenosis of the right superficial femoral artery and severe >75% stenosis of the profunda femoris artery with occlusion of the posterior tibial artery. The right ankle/brachial index is 0.66. There was also moderate >50% stenosis of the left profunda femoris artery and focal occlusion of the distal superficial femoral artery with occlusion of the anterior tibial and peroneal arteries. The left ankle/brachial index is 0.53. He was started on pip/tazo since admission. I have been consulted for further recommendations. Of note, his creatinine is 2.76 today compared to 1.89 on admission. Patient continues to have bilateral leg pain. Patient denies headaches, visual disturbances, sore throat, runny nose, earaches, cp, sob, chills, cough, abdominal pain, diarrhea, burning micturition,  or weakness in extremities. He denies back pain/flank pain. He denies recent sick contacts. No h/o recent travel. No known h/o MRSA colonization or infection in the past.          Past Medical History:   Diagnosis Date    Arrhythmia     A fib; has external vest and belt he wears for this    Arthritis     High blood pressure     Hypercholesteremia     Mini stroke ([facility] [address] 2000    Unspecified sleep apnea     does not wear machine       Past Surgical History:   Procedure Laterality Date    CABG, ARTERY-VEIN, THREE  2013    HX CAROTID ENDARTERECTOMY Left     HX CAROTID ENDARTERECTOMY Right 2000    HX CORONARY STENT PLACEMENT         home Medication List    Details   fish oil-omega-3 fatty acids 340-1,000 mg capsule Take 1 Cap by mouth two (2) times a day.  Indications: HYPERTRIGLYCERIDEMIA  Qty: 100 Cap, Refills: 0 cephALEXin (KEFLEX) 500 mg capsule Take 1 Cap by mouth four (4) times daily for 7 days. Qty: 28 Cap, Refills: 0      HYDROcodone-acetaminophen (NORCO) 5-325 mg per tablet Take 1 Tab by mouth every six (6) hours as needed for Pain. Max Daily Amount: 4 Tabs. Qty: 12 Tab, Refills: 0      carvedilol (COREG) 6.25 mg tablet Take 1 Tab by mouth two (2) times daily (with meals). Indications: Chronic Heart Failure  Qty: 60 Tab, Refills: 6      colchicine 0.6 mg tablet Take 0.6 mg by mouth daily. Associated Diagnoses: Essential hypertension; Hyperlipidemia, unspecified hyperlipidemia type; Aortic stenosis, moderate; Coronary artery disease of bypass graft of native heart with stable angina pectoris ([facility] [address] Chronic diastolic congestive heart failure ([facility] [address] PVD (peripheral vascular disease) ([facility] [address] Hx of CABG; H/O carotid endarterectomy; AICD (automatic cardioverter/defibrillator) present; CKD (chronic kidney disease), unspecified stage; Pulmonary HTN (HCC)      loperamide (IMMODIUM) 2 mg tablet Take 2 mg by mouth four (4) times daily as needed for Diarrhea. Associated Diagnoses: Essential hypertension; Hyperlipidemia, unspecified hyperlipidemia type; Aortic stenosis, moderate; Coronary artery disease of bypass graft of native heart with stable angina pectoris ([facility] [address] Chronic diastolic congestive heart failure ([facility] [address] PVD (peripheral vascular disease) ([facility] [address] Hx of CABG; H/O carotid endarterectomy; AICD (automatic cardioverter/defibrillator) present; CKD (chronic kidney disease), unspecified stage; Pulmonary HTN (HCC)      allopurinol (ZYLOPRIM) 100 mg tablet Take 1 Tab by mouth daily. Indications: GOUT  Qty: 30 Tab, Refills: 0      aspirin delayed-release 81 mg tablet Take 2 Tabs by mouth daily. Qty: 30 Tab, Refills: 0      cholecalciferol (VITAMIN D3) 1,000 unit tablet Take 2 Tabs by mouth daily. Qty: 100 Tab, Refills: 0      therapeutic multivitamin (THERAGRAN) tablet Take 1 Tab by mouth daily.   Qty: 100 Tab, Refills: 0      nitroglycerin (NITROSTAT) 0.4 mg SL tablet 1 Tab by SubLINGual route as needed for Chest Pain. Qty: 1 Bottle, Refills: 0      pantoprazole (PROTONIX) 40 mg tablet Take 1 Tab by mouth Daily (before breakfast). Qty: 100 Tab, Refills: 0      pravastatin (PRAVACHOL) 80 mg tablet Take 1 Tab by mouth nightly. Qty: 100 Tab, Refills: 0      albuterol-ipratropium (DUO-NEB) 2.5 mg-0.5 mg/3 ml nebu 3 mL by Nebulization route every six (6) hours as needed. Qty: 30 Nebule, Refills: 0      ferrous sulfate 325 mg (65 mg iron) tablet Take 1 Tab by mouth daily (with breakfast). Qty: 100 Tab, Refills: 0      fluticasone (FLONASE) 50 mcg/actuation nasal spray 2 spray each nostril daily  Indications: ALLERGIC RHINITIS  Qty: 1 Bottle, Refills: 0      furosemide (LASIX) 40 mg tablet 1 tab BID  Qty: 100 Tab, Refills: 0      influenza vaccine 2016-17, 36mos+,,PF, (FLUZONE/FLUARIX/FLULAVAL QUAD) syrg injection 0.5 mL by IntraMUSCular route PRIOR TO DISCHARGE. Qty: 0.5 mL, Refills: 0      metOLazone (ZAROXOLYN) 2.5 mg tablet Take 1 Tab by mouth daily. Qty: 100 Tab, Refills: 0      zolpidem (AMBIEN) 5 mg tablet Take 1 Tab by mouth nightly as needed for Sleep. Max Daily Amount: 5 mg.   Qty: 30 Tab, Refills: 0             Current Facility-Administered Medications   Medication Dose Route Frequency    colchicine (MITIGARE) capsule 0.6 mg  0.6 mg Oral DAILY    DOBUTamine (DOBUTREX) 500 mg/250 mL (2,000 mcg/mL) infusion  2.5 mcg/kg/min IntraVENous CONTINUOUS    allopurinol (ZYLOPRIM) tablet 100 mg  100 mg Oral DAILY    cholecalciferol (VITAMIN D3) tablet 2,000 Units  2,000 Units Oral DAILY    therapeutic multivitamin (THERAGRAN) tablet 1 Tab  1 Tab Oral DAILY    nitroglycerin (NITROSTAT) tablet 0.4 mg  0.4 mg SubLINGual PRN    fish oil-omega-3 fatty acids 340-1,000 mg capsule 1 Cap  1 Cap Oral BID    pantoprazole (PROTONIX) tablet 40 mg  40 mg Oral ACB    pravastatin (PRAVACHOL) tablet 80 mg  80 mg Oral QHS    albuterol-ipratropium (DUO-NEB) 2.5 MG-0.5 MG/3 ML  3 mL Nebulization Q6H PRN    ferrous sulfate tablet 325 mg  325 mg Oral DAILY WITH BREAKFAST    fluticasone (FLONASE) 50 mcg/actuation nasal spray 2 Spray  2 Spray Both Nostrils DAILY    HYDROcodone-acetaminophen (NORCO) 5-325 mg per tablet 1 Tab  1 Tab Oral Q4H PRN    influenza vaccine - (36mos+)(PF) (FLUZONE/FLUARIX/FLULAVAL QUAD) injection 0.5 mL  0.5 mL IntraMUSCular PRIOR TO DISCHARGE    zolpidem (AMBIEN) tablet 5 mg  5 mg Oral QHS PRN    sodium chloride (NS) flush 5-10 mL  5-10 mL IntraVENous Q8H    sodium chloride (NS) flush 5-10 mL  5-10 mL IntraVENous PRN    ondansetron (ZOFRAN) injection 4 mg  4 mg IntraVENous Q4H PRN    piperacillin-tazobactam (ZOSYN) 2.25 g in 0.9% sodium chloride (MBP/ADV) 50 mL MBP  2.25 g IntraVENous Q6H       Allergies: Review of patient's allergies indicates no known allergies. History reviewed. No pertinent family history. Social History     Social History    Marital status: UNKNOWN     Spouse name: N/A    Number of children: N/A    Years of education: N/A     Occupational History    Not on file. Social History Main Topics    Smoking status: Former Smoker    Smokeless tobacco: Never Used      Comment: quit in the     Alcohol use No    Drug use: No    Sexual activity: Not on file     Other Topics Concern    Not on file     Social History Narrative     History   Smoking Status    Former Smoker   Smokeless Tobacco    Never Used     Comment: quit in the s        Temp (24hrs), Av.9 °F (36.1 °C), Min:96 °F (35.6 °C), Max:98.3 °F (36.8 °C)    Visit Vitals    BP 91/58 (BP 1 Location: Right arm, BP Patient Position: At rest)    Pulse 60    Temp 97.4 °F (36.3 °C)    Resp 18    Ht 6' (1.829 m)    Wt 91.4 kg (201 lb 6.4 oz)    SpO2 92%    BMI 27.31 kg/m2       ROS: 12 point ROS obtained in details.  Pertinent positives as mentioned in HPI,   otherwise negative    Physical Exam:    General: elderly appearing male laying on the bed AAOx3 in no acute distress. General:   awake alert and oriented   HEENT:  Normocephalic, atraumatic, PERRL, EOMI, no scleral icterus or pallor; no conjunctival hemmohage;  nasal and oral mucous are moist and without evidence of lesions. No thrush. Neck supple, no bruits. Lymph Nodes:   no cervical, axillary or inguinal adenopathy   Lungs:   non-labored, bilaterally clear to auscultation- no crackles wheezes rales or rhonchi   Heart:  RRR, s1 and s2; 4/6 systolic murmurs throughout the precordium, no rubs or gallops, AICD pocket site left chest without erythema, bilateral LE edema, feeble pedal pulses   Abdomen:  soft, non-distended, active bowel sounds, no hepatomegaly, no splenomegaly. Non-tender   Genitourinary:  deferred   Extremities:   no clubbing, cyanosis; no joint effusions or swelling;  muscle mass appropriate for age, dark erythema right leg, no warmth or tenderness bilateral LE, edema bilateral LE   Neurologic:  No gross focal sensory abnormalities; 5/5 muscle strength to upper and lower extremities. Speech appropriate.  Cranial nerves intact                        Skin:  Superficial ulcers left leg, no drainage, mild erythema right leg   Back:  no spinal or paraspinal muscle tenderness or rigidity, no CVA tenderness     Psychiatric:  No suicidal or homicidal ideations, appropriate mood and affect         Labs: Results:   Chemistry Recent Labs      05/15/17   0240  05/14/17   1805 05/14/17   0826   GLU  117*  48*  71*   NA  138  140  136   K  4.3  4.0  4.2   CL  98*  100  97*   CO2  29  28  32   BUN  65*  62*  58*   CREA  2.76*  2.41*  1.89*   CA  8.6  8.9  9.5   AGAP  11  12  7   BUCR  24*  26*  31*   AP   --   121*   --    TP   --   6.7   --    ALB   --   2.7*   --    GLOB   --   4.0   --    AGRAT   --   0.7*   --       CBC w/Diff Recent Labs      05/15/17   0240  05/14/17   1805  05/14/17   0826   WBC  7.0  9.8  6.6   RBC  2.52*  2.36*  2.62*   HGB  7.0* 6. 6*  7.2*   HCT  22.8*  21.3*  23.5*   PLT  49*  64*  91*   GRANS  82*  84*  80*   LYMPH  9*  2*  11*   EOS  0  0  5      Microbiology Recent Labs      05/15/17   1950  05/15/17   1940   CULT  NO GROWTH AFTER 11 HOURS  NO GROWTH AFTER 11 HOURS          RADIOLOGY:    All available imaging studies/reports in [facility] for this admission were reviewed    Dr. Loralee Bumpers, Infectious Disease Specialist  [phone]  May 16, 2017  9:17 AM

## 2017-05-16 NOTE — ADT AUTH CERT NOTES
Heart Failure - Care Day 3 (5/16/2017) by Marvin Marina, RN        Review Status Review Entered       Completed 5/16/2017       Details              Care Day: 3 Care Date: 5/16/2017 Level of Care: Step Down       Guideline Day 1        Level Of Care       (X) ICU [D] or intermediate care [E] after emergency treatment       5/16/2017 2:46 PM EDT by Eloise SOUSA                     Clinical Status       (X) * Clinical Indications met [F]              Routes       (X) Parenteral medications       5/16/2017 2:46 PM EDT by Eloise Sims         DOBUTAMINE DRIP STARTED 5/15  SBP'S WERE IN THE 70'S AND 80'S                     Interventions       (X) Oxygen       5/16/2017 2:46 PM EDT by Eloise Sims         4 LPM NC                     Medications       (X) IV diuretics              5/16/2017 2:46 PM EDT by Eloise Sims       Subject: Additional Clinical Information       VS:  95/63, 96.5, 60, 18, 87% 4 LPMHGB 7.6, HCT 24.0, BUN 79, CREAT 3.78, LACTIC ACID 1.7ZOSYN IV,                                   * Milestone              Additional Notes       HOSPITALIST 5/15:Subjective/Objective/Plans       More alert       Low BP       Low Hg       Low platelets               Plan       Transfuse       Cardiology, Vascular, ID consult       DC Heparin              IMPRESSION:       · Patient Active Problem List       Diagnosis Code        Hematospermia R36.1        CAD (coronary artery disease) I25.10        PVD (peripheral vascular disease) ([facility]) I73.9        CHF (congestive heart failure) ([facility]) I50.9        Anasarca R60. 1        Hx of CABG Z95.1        H/O carotid endarterectomy Z98.890        AICD (automatic cardioverter/defibrillator) present Z95.810        Acute exacerbation of CHF (congestive heart failure) ([facility]) U75.9        Diastolic CHF, acute on chronic ([facility]) I50.33        HTN (hypertension) I10        HLD (hyperlipidemia) E78.5        Acute CHF (congestive heart failure) ([facility]) I50.9      Acute on chronic diastolic (congestive) heart failure (HCC) I50.33        Thrombocytopenia (HCC) D69. 6        Hypokalemia E87. 6        Aortic stenosis, moderate I35.0        CKD (chronic kidney disease) N18.9        Pulmonary HTN (HCC) I27.2        Cellulitis L03.90       ·                 PLAN:/DISCUSSION:       · Transfuse       · Dobutamine drip       · Renal consult              CARDIOLOGY 5/16: CARDIOLOGY PROGRESS NOTE       RECS:               1. Acute diastolic congestive heart failure- presents with worsening CHF with BLE edema and elevated NT pro BNP. Stricts I&O. Monitor electrolytes and renal function. Continue with  dobutamine drip.        2. Hypotension-slowly improving. On dobutamine drip. Monitor closely        3. Anemia-was transfused yesderday  Monitor H&H       4. Thrombocytopenia?- hold asa for now. Follow CBC today        5. S/p CABG in 2013 in [facility] 7- stable denies chest pain or pressure. S/p AICD interrogate 2/17 normal function.        6. Hx Carotid endarterectomy- bilateral in 2015         7. Mitral regurgitation-mild to moderate regurgitation echo on 2016        8. Aortic stenosis : moderate last echo 2/17       9. KYLEE on CKD- elevated creatinine. Monitor closely consider renal consult       9. PAD- with BLE pain- vascular is following               NEPHROLOGY 5/16:       PLAN / RECOMMENDATION:        · Acute/crf stage 4,single functioning kidney. I had discussed eventual need for dialysis . however,he,s refusing. wants to continue conservative medical treatment       · Anemia,transfused. candidate for epo therapy       · Chf,chronic       · Adjust meds for renal failure           Heart Failure - Clinical Indications for Admission to Inpatient Care by Nanci Harper RN        Review Status Review Entered       Completed 5/16/2017       Details              Clinical Indications for Admission to Inpatient Care       Most Recent : Ginger Campos Most Recent Date: 5/16/2017 2:31 PM EDT       (X) Admission is indicated by 1 or more of the following  (1) (2) (3) (4) (5) (6) (7):          (X) Hemodynamic instability          5/16/2017 2:31 PM EDT by Lisa Mendoza            bp's 85/51, 80/52, 91/58, 95/63                 (X) Inpatient admission required [B] rather than observation care (see Heart Failure: Observation           Care guideline as appropriate)  because of 1 or more of the following :             (X) Other condition, treatment, or monitoring requiring inpatient admission             5/16/2017 2:31 PM EDT by Barbie Collet currently admitted for cellulitis

## 2017-05-16 NOTE — ROUTINE PROCESS
Bedside and Verbal shift change report given to Gabrielle Christianson (oncoming nurse) by Dori Sims RN (offgoing nurse). Report included the following information SBAR, Kardex and MAR.

## 2017-05-16 NOTE — PROGRESS NOTES
Vascular Surgery Progress Note    Admit Date: 2017  POD * No surgery found *    Procedure:  * No surgery found *      Subjective:     Patient has no new complaints. States leg pain actually improved today. Objective:     Blood pressure 91/58, pulse 60, temperature 97.4 °F (36.3 °C), resp. rate 18, height 6' (1.829 m), weight 201 lb 6.4 oz (91.4 kg), SpO2 92 %. Temp (24hrs), Av.9 °F (36.1 °C), Min:96 °F (35.6 °C), Max:98.3 °F (36.8 °C)      Physical Exam:  GENERAL: alert, cooperative, no distress, appears stated age, LUNG:  no resp difficulty, ABDOMEN:  no change and EXTREMITIES:  mild edema, left foot warmer to touch today, less sensative bilaterally to touch.      Labs: Results:       Chemistry Recent Labs      05/15/17   0240  05/14/17   1805  05/14/17   0826   GLU  117*  48*  71*   NA  138  140  136   K  4.3  4.0  4.2   CL  98*  100  97*   CO2  29  28  32   BUN  65*  62*  58*   CREA  2.76*  2.41*  1.89*   CA  8.6  8.9  9.5   AGAP  11  12  7   BUCR  24*  26*  31*   AP   --   121*   --    TP   --   6.7   --    ALB   --   2.7*   --    GLOB   --   4.0   --    AGRAT   --   0.7*   --       CBC w/Diff Recent Labs      05/15/17   0240  05/14/17   1805  05/14/17   0826   WBC  7.0  9.8  6.6   RBC  2.52*  2.36*  2.62*   HGB  7.0*  6.6*  7.2*   HCT  22.8*  21.3*  23.5*   PLT  49*  64*  91*   GRANS  82*  84*  80*   LYMPH  9*  2*  11*   EOS  0  0  5      Microbiology Recent Labs      05/15/17   1950  05/15/17   1940   CULT  NO GROWTH AFTER 11 HOURS  NO GROWTH AFTER 11 HOURS      Coagulation Recent Labs      05/15/17   024   PTP  20.6*   INR  1.9*   APTT  49.4*         Data Review: images and reports reviewed    Assessment:     Active Problems:    CAD (coronary artery disease) (3/5/2014)      PVD (peripheral vascular disease) ([facility]) (3/5/2014)      CHF (congestive heart failure) ([facility]) (2016)      Hx of CABG (2016)      Overview:       H/O carotid endarterectomy (2016)      Overview:  AICD (automatic cardioverter/defibrillator) present (7/22/2016)      Overview: 2015      Acute exacerbation of CHF (congestive heart failure) ([address] (7/76/6275)      Diastolic CHF, acute on chronic ([address] (8/17/2016)      HTN (hypertension) (9/4/2016)      HLD (hyperlipidemia) (9/4/2016)      Acute CHF (congestive heart failure) ([address] (2/26/2017)      Acute on chronic diastolic (congestive) heart failure ([address] (2/27/2017)      Aortic stenosis, moderate (3/11/2017)      Pulmonary HTN ([address] (4/4/2017)      Cellulitis (5/14/2017)    PAD    Plan/Recommendations/Medical Decision Making:     Continue present treatment   Leg pain improved today, likely secondary to improved perfusion with transfusion and Dobutamine. will eventually need CO2 angiograms (CO2 due to his CKD) however needs to be medically stable prior to intervention. Can be done as outpt if necessary. Following.      Kareen Lanza  [phone]

## 2017-05-16 NOTE — PROGRESS NOTES
Received pt in bed awake and alert. Denies pain or discomfort. In no acute distress. Vital signs stable. told by day shift nurse that pt has not voided for 12 hours. Stated he would call Dr Yane Georges. remains on Dobutrex drip at 2.5mcg/kg.

## 2017-05-17 NOTE — PROGRESS NOTES
Infectious Disease progress Note    Requested by: dr. Magen Moyer    Reason: bilateral LE cellulitis    Current abx Prior abx    Piperacillin/tazobactam 5/14/17-5/16     Lines:       Assessment :    79 yo M with h/o CHF, O2 dependence, anemia, and HTN admitted to  [facility] on 5/14/17 with bilateral leg pain and swelling x 1 week. Clinical presentation not very consistent with cellulitis. The bilateral leg pain could be due to recent fall, worsening edema due to worsening renal function, concurrent vascular disease. I will discontinue antibiotics since low clinical suspicion of infection. 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 Monitor off abx  2. F/u nephrology recommendations  3. Dobutamine per cardiology  4. Agree with vascular surgery recommendations    Will sign off. thanks   Above plan was discussed in details with patient,  and dr Magen Moyer. Please call me if any further questions or concerns. Will continue to participate in the care of this patient. subjective:    improved bilateral leg pain. Patient denies headaches, visual disturbances, sore throat, runny nose, earaches, cp, sob, chills, cough, abdominal pain, diarrhea, burning micturition,  or weakness in extremities. He denies back pain/flank pain. home Medication List    Details   fish oil-omega-3 fatty acids 340-1,000 mg capsule Take 1 Cap by mouth two (2) times a day. Indications: HYPERTRIGLYCERIDEMIA  Qty: 100 Cap, Refills: 0      cephALEXin (KEFLEX) 500 mg capsule Take 1 Cap by mouth four (4) times daily for 7 days. Qty: 28 Cap, Refills: 0      HYDROcodone-acetaminophen (NORCO) 5-325 mg per tablet Take 1 Tab by mouth every six (6) hours as needed for Pain. Max Daily Amount: 4 Tabs. Qty: 12 Tab, Refills: 0      carvedilol (COREG) 6.25 mg tablet Take 1 Tab by mouth two (2) times daily (with meals).  Indications: Chronic Heart Failure  Qty: 60 Tab, Refills: 6      colchicine 0.6 mg tablet Take 0.6 mg by mouth daily. Associated Diagnoses: Essential hypertension; Hyperlipidemia, unspecified hyperlipidemia type; Aortic stenosis, moderate; Coronary artery disease of bypass graft of native heart with stable angina pectoris ([facility] Chronic diastolic congestive heart failure ([facility] PVD (peripheral vascular disease) ([facility] Hx of CABG; H/O carotid endarterectomy; AICD (automatic cardioverter/defibrillator) present; CKD (chronic kidney disease), unspecified stage; Pulmonary HTN (HCC)      loperamide (IMMODIUM) 2 mg tablet Take 2 mg by mouth four (4) times daily as needed for Diarrhea. Associated Diagnoses: Essential hypertension; Hyperlipidemia, unspecified hyperlipidemia type; Aortic stenosis, moderate; Coronary artery disease of bypass graft of native heart with stable angina pectoris ([facility] diastolic [facility] PVD (peripheral vascular disease) ([facility] Hx of CABG; H/O carotid endarterectomy; AICD (automatic cardioverter/defibrillator) present; CKD (chronic kidney disease), unspecified stage; Pulmonary HTN (HCC)      allopurinol (ZYLOPRIM) 100 mg tablet Take 1 Tab by mouth daily. Indications: GOUT  Qty: 30 Tab, Refills: 0      aspirin delayed-release 81 mg tablet Take 2 Tabs by mouth daily. Qty: 30 Tab, Refills: 0      cholecalciferol (VITAMIN D3) 1,000 unit tablet Take 2 Tabs by mouth daily. Qty: 100 Tab, Refills: 0      therapeutic multivitamin (THERAGRAN) tablet Take 1 Tab by mouth daily. Qty: 100 Tab, Refills: 0      nitroglycerin (NITROSTAT) 0.4 mg SL tablet 1 Tab by SubLINGual route as needed for Chest Pain. Qty: 1 Bottle, Refills: 0      pantoprazole (PROTONIX) 40 mg tablet Take 1 Tab by mouth Daily (before breakfast). Qty: 100 Tab, Refills: 0      pravastatin (PRAVACHOL) 80 mg tablet Take 1 Tab by mouth nightly.   Qty: 100 Tab, Refills: 0      albuterol-ipratropium (DUO-NEB) 2.5 mg-0.5 mg/3 ml nebu 3 mL by Nebulization route every six (6) hours as needed. Qty: 30 Nebule, Refills: 0      ferrous sulfate 325 mg (65 mg iron) tablet Take 1 Tab by mouth daily (with breakfast). Qty: 100 Tab, Refills: 0      fluticasone (FLONASE) 50 mcg/actuation nasal spray 2 spray each nostril daily  Indications: ALLERGIC RHINITIS  Qty: 1 Bottle, Refills: 0      furosemide (LASIX) 40 mg tablet 1 tab BID  Qty: 100 Tab, Refills: 0      influenza vaccine 2016-17, 36mos+,,PF, (FLUZONE/FLUARIX/FLULAVAL QUAD) syrg injection 0.5 mL by IntraMUSCular route PRIOR TO DISCHARGE. Qty: 0.5 mL, Refills: 0      metOLazone (ZAROXOLYN) 2.5 mg tablet Take 1 Tab by mouth daily. Qty: 100 Tab, Refills: 0      zolpidem (AMBIEN) 5 mg tablet Take 1 Tab by mouth nightly as needed for Sleep. Max Daily Amount: 5 mg.   Qty: 30 Tab, Refills: 0             Current Facility-Administered Medications   Medication Dose Route Frequency    [START ON 5/18/2017] colchicine (MITIGARE) capsule 0.6 mg  0.6 mg Oral Q TU, TH & SAT    bumetanide (BUMEX) 12.5 mg in 0.9% sodium chloride 100 mL infusion  0.5 mg/hr IntraVENous CONTINUOUS    allopurinol (ZYLOPRIM) tablet 50 mg  50 mg Oral DAILY    DOBUTamine (DOBUTREX) 500 mg/250 mL (2,000 mcg/mL) infusion  5 mcg/kg/min IntraVENous CONTINUOUS    cholecalciferol (VITAMIN D3) tablet 2,000 Units  2,000 Units Oral DAILY    therapeutic multivitamin (THERAGRAN) tablet 1 Tab  1 Tab Oral DAILY    nitroglycerin (NITROSTAT) tablet 0.4 mg  0.4 mg SubLINGual PRN    fish oil-omega-3 fatty acids 340-1,000 mg capsule 1 Cap  1 Cap Oral BID    pantoprazole (PROTONIX) tablet 40 mg  40 mg Oral ACB    albuterol-ipratropium (DUO-NEB) 2.5 MG-0.5 MG/3 ML  3 mL Nebulization Q6H PRN    ferrous sulfate tablet 325 mg  325 mg Oral DAILY WITH BREAKFAST    fluticasone (FLONASE) 50 mcg/actuation nasal spray 2 Spray  2 Spray Both Nostrils DAILY    HYDROcodone-acetaminophen (NORCO) 5-325 mg per tablet 1 Tab  1 Tab Oral Q4H PRN    influenza vaccine - (36mos+)(PF) (FLUZONE/FLUARIX/FLULAVAL QUAD) injection 0.5 mL  0.5 mL IntraMUSCular PRIOR TO DISCHARGE    zolpidem (AMBIEN) tablet 5 mg  5 mg Oral QHS PRN    sodium chloride (NS) flush 5-10 mL  5-10 mL IntraVENous Q8H    sodium chloride (NS) flush 5-10 mL  5-10 mL IntraVENous PRN    ondansetron (ZOFRAN) injection 4 mg  4 mg IntraVENous Q4H PRN       Allergies: Review of patient's allergies indicates no known allergies. Temp (24hrs), Av.4 °F (36.3 °C), Min:96.6 °F (35.9 °C), Max:98.6 °F (37 °C)    Visit Vitals    /71 (BP 1 Location: Right arm, BP Patient Position: At rest)    Pulse 73    Temp 97.2 °F (36.2 °C)    Resp 22    Ht 6' (1.829 m)    Wt 97.9 kg (215 lb 13.3 oz)    SpO2 91%    BMI 29.27 kg/m2       ROS: 12 point ROS obtained in details. Pertinent positives as mentioned in HPI,   otherwise negative    Physical Exam:    General: elderly appearing male laying on the bed AAOx3 in no acute distress. General:   awake alert and oriented   HEENT:  Normocephalic, atraumatic, PERRL, EOMI, no scleral icterus or pallor; no conjunctival hemmohage;  nasal and oral mucous are moist and without evidence of lesions. No thrush. Neck supple, no bruits. Lymph Nodes:   no cervical, axillary or inguinal adenopathy   Lungs:   non-labored, bilaterally clear to auscultation- no crackles wheezes rales or rhonchi   Heart:  RRR, s1 and s2; 4/6 systolic murmurs throughout the precordium, no rubs or gallops, AICD pocket site left chest without erythema, bilateral LE edema, feeble pedal pulses   Abdomen:  soft, non-distended, active bowel sounds, no hepatomegaly, no splenomegaly.  Non-tender   Genitourinary:  deferred   Extremities:   no clubbing, cyanosis; no joint effusions or swelling;  muscle mass appropriate for age, dark erythema right leg, no warmth or tenderness bilateral LE, edema bilateral LE   Neurologic:  No gross focal sensory abnormalities; 5/5 muscle strength to upper and lower extremities. Speech appropriate.  Cranial nerves intact                        Skin:  Superficial ulcers left leg, no drainage, mild erythema right leg   Back:  no spinal or paraspinal muscle tenderness or rigidity, no CVA tenderness     Psychiatric:  No suicidal or homicidal ideations, appropriate mood and affect         Labs: Results:   Chemistry Recent Labs      05/17/17   1005  05/17/17   0143  05/16/17   1203  05/15/17   0240  05/14/17   1805   GLU   --   106*  113*  117*  48*   NA   --   132*  134*  138  140   K   --   4.1  4.1  4.3  4.0   CL   --   95*  96*  98*  100   CO2   --   28  29  29  28   BUN   --   81*  79*  65*  62*   CREA   --   4.07*  3.78*  2.76*  2.41*   CA  8.5  8.6  8.3*  8.6  8.9   AGAP   --   9  9  11  12   BUCR   --   20  21*  24*  26*   AP   --    --    --    --   121*   TP   --    --    --    --   6.7   ALB   --    --    --    --   2.7*   GLOB   --    --    --    --   4.0   AGRAT   --    --    --    --   0.7*      CBC w/Diff Recent Labs      05/17/17   1005  05/16/17   1203  05/15/17   0240   WBC  5.1  5.0  7.0   RBC  2.71*  2.69*  2.52*   HGB  7.6*  7.6*  7.0*   HCT  24.0*  24.0*  22.8*   PLT  76*  54*  49*   GRANS  76*  77*  82*   LYMPH  6*  6*  9*   EOS  7*  7*  0      Microbiology Recent Labs      05/15/17   1950  05/15/17   1940   CULT  NO GROWTH 2 DAYS  NO GROWTH 2 DAYS          RADIOLOGY:    All available imaging studies/reports in [facility] care for this admission were reviewed    Dr. Wale Ascencio, Infectious Disease Specialist  [phone]  May 17, 2017  9:17 AM

## 2017-05-17 NOTE — CDMP QUERY
Please clarify if this patient is being treated/managed for:    => Hypovolemic shock associated with diuretics  =>Other Explanation of clinical findings  =>Unable to Determine (no explanation of clinical findings)    The medical record reflects the following:    Risk:75 yo M with h/o CHF, O2 dependence, anemia, and HTN . Dx Acute on chronic diastolic heart failure and cellulits    Clinical Indicators:hypotension even with IV dobutamine  gtt    Treatment: dobutamine drip. D/c'd metolazone and Coreg for now. D/c ivf 2nd chf,labs monitored    Please clarify and document your clinical opinion in the progress notes and discharge summary including the definitive and/or presumptive diagnosis, (suspected or probable), related to the above clinical findings. Please include clinical findings supporting your diagnosis. If you DECLINE this query or would like to communicate with [facility], please utilize the \"[employer] message box\" at the TOP of the Progress Note on the right.       Thank you, [employer]

## 2017-05-17 NOTE — PROGRESS NOTES
Pt with PAD, confirmed with noninvasive studies, who had severe leg pain on admission  Leg pain improved today, likely secondary to improved perfusion with transfusion and Dobutamine. Will eventually need CO2 angiograms (CO2 due to his CKD) however needs to be medically stable prior to intervention. Can be done as outpt if necessary.    Following

## 2017-05-17 NOTE — PROGRESS NOTES
RENAL DAILY PROGRESS NOTE    Patient: Rafael Lincoln               Sex: male          DOA: 5/14/2017  8:05 AM        YOB: 1940      Age:  78 y.o.        LOS:  LOS: 3 days     Subjective:     Rafael Lincoln is a 78 y.o.  who presents with Acute exacerbation of CHF (congestive heart failure) (HCC)  Cellulitis. Asked to evaluate for renal failure. hx of crf stage 4,cardiomyopathy,chf,cabg,single functioning kidney,shira  Chief complains: Patient denies nausea, vomiting, chest pain, dizziness,or headache.hypoxic  - Reviewed last 24 hrs events     Current Facility-Administered Medications   Medication Dose Route Frequency    [START ON 5/18/2017] colchicine (MITIGARE) capsule 0.6 mg  0.6 mg Oral Q TU, TH & SAT    bumetanide (BUMEX) 12.5 mg in 0.9% sodium chloride 100 mL infusion  0.5 mg/hr IntraVENous CONTINUOUS    allopurinol (ZYLOPRIM) tablet 50 mg  50 mg Oral DAILY    DOBUTamine (DOBUTREX) 500 mg/250 mL (2,000 mcg/mL) infusion  5 mcg/kg/min IntraVENous CONTINUOUS    cholecalciferol (VITAMIN D3) tablet 2,000 Units  2,000 Units Oral DAILY    therapeutic multivitamin (THERAGRAN) tablet 1 Tab  1 Tab Oral DAILY    nitroglycerin (NITROSTAT) tablet 0.4 mg  0.4 mg SubLINGual PRN    fish oil-omega-3 fatty acids 340-1,000 mg capsule 1 Cap  1 Cap Oral BID    pantoprazole (PROTONIX) tablet 40 mg  40 mg Oral ACB    albuterol-ipratropium (DUO-NEB) 2.5 MG-0.5 MG/3 ML  3 mL Nebulization Q6H PRN    ferrous sulfate tablet 325 mg  325 mg Oral DAILY WITH BREAKFAST    fluticasone (FLONASE) 50 mcg/actuation nasal spray 2 Spray  2 Spray Both Nostrils DAILY    HYDROcodone-acetaminophen (NORCO) 5-325 mg per tablet 1 Tab  1 Tab Oral Q4H PRN    influenza vaccine 2016-17 (36mos+)(PF) (FLUZONE/FLUARIX/FLULAVAL QUAD) injection 0.5 mL  0.5 mL IntraMUSCular PRIOR TO DISCHARGE    zolpidem (AMBIEN) tablet 5 mg  5 mg Oral QHS PRN    sodium chloride (NS) flush 5-10 mL  5-10 mL IntraVENous Q8H    sodium chloride (NS) flush 5-10 mL  5-10 mL IntraVENous PRN    ondansetron (ZOFRAN) injection 4 mg  4 mg IntraVENous Q4H PRN       Objective:     Visit Vitals    /71 (BP 1 Location: Right arm, BP Patient Position: At rest)    Pulse 73    Temp 97.2 °F (36.2 °C)    Resp 22    Ht 6' (1.829 m)    Wt 97.9 kg (215 lb 13.3 oz)    SpO2 91%    BMI 29.27 kg/m2       Intake/Output Summary (Last 24 hours) at 05/17/17 1258  Last data filed at 05/17/17 0930   Gross per 24 hour   Intake           623.22 ml   Output              545 ml   Net            78.22 ml       Physical Examination:     GEN: AAO X 3,awake  RS: diminished bs   CVS: S1-S2 heard, RRR, No S3 / murmur  Abdomen: ascites  Extremities: + edema,  HEENT: Head is atraumatic, PERRLA, conjunctiva pink & non icteric. No JVD or carotid bruit   Musculoskeletal: No gross joints or bone deformities   Lymph Node: No palpable cervical, axillary or groin lymphadenopathy. Data Review:      Labs:     Hematology: Recent Labs      05/17/17   1005  05/16/17   1203  05/15/17   0240  05/14/17   1805   WBC  5.1  5.0  7.0  9.8   HGB  7.6*  7.6*  7.0*  6.6*   HCT  24.0*  24.0*  22.8*  21.3*     Chemistry: Recent Labs      05/17/17   1005  05/17/17   0143  05/16/17   1203  05/15/17   0240  05/14/17   1805   BUN   --   81*  79*  65*  62*   CREA   --   4.07*  3.78*  2.76*  2.41*   CA  8.5  8.6  8.3*  8.6  8.9   ALB   --    --    --    --   2.7*   K   --   4.1  4.1  4.3  4.0   NA   --   132*  134*  138  140   CL   --   95*  96*  98*  100   CO2   --   28  29  29  28   PHOS  4.5   --    --    --    --    GLU   --   106*  113*  117*  48*        Images:    XR (Most Recent). CXR reviewed by me and compared with previous CXR   Results from Hospital Encounter encounter on 05/14/17   XR CHEST PORT   Narrative CHEST PORTABLE    CPT CODE: 64515    COMPARISON: February 26, 2017    INDICATIONS: Right leg pain and cellulitis.  Chest pain with shortness of breath  and or arrhythmia    FINDINGS: There is a left-sided bipolar ICD. Status post median sternotomy. The  heart is slightly enlarged. There is a moderate sized right pleural effusion  with associated compressive atelectatic changes in the right lung base. The left  lung is clear. No significant osseous abnormalities. Impression Impression:    Mild cardiomegaly. Moderate-sized right-sided pleural effusion decreased in size  since February 26, 2017. Previously seen small left pleural effusion has  resolved. CT (Most Recent)   Results from Hospital Encounter encounter on 12/12/16   CT ABD WO CONT   Narrative CT ABDOMEN WITHOUT ENHANCEMENT     CPT CODE: 92495    INDICATION: Acute renal failure and chronic kidney disease. TECHNIQUE: 5 mm collimation axial images obtained from the diaphragm to the  level of the iliac crest without intravenous contrast. Oral contrast was  administered. All CT scans at this facility are performed using dose optimization technique as  appropriate to a performed exam, to include automated exposure control,  adjustment of the mA and/or kV according to patient size (including appropriate  matching first site-specific examinations), or use of iterative reconstruction  technique. COMPARISON: None. FINDINGS:    ABDOMEN FINDINGS:     Lack of intravenous contrast renders this study suboptimal for evaluating the  solid abdominal organs, vasculature and bowel. Liver: Grossly unremarkable. Spleen: No splenomegaly. Pancreas: Grossly unremarkable. Biliary: Status post cholecystectomy. Bowel: No small bowel or colon dilation. .   Peritoneum/ Retroperitoneum: Large volume ascites. No free air. Johnathon Osiel Lymph Nodes: 1.1 cm in short axis left periaortic node (series 2, image 45). 1  cm retrocaval node (43). .    Adrenal Glands: No focal nodule. Kidneys: Right kidney is atrophic compared with left measuring 8.2 cm in  craniocaudal diameter compared with 9.5 cm on the left. . There are left greater  than right renal vascular calcifications. There is a 4.2 cm lesion at the right  interpolar kidney with density minimally above water. There are a few  subcentimeter left renal lesions which are too small to characterize. . No  hydronephrosis. Vessels: Decreased density within the aortic lumen. 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 Postsurgical changes  aortobiiliac graft with severe calcification in the excluded bilateral common  iliac arteries. Visceral artery calcifications. . Moderate atherosclerosis with  coarse calcification throughout the visceral ostia. Lung Base: Decreased density in the cardiac chambers. Incompletely visualized  cardiac device. Right atrium appears enlarged. Large right and moderate left  pleural effusions. There is complete atelectasis at the right lower lobe and at  atelectasis at the right middle lobe. Mild atelectasis at the left lung base. .    Bones: Moderate anasarca. Status post median sternotomy. Severe degenerative  disc disease L4-L5 with vacuum disc phenomenon and moderate degenerative disc  disease otherwise. Impression IMPRESSION:  1. Atrophic right kidney with bilateral renal vascular calcifications and  moderate atherosclerosis. 2.  Right renal lesion with density minimally above water is statistically most  likely a cyst.  3.  Large volume ascites, large right and moderate left pleural effusions and  moderate anasarca. 4.  Borderline retroperitoneal lymphadenopathy. 5.  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 6.  Anemia. EKG No results found for this or any previous visit. I have personally reviewed the old medical records and patient's labs    Plan / Recommendation:      1. Acute/crf stage 4.had discussed dialysis indications and complications with the pt.wants to try conservative treatment for now. discuss with dr Blayne Tolliver. add bumex drip,increase dobutamine  2.chf,fluid overload,high oxygen requirment  3.hx of small kidney ,shira. repeat renal ultrasound  4.anemia,start epo    D/w Gayla Dailey and Lary Sevilla MD  Nephrology  5/17/2017

## 2017-05-17 NOTE — PROGRESS NOTES
Ami Platt M.D. PROGRESS NOTE    Name: Christina Wilson MRN: [id number]   : 1940 Hospital: [facility]   Date: 2017  Admission Date: 2017     Subjective/Objective/Plans    1. Renal failure  2. CHF  3. Hypotension  3. Hypoxemia  4. Right leg swelling  5. Anemia    VSS  On O2 mask  No complaint  Has ascites    Hg stable Renal and Cardiology following    Plan  Pulmonary consult  Vital Signs:  Visit Vitals    /71 (BP 1 Location: Right arm, BP Patient Position: At rest)    Pulse 73    Temp 97.2 °F (36.2 °C)    Resp 22    Ht 6' (1.829 m)    Wt 97.9 kg (215 lb 13.3 oz)    SpO2 94%  Comment: 8L HFNC Salter    BMI 29.27 kg/m2       O2 Device: Ventimask   O2 Flow Rate (L/min): 12 l/min   Temp (24hrs), Av.4 °F (36.3 °C), Min:96.6 °F (35.9 °C), Max:98.6 °F (37 °C)     1:30 PM  Intake/Output:   Last shift:       07 - 1900  In: 139.4 [P.O.:120; I.V.:19.4]  Out: 200   Last 3 shifts: 05/15 1901 -  0700  In: 1173.8 [P.O.:760; I.V.:413.8]  Out: 895 [Urine:895]    Intake/Output Summary (Last 24 hours) at 17 1330  Last data filed at 17 0930   Gross per 24 hour   Intake           623.22 ml   Output              470 ml   Net           153.22 ml         Physical Exam:    General: moderately ill    HEENT: pupils equal, no ear discharge   Neck: No abnormally enlarged lymph nodes. , no JDV   Mouth: MMM no lesions    Chest: normal, no breast masses   Lungs: decreased air exchange bilaterally   Heart: Regular rate and rhythm   Abdomen: abdomen is soft without significant tenderness, masses, organomegaly or guarding   Extremity: 2 + edema   Neuro: alert    Skin: Skin color, texture, turgor normal. No rashes or lesions    Data Review:    Labs: Results:       Chemistry Recent Labs      17   1005  17   0143  17   1203  05/15/17   0240  17   1805   GLU   --   106*  113*  117*  48*   NA   --   132*  134*  138  140   K   --   4.1  4.1  4.3  4.0   CL --   95*  96*  98*  100   CO2   --   28  29  29  28   BUN   --   81*  79*  65*  62*   CREA   --   4.07*  3.78*  2.76*  2.41*   CA  8.5  8.6  8.3*  8.6  8.9   AGAP   --   9  9  11  12   BUCR   --   20  21*  24*  26*   TBILI   --    --    --    --   2.6*   AP   --    --    --    --   121*   TP   --    --    --    --   6.7   ALB   --    --    --    --   2.7*   GLOB   --    --    --    --   4.0   AGRAT   --    --    --    --   0.7*      CBC w/Diff Recent Labs      05/17/17   1005  05/16/17   1203  05/15/17   0240   WBC  5.1  5.0  7.0   RBC  2.71*  2.69*  2.52*   HGB  7.6*  7.6*  7.0*   HCT  24.0*  24.0*  22.8*   PLT  76*  54*  49*   GRANS  76*  77*  82*   LYMPH  6*  6*  9*   EOS  7*  7*  0      Cardiac Enzymes No results for input(s): CPK, CKND1, TYSON in the last 72 hours. No lab exists for component: CKRMB, TROIP   Coagulation Recent Labs      05/15/17   0240   PTP  20.6*   INR  1.9*   APTT  49.4*       Lipid Panel Lab Results   Component Value Date/Time    Cholesterol, total 100 07/22/2016 05:20 AM    HDL Cholesterol 31 07/22/2016 05:20 AM    LDL, calculated 54.4 07/22/2016 05:20 AM    VLDL, calculated 14.6 07/22/2016 05:20 AM    Triglyceride 73 07/22/2016 05:20 AM    CHOL/HDL Ratio 3.2 07/22/2016 05:20 AM      BNP No results for input(s): BNPP in the last 72 hours. Liver Enzymes Recent Labs      05/14/17   1805   TP  6.7   ALB  2.7*   TBILI  2.6*   AP  121*   SGOT  372*   ALT  205*      Thyroid Studies Lab Results   Component Value Date/Time    TSH 4.23 07/22/2016 05:20 AM          Procedures/imaging: see electronic medical records for all procedures, Xrays and details which were not copied into this note but were reviewed. Allergies:  No Known Allergies    Home Medications:  Prior to Admission Medications   Prescriptions Last Dose Informant Patient Reported? Taking?    HYDROcodone-acetaminophen (NORCO) 5-325 mg per tablet 5/12/2017 at 0300  No No   Sig: Take 1 Tab by mouth every six (6) hours as needed for Pain. Max Daily Amount: 4 Tabs. albuterol-ipratropium (DUO-NEB) 2.5 mg-0.5 mg/3 ml nebu 2017 at 1700  No No   Sig: 3 mL by Nebulization route every six (6) hours as needed. allopurinol (ZYLOPRIM) 100 mg tablet 2017 at 0300  No No   Sig: Take 1 Tab by mouth daily. Indications: GOUT   aspirin delayed-release 81 mg tablet 2017 at 0300  No No   Sig: Take 2 Tabs by mouth daily. carvedilol (COREG) 6.25 mg tablet 2017 at 0300  No No   Sig: Take 1 Tab by mouth two (2) times daily (with meals). Indications: Chronic Heart Failure   cephALEXin (KEFLEX) 500 mg capsule Not Taking at Unknown time  No No   Sig: Take 1 Cap by mouth four (4) times daily for 7 days. cholecalciferol (VITAMIN D3) 1,000 unit tablet 2017 at 0300  No No   Sig: Take 2 Tabs by mouth daily. colchicine 0.6 mg tablet 2017 at 0300  Yes No   Sig: Take 0.6 mg by mouth daily. ferrous sulfate 325 mg (65 mg iron) tablet 2017 at 0300  No No   Sig: Take 1 Tab by mouth daily (with breakfast). fish oil-omega-3 fatty acids 340-1,000 mg capsule 2017 at 0300  No Yes   Sig: Take 1 Cap by mouth two (2) times a day. Indications: HYPERTRIGLYCERIDEMIA   fluticasone (FLONASE) 50 mcg/actuation nasal spray 2017 at 0300  No No   Si spray each nostril daily  Indications: ALLERGIC RHINITIS   furosemide (LASIX) 40 mg tablet 2017 at 0300  No No   Si tab BID   influenza vaccine 2016-17, 36mos+,,PF, (FLUZONE/FLUARIX/FLULAVAL QUAD) syrg injection   No No   Si.5 mL by IntraMUSCular route PRIOR TO DISCHARGE. loperamide (IMMODIUM) 2 mg tablet 2017 at 0300  Yes No   Sig: Take 2 mg by mouth four (4) times daily as needed for Diarrhea.   metOLazone (ZAROXOLYN) 2.5 mg tablet Not Taking at Unknown time  No No   Sig: Take 1 Tab by mouth daily. Patient taking differently: Take 2.5 mg by mouth every other day.    nitroglycerin (NITROSTAT) 0.4 mg SL tablet Unknown at Unknown time  No No   Si Tab by SubLINGual route as needed for Chest Pain.   pantoprazole (PROTONIX) 40 mg tablet Unknown at Unknown time  No No   Sig: Take 1 Tab by mouth Daily (before breakfast). pravastatin (PRAVACHOL) 80 mg tablet 5/12/2017 at 0300  No No   Sig: Take 1 Tab by mouth nightly. therapeutic multivitamin (THERAGRAN) tablet 5/12/2017 at 0300  No No   Sig: Take 1 Tab by mouth daily. zolpidem (AMBIEN) 5 mg tablet Unknown at Unknown time  No No   Sig: Take 1 Tab by mouth nightly as needed for Sleep. Max Daily Amount: 5 mg.       Facility-Administered Medications: None       Current Medications:  Current Facility-Administered Medications   Medication Dose Route Frequency    [START ON 5/18/2017] colchicine (MITIGARE) capsule 0.6 mg  0.6 mg Oral Q TU, TH & SAT    bumetanide (BUMEX) 12.5 mg in 0.9% sodium chloride 100 mL infusion  0.5 mg/hr IntraVENous CONTINUOUS    epoetin ron (EPOGEN;PROCRIT) injection 6,000 Units  6,000 Units SubCUTAneous Q MON, WED & FRI    allopurinol (ZYLOPRIM) tablet 50 mg  50 mg Oral DAILY    DOBUTamine (DOBUTREX) 500 mg/250 mL (2,000 mcg/mL) infusion  5 mcg/kg/min IntraVENous CONTINUOUS    cholecalciferol (VITAMIN D3) tablet 2,000 Units  2,000 Units Oral DAILY    therapeutic multivitamin (THERAGRAN) tablet 1 Tab  1 Tab Oral DAILY    nitroglycerin (NITROSTAT) tablet 0.4 mg  0.4 mg SubLINGual PRN    fish oil-omega-3 fatty acids 340-1,000 mg capsule 1 Cap  1 Cap Oral BID    pantoprazole (PROTONIX) tablet 40 mg  40 mg Oral ACB    albuterol-ipratropium (DUO-NEB) 2.5 MG-0.5 MG/3 ML  3 mL Nebulization Q6H PRN    ferrous sulfate tablet 325 mg  325 mg Oral DAILY WITH BREAKFAST    fluticasone (FLONASE) 50 mcg/actuation nasal spray 2 Spray  2 Spray Both Nostrils DAILY    HYDROcodone-acetaminophen (NORCO) 5-325 mg per tablet 1 Tab  1 Tab Oral Q4H PRN    influenza vaccine 2016-17 (36mos+)(PF) (FLUZONE/FLUARIX/FLULAVAL QUAD) injection 0.5 mL  0.5 mL IntraMUSCular PRIOR TO DISCHARGE    zolpidem (AMBIEN) tablet 5 mg  5 mg Oral QHS PRN    sodium chloride (NS) flush 5-10 mL  5-10 mL IntraVENous Q8H    sodium chloride (NS) flush 5-10 mL  5-10 mL IntraVENous PRN    ondansetron (ZOFRAN) injection 4 mg  4 mg IntraVENous Q4H PRN       Chart and notes reviewed. Data reviewed. I have evaluated and examined the patient.         IMPRESSION:   Patient Active Problem List   Diagnosis Code    Hematospermia R36.1    CAD (coronary artery disease) I25.10    PVD (peripheral vascular disease) ([facility] I73.9    CHF (congestive heart failure) ([facility]) I50.9    Anasarca R60.1    Hx of CABG Z95.1    H/O carotid endarterectomy Z98.890    AICD (automatic cardioverter/defibrillator) present Z95.810    Acute exacerbation of CHF (congestive heart failure) ([facility]) O90.0    Diastolic CHF, acute on chronic (HCC) I50.33    HTN (hypertension) I10    HLD (hyperlipidemia) E78.5    Acute CHF (congestive heart failure) ([facility]) I50.9    Acute on chronic diastolic (congestive) heart failure (HCC) I50.33    Thrombocytopenia (HCC) D69.6    Hypokalemia E87.6    Aortic stenosis, moderate I35.0    CKD (chronic kidney disease) N18.9    Pulmonary HTN ([facility]) I27.2    Cellulitis L03.90 ·         PLAN:/DISCUSSION:   · Continue current meds  ·         Vanessa Bansal MD  5/17/2017, 1:30 PM

## 2017-05-17 NOTE — ROUTINE PROCESS
Bedside and Verbal shift change report given to Luis Enrique Hunt) by Marcelle Mcclellan RN (offgoing nurse). Report included the following information SBAR, Kardex, MAR and Recent Results.     SITUATION:    Code Status: Full Code   Reason for Admission: Acute exacerbation of [facility]F [facility]congestive [facility] failure) [facility]   Cellulitis    [facility] day: 3   Problem List:       Hospital Problems  Date Reviewed: 3/11/2017          Codes Class Noted POA    Cellulitis ICD-10-CM: L03.90  ICD-9-CM: 682.9  5/14/2017 Unknown        Pulmonary HTN ([facility] ICD-10-CM: I27.2  ICD-9-CM: 416.8  4/4/2017 Yes        Aortic stenosis, moderate (Chronic) ICD-10-CM: I35.0  ICD-9-CM: 424.1  3/11/2017 Yes        Acute on chronic diastolic (congestive) heart failure (HCC) ICD-10-CM: I50.33  ICD-9-CM: 428.33, 428.0  2/27/2017 Yes        Acute CHF (congestive heart failure) ([facility] ICD-10-CM: I50.9  ICD-9-CM: 428.0  2/26/2017 Yes        HTN (hypertension) (Chronic) ICD-10-CM: I10  ICD-9-CM: 401.9  9/4/2016 Yes        HLD (hyperlipidemia) (Chronic) ICD-10-CM: E78.5  ICD-9-CM: 272.4  9/4/2016 Yes        Diastolic CHF, acute on chronic ([facility] ICD-10-CM: I50.33  ICD-9-CM: 428.33, 428.0  8/17/2016 Yes        Acute exacerbation of CHF (congestive heart failure) ([facility] ICD-10-CM: I50.9  ICD-9-CM: 428.0  8/12/2016 Unknown        Hx of CABG ICD-10-CM: Z95.1  ICD-9-CM: V45.81  7/22/2016 Yes    Overview Signed 7/22/2016  5:23 PM by Jessee Byrne NP     2013             H/O carotid endarterectomy ICD-10-CM: Z98.890  ICD-9-CM: V45.89  7/22/2016 Yes    Overview Signed 7/22/2016  5:27 PM by Jessee Byrne NP     2015             AICD (automatic cardioverter/defibrillator) present ICD-10-CM: Z95.810  ICD-9-CM: V45.02  7/22/2016 Yes    Overview Signed 7/22/2016  5:27 PM by Jessee Byrne NP     2015             CHF (congestive heart failure) (HCC) ICD-10-CM: I50.9  ICD-9-CM: 428.0  7/21/2016 Yes        CAD (coronary artery disease) ICD-10-CM: I25.10  ICD-9-CM: 414.00  3/5/2014 Yes        PVD (peripheral vascular disease) ([facility] ICD-10-CM: I73.9  ICD-9-CM: 443.9  3/5/2014 Yes              BACKGROUND:    Past Medical History:   Past Medical History:   Diagnosis Date    Arrhythmia     A fib; has external vest and belt he wears for this    Arthritis     High blood pressure     Hypercholesteremia     Mini stroke ([facility] 2000    Unspecified sleep apnea     does not wear machine         Patient taking anticoagulants no     ASSESSMENT:    Changes in Assessment Throughout Shift: none     Patient has Central Line: no Reasons if yes: .    Patient has Emerson Cath: yes Reasons if yes: retention      Last Vitals:     Vitals:    05/16/17 2000 05/16/17 2214 05/17/17 0020 05/17/17 0435   BP: 90/50 124/82 102/67 114/78   Pulse: 63 74 65 71   Resp: 20 20 20 20   Temp: 96.7 °F (35.9 °C) 97.2 °F (36.2 °C) 98.2 °F (36.8 °C) 98.6 °F (37 °C)   SpO2:  98% 98% 95%   Weight:    97.9 kg (215 lb 13.3 oz)   Height:            IV and DRAINS (will only show if present)   Peripheral IV 05/15/17 Right Hand-Site Assessment: Clean, dry, & intact  [REMOVED] Peripheral IV 05/14/17 Right Antecubital-Site Assessment: Clean, dry, & intact     WOUND (if present)   Wound Type:  Skin tear   Dressing present Dressing Present : No   Wound Concerns/Notes:  none     PAIN    Pain Assessment    Pain Intensity 1: 0 (05/17/17 0400)    Pain Location 1: Back    Pain Intervention(s) 1: Medication (see MAR)    Patient Stated Pain Goal: 0  o Interventions for Pain:  none  o Intervention effective: n/a  o Time of last intervention: n/a   o Reassessment Completed: n/a      Last 3 Weights:  Last 3 Recorded Weights in this Encounter    05/15/17 0652 05/16/17 0325 05/17/17 0435   Weight: 87.5 kg (192 lb 14.4 oz) 91.4 kg (201 lb 6.4 oz) 97.9 kg (215 lb 13.3 oz)     Weight change: 6.546 kg (14 lb 6.9 oz)     INTAKE/OUPUT    Current Shift: 05/16 1901 - 05/17 0700  In: 214.6 [P.O.:60; I.V.:154.6]  Out: 250 [Urine:250]    Last three shifts: 05/15 0701 - 05/16 1900  In: 1282.1 [P.O.:700; I.V.:259.2]  Out: 645 [Urine:645]     LAB RESULTS     Recent Labs      05/16/17   1203  05/15/17   0240  05/14/17   1805   WBC  5.0  7.0  9.8   HGB  7.6*  7.0*  6.6*   HCT  24.0*  22.8*  21.3*   PLT  54*  49*  64*        Recent Labs      05/17/17   0143  05/16/17   1203  05/15/17   0240   NA  132*  134*  138   K  4.1  4.1  4.3   GLU  106*  113*  117*   BUN  81*  79*  65*   CREA  4.07*  3.78*  2.76*   CA  8.6  8.3*  8.6   INR   --    --   1.9*       RECOMMENDATIONS AND DISCHARGE PLANNING     1. Pending tests/procedures/ Plan of Care or Other Needs: none     2. Discharge plan for patient and Needs/Barriers: long term care    3. Estimated Discharge Date: 5/20/2017 Posted on Whiteboard in Patients Room: no      4. The patient's care plan was reviewed with the oncoming nurse. \"HEALS\" SAFETY CHECK      Fall Risk    Total Score: 3    Safety Measures: Safety Measures: Bed/Chair-Wheels locked, Bed in low position, Call light within reach, Fall prevention (comment), Gripper socks, Side rails X 3    A safety check occurred in the patient's room between off going nurse and oncoming nurse listed above. The safety check included the below items  Area Items   H  High Alert Medications - Verify all high alert medication drips (heparin, PCA, etc.)   E  Equipment - Suction is set up for ALL patients (with yanker)  - Red plugs utilized for all equipment (IV pumps, etc.)  - WOWs wiped down at end of shift.  - Room stocked with oxygen, suction, and other unit-specific supplies   A  Alarms - Bed alarm is set for fall risk patients  - Ensure chair alarm is in place and activated if patient is up in a chair   L  Lines - Check IV for any infiltration  - Emerson bag is empty if patient has a Emerson   - Tubing and IV bags are labeled   S  Safety   - Room is clean, patient is clean, and equipment is clean.   - Hallways are clear from equipment besides carts.   - Fall bracelet on for fall risk patients  - Ensure room is clear and free of clutter  - Suction is set up for ALL patients (with phylicia)  - Hallways are clear from equipment besides carts.    - Isolation precautions followed, supplies available outside room, sign posted     Ángel Caballero RN

## 2017-05-17 NOTE — CONSULTS
[facility]. Pulmonary, Critical Care, and Sleep Medicine    Initial Patient Consult    Name: William Zarate MRN: [id number]   : 1940 Hospital: [address] [place] [address] Dr   Date: 2017        IMPRESSION:   · Bilateral Pleural Effusions: R>L, worsening on CXR (17) from . Discussed with pt about therapeutic Thoracentesis to help with respiratory status in the short term, though will not be a long term fix for underlying process. Pt will consider procedure - will re-address tomorrow. · Acute on Chronic hypoxic Respiratory Failure - 2/2 Fluid overload d/t worsening CHF in the setting of ESRD and pt currently refusing HD. · COPD  · Acute diastolic CHF - Elevated BNP & BLE edema   · KYLEE on CKD - Elevated Cr; Nephrology following; pt needs HD but is currently refusing   · Hypotension - Currently on Dobutamine drip at 5mcg/mg/kg/hr  · Pulmonary HTN  · Anemia - Received transfusion - 1U PRBC on 05/15/17  · Thrombocytopenia - Pt does have chronic ITP ( seen by Hematology in 2017)  · Anasacra  · Cellulitis - Failed out-patient treatment; completed Zosyn on 17.    · PAD - with BLE pain - Vascular following  · CAD - S/p CABG in ; s/p AICD in ; hx of Carotid endarterectomy - bilateral in ; Mitral regurgitation - mild-to moderate based on Echo ()  · Gout      Patient Active Problem List   Diagnosis Code    Hematospermia R36.1    CAD (coronary artery disease) I25.10    PVD (peripheral vascular disease) ([facility] [address] I73.9    CHF (congestive heart failure) ([facility]) I50.9    Anasarca R60.1    Hx of CABG Z95.1    H/O carotid endarterectomy Z98.890    AICD (automatic cardioverter/defibrillator) present Z95.810    Acute exacerbation of CHF (congestive heart failure) (HCC) B25.5    Diastolic CHF, acute on chronic (HCC) I50.33    HTN (hypertension) I10    HLD (hyperlipidemia) E78.5    Acute CHF (congestive heart failure) ([facility]) I50.9    Acute on chronic diastolic (congestive) heart failure (HCC) I50.33    Thrombocytopenia (HCC) D69.6    Hypokalemia E87.6    Aortic stenosis, moderate I35.0    CKD (chronic kidney disease) N18.9    Pulmonary HTN (HCC) I27.2    Cellulitis L03.90      RECOMMENDATIONS:   · SpO2 goal >90%; titrate supp O2 PRN; upon first exam, pt was resting in mild distress on 58% Venturi Mask. After lengthy discussion with patient and hearing out his concerns for treatment and reasons for refusing certain treatments, pt was agreeable for ABG and CXR to evaluate respiratory status. Pt was titrated to HFNC - Salter at 8LPM so that he could eat his lunch. · Upon re-exam; s/p ABG result, RT increased O2 up to 12LPM HFNC - SALTER - now pt is sleeping and appears comfortable. SpO2 at 94%, however ABG results show Metabolic Acidosis, partially compensated. Pt agreeable to trial BiPAP for now and repeat ABG. BiPAP settings: 14/7, FiO2 at 60%. · Repeat ABG shows slow improvement - pt appears to be a chronic CO2 retainer - Ok to use BiPAP PRN; use HFNC - Salter when not on BiPAP. Will monitor respiratory status and repeat ABG PRN. · CXR shows bilateral pleural effusions, R side increased since prior imaging. Discussed with pt regarding Thoracentesis for short term relief of symptoms. At this time, pt is considering this, but has not agreed to procedure yet. Will re-evaluate tomorrow. · Aggressive pulmonary toileting; encourage ICS when not on BiPAP; aspiration precautions - HOB >30'  · Con't duo-nebs q6 PRN;Con't Flonase  · No ABX at this time. · Pt currently on Dobutamine drip at fixed rate of 5mcg/kg/hr. Currently HD stable; will monitor. · Cardio following - Currently on Bumex gtt; defer diuresis management to Cardio & Nephro; Repeat Echo pending  · Pt states that he does have COPD and quit smoking in 1999.  No prior records found in chart for pulmonologist, though pt states that he thought he used to see one; No daily meds found in chart, pt can't recall if he takes daily maintenance meds for his COPD. Would consider adding maintenance inhaler (LABA/LAMA) for added respiratory benefit. Will re-evaluate once out of acute phase. · Con't to monitor renal function - Nephrology following - recommend HD for worsening KYLEE on CKD. Had lengthy discussion with pt regarding his refusal for dialysis, which seems to stem from a fear pain from a preconceived need for multiple IV sticks for dialysis. I explained that if he were to agreed to dialysis, a temp dialysis cath would be placed. After lengthy discussion with pt and addressing his fears and questions, pt was ammenable to suggestions and need for dialysis. At this point, he is considering dialysis treatment, but has not agreed or consented to treatment yet. · Arterial studies show occluded L SFA and moderate >50% stenosis of the right superficial femoral artery with severe >75% stenosis of the profunda femoris artery - Vascular following - rec' for CO2 angiograms once more stable; if resting leg pain persists, may consider urgent vascular intervention. · Recommend SAMANTA with Pulmonary Clinic upon D/C for overall f/u; evaluation for COPD, baseline PTF's, evaluation for ROOPA. · Given pt's overall prognosis, Palliative Care team also consulted   · Will Follow closely, thank you for this consult. · DVT, PUD prophylaxis - per primary team.      Subjective: This patient has been seen and evaluated at the request of Dr. Kevan Hill for Hypoxia in the setting of worsening CHF and KYLEE on CKD and pt currently refusing dialysis.  Patient is a 78 y.o. male prior smoker, with multiple medical problems and extensive cardiac history, to include PMH of Acute on chronic Systolic and Diastolic CHF; COPD (per pt, but not found in chart) - on home O2 at 3LPM at rest, 4LPM with exertion; PVD, HTN, Pulmonary HTN, CAD, s/p CABG (2013), s/p AICD (02/17); HLD; and chronic TTP, who presented to [facility] ED via EMS on 05/14/17, c/o R leg pain and progressively worsening of leg swelling x1 week. Pt was seen on 5/9/17 in the ED for same, had negative PVL and was d/h with keflex and pain meds. Has been taking abx as directed and has finished his pain meds but he has not had any improvement in pain. Upon arrival to ED, pt has clinical signs of cellulitis, not responding to out-patient treatment. Pt was admitted for cellulitis and CHF exacerbation. PVL's (-) for DVT; CTA (-) for PE; Arterial studies show occluded L SFA and moderate >50% stenosis of the R superficial femoral artery with severe >75% stenosis of the profunda femoris artery. 05/17/17:  Pulmonary team consulted today (05/17/17) d/t hypoxia in the setting of acute CHF with fluid overload, 2/2 worsening KYLEE on CKD and pt currently refusing dialysis. Pt may also have underlying respiratory disease, and reports hx of COPD - underlying COPD exacerbation?    - Alert, awake, initially resting in mild distress on Venturi mask - was able to have conversation with me, but would pause every so often to catch his breath; pt was transitioned to SALTER at 8LPM  - ABG showed partially compensated metabolic Acidosis - increased to 12L on [place]ter - SpO2 improved to 94%; pt then agreed to BiPAP   - c/o of bilateral leg pain, very sensitive to touch  - C/o of SOB, admits that breathing feels slightly improved with above interventions; has intermittent nonproductive cough  - States that he felt nauseated this morning and vomited twice; does not currently feel nauseous  - Denies any CP, palpitations, abd pain, N/V/D currently, F/C, H/A.        Past Medical History:   Diagnosis Date    Arrhythmia     A fib; has external vest and belt he wears for this    Arthritis     High blood pressure     Hypercholesteremia     Mini stroke ([facility] [address] 2000    Unspecified sleep apnea     does not wear machine      Past Surgical History:   Procedure Laterality Date    CABG, ARTERY-VEIN, THREE  2013    HX CAROTID ENDARTERECTOMY Left     HX CAROTID ENDARTERECTOMY Right 2000    HX CORONARY STENT PLACEMENT        Prior to Admission medications    Medication Sig Start Date End Date Taking? Authorizing Provider   fish oil-omega-3 fatty acids 340-1,000 mg capsule Take 1 Cap by mouth two (2) times a day. Indications: HYPERTRIGLYCERIDEMIA 3/11/17  Yes Parisa Madrigal MD   cephALEXin (KEFLEX) 500 mg capsule Take 1 Cap by mouth four (4) times daily for 7 days. 5/9/17 5/16/17  Nicko Palma PA-C   HYDROcodone-acetaminophen (NORCO) 5-325 mg per tablet Take 1 Tab by mouth every six (6) hours as needed for Pain. Max Daily Amount: 4 Tabs. 5/9/17   Nicko Palma PA-C   carvedilol (COREG) 6.25 mg tablet Take 1 Tab by mouth two (2) times daily (with meals). Indications: Chronic Heart Failure 4/5/17   Kim Lewis NP   colchicine 0.6 mg tablet Take 0.6 mg by mouth daily. Historical Provider   loperamide (IMMODIUM) 2 mg tablet Take 2 mg by mouth four (4) times daily as needed for Diarrhea. Historical Provider   allopurinol (ZYLOPRIM) 100 mg tablet Take 1 Tab by mouth daily. Indications: GOUT 3/11/17   Parisa Madrigal MD   aspirin delayed-release 81 mg tablet Take 2 Tabs by mouth daily. 3/11/17   Parisa Madrigal MD   cholecalciferol (VITAMIN D3) 1,000 unit tablet Take 2 Tabs by mouth daily. 3/11/17   Parisa Madrigal MD   therapeutic multivitamin [facility]) tablet Take 1 Tab by mouth daily. 3/11/17   Parisa Madrigal MD   nitroglycerin (NITROSTAT) 0.4 mg SL tablet 1 Tab by SubLINGual route as needed for Chest Pain. 3/11/17   Parisa Madrigal MD   pantoprazole (PROTONIX) 40 mg tablet Take 1 Tab by mouth Daily (before breakfast). 3/11/17   Parisa Madrigal MD   pravastatin (PRAVACHOL) 80 mg tablet Take 1 Tab by mouth nightly. 3/11/17   Parisa Madrigal MD   albuterol-ipratropium (DUO-NEB) 2.5 mg-0.5 mg/3 ml nebu 3 mL by Nebulization route every six (6) hours as needed.  3/11/17   Parisa Madrigal MD   ferrous sulfate 325 mg (65 mg iron) tablet Take 1 Tab by mouth daily (with breakfast). 3/11/17   Gabriela Mcdonough MD   fluticasone [facility]) 50 mcg/actuation nasal spray 2 spray each nostril daily  Indications: ALLERGIC RHINITIS 3/11/17   Gabriela Mcdonough MD   furosemide (LASIX) 40 mg tablet 1 tab BID 3/11/17   Gabriela Mcdonough MD   influenza vaccine 2016-17, 36mos+,,PF, (FLUZONE/FLUARIX/FLULAVAL QUAD) syrg injection 0.5 mL by IntraMUSCular route PRIOR TO DISCHARGE. 3/11/17   Gabriela Mcdonough MD   metOLazone (ZAROXOLYN) 2.5 mg tablet Take 1 Tab by mouth daily. Patient taking differently: Take 2.5 mg by mouth every other day. 3/11/17   Gabriela Mcdonough MD   zolpidem (AMBIEN) 5 mg tablet Take 1 Tab by mouth nightly as needed for Sleep. Max Daily Amount: 5 mg. 3/11/17   Gabriela Mcdonough MD     No Known Allergies   Social History   Substance Use Topics    Smoking status: Former Smoker    Smokeless tobacco: Never Used      Comment: quit in the 90s    Alcohol use No      History reviewed. No pertinent family history.      Current Facility-Administered Medications   Medication Dose Route Frequency    [START ON 5/18/2017] colchicine (MITIGARE) capsule 0.6 mg  0.6 mg Oral Q TU, TH & SAT    bumetanide (BUMEX) 12.5 mg in 0.9% sodium chloride 100 mL infusion  0.5 mg/hr IntraVENous CONTINUOUS    epoetin ron (EPOGEN;PROCRIT) injection 6,000 Units  6,000 Units SubCUTAneous Q MON, WED & FRI    allopurinol (ZYLOPRIM) tablet 50 mg  50 mg Oral DAILY    DOBUTamine (DOBUTREX) 500 mg/250 mL (2,000 mcg/mL) infusion  5 mcg/kg/min IntraVENous CONTINUOUS    cholecalciferol (VITAMIN D3) tablet 2,000 Units  2,000 Units Oral DAILY    therapeutic multivitamin (THERAGRAN) tablet 1 Tab  1 Tab Oral DAILY    fish oil-omega-3 fatty acids 340-1,000 mg capsule 1 Cap  1 Cap Oral BID    pantoprazole (PROTONIX) tablet 40 mg  40 mg Oral ACB    ferrous sulfate tablet 325 mg  325 mg Oral DAILY WITH BREAKFAST    fluticasone (FLONASE) 50 mcg/actuation nasal spray 2 Spray  2 Spray Both Nostrils DAILY    influenza vaccine  (36mos+)(PF) (FLUZONE/FLUARIX/FLULAVAL QUAD) injection 0.5 mL  0.5 mL IntraMUSCular PRIOR TO DISCHARGE    sodium chloride (NS) flush 5-10 mL  5-10 mL IntraVENous Q8H       Review of Systems:  Pertinent items are noted in HPI. ROS    Objective:   Vital Signs:    Visit Vitals    /71 (BP 1 Location: Right arm, BP Patient Position: At rest)    Pulse 73    Temp 97.2 °F (36.2 °C)    Resp 22    Ht 6' (1.829 m)    Wt 97.9 kg (215 lb 13.3 oz)    SpO2 94%  Comment: 8L HFNC [place]    BMI 29.27 kg/m2       O2 Device: Ventimask   O2 Flow Rate (L/min): 12 l/min   Temp (24hrs), Av.4 °F (36.3 °C), Min:96.6 °F (35.9 °C), Max:98.6 °F (37 °C)       Intake/Output:   Last shift:       07 -  190  In: 139.4 [P.O.:120; I.V.:19.4]  Out: 200   Last 3 shifts: 05/15 190 -  0700  In: 1173.8 [P.O.:760; I.V.:413.8]  Out: 895 [Urine:895]    Intake/Output Summary (Last 24 hours) at 17 1452  Last data filed at 17 0930   Gross per 24 hour   Intake           503.22 ml   Output              470 ml   Net            33.22 ml      Physical Exam:   General:  Alert, awake, resting in mild distress on Venturi mask; able to speak in full sentences; pleasant and conversational for my evaluation   HEENT:  Normocephalic,atraumatic; PERRL, conjunctivae/corneas clear, anicteric; nares normal; no drainage/sinus tenderness; nasal/oral mucosa moist; neck supple, symmetric; trachea midline; No adenopathy; + JVD noted. Resp:   Symmetrical chest rise; mod AE bilat; diminished throughout, worse bibasilar with course rales; otherwiseCTAB; no wheezes/rhonchi/rales noted   Chest wall:  No tenderness or deformity. NO CREPITUS   CV:  RRR, S1, S2 normal, no m/r/g   Abdomen:   Protuberant; moderate ascites; abd is slightly firm, distended and tympanic; non-tender(+) B. S x4;.No masses/ organomegaly/ paradox noted   Extremities: normal, atraumatic, anasarca - BLE appear improved per pt and RN, from previous; +1-+2 edema BLE up to upper thighs and abdominal wall . Pulses: 1-2+ and symmetric in upper extremities; unable to palpate BLE pulses. Skin: Venous statis; dry; 2 ulcers noted to L shin - covered with Mepilex.    Lymph nodes: Cervical, supraclavicular, and axillary nodes normal.   Neurologic: Grossly Nonfocal          Data review:   Labs:  Recent Results (from the past 24 hour(s))   METABOLIC PANEL, BASIC    Collection Time: 05/17/17  1:43 AM   Result Value Ref Range    Sodium 132 (L) 136 - 145 mmol/L    Potassium 4.1 3.5 - 5.5 mmol/L    Chloride 95 (L) 100 - 108 mmol/L    CO2 28 21 - 32 mmol/L    Anion gap 9 3.0 - 18 mmol/L    Glucose 106 (H) 74 - 99 mg/dL    BUN 81 (H) 7.0 - 18 MG/DL    Creatinine 4.07 (H) 0.6 - 1.3 MG/DL    BUN/Creatinine ratio 20 12 - 20      GFR est AA 18 (L) >60 ml/min/1.73m2    GFR est non-AA 14 (L) >60 ml/min/1.73m2    Calcium 8.6 8.5 - 10.1 MG/DL   CREATININE, UR, RANDOM    Collection Time: 05/17/17  9:30 AM   Result Value Ref Range    Creatinine, urine 60.30 30 - 125 mg/dL   PROTEIN URINE, RANDOM    Collection Time: 05/17/17  9:30 AM   Result Value Ref Range    Protein, urine random 101 (H) <11.9 mg/dL   SODIUM, UR, RANDOM    Collection Time: 05/17/17  9:30 AM   Result Value Ref Range    Sodium urine, random 52 20 - 110 MMOL/L   PHOSPHORUS    Collection Time: 05/17/17 10:05 AM   Result Value Ref Range    Phosphorus 4.5 2.5 - 4.9 MG/DL   PTH INTACT    Collection Time: 05/17/17 10:05 AM   Result Value Ref Range    Calcium 8.5 8.5 - 10.1 MG/DL    PTH, Intact 136.9 (H) 14.0 - 72.0 pg/mL   MAGNESIUM    Collection Time: 05/17/17 10:05 AM   Result Value Ref Range    Magnesium 2.0 1.6 - 2.6 mg/dL   CBC WITH AUTOMATED DIFF    Collection Time: 05/17/17 10:05 AM   Result Value Ref Range    WBC 5.1 4.6 - 13.2 K/uL    RBC 2.71 (L) 4.70 - 5.50 M/uL    HGB 7.6 (L) 13.0 - 16.0 g/dL    HCT 24.0 (L) 36.0 - 48.0 %    MCV 88.6 74.0 - 97.0 FL    MCH 28.0 24.0 - 34.0 PG    MCHC 31.7 31.0 - 37.0 g/dL RDW 16.8 (H) 11.6 - 14.5 %    PLATELET 76 (L) 315 - 420 K/uL    MPV 9.9 9.2 - 11.8 FL    NEUTROPHILS 76 (H) 40 - 73 %    LYMPHOCYTES 6 (L) 21 - 52 %    MONOCYTES 11 (H) 3 - 10 %    EOSINOPHILS 7 (H) 0 - 5 %    BASOPHILS 0 0 - 2 %    ABS. NEUTROPHILS 3.9 1.8 - 8.0 K/UL    ABS. LYMPHOCYTES 0.3 (L) 0.9 - 3.6 K/UL    ABS. MONOCYTES 0.6 0.05 - 1.2 K/UL    ABS. EOSINOPHILS 0.3 0.0 - 0.4 K/UL    ABS. BASOPHILS 0.0 0.0 - 0.1 K/UL    DF AUTOMATED     POC G3    Collection Time: 05/17/17  2:40 PM   Result Value Ref Range    Device: High Flow Nasal Cannula      Flow rate (POC) 8 L/M    FIO2 (POC) 54 %    pH (POC) 7.315 (L) 7.35 - 7.45      pCO2 (POC) 52.8 (HH) 35 - 45 MMHG    pO2 (POC) 53 (L) 80 - 100 MMHG    HCO3 (POC) 26.9 (H) 22 - 26 MMOL/L    sO2 (POC) 84 (L) 92 - 97 %    Base excess (POC) 0 mmol/L    Allens test (POC) YES      Total resp. rate 18      Site RIGHT RADIAL      Specimen type (POC) ARTERIAL      Performed by Solange Villavicencio      Arterial Blood Gas result:  PO2: 53; pCO2: 52.8; pH: 7.315;  HCO3: 26.9, %O2 Sat: 84% on 8L [place] HFNC. Imaging:   US RETROPERITONEUM COMP [05/17/17]: FINDINGS:      Right kidney size: 7.5 x 3.2 x 3.5 cm. Left kidney size: 12.5 x 5.2 x 6.2 cm.     At the right kidney upper pole region, there is a 3.7 x 3.5 x 3.3 cm  circumscribed cyst. No hydronephrosis is demonstrated in the right kidney. No evidence of solid or cystic mass or hydronephrosis is detected in the left kidney. Both kidneys demonstrate increased parenchymal echogenicity.     Moderate ascites is demonstrated in the peritoneal cavity particularly around  the liver and spleen. The spleen is enlarged, measuring 13.0 x 5.6 x 13.8 cm.     The bladder is not optimally evaluated due to contracted state. Emerson catheter  has been deployed. The inferior vena cava and aorta are not well visualized.       IMPRESSION:  1. No postobstructive changes. Asymmetric atrophy of the right kidney.  Renal cyst at the upper pole of the right kidney. 2. Increased renal parenchymal echogenicity, potentially suggestive of medical renal disease. 3. Moderate ascites        CXR [05/17/17]: FINDINGS:   A cardiac pacemaker hub is identified in the left upper torso region  with leads through the left subclavian approach. No pneumothorax is detected. There is moderate to large right-sided pleural effusion. The size of the pleural effusion may be slightly increased compared to 5/14/17. Increased streaky densities are identified in the aerated right lung particularly in the upper to lower lung zones. Increased streaky densities are also identified in the left lower lung zone. There is new developing left costophrenic angle blunting. The cardiac silhouette is moderately prominent, with multiple intact median sternotomy wires.     IMPRESSION:  1. Pacemaker in place. 2. Moderate to large right pleural effusion, slightly increased compared to 5/14/17. 3. New developing small left-sided pleural effusion. 3. Streaky densities in both lungs suggestive of atelectatic changes vs. Infiltrate        I have personally reviewed the patients radiographs and have reviewed the reports:  AMD     High complexity decision making was performed in this consultation and evaluation of this patient who is at high risk for decompensation with multiple organ involvement  More than 50% time spent in coordination of plan and counseling.      Fabián Cox PA-C

## 2017-05-17 NOTE — PROGRESS NOTES
Visited with Mr Jose Laird who was sitting up in bed awake. Stated he feels \"better\", demonstrated ROM BLE. Discussed diuretics at home, and he contacted his [facility] nurse to ask about meds. Discussed HF briefly. Resting at this time, agreed to continued visits.

## 2017-05-17 NOTE — PROGRESS NOTES
[facility]      CARDIOLOGY PROGRESS NOTE  RECS:      1. Acute  diastolic congestive heart failure- presents with worsening CHF with BLE edema and elevated NT pro BNP. Stricts I&O. Monitor electrolytes and renal function. Increased dobutamine drip to 5 mcg. Add bumex drip as he is refusing renal recommendation of HD. I explained poss card arrhythmia including shocks from ICD and death with CHF and hypoxia. Follow limited echo. D/w Dr Margi Cedeno and recommended pulm consult for hypoxia. 2. Hypotension- improving. On dobutamine drip. Monitor closely   3. Anemia- Monitor H&H. follow CBC  4. Thrombocytopenia?- hold asa for now. Follow CBC today   5. S/p CABG in 2013 in [facility]- stable denies chest pain or pressure. S/p AICD interrogate 2/17 normal function. 6. Hx Carotid endarterectomy- bilateral in 2015    7. Mitral regurgitation-mild to moderate regurgitation echo on 2016   8. Aortic stenosis : moderate last echo 2/17. Recheck echo. 9. KYLEE on CKD- May need HD per Nephrology note  9. PAD- with BLE pain- vascular is following             SUMMARY: echo 2/17  Left ventricle: Systolic function was normal by visual assessment. Ejection fraction was estimated in the range of 55 % to 60 %. No obvious  wall motion abnormalities identified in the views obtained. Wall thickness  was mildly increased. Features were consistent with a pseudonormal left  ventricular filling pattern, with concomitant abnormal relaxation and  increased filling pressure (grade 2 diastolic dysfunction). Right ventricle: The ventricle was mildly dilated. Systolic function was  mildly reduced. Systolic pressure was moderately increased. Estimated peak  pressure was 55 mmHg. Right atrium: The atrium was mildly dilated. Mitral valve: There was moderate thickening. Aortic valve: The valve was probably trileaflet. Leaflets exhibited  calcification and reduced mobility. There was moderate stenosis.  Valve  peak gradient was 34 mmHg. Valve mean gradient was 21 mmHg. Estimated  aortic valve area (by Vmax) was 1.14 cmï¾².       ASSESSMENT:  Hospital Problems  Date Reviewed: 3/11/2017          Codes Class Noted POA    Cellulitis ICD-10-CM: L03.90  ICD-9-CM: 682.9  5/14/2017 Unknown        Pulmonary HTN ([facility] ICD-10-CM: I27.2  ICD-9-CM: 416.8  4/4/2017 Yes        Aortic stenosis, moderate (Chronic) ICD-10-CM: I35.0  ICD-9-CM: 424.1  3/11/2017 Yes        Acute on chronic diastolic (congestive) heart failure (HCC) ICD-10-CM: I50.33  ICD-9-CM: 428.33, 428.0  2/27/2017 Yes        Acute CHF (congestive heart failure) ([facility] ICD-10-CM: I50.9  ICD-9-CM: 428.0  2/26/2017 Yes        HTN (hypertension) (Chronic) ICD-10-CM: I10  ICD-9-CM: 401.9  9/4/2016 Yes        HLD (hyperlipidemia) (Chronic) ICD-10-CM: E78.5  ICD-9-CM: 272.4  9/4/2016 Yes        Diastolic CHF, acute on chronic ([facility] ICD-10-CM: I50.33  ICD-9-CM: 428.33, 428.0  8/17/2016 Yes        Acute exacerbation of CHF (congestive heart failure) ([facility] ICD-10-CM: I50.9  ICD-9-CM: 428.0  8/12/2016 Unknown        Hx of CABG ICD-10-CM: Z95.1  ICD-9-CM: V45.81  7/22/2016 Yes    Overview Signed 7/22/2016  5:23 PM by Marcus Jo NP     2013             H/O carotid endarterectomy ICD-10-CM: Z98.890  ICD-9-CM: V45.89  7/22/2016 Yes    Overview Signed 7/22/2016  5:27 PM by Marcus Jo NP     2015             AICD (automatic cardioverter/defibrillator) present ICD-10-CM: Z95.810  ICD-9-CM: V45.02  7/22/2016 Yes    Overview Signed 7/22/2016  5:27 PM by Marcus Jo NP     2015             CHF (congestive heart failure) (HCC) ICD-10-CM: I50.9  ICD-9-CM: 428.0  7/21/2016 Yes        CAD (coronary artery disease) ICD-10-CM: I25.10  ICD-9-CM: 414.00  3/5/2014 Yes        PVD (peripheral vascular disease) ([facility] ICD-10-CM: I73.9  ICD-9-CM: 443.9  3/5/2014 Yes                SUBJECTIVE:    No CP  Denies  SOB   Leg pain is slowly improving    OBJECTIVE:    VS:   Visit Vitals    /72 (BP 1 Location: Right arm, BP Patient Position: At rest)    Pulse 76    Temp 97.2 °F (36.2 °C)    Resp 20    Ht 6' (1.829 m)    Wt 97.9 kg (215 lb 13.3 oz)    SpO2 90%    BMI 29.27 kg/m2         Intake/Output Summary (Last 24 hours) at 05/17/17 1033  Last data filed at 05/17/17 0930   Gross per 24 hour   Intake           503.22 ml   Output              595 ml   Net           -91.78 ml     TELE: normal sinus rhythm    General: alert, well developed, pleasant and in no distress  HENT: Normocephalic, atraumatic. Normal external eye. Neck :  increased JVP  Cardiac:  regular rate and rhythm, S1, S2 normal, no S3 or S4, systolic murmur: holosystolic 3/6, harsh at lower left sternal border, at apex, throughout the precordium, no click, no rub  Lungs: fine rales lung bases. Abdomen: Soft, nontender, no masses  Extremities:  Edema +2-3 up to upper thighs and abdomen. BLE tender/cold  to touch       Labs: Results:       Chemistry Recent Labs      05/17/17   0143  05/16/17   1203  05/15/17   0240  05/14/17   1805   GLU  106*  113*  117*  48*   NA  132*  134*  138  140   K  4.1  4.1  4.3  4.0   CL  95*  96*  98*  100   CO2  28  29  29  28   BUN  81*  79*  65*  62*   CREA  4.07*  3.78*  2.76*  2.41*   CA  8.6  8.3*  8.6  8.9   AGAP  9  9  11  12   BUCR  20  21*  24*  26*   AP   --    --    --   121*   TP   --    --    --   6.7   ALB   --    --    --   2.7*   GLOB   --    --    --   4.0   AGRAT   --    --    --   0.7*      CBC w/Diff Recent Labs      05/16/17   1203  05/15/17   0240  05/14/17   1805   WBC  5.0  7.0  9.8   RBC  2.69*  2.52*  2.36*   HGB  7.6*  7.0*  6.6*   HCT  24.0*  22.8*  21.3*   PLT  54*  49*  64*   GRANS  77*  82*  84*   LYMPH  6*  9*  2*   EOS  7*  0  0      Cardiac Enzymes No results for input(s): CPK, CKND1, TYSON in the last 72 hours.     No lab exists for component: CKRMB, TROIP   Coagulation Recent Labs      05/15/17   0240   PTP  20.6*   INR  1.9*   APTT  49.4*       Lipid Panel Lab Results Component Value Date/Time    Cholesterol, total 100 07/22/2016 05:20 AM    HDL Cholesterol 31 07/22/2016 05:20 AM    LDL, calculated 54.4 07/22/2016 05:20 AM    VLDL, calculated 14.6 07/22/2016 05:20 AM    Triglyceride 73 07/22/2016 05:20 AM    CHOL/HDL Ratio 3.2 07/22/2016 05:20 AM      BNP No results for input(s): BNPP in the last 72 hours. Liver Enzymes Recent Labs      05/14/17   1805   TP  6.7   ALB  2.7*   AP  121*   SGOT  372*      Thyroid Studies Lab Results   Component Value Date/Time    TSH 4.23 07/22/2016 05:20 AM              Kim Lewis Casandra:[phone]  Patient seen independently  Discussed the details with NP and patient.  Please see orders & recommendations  Cliff Hager MD

## 2017-05-17 NOTE — PROGRESS NOTES
Received in bed in no acute distress. Noted dobutrex drip increased to 5mcg/kg. Pt to be transferred to [facility]. Day nurse stated pt`s b/p has been low. Presently 90/50 manually. report given to Rocky Monge RN

## 2017-05-17 NOTE — PROGRESS NOTES
Visited with Mr Fernando Scott who was sitting up in bed. Pulmonary consult in progress. Will follow.

## 2017-05-18 NOTE — PROGRESS NOTES
Visited with Mr Osman Clinton who was sitting up in bed with lunch tray. O2 switched to Hi Flow for meal. Discussed his decision to start hemodialysis and his anticipation of having SOB resolved. Discussed AMD, and he stated he would speak with daughter. Doing ROM exercises both lower legs. Opportunity given for questions.

## 2017-05-18 NOTE — PROGRESS NOTES
[facility]  Pulmonary, Critical Care, and Sleep Medicine    Name: Pb Howe MRN: [id number]   : 1940 Hospital: [facility]   Date: 2017          Subjective/Interval History:     Patient is a 78 y.o. Male, prior smoker, with multiple medical problems and extensive cardiac history, to include PMH of Acute on chronic Systolic and Diastolic CHF; COPD (per pt, but not found in chart) - on home O2 at 3LPM at rest, 4LPM with exertion; PVD, HTN, Pulmonary HTN, CAD, s/p CABG (), s/p AICD (); HLD; and chronic ITP, who presented to [facility] ED via EMS on 17, c/o R leg pain, s/p sliding off his recliner (per pt), and progressively worsening of leg swelling x1 week. Pt was seen on 17 in the ED for leg swelling as well, had negative PVL and was d/h with keflex and pain meds. Has been taking abx as directed and has finished his pain meds but he has not had any improvement in pain. Upon arrival to ED, pt has clinical signs of cellulitis, not responding to out-patient treatment. Pt was admitted for cellulitis and CHF exacerbation. [facility] course has included: PVL's (-) for DVT; CTA (-) for PE; Arterial studies show occluded L SFA and moderate >50% stenosis of the R superficial femoral artery with severe >75% stenosis of the profunda femoris artery. Pulmonary team consulted (17) d/t hypoxia in the setting of acute CHF with fluid overload, 2/2 worsening KYLEE on CKD and pt currently refusing dialysis. Pt may also have underlying respiratory disease, and reports hx of COPD - underlying COPD exacerbation?    17:  - Pt is awake, alert, conversational, resting comfortably in bed on 12L HFNC in his mouth ( pt states he is mouth breather, and feels he gets more O2 this way)  - States that he did use the BiPAP PRN throughout the evening and night and early morning; states that he does feel like it is helping him breath better.    - Pt is agreeable to dialysis this morning and would like to proceed with plans to begin dialysis. - Overall, lungs sound slightly worse today - course rales, more diminished on L side; appears to be coughing up more brown colored sputum - cough sounds worse today and has increased per pt.   - (+) flatus, last BM was last night; has some mild abd discomfort upon palpation; BLE edema and pain slightly improved from previous  - Denies any CP, palpitations, N/V/D, H/A, F/C.       ROS:Pertinent items are noted in HPI. Objective:   Vital Signs:    Visit Vitals    /79 (BP 1 Location: Right arm, BP Patient Position: Supine)    Pulse 78    Temp 96.8 °F (36 °C)    Resp 22    Ht 6' (1.829 m)    Wt 96.6 kg (212 lb 14.4 oz)    SpO2 91%    BMI 28.87 kg/m2       O2 Device: Ventimask   O2 Flow Rate (L/min): 12 l/min   Temp (24hrs), Av.3 °F (36.3 °C), Min:96.8 °F (36 °C), Max:98.1 °F (36.7 °C)       Intake/Output:   Last shift:         Last 3 shifts:  1901 -  0700  In: 486 [P.O.:480; I.V.:174]  Out: 1750 [Urine:1550]    Intake/Output Summary (Last 24 hours) at 17 0954  Last data filed at 17 0658   Gross per 24 hour   Intake              200 ml   Output             1300 ml   Net            -1100 ml        Physical Exam:    General: Alert, awake, NAD, resting comfortably on 12L HFNC   HEENT: Normocephalic,atraumatic; PERRL, conjunctivae/corneas clear, anicteric; nares normal; no drainage/sinus tenderness; nasal/oral mucosa moist; neck supple, symmetric; trachea midline; No adenopathy; + JVD noted. Resp: Symmetrical chest rise; mod AE bilat; diminished throughout, L side worsening; Bibasilar rales; No wheezes/rhonchi noted. CV: RRR, S1S2 normal; No m/r/g   Abdomen: Protuberant; moderate ascites; abd is slightly firm, distended and tympanic - no change from previous; non-tender(+) B. S x4;.No masses/ organomegaly/ paradox noted   Extremity: 1-2+ pulses to upper extremities; unable to palpate BLE pulses - unchanged; anasarca - BLE continues to improve per pt and RN, from previous;  to touch; +1-+2 edema BLE up to upper thighs and abdominal wall    Neuro: Alert, grossly non-focal   Skin: Venous statis; dry; 2 ulcers noted to L shin - covered with Mepilex      DATA:  Labs:  Recent Labs      05/18/17   0514  05/17/17   1005  05/16/17   1203   WBC  4.4*  5.1  5.0   HGB  7.8*  7.6*  7.6*   HCT  24.8*  24.0*  24.0*   PLT  45*  76*  54*     Recent Labs      05/18/17   0514  05/17/17   1005  05/17/17   0143  05/16/17   1203   NA  131*   --   132*  134*   K  4.3   --   4.1  4.1   CL  93*   --   95*  96*   CO2  27   --   28  29   GLU  85   --   106*  113*   BUN  85*   --   81*  79*   CREA  4.65*   --   4.07*  3.78*   CA  8.7  8.5  8.6  8.3*   MG   --   2.0   --    --    PHOS   --   4.5   --    --      PFT:    N/A                                                   Echo [05/18/17]: Test complete; results pending    Imaging: No new imaging today    US RETROPERITONEUM COMP [05/17/17]: FINDINGS:      Right kidney size: 7.5 x 3.2 x 3.5 cm. Left kidney size: 12.5 x 5.2 x 6.2 cm.      At the right kidney upper pole region, there is a 3.7 x 3.5 x 3.3 cm  circumscribed cyst. No hydronephrosis is demonstrated in the right kidney. No evidence of solid or cystic mass or hydronephrosis is detected in the left kidney. Both kidneys demonstrate increased parenchymal echogenicity.      Moderate ascites is demonstrated in the peritoneal cavity particularly around  the liver and spleen. The spleen is enlarged, measuring 13.0 x 5.6 x 13.8 cm.      The bladder is not optimally evaluated due to contracted state. Emerson catheter  has been deployed. The inferior vena cava and aorta are not well visualized.         IMPRESSION:  1. No postobstructive changes. Asymmetric atrophy of the right kidney. Renal cyst at the upper pole of the right kidney. 2. Increased renal parenchymal echogenicity, potentially suggestive of medical renal disease.   3. Moderate ascites           CXR [05/17/17]: FINDINGS:   A cardiac pacemaker hub is identified in the left upper torso region  with leads through the left subclavian approach. No pneumothorax is detected. There is moderate to large right-sided pleural effusion. The size of the pleural effusion may be slightly increased compared to 5/14/17. Increased streaky densities are identified in the aerated right lung particularly in the upper to lower lung zones. Increased streaky densities are also identified in the left lower lung zone. There is new developing left costophrenic angle blunting. The cardiac silhouette is moderately prominent, with multiple intact median sternotomy wires.      IMPRESSION:  1. Pacemaker in place. 2. Moderate to large right pleural effusion, slightly increased compared to 5/14/17. 3. New developing small left-sided pleural effusion. 3. Streaky densities in both lungs suggestive of atelectatic changes vs. Infiltrate        [x]I have personally reviewed the patients radiographs  [x]Radiographs reviewed with radiologist   []No change from prior, tubes and lines in adequate position  []Improved   []Worsening    IMPRESSION:   · Bilateral Pleural Effusions: R>L, worsening on CXR (05/17/17) from 05/14. Discussed with pt about therapeutic Thoracentesis to help with respiratory status in the short term, though will not be a long term fix for underlying process. Pt will consider procedure - will re-address tomorrow. · Metabolic Acidosis - 2/2 to below  · Acute on Chronic hypoxic Respiratory Failure - 2/2 Fluid overload d/t worsening CHF in the setting of ESRD and pt currently refusing HD.    · COPD  · Acute diastolic CHF - Elevated BNP & BLE edema   · KYLEE on CKD - Elevated Cr; Nephrology following; pt needs HD but is currently refusing   · Hypotension - Currently on Dobutamine drip at 5mcg/mg/kg/hr  · Pulmonary HTN  · Anemia - Received transfusion - 1U PRBC on 05/15/17  · Thrombocytopenia - Pt does have chronic ITP ( seen by Hematology in 02/2017)  · Anasacra  · Cellulitis - Failed out-patient treatment; completed Zosyn on 05/16/17. · PAD - with BLE pain - Vascular following  · CAD - S/p CABG in 2013; s/p AICD in 02/17; hx of Carotid endarterectomy - bilateral in 2015; Mitral regurgitation - mild-to moderate based on Echo (2016)  · Gout      Patient Active Problem List   Diagnosis Code    Hematospermia R36.1    CAD (coronary artery disease) I25.10    PVD (peripheral vascular disease) ([facility] I73.9    CHF (congestive heart failure) ([facility]) I50.9    Anasarca R60.1    Hx of CABG Z95.1    H/O carotid endarterectomy Z98.890    AICD (automatic cardioverter/defibrillator) present Z95.810    Acute exacerbation of CHF (congestive heart failure) ([facility]) X82.2    Diastolic CHF, acute on chronic (HCC) I50.33    HTN (hypertension) I10    HLD (hyperlipidemia) E78.5    Acute CHF (congestive heart failure) ([facility]) I50.9    Acute on chronic diastolic (congestive) heart failure (HCC) I50.33    Thrombocytopenia ([facility]) D69.6    Hypokalemia E87.6    Aortic stenosis, moderate I35.0    CKD (chronic kidney disease) N18.9    Pulmonary HTN ([facility]) I27.2    Cellulitis L03.90      PLAN:   · SpO2 >90%; titrate supp O2 PRN; currently pt is resting on comfortably on 12L HFNC;  pt is a chronic CO2 retainer - monitor closely for signs of CO2 narcosis. · ABG improved on BiPAP yesterday; con't BiPAP PRN. Will repeat ABG /CXR if respiratory status worsens or pt's mental status changes  · CXR (05/17/17) shows bilateral pleural effusions, R side increased since prior imaging. Discussion was had with pt regarding possible diagnostic/theraputic Thoracentesis - pt agreed to consider this option; today (05/18/17) - pt has agreed to proceed if still recommended. At this time, since pt has also agreed to dialysis, will hold off on Thoracentesis for now and monitor response to dialysis.     · Aggressive pulmonary toileting; encourage ICS when not on BiPAP; will add PEP device today  · Aspiration precautions - HOB >30'  · Pt likely has component of COPD - will plan to add maintenance inhalers for added respiratory benefit - for now - will con't Con't duo-nebs q6 PRN;Con't Flonase; and monitor respiratory response to dialysis. · Pt's cough is worsening and is coughing up more thick brownish looking sputum - will order sputum cx - will hold off on starting ABX at this time; con't to monitor s/s for infection - currently afebrile, aleukocytosis; normotensive. · Cardio following - currently on Dobutamine drip at 5mcg/kg/hr for hypotension. Currently HD stable; will monitor. On Bumex gtt; defer diuresis management to Cardio & Nephro; Repeat Echo pending  · Vascular following - Arterial studies show occluded L SFA and moderate >50% stenosis of the right superficial femoral artery with severe >75% stenosis of the profunda femoris artery - Vascular following - rec' for CO2 angiograms once more stable; if resting leg pain persists, may consider urgent vascular intervention. · Nephrology following - Upon conversation with me this morning - pt has now agreed to dialysis and would like to move forward with starting dialysis ASAP. Dr. Dulce Dewitt was made aware and agreed. Will need dialysis access first.   · Recommend SAMANTA with Pulmonary Clinic upon D/C for overall f/u; evaluation for COPD, baseline PTF's, evaluation for ROOPA. · Will Follow closely, thank you for this consult.    · DVT, PUD prophylaxis - per primary team.          Spring Pantoja PA-C

## 2017-05-18 NOTE — PROGRESS NOTES
RENAL DAILY PROGRESS NOTE    Patient: Kianna Heredia               Sex: male          DOA: 5/14/2017  8:05 AM        YOB: 1940      Age:  78 y.o.        LOS:  LOS: 4 days     Subjective:     Kianna Heredia is a 78 y.o.  who presents with Acute exacerbation of CHF (congestive heart failure) (HCC)  Cellulitis. Asked to evaluate for renal failure. hx of crf stage 4,cardiomyopathy,chf,cabg,single functioning kidney,shira  Chief complains: Patient denies nausea, vomiting, chest pain, dizziness,or headache.hypoxic  - Reviewed last 24 hrs events     Current Facility-Administered Medications   Medication Dose Route Frequency    carvedilol (COREG) tablet 3.125 mg  3.125 mg Oral Q12H    colchicine (MITIGARE) capsule 0.6 mg  0.6 mg Oral Q TU, TH & SAT    bumetanide (BUMEX) 12.5 mg in 0.9% sodium chloride 100 mL infusion  0.5 mg/hr IntraVENous CONTINUOUS    epoetin ron (EPOGEN;PROCRIT) injection 6,000 Units  6,000 Units SubCUTAneous Q MON, WED & FRI    allopurinol (ZYLOPRIM) tablet 50 mg  50 mg Oral DAILY    DOBUTamine (DOBUTREX) 500 mg/250 mL (2,000 mcg/mL) infusion  5 mcg/kg/min IntraVENous CONTINUOUS    cholecalciferol (VITAMIN D3) tablet 2,000 Units  2,000 Units Oral DAILY    therapeutic multivitamin (THERAGRAN) tablet 1 Tab  1 Tab Oral DAILY    nitroglycerin (NITROSTAT) tablet 0.4 mg  0.4 mg SubLINGual PRN    fish oil-omega-3 fatty acids 340-1,000 mg capsule 1 Cap  1 Cap Oral BID    pantoprazole (PROTONIX) tablet 40 mg  40 mg Oral ACB    albuterol-ipratropium (DUO-NEB) 2.5 MG-0.5 MG/3 ML  3 mL Nebulization Q6H PRN    ferrous sulfate tablet 325 mg  325 mg Oral DAILY WITH BREAKFAST    fluticasone (FLONASE) 50 mcg/actuation nasal spray 2 Spray  2 Spray Both Nostrils DAILY    HYDROcodone-acetaminophen (NORCO) 5-325 mg per tablet 1 Tab  1 Tab Oral Q4H PRN    influenza vaccine 2016-17 (36mos+)(PF) (FLUZONE/FLUARIX/FLULAVAL QUAD) injection 0.5 mL  0.5 mL IntraMUSCular PRIOR TO DISCHARGE    zolpidem (AMBIEN) tablet 5 mg  5 mg Oral QHS PRN    sodium chloride (NS) flush 5-10 mL  5-10 mL IntraVENous Q8H    sodium chloride (NS) flush 5-10 mL  5-10 mL IntraVENous PRN    ondansetron (ZOFRAN) injection 4 mg  4 mg IntraVENous Q4H PRN       Objective:     Visit Vitals    /79 (BP 1 Location: Right arm, BP Patient Position: Supine)    Pulse 78    Temp 96.8 °F (36 °C)    Resp 22    Ht 6' (1.829 m)    Wt 96.6 kg (212 lb 14.4 oz)    SpO2 91%    BMI 28.87 kg/m2       Intake/Output Summary (Last 24 hours) at 05/18/17 1037  Last data filed at 05/18/17 6301   Gross per 24 hour   Intake              200 ml   Output             1300 ml   Net            -1100 ml       Physical Examination:     GEN: AAO X 3,awake  RS: diminished bs   CVS: S1-S2 heard, RRR, No S3 / murmur  Abdomen: ascites  Extremities: + edema,  HEENT: Head is atraumatic, PERRLA, conjunctiva pink & non icteric. No JVD or carotid bruit   Musculoskeletal: No gross joints or bone deformities   Lymph Node: No palpable cervical, axillary or groin lymphadenopathy. Data Review:      Labs:     Hematology:   Recent Labs      05/18/17   0514  05/17/17   1005  05/16/17   1203   WBC  4.4*  5.1  5.0   HGB  7.8*  7.6*  7.6*   HCT  24.8*  24.0*  24.0*     Chemistry:   Recent Labs      05/18/17   0514  05/17/17   1005  05/17/17   0143  05/16/17   1203   BUN  85*   --   81*  79*   CREA  4.65*   --   4.07*  3.78*   CA  8.7  8.5  8.6  8.3*   K  4.3   --   4.1  4.1   NA  131*   --   132*  134*   CL  93*   --   95*  96*   CO2  27   --   28  29   PHOS   --   4.5   --    --    GLU  85   --   106*  113*        Images:    XR (Most Recent). CXR reviewed by me and compared with previous CXR   Results from Hospital Encounter encounter on 05/14/17   XR CHEST PORT   Narrative PROCEDURE:  Chest Portable    CPT code 33748    INDICATION:  Shortness of breath. COMPARISON:  5/14/17.      FINDINGS:  A cardiac pacemaker hub is identified in the left upper torso region  with leads through the left subclavian approach. No pneumothorax is detected. There is moderate to large right-sided pleural effusion. The size of the  pleural effusion may be slightly increased compared to 5/14/17. Increased  streaky densities are identified in the aerated right lung particularly in the  upper to lower lung zones. Increased streaky densities are also identified in  the left lower lung zone. There is new developing left costophrenic angle  blunting. The cardiac silhouette is moderately prominent, with multiple intact  median sternotomy wires. Impression IMPRESSION:    1. Pacemaker in place. 2.  Moderate to large right pleural effusion, slightly increased compared to  5/14/17. 3.  New developing small left-sided pleural effusion. 3.  Streaky densities in both lungs suggestive of atelectatic changes vs.  infiltrate. CT (Most Recent)   Results from Hospital Encounter encounter on 12/12/16   CT ABD WO CONT   Narrative CT ABDOMEN WITHOUT ENHANCEMENT     CPT CODE: 84167    INDICATION: Acute renal failure and chronic kidney disease. TECHNIQUE: 5 mm collimation axial images obtained from the diaphragm to the  level of the iliac crest without intravenous contrast. Oral contrast was  administered. All CT scans at this facility are performed using dose optimization technique as  appropriate to a performed exam, to include automated exposure control,  adjustment of the mA and/or kV according to patient size (including appropriate  matching first site-specific examinations), or use of iterative reconstruction  technique. COMPARISON: None. FINDINGS:    ABDOMEN FINDINGS:     Lack of intravenous contrast renders this study suboptimal for evaluating the  solid abdominal organs, vasculature and bowel. Liver: Grossly unremarkable. Spleen: No splenomegaly. Pancreas: Grossly unremarkable. Biliary: Status post cholecystectomy.   Bowel: No small bowel or colon dilation. .   Peritoneum/ Retroperitoneum: Large volume ascites. No free air. Gevena Roger Lymph Nodes: 1.1 cm in short axis left periaortic node (series 2, image 45). 1  cm retrocaval node (43). .    Adrenal Glands: No focal nodule. Kidneys: Right kidney is atrophic compared with left measuring 8.2 cm in  craniocaudal diameter compared with 9.5 cm on the left. . There are left greater  than right renal vascular calcifications. There is a 4.2 cm lesion at the right  interpolar kidney with density minimally above water. There are a few  subcentimeter left renal lesions which are too small to characterize. . No  hydronephrosis. Vessels: Decreased density within the aortic lumen. 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 Postsurgical changes  aortobiiliac graft with severe calcification in the excluded bilateral common  iliac arteries. Visceral artery calcifications. . Moderate atherosclerosis with  coarse calcification throughout the visceral ostia. Lung Base: Decreased density in the cardiac chambers. Incompletely visualized  cardiac device. Right atrium appears enlarged. Large right and moderate left  pleural effusions. There is complete atelectasis at the right lower lobe and at  atelectasis at the right middle lobe. Mild atelectasis at the left lung base. .    Bones: Moderate anasarca. Status post median sternotomy. Severe degenerative  disc disease L4-L5 with vacuum disc phenomenon and moderate degenerative disc  disease otherwise. Impression IMPRESSION:  1. Atrophic right kidney with bilateral renal vascular calcifications and  moderate atherosclerosis. 2.  Right renal lesion with density minimally above water is statistically most  likely a cyst.  3.  Large volume ascites, large right and moderate left pleural effusions and  moderate anasarca. 4.  Borderline retroperitoneal lymphadenopathy.   5.  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 6.  Anemia. EKG No results found for this or any previous visit. I have personally reviewed the old medical records and patient's labs    Plan / Recommendation:      1. Acute/crf stage 4.had discussed dialysis indications and complications with the pt.he agreed with dialysis. I consulted dr Manuel Alegria for cath  2.chf,fluid overload,continue current therapy. attempt to remove fluids with dialysis as tolerated  3.hx of small kidney ,shira. repeat renal ultrasound  4.anemia,give epo with dialysis    D/w Dr Joleen Mims MD  Nephrology  5/18/2017
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
## 2017-05-18 NOTE — PROGRESS NOTES
Consult noted and appreciated. Chart reviewed. Call made to daughter [place] [phone], who voices that she is medical power of  and she has the paperwork that she will bring in this afternoon. She has two sisters 121 Ainsley Chow (who is flying in today) and Dorcas Meyers (who is coming later in the week). Mrs. Vick Bowden is flying out of town today at [facility] and has multiple appointments today and might not be physically at the hospital to speak with Palliative Medicine and will not be available tomorrow. Encouraged a conference call with her and her sisters to discuss medical update, goals of care, short and long term goals, etc.  She is going to call her sisters to find out a convenient time for all, suggested 4pm, and will call me back and let me know. Will continue to follow for assistance in decision making.

## 2017-05-18 NOTE — PROGRESS NOTES
[facility] [place].      CARDIOLOGY PROGRESS NOTE  RECS:      1. Acute  diastolic congestive heart failure- presents with worsening CHF with BLE edema and elevated NT pro BNP. Stricts I&O. Monitor electrolytes and renal function. Increased dobutamine drip to 5 mcg.  cont bumex drip   2. Hypotension- improving. On dobutamine drip. Monitor closely   3. Anemia- Monitor H&H. follow CBC  4. Thrombocytopenia?- hold asa for now. Follow CBC today   5. S/p CABG in 2013 in [facility]- stable denies chest pain or pressure. S/p AICD interrogate 2/17 normal function. 6. Hx Carotid endarterectomy- bilateral in 2015    7. Mitral regurgitation-mild to moderate regurgitation echo on 2016   8. Aortic stenosis : moderate last echo 2/17. Recheck echo. 9. KYLEE on CKD- May need HD per Nephrology note  9. PAD- with BLE pain- vascular is following     patient with ESRD- likely needs HD-- today patient is not opposed to HD. Continue bumex for now.       SUMMARY: echo 2/17  Left ventricle: Systolic function was normal by visual assessment. Ejection fraction was estimated in the range of 55 % to 60 %. No obvious  wall motion abnormalities identified in the views obtained. Wall thickness  was mildly increased. Features were consistent with a pseudonormal left  ventricular filling pattern, with concomitant abnormal relaxation and  increased filling pressure (grade 2 diastolic dysfunction). Right ventricle: The ventricle was mildly dilated. Systolic function was  mildly reduced. Systolic pressure was moderately increased. Estimated peak  pressure was 55 mmHg. Right atrium: The atrium was mildly dilated. Mitral valve: There was moderate thickening. Aortic valve: The valve was probably trileaflet. Leaflets exhibited  calcification and reduced mobility. There was moderate stenosis. Valve  peak gradient was 34 mmHg. Valve mean gradient was 21 mmHg.  Estimated  aortic valve area (by Vmax) was 1.14 cmï¾².       ASSESSMENT:  Hospital Problems  Date Reviewed: 3/11/2017          Codes Class Noted POA    Cellulitis ICD-10-CM: L03.90  ICD-9-CM: 682.9  5/14/2017 Unknown        Pulmonary HTN ([facility] ICD-10-CM: I27.2  ICD-9-CM: 416.8  4/4/2017 Yes        Aortic stenosis, moderate (Chronic) ICD-10-CM: I35.0  ICD-9-CM: 424.1  3/11/2017 Yes        Acute on chronic diastolic (congestive) heart failure (HCC) ICD-10-CM: I50.33  ICD-9-CM: 428.33, 428.0  2/27/2017 Yes        Acute CHF (congestive heart failure) ([facility] ICD-10-CM: I50.9  ICD-9-CM: 428.0  2/26/2017 Yes        HTN (hypertension) (Chronic) ICD-10-CM: I10  ICD-9-CM: 401.9  9/4/2016 Yes        HLD (hyperlipidemia) (Chronic) ICD-10-CM: E78.5  ICD-9-CM: 272.4  9/4/2016 Yes        Diastolic CHF, acute on chronic ([facility] ICD-10-CM: I50.33  ICD-9-CM: 428.33, 428.0  8/17/2016 Yes        Acute exacerbation of CHF (congestive heart failure) ([facility] ICD-10-CM: I50.9  ICD-9-CM: 428.0  8/12/2016 Unknown        Hx of CABG ICD-10-CM: Z95.1  ICD-9-CM: V45.81  7/22/2016 Yes    Overview Signed 7/22/2016  5:23 PM by Ayala Menendez NP     2013             H/O carotid endarterectomy ICD-10-CM: Z98.890  ICD-9-CM: V45.89  7/22/2016 Yes    Overview Signed 7/22/2016  5:27 PM by Ayala Menendez NP     2015             AICD (automatic cardioverter/defibrillator) present ICD-10-CM: Z95.810  ICD-9-CM: V45.02  7/22/2016 Yes    Overview Signed 7/22/2016  5:27 PM by Ayala Menendez NP     2015             CHF (congestive heart failure) ([facility]) ICD-10-CM: I50.9  ICD-9-CM: 428.0  7/21/2016 Yes        CAD (coronary artery disease) ICD-10-CM: I25.10  ICD-9-CM: 414.00  3/5/2014 Yes        PVD (peripheral vascular disease) ([facility] ICD-10-CM: I73.9  ICD-9-CM: 443.9  3/5/2014 Yes                SUBJECTIVE:    No CP  Denies  SOB   Leg pain is slowly improving    OBJECTIVE:    VS:   Visit Vitals    /79 (BP 1 Location: Right arm, BP Patient Position: Supine)    Pulse 78    Temp 96.8 °F (36 °C)    Resp 22    Ht 6' (1.829 m)    Wt 96.6 kg (212 lb 14.4 oz)    SpO2 91%    BMI 28.87 kg/m2         Intake/Output Summary (Last 24 hours) at 05/18/17 0944  Last data filed at 05/18/17 0658   Gross per 24 hour   Intake              200 ml   Output             1300 ml   Net            -1100 ml     TELE: normal sinus rhythm    General: alert, well developed, pleasant and in no distress  HENT: Normocephalic, atraumatic. Normal external eye. Neck :  increased JVP  Cardiac:  regular rate and rhythm, S1, S2 normal, no S3 or S4, systolic murmur: holosystolic 3/6, harsh at lower left sternal border, at apex, throughout the precordium, no click, no rub  Lungs: fine rales lung bases. Abdomen: Soft, nontender, no masses  Extremities:  Edema +2-3 up to upper thighs and abdomen. BLE tender/cold  to touch       Labs: Results:       Chemistry Recent Labs      05/18/17   0514  05/17/17   1005  05/17/17   0143  05/16/17   1203   GLU  85   --   106*  113*   NA  131*   --   132*  134*   K  4.3   --   4.1  4.1   CL  93*   --   95*  96*   CO2  27   --   28  29   BUN  85*   --   81*  79*   CREA  4.65*   --   4.07*  3.78*   CA  8.7  8.5  8.6  8.3*   AGAP  11   --   9  9   BUCR  18   --   20  21*      CBC w/Diff Recent Labs      05/18/17   0514  05/17/17   1005  05/16/17   1203   WBC  4.4*  5.1  5.0   RBC  2.77*  2.71*  2.69*   HGB  7.8*  7.6*  7.6*   HCT  24.8*  24.0*  24.0*   PLT  45*  76*  54*   GRANS  77*  76*  77*   LYMPH  7*  6*  6*   EOS  6*  7*  7*      Cardiac Enzymes No results for input(s): CPK, CKND1, TYSON in the last 72 hours. No lab exists for component: CKRMB, TROIP   Coagulation No results for input(s): PTP, INR, APTT in the last 72 hours.     No lab exists for component: INREXT, INREXT    Lipid Panel Lab Results   Component Value Date/Time    Cholesterol, total 100 07/22/2016 05:20 AM    HDL Cholesterol 31 07/22/2016 05:20 AM    LDL, calculated 54.4 07/22/2016 05:20 AM    VLDL, calculated 14.6 07/22/2016 05:20 AM Triglyceride 73 07/22/2016 05:20 AM    CHOL/HDL Ratio 3.2 07/22/2016 05:20 AM      BNP No results for input(s): BNPP in the last 72 hours. Liver Enzymes No results for input(s): TP, ALB, TBIL, AP, SGOT, GPT in the last 72 hours.     No lab exists for component: DBIL   Thyroid Studies Lab Results   Component Value Date/Time    TSH 4.23 07/22/2016 05:20 AM                Anibal Quintero MD

## 2017-05-18 NOTE — ROUTINE PROCESS
Bedside and Verbal shift change report given to Neymar (oncoming nurse) by Cate Roland (offgoing nurse). Report included the following information SBAR, Kardex, MAR and Recent Results.

## 2017-05-18 NOTE — PROGRESS NOTES
Interdisciplinary Review  Chang Harris, 78 y.o., male   5/14/2017  8:05 AM  HOSPITAL DAY:4     Admission Type: Emergency    Patient Active Problem List    Diagnosis Date Noted    Cellulitis 05/14/2017    CKD (chronic kidney disease) 04/04/2017    Pulmonary HTN ([facility] 04/04/2017    Thrombocytopenia ([facility] 03/11/2017    Hypokalemia 03/11/2017    Aortic stenosis, moderate 03/11/2017    Acute on chronic diastolic (congestive) heart failure ([facility] 02/27/2017    Acute CHF (congestive heart failure) ([facility] 02/26/2017    HTN (hypertension) 09/04/2016    HLD (hyperlipidemia) 96/90/9567    Diastolic CHF, acute on chronic (HCC) 08/17/2016    Acute exacerbation of CHF (congestive heart failure) ([facility] 08/12/2016    Anasarca 07/22/2016    Hx of CABG 07/22/2016    H/O carotid endarterectomy 07/22/2016    AICD (automatic cardioverter/defibrillator) present 07/22/2016    CHF (congestive heart failure) ([facility] 07/21/2016    Hematospermia 03/05/2014    CAD (coronary artery disease) 03/05/2014    PVD (peripheral vascular disease) ([facility] 03/05/2014     Sanchez Vitale MD    Past Medical History:   Diagnosis Date    Arrhythmia     A fib; has external vest and belt he wears for this    Arthritis     High blood pressure     Hypercholesteremia     Mini stroke ([facility] 2000    Unspecified sleep apnea     does not wear machine     Past Surgical History:   Procedure Laterality Date    CABG, ARTERY-VEIN, THREE  2013    HX CAROTID ENDARTERECTOMY Left     HX CAROTID ENDARTERECTOMY Right 2000    HX CORONARY STENT PLACEMENT       Current Facility-Administered Medications   Medication Dose Route Frequency    carvedilol (COREG) tablet 3.125 mg  3.125 mg Oral Q12H    colchicine (MITIGARE) capsule 0.6 mg  0.6 mg Oral Q TU, TH & SAT    bumetanide (BUMEX) 12.5 mg in 0.9% sodium chloride 100 mL infusion  0.5 mg/hr IntraVENous CONTINUOUS    epoetin ron (EPOGEN;PROCRIT) injection 6,000 Units  6,000 Units SubCUTAneous Q MON, WED & FRI  allopurinol (ZYLOPRIM) tablet 50 mg  50 mg Oral DAILY    DOBUTamine (DOBUTREX) 500 mg/250 mL (2,000 mcg/mL) infusion  5 mcg/kg/min IntraVENous CONTINUOUS    cholecalciferol (VITAMIN D3) tablet 2,000 Units  2,000 Units Oral DAILY    therapeutic multivitamin (THERAGRAN) tablet 1 Tab  1 Tab Oral DAILY    nitroglycerin (NITROSTAT) tablet 0.4 mg  0.4 mg SubLINGual PRN    fish oil-omega-3 fatty acids 340-1,000 mg capsule 1 Cap  1 Cap Oral BID    pantoprazole (PROTONIX) tablet 40 mg  40 mg Oral ACB    albuterol-ipratropium (DUO-NEB) 2.5 MG-0.5 MG/3 ML  3 mL Nebulization Q6H PRN    ferrous sulfate tablet 325 mg  325 mg Oral DAILY WITH BREAKFAST    fluticasone (FLONASE) 50 mcg/actuation nasal spray 2 Spray  2 Spray Both Nostrils DAILY    HYDROcodone-acetaminophen (NORCO) 5-325 mg per tablet 1 Tab  1 Tab Oral Q4H PRN    influenza vaccine 2016-17 (36mos+)(PF) (FLUZONE/FLUARIX/FLULAVAL QUAD) injection 0.5 mL  0.5 mL IntraMUSCular PRIOR TO DISCHARGE    zolpidem (AMBIEN) tablet 5 mg  5 mg Oral QHS PRN    sodium chloride (NS) flush 5-10 mL  5-10 mL IntraVENous Q8H    sodium chloride (NS) flush 5-10 mL  5-10 mL IntraVENous PRN    ondansetron (ZOFRAN) injection 4 mg  4 mg IntraVENous Q4H PRN       Received Flu Vaccine for Current Season (usually Sept-March): Not Flu Season     O2 Device: Hi flow nasal cannula (saulter nc) O2 Sat (%): 96 % Resp Rate: 20           Labs-   Recent Results (from the past 24 hour(s))   POC G3    Collection Time: 05/17/17  2:40 PM   Result Value Ref Range    Device: High Flow Nasal Cannula      Flow rate (POC) 8 L/M    FIO2 (POC) 54 %    pH (POC) 7.315 (L) 7.35 - 7.45      pCO2 (POC) 52.8 (HH) 35 - 45 MMHG    pO2 (POC) 53 (L) 80 - 100 MMHG    HCO3 (POC) 26.9 (H) 22 - 26 MMOL/L    sO2 (POC) 84 (L) 92 - 97 %    Base excess (POC) 0 mmol/L    Allens test (POC) YES      Total resp.  rate 18      Site RIGHT RADIAL      Specimen type (POC) ARTERIAL      Performed by Laverne DODD G3 Collection Time: 05/17/17  4:54 PM   Result Value Ref Range    Device: BIPAP      FIO2 (POC) 60 %    pH (POC) 7.327 (L) 7.35 - 7.45      pCO2 (POC) 51.6 (HH) 35 - 45 MMHG    pO2 (POC) 93 80 - 100 MMHG    HCO3 (POC) 27.0 (H) 22 - 26 MMOL/L    sO2 (POC) 96 92 - 97 %    Base excess (POC) 1 mmol/L    PEEP/CPAP (POC) 7 cmH2O    PIP (POC) 14      Allens test (POC) YES      Total resp. rate 18      Site RIGHT RADIAL      Specimen type (POC) ARTERIAL      Performed by [facility]    METABOLIC PANEL, BASIC    Collection Time: 05/18/17  5:14 AM   Result Value Ref Range    Sodium 131 (L) 136 - 145 mmol/L    Potassium 4.3 3.5 - 5.5 mmol/L    Chloride 93 (L) 100 - 108 mmol/L    CO2 27 21 - 32 mmol/L    Anion gap 11 3.0 - 18 mmol/L    Glucose 85 74 - 99 mg/dL    BUN 85 (H) 7.0 - 18 MG/DL    Creatinine 4.65 (H) 0.6 - 1.3 MG/DL    BUN/Creatinine ratio 18 12 - 20      GFR est AA 15 (L) >60 ml/min/1.73m2    GFR est non-AA 12 (L) >60 ml/min/1.73m2    Calcium 8.7 8.5 - 10.1 MG/DL   CBC WITH AUTOMATED DIFF    Collection Time: 05/18/17  5:14 AM   Result Value Ref Range    WBC 4.4 (L) 4.6 - 13.2 K/uL    RBC 2.77 (L) 4.70 - 5.50 M/uL    HGB 7.8 (L) 13.0 - 16.0 g/dL    HCT 24.8 (L) 36.0 - 48.0 %    MCV 89.5 74.0 - 97.0 FL    MCH 28.2 24.0 - 34.0 PG    MCHC 31.5 31.0 - 37.0 g/dL    RDW 16.7 (H) 11.6 - 14.5 %    PLATELET 45 (L) 485 - 420 K/uL    MPV 10.5 9.2 - 11.8 FL    NEUTROPHILS 77 (H) 40 - 73 %    LYMPHOCYTES 7 (L) 21 - 52 %    MONOCYTES 10 3 - 10 %    EOSINOPHILS 6 (H) 0 - 5 %    BASOPHILS 0 0 - 2 %    ABS. NEUTROPHILS 3.4 1.8 - 8.0 K/UL    ABS. LYMPHOCYTES 0.3 (L) 0.9 - 3.6 K/UL    ABS. MONOCYTES 0.4 0.05 - 1.2 K/UL    ABS. EOSINOPHILS 0.3 0.0 - 0.4 K/UL    ABS.  BASOPHILS 0.0 0.0 - 0.1 K/UL    DF AUTOMATED         No results found for: PO2, PCO2                  Principal Problem:      Procedure:      During review the following quality care indicators and evidence based practices were addressed : DVT Prophylaxis, Pain Management, Medication management, Nutritional Status, Dialysis, Smoking cessation, Spirometry, ROOPA, IS and Cornet.      Discharge Management: Home and Palliative Care     Interdisciplinary team review were held with the following team members Care Management, Navigators, Physician, Nurse Practitioner and Clinical Coordinator . Plan of care discussed. See clinical pathway and/or care plan for interventions and desired outcomes.

## 2017-05-19 PROBLEM — N17.9 ACUTE ON CHRONIC KIDNEY FAILURE (HCC): Status: ACTIVE | Noted: 2017-01-01

## 2017-05-19 PROBLEM — N18.9 ACUTE ON CHRONIC KIDNEY FAILURE (HCC): Status: ACTIVE | Noted: 2017-01-01

## 2017-05-19 NOTE — ROUTINE PROCESS
TRANSFER - IN REPORT:    Verbal report received from Salena Meza RN(name) on Cadence Huizar  being received from [facility](unit) for ordered procedure      Report consisted of patients Situation, Background, Assessment and   Recommendations(SBAR). Information from the following report(s) SBAR, Intake/Output, MAR and Recent Results was reviewed with the receiving nurse. Opportunity for questions and clarification was provided. Assessment completed upon patients arrival to unit and care assumed.

## 2017-05-19 NOTE — PROGRESS NOTES
Extensive conversations with daughter Ruslan Matias at bedside. Patient off the floor for dialysis and prior to that for insertion of dialysis catheter. Attempts to see patient today have been unsuccessful as result. Encouraged family to discuss code status with patient and explained rationale for conversations and encouraged notification to medical team if wish to change to DNR. Palliative will follow with medical team early next week.

## 2017-05-19 NOTE — ROUTINE PROCESS
Bedside and Verbal shift change report given to [id number] (oncoming nurse) by Delmar Chun (offgoing nurse). Report included the following information SBAR, Kardex, MAR and Recent Results.

## 2017-05-19 NOTE — PROGRESS NOTES
TRANSFER - IN REPORT:    Verbal report received from Louise Orta) on William Zarate  being received from [facility](unit) for routine progression of care      Report consisted of patients Situation, Background, Assessment and   Recommendations(SBAR). Information from the following report(s) SBAR, Kardex and Procedure Summary was reviewed with the receiving nurse. Opportunity for questions and clarification was provided. Assessment completed upon patients arrival to unit and care assumed.

## 2017-05-19 NOTE — ROUTINE PROCESS
Pt care received. Pt A/O x 4. Pt resting in bed. Denies pain. Pt stable. Call light within reach, bed in lowest position and locked.

## 2017-05-19 NOTE — PROGRESS NOTES
Formerly named [facility]: [phone] ([phone])  Formerly [facility]: [phone]   [facility]: [phone]    Patient Name: Benita Huber  YOB: 1940    Date of Initial Consult: 05/18/2017  Reason for Consult: Care Decisions  Requesting Provider: Ramsey Harper MD   Primary Care Physician: Apolonia Miller MD      SUMMARY:   Benita Huber is a 78y.o. year old with a past history of coronary artery disease, hyperlipidemia, gout, anemia, coronary artery bypass surgery / stent placement, diastolic heart failure with defibrillator placement, hypertension, hyperlipidemia, CKD stage 3, chronic ascites, who was admitted on 5/14/2017 from home with a diagnosis of pain and swelling to right leg. Current medical issues leading to Palliative Medicine involvement include: care decisions. PALLIATIVE DIAGNOSES:   1. Acute on chronic diastolic CHF  2. Aortic stenosis, moderate  3. Acute on chronic kidney failure  4. Thrombocytopenia  5. CAD  6. AICD  7. Pulmonary HTN  8. Anemia     PLAN:   1. Visit with patient whom seems generally aware of medical situation. He complains of feeling \"generally crappy\" at time of visit - only complaint he is able to pinpoint is nausea. Patient states related to the fish oil, he denies any nausea medication as it makes him \"gassy\". Will d/c fish oil and re-eval symptoms. Brief discussion with patient about his medical situation, grandson in law visits during the conversation. 2. Extensive conversations with ramos Lipscomb and Eileen Burnett about current medical condition and wishes regarding life sustaining measures. And this provider spoke with Eileen Burnett over phone. This provider (in discussion with family) planning on returning to discuss wishes with patient on 05/19. Will communicate with family with outcome of conversation, family supportive of patient wishes. Family reiterates concern that he will not be compliant with dialysis. 3. Initial consult note routed to primary continuity provider  4. Communicated plan of care with: Palliative IDT, daughters x2     GOALS OF CARE:     [====Goals of Care====]  GOALS OF CARE:  Patient / health care proxy stated goals: do what he wants    TREATMENT PREFERENCES:   Code Status: Full Code    Advance Care Planning:  Advance Care Planning 5/14/2017   Patient's Healthcare Decision Maker is: Legal Next of Alexander 71   Primary Decision Maker Name [address] [place] [address]   Primary Decision Maker Phone Number [phone]   Primary Decision Maker Relationship to Patient Adult child   Secondary Decision Maker Name [facility]   Secondary Decision [address] Phone Number -   Secondary Decision Maker Relationship to Patient Adult child   Confirm Advance Directive None   Patient Would Like to Complete Advance Directive Yes   Does the patient have other document types Other (comment)       Other:    The palliative care team has discussed with patient / health care proxy about goals of care / treatment preferences for patient.  [====Goals of Care====]    Advance Care Planning 5/14/2017   Patient's Healthcare Decision Maker is: Legal Next of Alexander 71   Primary Decision Maker Name [facility]   Primary Decision Maker Phone Number [phone]   Primary Decision Maker Relationship to Patient Adult child   Secondary Decision Maker Name [id number] Phone Number -   Secondary Decision Maker Relationship to Patient Adult child   Confirm Advance Directive None   Patient Would Like to Complete Advance Directive Yes   Does the patient have other document types Other (comment)           HISTORY:     History obtained from: patient    CHIEF COMPLAINT: nausea    HPI/SUBJECTIVE:    The patient is:   [x] Verbal and participatory  [] Non-participatory due to:   Patient states c/o intermittent nausea, no vomiting states related to fish oil \"which I started myself\". He denies anti nausea medication.      Clinical Pain Assessment (nonverbal scale for nonverbal patients): Pain: 3  Complains of intermittent muscle spasms in bilateral lower extremities       FUNCTIONAL ASSESSMENT:     Palliative Performance Scale (PPS):  PPS: 30       PSYCHOSOCIAL/SPIRITUAL SCREENING:     Advance Care Planning:  Advance Care Planning 2017   Patient's Healthcare Decision Maker is: Legal Next of Alexander Ceron   Primary Decision Maker Name [address] [place] [address]   Primary Decision Maker Phone Number [phone]   Primary Decision Maker Relationship to Patient Adult child   Secondary Decision Maker Name [facility]   Secondary Decision Maker Phone Number -   Secondary Decision Maker Relationship to Patient Adult child   Confirm Advance Directive None   Patient Would Like to Complete Advance Directive Yes   Does the patient have other document types Other (comment)        Any spiritual / Holiness concerns:  [] Yes /  [x] No    Caregiver Burnout:  [] Yes /  [x] No /  [] No Caregiver Present      Anticipatory grief assessment:   [x] Normal  / [] Maladaptive       ESAS Anxiety: Anxiety: 0    ESAS Depression:          REVIEW OF SYSTEMS:     Positive and pertinent negative findings in ROS are noted above in HPI. The following systems were [x] reviewed / [] unable to be reviewed as noted in HPI  Constitutional - feeling generally \"crappy\"  Respiratory - denies shortness of breath  Cardiac - denies chest pain  Gastrointestinal - admits to nausea    Modified ESAS Completed by: provider   Fatigue: 2 Drowsiness: 0     Pain: 3   Anxiety: 0 Nausea: 5   Anorexia: 1 Dyspnea: 0   [facility] Well[facility]in     Other Problem (Comment): 0 Stool Occurrence(s): 0        PHYSICAL EXAM:     Wt Readings from Last 3 Encounters:   17 93.2 kg (205 lb 6.4 oz)   17 87.5 kg (193 lb)   17 88.9 kg (196 lb)     Blood pressure 149/78, pulse 72, temperature 97.3 °F (36.3 °C), resp. rate 20, height 6' (1.829 m), weight 93.2 kg (205 lb 6.4 oz), SpO2 98 %.   Pain:  Pain Scale 1: Numeric (0 - 10)  Pain Intensity 1: 0     Pain Location 1: Leg  Pain Orientation 1: Left     Pain Intervention(s) 1: Medication (see MAR)    Constitutional: alert, generally fatigued  Eyes: pupils equal, anicteric  ENMT: no nasal discharge, moist mucous membranes  Respiratory: breathing not labored  Gastrointestinal: soft non-tender, +bowel sounds  Skin: warm, dry  Neurologic: following commands, moving all extremities  Psychiatric: oriented x 3-4     HISTORY:     Active Problems:    CAD (coronary artery disease) (3/5/2014)      PVD (peripheral vascular disease) ([address] (3/5/2014)      CHF (congestive heart failure) ([address] (7/21/2016)      Hx of CABG (7/22/2016)      Overview: 2013      H/O carotid endarterectomy (7/22/2016)      Overview: 2015      AICD (automatic cardioverter/defibrillator) present (7/22/2016)      Overview: 2015      Acute exacerbation of CHF (congestive heart failure) ([address] (2/10/1928)      Diastolic CHF, acute on chronic ([address] (8/17/2016)      HTN (hypertension) (9/4/2016)      HLD (hyperlipidemia) (9/4/2016)      Acute CHF (congestive heart failure) ([address] (2/26/2017)      Acute on chronic diastolic (congestive) heart failure ([address] (2/27/2017)      Aortic stenosis, moderate (3/11/2017)      Pulmonary HTN ([address] (4/4/2017)      Cellulitis (5/14/2017)      Past Medical History:   Diagnosis Date    Arrhythmia     A fib; has external vest and belt he wears for this    Arthritis     High blood pressure     Hypercholesteremia     Mini stroke ([address] 2000    Unspecified sleep apnea     does not wear machine      Past Surgical History:   Procedure Laterality Date    CABG, ARTERY-VEIN, THREE  2013    HX CAROTID ENDARTERECTOMY Left     HX CAROTID ENDARTERECTOMY Right 2000    HX CORONARY STENT PLACEMENT        History reviewed. No pertinent family history. History reviewed, no pertinent family history.   Social History   Substance Use Topics    Smoking status: Former Smoker    Smokeless tobacco: Never Used      Comment: quit in the 90s    Alcohol use No     No Known Allergies   Current Facility-Administered Medications   Medication Dose Route Frequency    carvedilol (COREG) tablet 3.125 mg  3.125 mg Oral Q12H    colchicine (MITIGARE) capsule 0.6 mg  0.6 mg Oral Q TU, TH & SAT    bumetanide (BUMEX) 12.5 mg in 0.9% sodium chloride 100 mL infusion  0.5 mg/hr IntraVENous CONTINUOUS    epoetin ron (EPOGEN;PROCRIT) injection 6,000 Units  6,000 Units SubCUTAneous Q MON, WED & FRI    allopurinol (ZYLOPRIM) tablet 50 mg  50 mg Oral DAILY    DOBUTamine (DOBUTREX) 500 mg/250 mL (2,000 mcg/mL) infusion  5 mcg/kg/min IntraVENous CONTINUOUS    cholecalciferol (VITAMIN D3) tablet 2,000 Units  2,000 Units Oral DAILY    therapeutic multivitamin (THERAGRAN) tablet 1 Tab  1 Tab Oral DAILY    nitroglycerin (NITROSTAT) tablet 0.4 mg  0.4 mg SubLINGual PRN    pantoprazole (PROTONIX) tablet 40 mg  40 mg Oral ACB    albuterol-ipratropium (DUO-NEB) 2.5 MG-0.5 MG/3 ML  3 mL Nebulization Q6H PRN    ferrous sulfate tablet 325 mg  325 mg Oral DAILY WITH BREAKFAST    fluticasone (FLONASE) 50 mcg/actuation nasal spray 2 Spray  2 Spray Both Nostrils DAILY    HYDROcodone-acetaminophen (NORCO) 5-325 mg per tablet 1 Tab  1 Tab Oral Q4H PRN    influenza vaccine 2016-17 (36mos+)(PF) (FLUZONE/FLUARIX/FLULAVAL QUAD) injection 0.5 mL  0.5 mL IntraMUSCular PRIOR TO DISCHARGE    zolpidem (AMBIEN) tablet 5 mg  5 mg Oral QHS PRN    sodium chloride (NS) flush 5-10 mL  5-10 mL IntraVENous Q8H    sodium chloride (NS) flush 5-10 mL  5-10 mL IntraVENous PRN    ondansetron (ZOFRAN) injection 4 mg  4 mg IntraVENous Q4H PRN        LAB AND IMAGING FINDINGS:     Lab Results   Component Value Date/Time    WBC 4.3 05/19/2017 05:33 AM    HGB 8.0 05/19/2017 05:33 AM    PLATELET 44 67/51/5342 05:33 AM     Lab Results   Component Value Date/Time    Sodium 131 05/19/2017 05:33 AM    Potassium 4.2 05/19/2017 05:33 AM    Chloride 92 05/19/2017 05:33 AM    CO2 27 05/19/2017 05:33 AM    BUN 86 05/19/2017 05:33 AM    Creatinine 4.94 05/19/2017 05:33 AM    Calcium 9.2 05/19/2017 05:33 AM    Magnesium 2.0 05/17/2017 10:05 AM    Phosphorus 4.5 05/17/2017 10:05 AM      Lab Results   Component Value Date/Time    AST (SGOT) 372 05/14/2017 06:05 PM    Alk. phosphatase 121 05/14/2017 06:05 PM    Protein, total 6.7 05/14/2017 06:05 PM    Albumin 2.7 05/14/2017 06:05 PM    Globulin 4.0 05/14/2017 06:05 PM     Lab Results   Component Value Date/Time    INR 1.9 05/15/2017 02:40 AM    Prothrombin time 20.6 05/15/2017 02:40 AM    aPTT 49.4 05/15/2017 02:40 AM      Lab Results   Component Value Date/Time    Iron 43 02/27/2017 03:35 AM    TIBC 339 02/27/2017 03:35 AM    Iron % saturation 13 02/27/2017 03:35 AM    Ferritin 111 02/27/2017 03:35 AM      No results found for: PH, PCO2, PO2  No components found for: [place]   Lab Results   Component Value Date/Time    CK 39 02/27/2017 08:15 PM    CK - MB <1.0 02/27/2017 08:15 PM            Total time: 50 minutes  Counseling / coordination time, spent as noted above: 35 minutes  > 50% counseling / coordination?: yes    Prolonged service was provided for  []30 min   []75 min in face to face time in the presence of the patient, spent as noted above. Time Start:   Time End:   Note: this can only be billed with 90078 (initial) or 55407 (follow up). If multiple start / stop times, list each separately.

## 2017-05-19 NOTE — PROGRESS NOTES
Late entry note for 5/18  Seen at the bedside along with Pulmonary Dr Mandi Peterson and PA  Lab tests reviewed  Alert and coherent requiring nasal oxygen  Has Pleural effusion  VSS  Heart reg Lungs decreased breath sounds  Abd soft  Extremities 1 + edema    ! Rebecca Lundberg CHF  2. ESRD  3. CAD  4.  Anemia    Plan  Need HD

## 2017-05-19 NOTE — PROGRESS NOTES
RENAL DAILY PROGRESS NOTE      IMPRESSION:   Acute on chronic renal failure, not recovering renal function , Plan for HD yesterday by Dr. Terri Leonard, awaiting for HD catheter placement--- today rising BUN, getting refractory to diuretics  Short of breath, multifactorial , bilateral pleural effusion, COPD exacerbation   Anemia   HTN, well contorlled   PLAN:    No improvement in renal clearance, dialysis would help to improve clearance ab fluid management . Plan for HD once HD cahteter placed. Diuretics per cardiology colleague . Family at bed side, discussed following risk and benefit for dialysis. I have discussed risk and benefit for dialysis. Dialysis helps to improve breathing, remove waste product which can cause life threatening condition , such as chest pain, seizure, etc, helps to correct as well as to  avoid  various electrolyte imbalance such as hyperkalemia, acidosis, hyperphosphatemia, hypo or hyper calcemia etc.  Acute complication includes hypotension, cramps, nausea and vomiting, headache, chest pain, back pain, itching, and fever and chills. Cardiac arrhythmia are frequently happened, including supraventricular tachycardia(most common) atrial fibrilation , ventricular arrhythmia. Dialysis catheter can cause bleeding , thrombosis and high risk for life threatening infection. Addendum  At 3:44 PM on 5/19/2017, I saw and examined patient during hemodialysis treatment. The patient was receiving hemodialysis for treatment of  renal failure. I have also reviewed vital signs, intake and output, lab results and recent events, and agreed with today's dialysis order. Low platelet, Avoid heparin in HD catheter, will use citrate. Subjective:     71y M with acute on chronic kidney injury,   Complaint:   Overnight events noted  Breathing is okay   no nausea, vomiting, chest pain,cough, seizure.      Current Facility-Administered Medications   Medication Dose Route Frequency    lidocaine (PF) (XYLOCAINE) 10 mg/mL (1 %) injection 1-60 mL  1-60 mL SubCUTAneous Multiple    midazolam (VERSED) injection 0.25-2 mg  0.25-2 mg IntraVENous Multiple    fentaNYL citrate (PF) injection 12.5-100 mcg  12.5-100 mcg IntraVENous Multiple    carvedilol (COREG) tablet 3.125 mg  3.125 mg Oral Q12H    colchicine (MITIGARE) capsule 0.6 mg  0.6 mg Oral Q TU, TH & SAT    bumetanide (BUMEX) 12.5 mg in 0.9% sodium chloride 100 mL infusion  0.5 mg/hr IntraVENous CONTINUOUS    epoetin ron (EPOGEN;PROCRIT) injection 6,000 Units  6,000 Units SubCUTAneous Q MON, WED & FRI    allopurinol (ZYLOPRIM) tablet 50 mg  50 mg Oral DAILY    DOBUTamine (DOBUTREX) 500 mg/250 mL (2,000 mcg/mL) infusion  5 mcg/kg/min IntraVENous CONTINUOUS    cholecalciferol (VITAMIN D3) tablet 2,000 Units  2,000 Units Oral DAILY    therapeutic multivitamin (THERAGRAN) tablet 1 Tab  1 Tab Oral DAILY    nitroglycerin (NITROSTAT) tablet 0.4 mg  0.4 mg SubLINGual PRN    pantoprazole (PROTONIX) tablet 40 mg  40 mg Oral ACB    albuterol-ipratropium (DUO-NEB) 2.5 MG-0.5 MG/3 ML  3 mL Nebulization Q6H PRN    ferrous sulfate tablet 325 mg  325 mg Oral DAILY WITH BREAKFAST    fluticasone (FLONASE) 50 mcg/actuation nasal spray 2 Spray  2 Spray Both Nostrils DAILY    HYDROcodone-acetaminophen (NORCO) 5-325 mg per tablet 1 Tab  1 Tab Oral Q4H PRN    influenza vaccine 2016-17 (36mos+)(PF) (FLUZONE/FLUARIX/FLULAVAL QUAD) injection 0.5 mL  0.5 mL IntraMUSCular PRIOR TO DISCHARGE    zolpidem (AMBIEN) tablet 5 mg  5 mg Oral QHS PRN    sodium chloride (NS) flush 5-10 mL  5-10 mL IntraVENous Q8H    sodium chloride (NS) flush 5-10 mL  5-10 mL IntraVENous PRN    ondansetron (ZOFRAN) injection 4 mg  4 mg IntraVENous Q4H PRN       Review of Symptoms: comprehensive ROS negative except above.    Objective:   Patient Vitals for the past 24 hrs:   Temp Pulse Resp BP SpO2   05/19/17 0752 - - - - 98 %   05/19/17 0722 97.3 °F (36.3 °C) 72 20 149/78 97 %   05/19/17 0400 97.6 °F (36.4 °C) 80 20 118/71 96 %   05/19/17 0000 97.6 °F (36.4 °C) 68 20 127/71 98 %   05/18/17 1930 97.3 °F (36.3 °C) 77 20 147/90 96 %   05/18/17 1600 97.5 °F (36.4 °C) 73 20 120/78 97 %   05/18/17 1416 - - - - (!) 88 %   05/18/17 1258 97.3 °F (36.3 °C) 73 20 112/80 96 %        Weight change: -3.402 kg (-7 lb 8 oz)     05/17 1901 - 05/19 0700  In: 748.6 [P.O.:220;  I.V.:528.6]  Out: 3550 [Urine:3550]    Intake/Output Summary (Last 24 hours) at 05/19/17 1203  Last data filed at 05/19/17 1100   Gross per 24 hour   Intake            156.8 ml   Output             2550 ml   Net          -2393.2 ml     Physical Exam:   General: comfortable, no acute distress   HEENT sclera anicteric, supple neck, no thyromegaly  CVS: S1S2 heard,  no rub  RS: + air entry b/l,   Abd: Soft, Non tender, Not distended,   Neuro: non focal, awake, alert ,   Extrm: + edema, no cyanosis, clubbing   Musculoskeletal: No gross joints or bone deformities         Data Review:     LABS:   Hematology: Recent Labs      05/19/17   0533  05/18/17   0514  05/17/17   1005   WBC  4.3*  4.4*  5.1   HGB  8.0*  7.8*  7.6*   HCT  25.5*  24.8*  24.0*     Chemistry: Recent Labs      05/19/17   0533  05/18/17   0514  05/17/17   1005  05/17/17   0143   BUN  86*  85*   --   81*   CREA  4.94*  4.65*   --   4.07*   CA  9.2  8.7  8.5  8.6   K  4.2  4.3   --   4.1   NA  131*  131*   --   132*   CL  92*  93*   --   95*   CO2  27  27   --   28   PHOS   --    --   4.5   --    GLU  76  85   --   106*              Procedures/imaging: see electronic medical records for all procedures, Xrays and details which were not copied into this note but were reviewed prior to creation of Plan          Assessment & Plan:     As above         Mimi Saul MD  5/19/2017  12:03 PM

## 2017-05-19 NOTE — ROUTINE PROCESS
Bedside and Verbal shift change report given to Sweetie Guido (oncoming nurse) by Kasie Bansal RN   (offgoing nurse). Report included the following information SBAR, Kardex, Intake/Output and MAR.

## 2017-05-19 NOTE — H&P (VIEW-ONLY)
Vascular Surgery Consult      Patient: Mylene Steiner MRN: [id number]  CSN: [id number]      YOB: 1940    Age: 78 y.o. Sex: male      DOA: 5/14/2017       HPI:     Mylene Steiner is a 78 y.o. male with history of CAD s/p CABG, combined systolic and diastolic CHF, moderate AS/MR, HTN, hyperlipidemia, chronic ascites, history of bilateral carotid endarterectomies and CKD stage 3 who was admitted for worsening leg pain and swelling. Patient is a bit of a poor historian. He states that his leg pain started Friday after he stopped taking his gout medication. He states that prior to that he was feeling fine with no leg pain. Reportedly he had been taking ABX with no improvement and came to the ED. Of note he was recently discharged from [facility] in March of this year due to acute CHF exacerbation. Upon admission he was found to be anemic with Hbg of 7 and hypotensive with SBP in the low 80's and DBP in the 50's. He also has thrombocytopenia with platelet count in the 60's. He is scheduled to receive transfusion and Cardiology was consulted and pt has been started on Dobutamine drip. He has remained afebrile. No leukocytosis. Cultures ordered and pending. Venous US of right leg negative for DVT. He did also have arterial studies which showed moderate >50% stenosis of the right superficial femoral artery and severe >75% stenosis of the profunda femoris artery with occlusion of the posterior tibial artery. The right ankle/brachial index is 0.66. There was also moderate >50% stenosis of the left profunda femoris artery and focal occlusion of the distal superficial femoral artery with occlusion of the anterior tibial and peroneal arteries. The left ankle/brachial index is 0.53. Patient states that his leg is bilateral and that one leg is not worse than the other. He states that he did ambulate in the house and denies claudication although this is difficult to assess as he does not walk far.  He denies any previous rest pain. He is a former smoker. He is currently complaining of not being able to pass his urine, condom cath is in place. Past Medical History:   Diagnosis Date    Arrhythmia     A fib; has external vest and belt he wears for this    Arthritis     High blood pressure     Hypercholesteremia     Mini stroke ([facility] [address] 2000    Unspecified sleep apnea     does not wear machine       Past Surgical History:   Procedure Laterality Date    CABG, ARTERY-VEIN, THREE  2013    HX CAROTID ENDARTERECTOMY Left     HX CAROTID ENDARTERECTOMY Right 2000    HX CORONARY STENT PLACEMENT         History reviewed. No pertinent family history. Social History     Social History    Marital status: UNKNOWN     Spouse name: N/A    Number of children: N/A    Years of education: N/A     Social History Main Topics    Smoking status: Former Smoker    Smokeless tobacco: Never Used      Comment: quit in the 90s    Alcohol use No    Drug use: No    Sexual activity: Not Asked     Other Topics Concern    None     Social History Narrative       Prior to Admission medications    Medication Sig Start Date End Date Taking? Authorizing Provider   fish oil-omega-3 fatty acids 340-1,000 mg capsule Take 1 Cap by mouth two (2) times a day. Indications: HYPERTRIGLYCERIDEMIA 3/11/17  Yes Molly Pedersen MD   cephALEXin (KEFLEX) 500 mg capsule Take 1 Cap by mouth four (4) times daily for 7 days. 5/9/17 5/16/17  Isaias Magdaleno PA-C   HYDROcodone-acetaminophen (NORCO) 5-325 mg per tablet Take 1 Tab by mouth every six (6) hours as needed for Pain. Max Daily Amount: 4 Tabs. 5/9/17   Isaias Magdaleno PA-C   carvedilol (COREG) 6.25 mg tablet Take 1 Tab by mouth two (2) times daily (with meals). Indications: Chronic Heart Failure 4/5/17   Kim Lewis NP   colchicine 0.6 mg tablet Take 0.6 mg by mouth daily. Historical Provider   loperamide (IMMODIUM) 2 mg tablet Take 2 mg by mouth four (4) times daily as needed for Diarrhea.     Historical Provider   allopurinol (ZYLOPRIM) 100 mg tablet Take 1 Tab by mouth daily. Indications: GOUT 3/11/17   Brokoe Hunt MD   aspirin delayed-release 81 mg tablet Take 2 Tabs by mouth daily. 3/11/17   Brooke Hunt MD   cholecalciferol (VITAMIN D3) 1,000 unit tablet Take 2 Tabs by mouth daily. 3/11/17   Brooke Hunt MD   therapeutic multivitamin [facility]) tablet Take 1 Tab by mouth daily. 3/11/17   Brooke Hunt MD   nitroglycerin (NITROSTAT) 0.4 mg SL tablet 1 Tab by SubLINGual route as needed for Chest Pain. 3/11/17   Brooke Hunt MD   pantoprazole (PROTONIX) 40 mg tablet Take 1 Tab by mouth Daily (before breakfast). 3/11/17   Brooke Hunt MD   pravastatin (PRAVACHOL) 80 mg tablet Take 1 Tab by mouth nightly. 3/11/17   Brooke Hunt MD   albuterol-ipratropium (DUO-NEB) 2.5 mg-0.5 mg/3 ml nebu 3 mL by Nebulization route every six (6) hours as needed. 3/11/17   Brooke Hunt MD   ferrous sulfate 325 mg (65 mg iron) tablet Take 1 Tab by mouth daily (with breakfast). 3/11/17   Brooke Hunt MD   fluticasone [facility]) 50 mcg/actuation nasal spray 2 spray each nostril daily  Indications: ALLERGIC RHINITIS 3/11/17   Brooke Hunt MD   furosemide (LASIX) 40 mg tablet 1 tab BID 3/11/17   Brooke Hunt MD   influenza vaccine 2016-17, 36mos+,,PF, (FLUZONE/FLUARIX/FLULAVAL QUAD) syrg injection 0.5 mL by IntraMUSCular route PRIOR TO DISCHARGE. 3/11/17   Brooke Hunt MD   metOLazone (ZAROXOLYN) 2.5 mg tablet Take 1 Tab by mouth daily. Patient taking differently: Take 2.5 mg by mouth every other day. 3/11/17   Brooke Hunt MD   zolpidem (AMBIEN) 5 mg tablet Take 1 Tab by mouth nightly as needed for Sleep. Max Daily Amount: 5 mg. 3/11/17   Brooke Hunt MD       No Known Allergies    Review of Systems  Complete ROS is difficult to obtain due to pt poor historian.    Constitutional: negative   HEENT: negative   Respiratory: negative   Cardiovascular: negative   Gastrointestinal: positive for chronic ascites. Genitourinary:positive for difficulty urinating (condom cath in place)  Hematologic/lymphatic: negative   Musculoskeletal:positive for BLE edema and pain  Neurological: negative   Behavioral/Psych: negative   Endocrine: negative   Allergic/Immunologic: negative        Physical Exam:      Visit Vitals    BP 94/59 (BP 1 Location: Left arm, BP Patient Position: Supine)    Pulse 70    Temp 96.8 °F (36 °C)    Resp 18    Ht 6' (1.829 m)    Wt 192 lb 14.4 oz (87.5 kg)    SpO2 93%    BMI 26.16 kg/m2       Physical Exam:  General: elderly male in no acute distress. Pt appears lethargic and slightly confused  HEENT: EOMI, no scleral icterus is noted. Cardiovascular: RRR   Pulmonary: No increased work or breathing is noted. Abdomen: soft, distended. Extremities: mild edema, R>L. Left toes cool to touch, there are two surface deep ulcers on left shin. No drainage. Unable to palpate pulses. No signs of active cellulitis appreciated. Neuro: Cranial nerves II through XII are grossly intact. ? Mildly confused. ? baseline  Integument: No other ulcerations are identified visibly      Data Review:    CBC:   Lab Results   Component Value Date/Time    WBC 7.0 05/15/2017 02:40 AM    RBC 2.52 05/15/2017 02:40 AM    HGB 7.0 05/15/2017 02:40 AM    HCT 22.8 05/15/2017 02:40 AM    PLATELET 49 27/73/6760 02:40 AM      BMP:   Lab Results   Component Value Date/Time    Glucose 117 05/15/2017 02:40 AM    Sodium 138 05/15/2017 02:40 AM    Potassium 4.3 05/15/2017 02:40 AM    Chloride 98 05/15/2017 02:40 AM    CO2 29 05/15/2017 02:40 AM    BUN 65 05/15/2017 02:40 AM    Creatinine 2.76 05/15/2017 02:40 AM    Calcium 8.6 05/15/2017 02:40 AM     Coagulation:   Lab Results   Component Value Date/Time    Prothrombin time 20.6 05/15/2017 02:40 AM    INR 1.9 05/15/2017 02:40 AM    aPTT 49.4 05/15/2017 02:40 AM         Assessment/Plan     79 yo male with acute on chronic CHF exacerbation now on Dobutamine drip.  He is also quite anemic and scheduled to receive transfusion. He came in with complaint of leg pain and swelling. Arterial studies show occluded left SFA and moderate >50% stenosis of the right superficial femoral artery with severe >75% stenosis of the profunda femoris artery. Discussed that he will eventually need CO2 angiograms (CO2 due to his CKD) however needs to be medically stable prior to intervention. Hopefully once his overall perfusion is improved with Dobutamine and transfusion his pain will improve. Will continue to follow along. If rest pain does not subside or symptoms worsen may have to consider urgent vascular intervention.      Active Problems:    CAD (coronary artery disease) (3/5/2014)      PVD (peripheral vascular disease) ([address] (3/5/2014)      CHF (congestive heart failure) ([address] (7/21/2016)      Hx of CABG (7/22/2016)      Overview: 2013      H/O carotid endarterectomy (7/22/2016)      Overview: 2015      AICD (automatic cardioverter/defibrillator) present (7/22/2016)      Overview: 2015      Acute exacerbation of CHF (congestive heart failure) ([address] (6/13/0465)      Diastolic CHF, acute on chronic ([address] (8/17/2016)      HTN (hypertension) (9/4/2016)      HLD (hyperlipidemia) (9/4/2016)      Acute CHF (congestive heart failure) ([address] (2/26/2017)      Acute on chronic diastolic (congestive) heart failure ([address] (2/27/2017)      Aortic stenosis, moderate (3/11/2017)      Pulmonary HTN ([address] (4/4/2017)      Cellulitis (5/14/2017)        [address], [place]  May 15, 2017

## 2017-05-19 NOTE — PROGRESS NOTES
[facility]  Pulmonary, Critical Care, and Sleep Medicine    Name: Deloris Slaughter MRN: [id number]   : 1940 Hospital: [address] [place] [address] Dr   Date: 2017          Subjective/Interval History:     Patient is a 78 y.o. Male, prior smoker, with multiple medical problems and extensive cardiac history, to include PMH of Acute on chronic Systolic and Diastolic CHF; COPD (per pt, but not found in chart) - on home O2 at 3LPM at rest, 4LPM with exertion; PVD, HTN, Pulmonary HTN, CAD, s/p CABG (), s/p AICD (); HLD; and chronic ITP, who presented to [facility] ED via EMS on 17, c/o R leg pain, s/p sliding off his recliner (per pt), and progressively worsening of leg swelling x1 week. Pt was seen on 17 in the ED for leg swelling as well, had negative PVL and was d/h with keflex and pain meds. Has been taking abx as directed and has finished his pain meds but he has not had any improvement in pain. Upon arrival to ED, pt has clinical signs of cellulitis, not responding to out-patient treatment. Pt was admitted for cellulitis and CHF exacerbation. [facility] course has included: PVL's (-) for DVT; CTA (-) for PE; Arterial studies show occluded L SFA and moderate >50% stenosis of the R superficial femoral artery with severe >75% stenosis of the profunda femoris artery. Pulmonary team consulted (17) d/t hypoxia in the setting of acute CHF with fluid overload, 2/2 worsening KYLEE on CKD and pt currently refusing dialysis.  Pt may also have underlying respiratory disease, and reports hx of COPD - underlying COPD exacerbation?    17:  - Evaluated pt in Cath hold s/p Temp Dialysis cath placement  - Pt alert but drowsy; family bedside; pt resting comfortably on 8L HFNC in mouth  - States overall, feels slightly improved today from previous; breathing feels slightly worse  - Scheduled for 1st round of HD today  - BLE edema and pain slightly improved from previous  - Denies any CP, palpitations, N/V/D, H/A, F/C.   - Did use BiPAP \"a little\" last night - encouraged pt to use at night         ROS:Pertinent items are noted in HPI. Objective:   Vital Signs:    Visit Vitals    /75    Pulse 65    Temp 97.2 °F (36.2 °C) (Oral)    Resp 16    Ht 6' (1.829 m)    Wt 93.2 kg (205 lb 6.4 oz)    SpO2 96%    BMI 27.86 kg/m2       O2 Device: Room air   O2 Flow Rate (L/min): 10 l/min   Temp (24hrs), Av.4 °F (36.3 °C), Min:97.2 °F (36.2 °C), Max:97.6 °F (36.4 °C)       Intake/Output:   Last shift:       07 - 1900  In: 20 [I.V.:20]  Out: 300 [Urine:300]  Last 3 shifts: 1901 -  0700  In: 748.6 [P.O.:220; I.V.:528.6]  Out: 3550 [Urine:3550]    Intake/Output Summary (Last 24 hours) at 17 1439  Last data filed at 17 1100   Gross per 24 hour   Intake            156.8 ml   Output             2550 ml   Net          -2393.2 ml        Physical Exam:    General: Alert, awake, NAD, resting comfortably on 12L HFNC   HEENT: Normocephalic,atraumatic; PERRL, conjunctivae/corneas clear, anicteric; nares normal; no drainage/sinus tenderness; nasal/oral mucosa moist; neck supple, symmetric; trachea midline; No adenopathy; + JVD noted. Resp: Symmetrical chest rise; mod AE bilat; diminished throughout, L side worsening - no change from previous; course; Bibasilar rales; No wheezes/rhonchi noted. CV: RRR, S1S2 normal; No m/r/g   Abdomen: Protuberant; moderate ascites; abd is slightly firm, distended and tympanic - no change from previous; non-tender(+) B. S x4;.No masses/ organomegaly/ paradox noted   Extremity: 1-2+ pulses to upper extremities; unable to palpate BLE pulses - unchanged; anasarca - BLE continues to improve per pt and RN, from previous;  to touch; +1-+2 edema BLE up to upper thighs and abdominal wall    Neuro: Alert, grossly non-focal   Skin: Venous statis; dry; 2 ulcers noted to L shin - covered with Mepilex      DATA:  Labs:  Recent Labs 05/19/17   0533  05/18/17   0514  05/17/17   1005   WBC  4.3*  4.4*  5.1   HGB  8.0*  7.8*  7.6*   HCT  25.5*  24.8*  24.0*   PLT  44*  45*  76*     Recent Labs      05/19/17   0533  05/18/17   0514  05/17/17   1005  05/17/17   0143   NA  131*  131*   --   132*   K  4.2  4.3   --   4.1   CL  92*  93*   --   95*   CO2  27  27   --   28   GLU  76  85   --   106*   BUN  86*  85*   --   81*   CREA  4.94*  4.65*   --   4.07*   CA  9.2  8.7  8.5  8.6   MG   --    --   2.0   --    PHOS   --    --   4.5   --      PFT:    N/A                                                   Echo [05/18/17]:   SUMMARY:  Left ventricle: Systolic function was normal. Ejection fraction was  estimated in the range of 55 % to 60 %. No obvious wall motion  abnormalities identified in the views obtained. Wall thickness was mildly  increased. Aortic valve: There was moderate stenosis. Valve peak gradient was 49  mmHg. Valve mean gradient was 31 mmHg. Estimated aortic valve area (by  Vmax) was 1 cm-sq. Imaging: No new imaging today    US RETROPERITONEUM COMP [05/17/17]: FINDINGS:      Right kidney size: 7.5 x 3.2 x 3.5 cm. Left kidney size: 12.5 x 5.2 x 6.2 cm.      At the right kidney upper pole region, there is a 3.7 x 3.5 x 3.3 cm  circumscribed cyst. No hydronephrosis is demonstrated in the right kidney. No evidence of solid or cystic mass or hydronephrosis is detected in the left kidney. Both kidneys demonstrate increased parenchymal echogenicity.      Moderate ascites is demonstrated in the peritoneal cavity particularly around  the liver and spleen. The spleen is enlarged, measuring 13.0 x 5.6 x 13.8 cm.      The bladder is not optimally evaluated due to contracted state. Emerson catheter  has been deployed. The inferior vena cava and aorta are not well visualized.         IMPRESSION:  1. No postobstructive changes. Asymmetric atrophy of the right kidney. Renal cyst at the upper pole of the right kidney.   2. Increased renal parenchymal echogenicity, potentially suggestive of medical renal disease. 3. Moderate ascites           CXR [05/17/17]: FINDINGS:   A cardiac pacemaker hub is identified in the left upper torso region  with leads through the left subclavian approach. No pneumothorax is detected. There is moderate to large right-sided pleural effusion. The size of the pleural effusion may be slightly increased compared to 5/14/17. Increased streaky densities are identified in the aerated right lung particularly in the upper to lower lung zones. Increased streaky densities are also identified in the left lower lung zone. There is new developing left costophrenic angle blunting. The cardiac silhouette is moderately prominent, with multiple intact median sternotomy wires.      IMPRESSION:  1. Pacemaker in place. 2. Moderate to large right pleural effusion, slightly increased compared to 5/14/17. 3. New developing small left-sided pleural effusion. 3. Streaky densities in both lungs suggestive of atelectatic changes vs. Infiltrate        [x]I have personally reviewed the patients radiographs  [x]Radiographs reviewed with radiologist   []No change from prior, tubes and lines in adequate position  []Improved   []Worsening    IMPRESSION:   · Bilateral Pleural Effusions: R>L, worsening on CXR (05/17/17) from 05/14. Discussed with pt about therapeutic Thoracentesis to help with respiratory status in the short term, though will not be a long term fix for underlying process. Pt will consider procedure - will re-address tomorrow. · Metabolic Acidosis - 2/2 to below  · Acute on Chronic hypoxic Respiratory Failure - 2/2 Fluid overload d/t worsening CHF in the setting of ESRD and pt currently refusing HD.    · COPD  · Acute diastolic CHF - Elevated BNP & BLE edema   · KYLEE on CKD - Elevated Cr; Nephrology following; pt needs HD but is currently refusing   · Hypotension - Currently on Dobutamine drip at 5mcg/mg/kg/hr  · Pulmonary HTN  · Anemia - Received transfusion - 1U PRBC on 05/15/17  · Thrombocytopenia - Pt does have chronic ITP ( seen by Hematology in 02/2017); worsening (05/19)  · Anasacra  · Cellulitis - Failed out-patient treatment; completed Zosyn on 05/16/17. · PAD - with BLE pain - Vascular following  · CAD - S/p CABG in 2013; s/p AICD in 02/17; hx of Carotid endarterectomy - bilateral in 2015; Mitral regurgitation - mild-to moderate based on Echo (2016)  · Gout      Patient Active Problem List   Diagnosis Code    Hematospermia R36.1    CAD (coronary artery disease) I25.10    PVD (peripheral vascular disease) ([facility] I73.9    CHF (congestive heart failure) ([facility]) I50.9    Anasarca R60.1    Hx of CABG Z95.1    H/O carotid endarterectomy Z98.890    AICD (automatic cardioverter/defibrillator) present Z95.810    Acute exacerbation of CHF (congestive heart failure) ([facility]) C38.8    Diastolic CHF, acute on chronic ([facility]) I50.33    HTN (hypertension) I10    HLD (hyperlipidemia) E78.5    Acute CHF (congestive heart failure) ([facility]) I50.9    Acute on chronic diastolic (congestive) heart failure ([facility]) I50.33    Thrombocytopenia ([facility]) D69.6    Hypokalemia E87.6    Aortic stenosis, moderate I35.0    CKD (chronic kidney disease) N18.9    Pulmonary HTN ([facility]) I27.2    Cellulitis L03.90      PLAN:   · SpO2 >90%; titrate supp O2 PRN; currently pt is resting on comfortably on 8L HFNC;  pt is a chronic CO2 retainer - monitor closely for signs of CO2 narcosis. · ABG improved on BiPAP; con't BiPAP PRN & qHS. Will repeat ABG /CXR if respiratory status worsens or pt's mental status changes  · CXR (05/17/17) shows bilateral pleural effusions, R side increased since prior imaging; will hold off on Thoracentesis for now and monitor response to dialysis.     · Aggressive pulmonary toileting; encourage ICS when not on BiPAP; PEP device   · Aspiration precautions - HOB >30'  · Pt likely has component of COPD - will plan to add maintenance inhalers for added respiratory benefit - for now - will con't Con't duo-nebs q6 PRN;Con't Flonase; and monitor respiratory response to dialysis. · Pt's cough is worsening and is coughing up more thick brownish looking sputum - sputum cx ordered- will monitor for results; hold off on starting ABX at this time; con't to monitor s/s for infection   · Plts continuing to drop - 44K (05/19) - will monitor; WBC trending downward - 4.3 (05/19) - will monitor post dialysis  · Cardio following - currently on Dobutamine drip at 5mcg/kg/hr for hypotension. Currently HD stable; will monitor. On Bumex gtt; defer diuresis management to Cardio & Nephro; Repeat Echo pending  · Vascular following - Arterial studies show occluded L SFA and moderate >50% stenosis of the right superficial femoral artery with severe >75% stenosis of the profunda femoris artery - Vascular following - rec' for CO2 angiograms once more stable; if resting leg pain persists, may consider urgent vascular intervention. · Nephrology following - Temp dialysis cath placed today; started HD today (05/19/17)  · Recommend SAMANTA with Pulmonary Clinic upon D/C for overall f/u; evaluation for COPD, baseline PTF's, evaluation for ROOPA. · Will Follow closely, thank you for this consult.    · DVT, PUD prophylaxis - per primary team.          Joya Shankar PA-C

## 2017-05-19 NOTE — PROGRESS NOTES
[facility]      CARDIOLOGY PROGRESS NOTE  RECS:      1. Acute  diastolic congestive heart failure- presents with worsening CHF with BLE edema and elevated NT pro BNP. Stricts I&O. Monitor electrolytes and renal function. Increased dobutamine drip to 5 mcg.  cont bumex drip   2. Hypotension- improving. On dobutamine drip. Monitor closely   3. Anemia- Monitor H&H. follow CBC  4. Thrombocytopenia?- hold asa for now. Follow CBC today   5. S/p CABG in 2013 in [facility]- stable denies chest pain or pressure. S/p AICD interrogate 2/17 normal function. 6. Hx Carotid endarterectomy- bilateral in 2015    7. Mitral regurgitation-mild to moderate regurgitation echo on 2016   8. Aortic stenosis : moderate last echo 2/17. Recheck echo. 9. KYLEE on CKD- May need HD per Nephrology note  9. PAD- with BLE pain- vascular is following     patient with ESRD-- agrees for HD  Continue bumex for now till he gets HD       SUMMARY: echo 2/17  Left ventricle: Systolic function was normal by visual assessment. Ejection fraction was estimated in the range of 55 % to 60 %. No obvious  wall motion abnormalities identified in the views obtained. Wall thickness  was mildly increased. Features were consistent with a pseudonormal left  ventricular filling pattern, with concomitant abnormal relaxation and  increased filling pressure (grade 2 diastolic dysfunction). Right ventricle: The ventricle was mildly dilated. Systolic function was  mildly reduced. Systolic pressure was moderately increased. Estimated peak  pressure was 55 mmHg. Right atrium: The atrium was mildly dilated. Mitral valve: There was moderate thickening. Aortic valve: The valve was probably trileaflet. Leaflets exhibited  calcification and reduced mobility. There was moderate stenosis. Valve  peak gradient was 34 mmHg. Valve mean gradient was 21 mmHg. Estimated  aortic valve area (by Vmax) was 1.14 cmï¾².       ASSESSMENT:  Hospital Problems  Date Reviewed: 3/11/2017          Codes Class Noted POA    Acute on chronic kidney failure ([facility] ICD-10-CM: N17.9, N18.9  ICD-9-CM: 584.9, 585.9  5/19/2017 Unknown        Cellulitis ICD-10-CM: L03.90  ICD-9-CM: 682.9  5/14/2017 Unknown        Pulmonary HTN ([facility] ICD-10-CM: I27.2  ICD-9-CM: 416.8  4/4/2017 Yes        Aortic stenosis, moderate (Chronic) ICD-10-CM: I35.0  ICD-9-CM: 424.1  3/11/2017 Yes        Acute on chronic diastolic (congestive) heart failure (HCC) ICD-10-CM: I50.33  ICD-9-CM: 428.33, 428.0  2/27/2017 Yes        Acute CHF (congestive heart failure) ([facility] ICD-10-CM: I50.9  ICD-9-CM: 428.0  2/26/2017 Yes        HTN (hypertension) (Chronic) ICD-10-CM: I10  ICD-9-CM: 401.9  9/4/2016 Yes        HLD (hyperlipidemia) (Chronic) ICD-10-CM: E78.5  ICD-9-CM: 272.4  9/4/2016 Yes        Diastolic CHF, acute on chronic ([facility] ICD-10-CM: I50.33  ICD-9-CM: 428.33, 428.0  8/17/2016 Yes        Acute exacerbation of CHF (congestive heart failure) ([facility] ICD-10-CM: I50.9  ICD-9-CM: 428.0  8/12/2016 Unknown        Hx of CABG ICD-10-CM: Z95.1  ICD-9-CM: V45.81  7/22/2016 Yes    Overview Signed 7/22/2016  5:23 PM by Sena Arreola NP     2013             H/O carotid endarterectomy ICD-10-CM: Z98.890  ICD-9-CM: V45.89  7/22/2016 Yes    Overview Signed 7/22/2016  5:27 PM by Sena Arreola NP     2015             AICD (automatic cardioverter/defibrillator) present ICD-10-CM: Z95.810  ICD-9-CM: V45.02  7/22/2016 Yes    Overview Signed 7/22/2016  5:27 PM by Sena Arreola NP     2015             CHF (congestive heart failure) (HCC) ICD-10-CM: I50.9  ICD-9-CM: 428.0  7/21/2016 Yes        CAD (coronary artery disease) ICD-10-CM: I25.10  ICD-9-CM: 414.00  3/5/2014 Yes        PVD (peripheral vascular disease) ([facility] ICD-10-CM: I73.9  ICD-9-CM: 443.9  3/5/2014 Yes                SUBJECTIVE:    No CP  Denies  SOB   Leg pain is slowly improving    OBJECTIVE:    VS:   Visit Vitals    /87    Pulse (!) 54    Temp 97.2 °F (36.2 °C) (Oral)    Resp 16    Ht 6' (1.829 m)    Wt 93.2 kg (205 lb 6.4 oz)    SpO2 96%    BMI 27.86 kg/m2         Intake/Output Summary (Last 24 hours) at 05/19/17 1650  Last data filed at 05/19/17 1100   Gross per 24 hour   Intake               20 ml   Output             2550 ml   Net            -2530 ml     TELE: normal sinus rhythm    General: alert, well developed, pleasant and in no distress  HENT: Normocephalic, atraumatic. Normal external eye. Neck :  increased JVP  Cardiac:  regular rate and rhythm, S1, S2 normal, no S3 or S4, systolic murmur: holosystolic 3/6, harsh at lower left sternal border, at apex, throughout the precordium, no click, no rub  Lungs: fine rales lung bases. Abdomen: Soft, nontender, no masses  Extremities:  Edema +2-3 up to upper thighs and abdomen. BLE tender/cold  to touch       Labs: Results:       Chemistry Recent Labs      05/19/17   0533  05/18/17   0514  05/17/17   1005  05/17/17   0143   GLU  76  85   --   106*   NA  131*  131*   --   132*   K  4.2  4.3   --   4.1   CL  92*  93*   --   95*   CO2  27  27   --   28   BUN  86*  85*   --   81*   CREA  4.94*  4.65*   --   4.07*   CA  9.2  8.7  8.5  8.6   AGAP  12  11   --   9   BUCR  17  18   --   20      CBC w/Diff Recent Labs      05/19/17   0533  05/18/17   0514  05/17/17   1005   WBC  4.3*  4.4*  5.1   RBC  2.84*  2.77*  2.71*   HGB  8.0*  7.8*  7.6*   HCT  25.5*  24.8*  24.0*   PLT  44*  45*  76*   GRANS  72  77*  76*   LYMPH  9*  7*  6*   EOS  8*  6*  7*      Cardiac Enzymes No results for input(s): CPK, CKND1, TYSON in the last 72 hours. No lab exists for component: CKRMB, TROIP   Coagulation No results for input(s): PTP, INR, APTT in the last 72 hours.     No lab exists for component: INREXT, INREXT    Lipid Panel Lab Results   Component Value Date/Time    Cholesterol, total 100 07/22/2016 05:20 AM    HDL Cholesterol 31 07/22/2016 05:20 AM    LDL, calculated 54.4 07/22/2016 05:20 AM    VLDL, calculated 14.6 07/22/2016 05:20 AM    Triglyceride 73 07/22/2016 05:20 AM    CHOL/HDL Ratio 3.2 07/22/2016 05:20 AM      BNP No results for input(s): BNPP in the last 72 hours. Liver Enzymes No results for input(s): TP, ALB, TBIL, AP, SGOT, GPT in the last 72 hours.     No lab exists for component: DBIL   Thyroid Studies Lab Results   Component Value Date/Time    TSH 4.23 07/22/2016 05:20 AM                Vicente Stahl MD

## 2017-05-19 NOTE — PROGRESS NOTES
When  attempted to visit patient it was not a good time to visit. Patient was being prepared for a medical procedure and was taken out of the room.  spoke with family and met them for the first time. It was his daughter, who just flew in from [place] last night and plans on being with patient often for the next few weeks. She has children at home and will return some for their support. Patient's ex wife was in rapp way also and a son. Daughter said patient is hard to deal with and that staff needs 'tough skin' to be able to visit with patient.  provided brief pastoral support. Chaplains will provide spiritual care as needed, as requested. Thiago Carpenter MDiv.   Board Certified [employer] [phone]

## 2017-05-19 NOTE — INTERVAL H&P NOTE
H&P Update:  Lulu Rider was seen and examined. History and physical has been reviewed. Significant clinical changes have occurred as noted:  Needs dialysis catheter per nephrology.     Signed By: Danita Barber MD     May 19, 2017 11:21 AM

## 2017-05-19 NOTE — PROGRESS NOTES
Palliative Medicine  [place]: [phone] ([phone])  [facility]: [phone] ([phone])      Resuscitation Status: Full Code   Durable DNR addressed? [] Yes   [x] No    [] Not Applicable    Advance Care Planning 5/14/2017   Patient's Healthcare Decision Maker is: Legal Next of Kin   Primary Decision Maker Name [facility]   Primary Decision Maker Phone Number [phone]   Primary Decision Maker Relationship to Patient Adult child   Secondary Decision Maker Name [facility]   Secondary Decision Maker Phone Number -   Secondary Decision Maker Relationship to Patient Adult child   Confirm Advance Directive None   Patient Would Like to Complete Advance Directive Yes   Does the patient have other document types Other (comment)     Brief meeting with patient's daughter Burgess Martinez who was in town visiting from [place]. Patient was currently in dialysis, so off the floor. Palliative NP, Sheldon Neal explained current medical condition to daughter. NP answered her questions regarding dialysis, pleural effusion, cardiac, and respiratory status. Mentioned goals of care with daughter to discuss with father over the weekend i.e.. Code status. She stated will be okay with patient's decisions regarding his care and wishes. Plan to revisit goals of care early next week when patient is present with daughters. Currently patient will remain Full Code. Provided daughter contact number to reach Palliative Medicine. Palliative will follow up early next week. Thank you for the opportunity to assist in the care of Mr. Rosa M Fernández.    Mervin Scott MSW  Palliative Medicine

## 2017-05-19 NOTE — DIALYSIS
ACUTE HEMODIALYSIS FLOW SHEET    HEMODIALYSIS ORDERS: Physician: Cindi Ge      Dialyzer: aclear         Duration: 2.5 hr  BFR: 250   DFR: 500   Dialysate:  Temp *37 K+   2    Ca+  2.5 Na 140 Bicarb 30   Weight:  93.2 kg    Bed Scale []     Unable to Obtain []      Dry weight/UF Goal: 2000 Access   tdc  Needle Gauge     Heparin []  Bolus      Units    [] Hourly       Units    [x]None     Catheter locking solution sodium citrate   Pre BP:   124/81    Pulse:     68     Temperature:   97.2  Respirations: 16  Tx: NS       ml/Bolus  Other        [x] N/A   Labs: Pre        Post:        [x] N/A   Additional Orders(medications, blood products, hypotension management):       [x] N/A     [x] Time Out/Safety Check  [x] [facility] Consent Verified     CATHETER ACCESS: []N/A   [x]Right   []Left   [x]IJ     []Fem   [x] First use X-ray verified     [x]Tunnel                [] Non Tunneled   [x]No S/S infection  []Redness  []Drainage []Cultured []Swelling []Pain   [x]Medical Aseptic Prep Utilized   []Dressing Changed  [x] Biopatch  Date: 05/19/17     []Clotted   [x]Patent   Flows: [x]Good  []Poor  []Reversed   If access problem,  notified: []Yes    []N/A  Date:           GRAFT/FISTULA ACCESS:  [x]N/A     []Right     []Left     []UE     []LE   []AVG   []AVF        []Buttonhole    []Medical Aseptic Prep Utilized   []No S/S infection  []Redness  []Drainage []Cultured []Swelling []Pain    Bruit:   [] Strong    [] Weak       Thrill :   [] Strong    [] Weak       Needle Gauge:    Length:     If access problem,  notified: []Yes     []N/A  Date:        Please describe access if present and not used:       GENERAL ASSESSMENT:    LUNGS:  Rate  SaO2%        [x] N/A    [x] Clear  [] Coarse  [] Crackles  [] Wheezing        [x] Diminished     Location : [x]RLL   [x]LLL    []RUL  []KEN   Cough: []Productive  []Dry  [x]N/A   Respirations:  [x]Easy  []Labored   Therapy:  []RA  [x]NC 2 l/min    Mask: []NRB []Venti       O2% []Ventilator  []Intubated  [] Trach  [] BiPaP   CARDIAC: [x]Regular      [] Irregular   [] Pericardial Rub  [] JVD        []  Monitored  [] Bedside  [] Remotely monitored [] N/A  Rhythm:    EDEMA: [] None  [x]Generalized  [] Pitting [] 1    [] 2    [] 3    [] 4                 [] Facial  [] Pedal  []  UE  [] LE   SKIN:   [x] Warm  [] Hot     [] Cold   [x] Dry     [] Pale   [] Diaphoretic                  [] Flushed  [] Jaundiced  [] Cyanotic  [] Rash  [] Weeping   LOC:    [x] Alert      []Oriented:    [] Person     [] Place  []Time               [x] Confused  [] Lethargic  [] Medicated  [] Non-responsive     GI / ABDOMEN:  [] Flat    [] Distended    [x] Soft    [] Firm   []  Obese                             [] Diarrhea  [x] Bowel Sounds  [] Nausea  [] Vomiting       / URINE ASSESSMENT:[] Voiding   [x] Oliguria  [] Anuria   []  Emerson     [] Incontinent    []  Incontinent Brief      []  Bathroom Privileges     PAIN: [x] 0 []1  []2   []3   []4   []5   []6   []7   []8   []9   []10            Scale 0-10  Action/Follow Up:    MOBILITY:  [] Amb    [] Amb/Assist    [x] Bed    [] Wheelchair  [] Stretcher      All Vitals and Treatment Details on Attached [address]: [facility]          Room # 9120     [] 1st Time Acute  [] Stat  [x] Routine  [] Urgent     [x] Acute Room  []  Bedside  [] ICU/CCU  [] ER   Isolation Precautions:  [x] Dialysis   [] Airborne   [] Contact    [] Reverse   Special Considerations:         [] Blood Consent Verified [x]N/A     ALLERGIES:   [x] NKA          Code Status:  [x] Full Code  [] DNR  [] Other           HBsAg ONLY: Date Drawn 05/19/17         [x]Negative []Positive []Unknown   HBsAb: Date 05/19/17    [x] Susceptible   [] [id number] []Not Drawn  [] Drawn     Current Labs:    Date of Labs: Today [x]        Cut and paste current labs here.                                                                                                                                   DIET:  [x] Renal    [] Other     [] NPO     []  Diabetic      PRIMARY NURSE REPORT: First initial/Last name/Title      Pre Dialysis: Madan Woods RN    Time: 1300      EDUCATION:    [x] Patient [] Other         Knowledge Basis: []None [x]Minimal [] Substantial   Barriers to learning pt confused []N/A   [] Access Care     [] S&S of infection     [] Fluid Management     []K+     [x]Procedural    []Albumin     [] Medications     [] Tx Options     [] Transplant     [] Diet     [] Other   Teaching Tools:  [x] Explain  [] Demo  [] Handouts [] Video  Patient response:   [x] Verbalized understanding  [] Teach back  [] Return demonstration [] Requires follow up   Inappropriate due to            [address] Before each treatment:     Machine Number:                   [facility]                                  [x] Unit Machine # 4 with centralized RO                                  [] Portable Machine #1/RO serial # [id number]                                  [] Portable Machine #2/RO serial # [id number]                                  [] Portable Machine #3/RO serial # [id number]                                                                                                       [facility]                                  [] Portable Machine #11/RO serial # [id number]                                   [] Portable Machine #12/RO serial # [id number]                                  [] Portable Machine #13/RO serial #  [id number]      Alarm Test:  Pass time 1300         Other:         [x] RO/Machine Log Complete      Temp    37            [x]Extracorporeal Circuit Tested for integrity   Dialysate: pH  7.4 Conductivity: Meter   14     HD Machine   14                  TCD: 14  Dialyzer Lot # [id number]         Blood Tubing Lot # [id number]       Saline Lot #       CHLORINE TESTING-Before each treatment and every 4 hours    Total Chlorine: [x] less than 0.1 ppm  Time: 1100 4 Hr/2nd Check Time: 1400 (if greater than 0.1 ppm from Primary then every 30 minutes from Secondary)     TREATMENT INITIATION  with Dialysis Precautions:   [x] All Connections Secured                 [x] Saline Line Double Clamped   [x] Venous Parameters Set                  [x] Arterial Parameters Set    [x] Prime Given  250 ml               [x]Air Foam Detector Engaged      Treatment Initiation Note: pt arrived in stable condition via bed CVL accessed and treatment initiated without difficulty. Dr Dulce Case at bedside; pt CVL oozing blood; dressing changed and pressure dressed. Dr Penny Marino ordered sodium citrate for CVL locking solution. Medication Dose Volume Route Initials Dialyzer Cleared: [] Good [x] Fair  [] Poor    Blood processed:  37.5 L  UF Removed  1100 Ml    Post Wt: 102/72    kg  POst BP:   103/72       Pulse: 70      Respirations: 16  Temperature: 97.5      epogen           6000u  2ml     IV            Post Tx Vascular Access: AVF/AVG: Bleeding stopped Art  min. Wu. Min   N/A          Sodium citrate      0.08g  x2      1.6  1.6    intracatheter         Catheter: Locking solution: sodium citrate 1:1000 Art. 1.6  Wu. 1.6                                   Post Assessment:                                    Skin:  [x] Warm  [x] Dry [] Diaphoretic    [] Flushed  [] Pale [] Cyanotic   [facility] Signatures Title Initials  Time Lungs: [x] Clear    [] Course  [] Crackles  [] Wheezing [] Diminished   Glenroy Dahl RN    Cardiac: [x] Regular   [] Irregular   [] Monitor  [] N/A  Rhythm:           Edema:  [] None    [x] General     [] Facial   [] Pedal    [] UE    [] LE       Pain: [x]0  []1  []2   []3  []4   []5   []6   []7   []8   []9   []10         Post Treatment Note: HD well tolerated. 1.1L UF removed. No acute distress noted during or post HD treatment.      POST TREATMENT PRIMARY NURSE HANDOFF REPORT:     First initial/Last name/Title         Post Dialysis: Francisco Galeano RN Time:  1700     Abbreviations: AVG-arterial venous graft, AVF-arterial venous fistula, IJ-Internal Jugular, Subcl-Subclavian, Fem-Femoral, Tx-treatment, AP/HR-apical heart rate, DFR-dialysate flow rate, BFR-blood flow rate, AP-arterial pressure, -venous pressure, UF-ultrafiltrate, TMP-transmembrane pressure, Wu-Venous, Art-Arterial, RO-Reverse Osmosis

## 2017-05-19 NOTE — PROGRESS NOTES
Gina Campbell M.D. PROGRESS NOTE    Name: Arnav Ann MRN: [id number]   : 1940 Hospital: [address] [place] [address]   Date: 2017  Admission Date: 2017     Subjective/Objective/Plans  1. ESRD  2. CAD/CHF  3. HLD  4. Aortic steosis  5. CABGS  6. Bilateral Carotid endarectomy    On Dobutamine  HD in progress  Alert and coherent  VSS    Vital Signs:  Visit Vitals    /71 (BP 1 Location: Left arm, BP Patient Position: At rest)    Pulse 80    Temp 97.6 °F (36.4 °C)    Resp 20    Ht 6' (1.829 m)    Wt 93.2 kg (205 lb 6.4 oz)    SpO2 96%    BMI 27.86 kg/m2       O2 Device: Hi flow nasal cannula   O2 Flow Rate (L/min): 14 l/min   Temp (24hrs), Av.4 °F (36.3 °C), Min:96.8 °F (36 °C), Max:97.6 °F (36.4 °C)     5:46 AM  Intake/Output:   Last shift:      1901 -  0700  In: 0   Out: 950 [Urine:950]  Last 3 shifts:  0701 -  1900  In: 1088 [P.O.:540; I.V.:548]  Out: 2800 [Urine:2600]    Intake/Output Summary (Last 24 hours) at 17 0546  Last data filed at 17 0400   Gross per 24 hour   Intake           648.56 ml   Output             3150 ml   Net         -2501.44 ml         Physical Exam:    General: alert and moderately ill    HEENT: pupils equal, no ear discharge   Neck: No abnormally enlarged lymph nodes. , no JDV   Mouth: MMM no lesions    Chest: normal, no breast masses   Lungs: decreased air exchange bilaterally   Heart: aortic systolic murmur   Abdomen: abdomen is soft without significant tenderness, masses, organomegaly or guarding   Extremity: negative, 2+ edema   Neuro: alert    Skin: Skin color, texture, turgor normal. No rashes or lesions    Data Review:    Labs: Results:       Chemistry Recent Labs      17   0514  17   1005  17   0143  17   1203   GLU  85   --   106*  113*   NA  131*   --   132*  134*   K  4.3   --   4.1  4.1   CL  93*   --   95*  96*   CO2  27   --   28  29   BUN  85*   --   81*  79*   CREA  4.65*   --   4.07* 3.78*   CA  8.7  8.5  8.6  8.3*   AGAP  11   --   9  9   BUCR  18   --   20  21*      CBC w/Diff Recent Labs      05/18/17   0514  05/17/17   1005  05/16/17   1203   WBC  4.4*  5.1  5.0   RBC  2.77*  2.71*  2.69*   HGB  7.8*  7.6*  7.6*   HCT  24.8*  24.0*  24.0*   PLT  45*  76*  54*   GRANS  77*  76*  77*   LYMPH  7*  6*  6*   EOS  6*  7*  7*      Cardiac Enzymes No results for input(s): CPK, CKND1, TYSON in the last 72 hours. No lab exists for component: CKRMB, TROIP   Coagulation No results for input(s): PTP, INR, APTT in the last 72 hours. No lab exists for component: INREXT    Lipid Panel Lab Results   Component Value Date/Time    Cholesterol, total 100 07/22/2016 05:20 AM    HDL Cholesterol 31 07/22/2016 05:20 AM    LDL, calculated 54.4 07/22/2016 05:20 AM    VLDL, calculated 14.6 07/22/2016 05:20 AM    Triglyceride 73 07/22/2016 05:20 AM    CHOL/HDL Ratio 3.2 07/22/2016 05:20 AM      BNP No results for input(s): BNPP in the last 72 hours. Liver Enzymes No results for input(s): TP, ALB, TBILI, AP, SGOT, ALT, CBIL in the last 72 hours. Thyroid Studies Lab Results   Component Value Date/Time    TSH 4.23 07/22/2016 05:20 AM          Procedures/imaging: see electronic medical records for all procedures, Xrays and details which were not copied into this note but were reviewed. Allergies:  No Known Allergies    Home Medications:  Prior to Admission Medications   Prescriptions Last Dose Informant Patient Reported? Taking? HYDROcodone-acetaminophen (NORCO) 5-325 mg per tablet 5/12/2017 at 0300  No No   Sig: Take 1 Tab by mouth every six (6) hours as needed for Pain. Max Daily Amount: 4 Tabs. albuterol-ipratropium (DUO-NEB) 2.5 mg-0.5 mg/3 ml nebu 5/11/2017 at 1700  No No   Sig: 3 mL by Nebulization route every six (6) hours as needed. allopurinol (ZYLOPRIM) 100 mg tablet 5/11/2017 at 0300  No No   Sig: Take 1 Tab by mouth daily.  Indications: GOUT   aspirin delayed-release 81 mg tablet 5/12/2017 at 0300  No No   Sig: Take 2 Tabs by mouth daily. carvedilol (COREG) 6.25 mg tablet 2017 at 0300  No No   Sig: Take 1 Tab by mouth two (2) times daily (with meals). Indications: Chronic Heart Failure   cephALEXin (KEFLEX) 500 mg capsule Not Taking at Unknown time  No No   Sig: Take 1 Cap by mouth four (4) times daily for 7 days. cholecalciferol (VITAMIN D3) 1,000 unit tablet 2017 at 0300  No No   Sig: Take 2 Tabs by mouth daily. colchicine 0.6 mg tablet 2017 at 0300  Yes No   Sig: Take 0.6 mg by mouth daily. ferrous sulfate 325 mg (65 mg iron) tablet 2017 at 0300  No No   Sig: Take 1 Tab by mouth daily (with breakfast). fish oil-omega-3 fatty acids 340-1,000 mg capsule 2017 at 0300  No Yes   Sig: Take 1 Cap by mouth two (2) times a day. Indications: HYPERTRIGLYCERIDEMIA   fluticasone (FLONASE) 50 mcg/actuation nasal spray 2017 at 0300  No No   Si spray each nostril daily  Indications: ALLERGIC RHINITIS   furosemide (LASIX) 40 mg tablet 2017 at 0300  No No   Si tab BID   influenza vaccine 2016-17, 36mos+,,PF, (FLUZONE/FLUARIX/FLULAVAL QUAD) syrg injection   No No   Si.5 mL by IntraMUSCular route PRIOR TO DISCHARGE. loperamide (IMMODIUM) 2 mg tablet 2017 at 0300  Yes No   Sig: Take 2 mg by mouth four (4) times daily as needed for Diarrhea.   metOLazone (ZAROXOLYN) 2.5 mg tablet Not Taking at Unknown time  No No   Sig: Take 1 Tab by mouth daily. Patient taking differently: Take 2.5 mg by mouth every other day. nitroglycerin (NITROSTAT) 0.4 mg SL tablet Unknown at Unknown time  No No   Si Tab by SubLINGual route as needed for Chest Pain.   pantoprazole (PROTONIX) 40 mg tablet Unknown at Unknown time  No No   Sig: Take 1 Tab by mouth Daily (before breakfast). pravastatin (PRAVACHOL) 80 mg tablet 2017 at 0300  No No   Sig: Take 1 Tab by mouth nightly.    therapeutic multivitamin (THERAGRAN) tablet 2017 at 0300  No No   Sig: Take 1 Tab by mouth daily.   zolpidem (AMBIEN) 5 mg tablet Unknown at Unknown time  No No   Sig: Take 1 Tab by mouth nightly as needed for Sleep. Max Daily Amount: 5 mg. Facility-Administered Medications: None       Current Medications:  Current Facility-Administered Medications   Medication Dose Route Frequency    carvedilol (COREG) tablet 3.125 mg  3.125 mg Oral Q12H    colchicine (MITIGARE) capsule 0.6 mg  0.6 mg Oral Q TU, TH & SAT    bumetanide (BUMEX) 12.5 mg in 0.9% sodium chloride 100 mL infusion  0.5 mg/hr IntraVENous CONTINUOUS    epoetin ron (EPOGEN;PROCRIT) injection 6,000 Units  6,000 Units SubCUTAneous Q MON, WED & FRI    allopurinol (ZYLOPRIM) tablet 50 mg  50 mg Oral DAILY    DOBUTamine (DOBUTREX) 500 mg/250 mL (2,000 mcg/mL) infusion  5 mcg/kg/min IntraVENous CONTINUOUS    cholecalciferol (VITAMIN D3) tablet 2,000 Units  2,000 Units Oral DAILY    therapeutic multivitamin (THERAGRAN) tablet 1 Tab  1 Tab Oral DAILY    nitroglycerin (NITROSTAT) tablet 0.4 mg  0.4 mg SubLINGual PRN    fish oil-omega-3 fatty acids 340-1,000 mg capsule 1 Cap  1 Cap Oral BID    pantoprazole (PROTONIX) tablet 40 mg  40 mg Oral ACB    albuterol-ipratropium (DUO-NEB) 2.5 MG-0.5 MG/3 ML  3 mL Nebulization Q6H PRN    ferrous sulfate tablet 325 mg  325 mg Oral DAILY WITH BREAKFAST    fluticasone (FLONASE) 50 mcg/actuation nasal spray 2 Spray  2 Spray Both Nostrils DAILY    HYDROcodone-acetaminophen (NORCO) 5-325 mg per tablet 1 Tab  1 Tab Oral Q4H PRN    influenza vaccine 2016-17 (36mos+)(PF) (FLUZONE/FLUARIX/FLULAVAL QUAD) injection 0.5 mL  0.5 mL IntraMUSCular PRIOR TO DISCHARGE    zolpidem (AMBIEN) tablet 5 mg  5 mg Oral QHS PRN    sodium chloride (NS) flush 5-10 mL  5-10 mL IntraVENous Q8H    sodium chloride (NS) flush 5-10 mL  5-10 mL IntraVENous PRN    ondansetron (ZOFRAN) injection 4 mg  4 mg IntraVENous Q4H PRN       Chart and notes reviewed. Data reviewed. I have evaluated and examined the patient. IMPRESSION:   Patient Active Problem List   Diagnosis Code    Hematospermia R36.1    CAD (coronary artery disease) I25.10    PVD (peripheral vascular disease) ([facility] [address] I73.9    CHF (congestive heart failure) ([facility]) I50.9    Anasarca R60.1    Hx of CABG Z95.1    H/O carotid endarterectomy Z98.890    AICD (automatic cardioverter/defibrillator) present Z95.810    Acute exacerbation of CHF (congestive heart failure) ([facility]) V63.9    Diastolic CHF, acute on chronic ([facility]) I50.33    HTN (hypertension) I10    HLD (hyperlipidemia) E78.5    Acute CHF (congestive heart failure) ([facility]) I50.9    Acute on chronic diastolic (congestive) heart failure (HCC) I50.33    Thrombocytopenia (HCC) D69.6    Hypokalemia E87.6    Aortic stenosis, moderate I35.0    CKD (chronic kidney disease) N18.9    Pulmonary HTN ([facility]) I27.2    Cellulitis L03.90 ·         PLAN:/DISCUSSION:   · Continue current treatment        Bro Rider MD  5/19/2017, 5:46 AM

## 2017-05-20 NOTE — OP NOTES
[address] [facility]  OPERATIVE REPORTS    Name:  Bk Godoy  MR#:  [id number]  :  1940  Account #:  [de-identified]  Date of Adm:  2017  Date of Surgery:  2017      ATTENDING: Gemma Baez MD    PREOPERATIVE DIAGNOSIS: Acute renal failure in need of dialysis  catheter. POSTOPERATIVE DIAGNOSIS: Acute renal failure in need of dialysis  catheter. PROCEDURES PERFORMED  1. Ultrasound-guided access of right internal jugular vein. 2. Placement of a 19 cm tunneled dialysis catheter through a right IJ  approach, Palindrome. 3. Moderate conscious sedation of 10 minutes. CULTURES: None. SPECIMENS REMOVED: None. DRAINS: None. ESTIMATED BLOOD LOSS: Less than 50 mL. ANESTHESIA: Moderate conscious sedation of 10 minutes. This was  provided by an independent provider given the medications per my  discussion and monitoring of vital signs throughout the case. INDICATIONS FOR PROCEDURE: The patient is a 77-year-old  gentleman with acute renal failure in need of dialysis access. The  patient was given the risks and benefits of the procedure including, but  not limited to, bleeding, infection, damage to adjacent structures, MI,  stroke, and death, as well as loss of access. The patient was  understanding of all the risks and underwent the procedure. DESCRIPTION OF PROCEDURE: The patient was correctly identified  in the pre-cath area and taken to the cath lab in stable condition. The  patient had a pre-incision timeout prior to any incision. The patient was  prepped and draped in normal sterile fashion according to [facility]  guidelines aseptic technique. Ultrasound was then used to visualize  the right internal jugular vein. A picture was taken and kept with the  patient's chart. We numbed him up appropriately using 1% lidocaine,  took a single wall entry needle in order to gain access into the internal  jugular vein.  Once the needle tip was identified within the vein and  responsive blood return, a wire was then placed. A small skin incision  was created using 11 blade and the tract was dilated x2 and a peel-  away sheath was then placed. We then made a small skin incision on  the right chest, 2 fingerbreadths below the clavicle, after numbing this  area up and numbing up the track. We then were able to tunnel our 19  cm Palindrome catheter appropriately, removed the dilator and wire,  placed catheter down, removed the peel-away sheath. Tip of the  catheter was at the sinoatrial junction. Good curvature of the catheter  cuff was approximately 1-2 cm within the patient; 4-0 Monocryl  subcuticular stitch was used and Dermabond for dressing at the IJ  incision. We secured the catheter with 2-0 Prolene suture at both  butterfly holes and catheter insertion site. Biopatch and Tegaderm  were placed at the catheter insertion site. Both ports were easily  flushed and aspirated and 1.6 mL of hot heparin were placed into each  one and then capped and wrapped with a 4 x 4. The patient tolerated  the procedure well without any issues.         Ritu Wyman MD    EC / TB  D:  05/19/2017   13:57  T:  05/19/2017   20:36  Job #:  [id number]

## 2017-05-20 NOTE — PROGRESS NOTES
Catheter in place and working. Mild ooze around catheter likely secondary to low plts. Hypotension last night more than likely secondary to diastolic CHF along with dialysis. Currently awake with some soreness in bilateral legs. Will require arteriogram bilaterally given claudication along with wound on left leg especially left side given occluded sfa. But will hold off until we know his kidney function will not return and his plts rise; as long as he stays in semi-elective state. If gets into acute on chronic limb ischemia will require more urgent procedures. Will continue to follow along with you.

## 2017-05-20 NOTE — PROGRESS NOTES
[facility] called inquiring about platlets, I was told by  Debra Sneed that she was told by day shift lab personnel   that the platlets were canceled. Clarified the need for the platlets still remained Debra Sneed was going to call [facility] to check availability.

## 2017-05-20 NOTE — DIALYSIS
ACUTE HEMODIALYSIS FLOW SHEET    HEMODIALYSIS ORDERS: Physician: Alessio Perez     Dialyzer: Revaclear         Duration: 2.5 hr  BFR: 250   DFR: 500   Dialysate:  Temp 36.7 K+   2    Ca+  2.5 Na 140 Bicarb 30   Weight:  93 kg    Bed Scale [x]     Unable to Obtain []      Dry weight/UF Goal: 1000 Access [id number]  Needle Gauge       Heparin   []  Bolus      Units    [] Hourly       Units    [x]  None     Catheter locking solution heparin   Pre BP:   140/78    Pulse:     73     Temperature:   97.5  Respirations: 12  Tx: NS       ml/Bolus  Other        [x] N/A   Labs: Pre   Yes   Post:        [] N/A   Additional Orders(medications, blood products, hypotension management):       [x] N/A     [x]   Time Out/Safety Check  [x]   [facility] Consent Verified     CATHETER ACCESS:   []N/A   [x]Right   []Left   [x]IJ     []Fem   [] First use X-ray verified     [x]Tunnel                [] Non Tunneled   [x]No S/S infection  []Redness  []Drainage []Cultured []Swelling []Pain   [x]Medical Aseptic Prep Utilized   []Dressing Changed  [] Biopatch  Date:         []Clotted   [x]Patent   Flows: [x]Good  []Poor  []Reversed   If access problem,  notified: []Yes    []N/A  Date:           GRAFT/FISTULA ACCESS:    [x]N/A     []Right     []Left     []UE     []LE   []AVG   []AVF        []Buttonhole    []Medical Aseptic Prep Utilized   []No S/S infection  []Redness  []Drainage []Cultured []Swelling []Pain    Bruit:   [] Strong    [] Weak       Thrill :   [] Strong    [] Weak       Needle Gauge:      Length:       If access problem,  notified: []Yes     []N/A  Date:        Please describe access if present and not used:         GENERAL ASSESSMENT:      LUNGS:  Rate 12 SaO2%  100     [] N/A    [x] Clear  [] Coarse  [] Crackles  [] Wheezing        [] Diminished     Location : []RLL   []LLL    []RUL  []KEN   Cough: []Productive  []Dry  [x]N/A   Respirations:  []Easy  []Labored   Therapy:  []RA  []NC    l/min    Mask: [x]NRB []Venti O2%                  []Ventilator  []Intubated  [] Trach  [] BiPaP   CARDIAC: [x]Regular      [] Irregular   [] Pericardial Rub  [] JVD        []  Monitored  [] Bedside  [] Remotely monitored [] N/A  Rhythm:      EDEMA: [] None  []Generalized  [x] Pitting [] 1    [x] 2    [] 3    [] 4                 [] Facial  [x] Pedal  []  UE  [x] LE   SKIN:   [x] Warm  [] Hot     [] Cold   [x] Dry     [] Pale   [] Diaphoretic                  [] Flushed  [] Jaundiced  [] Cyanotic  [] Rash  [] Weeping   LOC:    [x] Alert      [x]Oriented:    [x] Person     [x] Place  []Time               [] Confused  [] Lethargic  [] Medicated  [] Non-responsive     GI / ABDOMEN:    [] Flat    [x] Distended    [x] Soft    [] Firm   []  Obese                             [] Diarrhea  [x] Bowel Sounds  [] Nausea  [] Vomiting       / URINE ASSESSMENT:  [] Voiding   [x] Oliguria  [] Anuria   [x]  Emerson     [] Incontinent    []  Incontinent Brief      []  Bathroom Privileges     PAIN:   [x] 0 []1  []2   []3   []4   []5   []6   []7   []8   []9   []10            Scale 0-10  Action/Follow Up:      MOBILITY:    [x] Amb    [] Amb/Assist    [] Bed    [] Wheelchair  [] Stretcher      All Vitals and Treatment Details on Attached [address]: [facility]          Room # 2308     [] 1st Time Acute  [] Stat  [x] Routine  [] Urgent     [] Acute Room  [x]  Bedside  [] ICU/CCU  [] ER   Isolation Precautions:  [x] Dialysis   [] Airborne   [] Contact    [] Reverse   Special Considerations:         [] Blood Consent Verified []N/A     ALLERGIES:     [x] NKA          Code Status:    [x] Full Code  [] DNR  [] Other           HBsAg ONLY: Date Drawn 5/19/17         [x]Negative []Positive []Unknown   HBsAb: Date 5/19/17    [x] Susceptible   [] [id number] []Not Drawn  [] Drawn     Current Labs:    Date of Labs: Today [x]        Results for Cristian Ceja (MRN [id number]) as of 5/20/2017 16:35   Ref.  Range 5/20/2017 04:36   Sodium Latest Ref Range: 136 - 145 mmol/L 133 (L)   Potassium Latest Ref Range: 3.5 - 5.5 mmol/L 4.1   Chloride Latest Ref Range: 100 - 108 mmol/L 96 (L)   CO2 Latest Ref Range: 21 - 32 mmol/L 28   Anion gap Latest Ref Range: 3.0 - 18 mmol/L 9   Glucose Latest Ref Range: 74 - 99 mg/dL 84   BUN Latest Ref Range: 7.0 - 18 MG/DL 63 (H)   Creatinine Latest Ref Range: 0.6 - 1.3 MG/DL 4.14 (H)   BUN/Creatinine ratio Latest Ref Range: 12 - 20   15   Calcium Latest Ref Range: 8.5 - 10.1 MG/DL 8.7   GFR est non-AA Latest Ref Range: >60 ml/min/1.73m2 14 (L)   GFR est AA Latest Ref Range: >60 ml/min/1.73m2 17 (L)   CK Latest Ref Range: 39 - 308 U/L 27 (L)   CK-MB Index Latest Ref Range: 0.0 - 4.0 % 10.4 (H)   CK - MB Latest Ref Range: <3.6 ng/ml 2.8   Troponin-I, Qt. Latest Ref Range: 0.0 - 0.045 NG/ML 1.00 (H)   Results for aMrcela Solis (MRN [id number]) as of 5/20/2017 16:35   Ref. Range 5/19/2017 21:20   WBC Latest Ref Range: 4.6 - 13.2 K/uL 5.9   RBC Latest Ref Range: 4.70 - 5.50 M/uL 2.92 (L)   HGB Latest Ref Range: 13.0 - 16.0 g/dL 8.2 (L)   HCT Latest Ref Range: 36.0 - 48.0 % 26.5 (L)   MCV Latest Ref Range: 74.0 - 97.0 FL 90.8   MCH Latest Ref Range: 24.0 - 34.0 PG 28.1   MCHC Latest Ref Range: 31.0 - 37.0 g/dL 30.9 (L)   RDW Latest Ref Range: 11.6 - 14.5 % 16.5 (H)   PLATELET Latest Ref Range: 135 - 420 K/uL 53 (L)   MPV Latest Ref Range: 9.2 - 11.8 FL 11.9 (H)   NEUTROPHILS Latest Ref Range: 40 - 73 % 74 (H)   LYMPHOCYTES Latest Ref Range: 21 - 52 % 10 (L)   MONOCYTES Latest Ref Range: 3 - 10 % 12 (H)   EOSINOPHILS Latest Ref Range: 0 - 5 % 3   BASOPHILS Latest Ref Range: 0 - 2 % 1   DF Latest Units:   AUTOMATED   ABS. NEUTROPHILS Latest Ref Range: 1.8 - 8.0 K/UL 4.4   ABS. LYMPHOCYTES Latest Ref Range: 0.9 - 3.6 K/UL 0.6 (L)   ABS. MONOCYTES Latest Ref Range: 0.05 - 1.2 K/UL 0.7   ABS. EOSINOPHILS Latest Ref Range: 0.0 - 0.4 K/UL 0.2   ABS. BASOPHILS Latest Ref Range: 0.0 - 0.06 K/UL 0.0   Cut and paste current labs here. DIET:    [x] Renal    [] Other     [] NPO     []  Diabetic      PRIMARY NURSE REPORT: First initial/Last name/Title      Pre Dialysis: Sierra Allen RN    Time: 1500      EDUCATION:      [x] Patient [] Other         Knowledge Basis: []None [x]Minimal [] Substantial   Barriers to learning    []N/A   [x] Access Care     [x] S&S of infection     [x] Fluid Management     [x]K+     [x]Procedural    []Albumin     [] Medications     [] Tx Options     [] Transplant     [x] Diet     [] Other   Teaching Tools:  [x] Explain  [] Demo  [] Handouts [] Video  Patient response:     [x] Verbalized understanding  [] Teach back  [] Return demonstration [] Requires follow up   Inappropriate due to            [address] Before each treatment:     Machine Number:                   [facility]                                  [] Unit Machine #    with centralized RO                                  [] Portable Machine #1/RO serial # [id number]                                  [] Portable Machine #2/RO serial # [id number]                                  [x] Portable Machine #3/RO serial # [id number]                                                                                                       [facility]                                  [] Portable Machine #11/RO serial # [id number]                                   [] Portable Machine #12/RO serial # [id number]                                  [] Portable Machine #13/RO serial #  [id number]      Alarm Test:  Pass time 8186         Other:         [x]   RO/Machine Log Complete      Temp    36.7            [x]  Extracorporeal Circuit Tested for integrity   Dialysate: pH  7.4 Conductivity: Meter   14.0     HD Machine   14.4                  TCD: 14.0  Dialyzer Lot # [id number]            Blood Tubing Lot # [id number]          Saline Lot #  [id number] CHLORINE TESTING-Before each treatment and every 4 hours    Total Chlorine:   [] less than 0.1 ppm  Time:    4 Hr/2nd Check Time:      (if greater than 0.1 ppm from Primary then every 30 minutes from Secondary)     TREATMENT INITIATION  with Dialysis Precautions:     [x] All Connections Secured                 [x] Saline Line Double Clamped   [x] Venous Parameters Set                  [x] Arterial Parameters Set    [x] Prime Given  250 ml               [x]Air Foam Detector Engaged      Treatment Initiation Note: Received pt A&OX3. Vital signs, 97.5-73-/%. Skin warm and dry with 2+ pitting edema to b/l lower extremities. Lungs clear on 100% non- rebreather mask. Abd soft and distended with +BS. Pt denied pain at the start of treatment. Treatment started at 1536 via [address]. 1630 - Pt tolerating treatment well. Pt looking at cell phone. Medication Dose Volume Route Initials Dialyzer Cleared:   [x] Good [] Fair  [] Poor    Blood processed:  37 L  UF Removed  1000 Ml    Post Wt: 92.2    kg  POst BP:   147/68       Pulse: 72      Respirations: 16  Temperature: 97.4                                   Post Tx Vascular Access: AVF/AVG: Bleeding stopped Art    min. Wu. Min   N/A                                     Catheter: Locking solution: Heparin 1:1000 Art. 1.6  Wu. 1.6  N/A                                   Post Assessment:                                    Skin:    [x] Warm  [x] Dry [] Diaphoretic    [] Flushed  [] Pale [] Cyanotic   [facility] Signatures Title Initials  Time Lungs:   [x] Clear    [] Course  [] Crackles  [] Wheezing [] Diminished   Angi Pham RN [facility] 5862 Cardiac:   [x] Regular   [] Irregular   [] Monitor  [] N/A  Rhythm:           Edema:    [] None    [] General     [] Facial   [x] Pedal    [] UE    [x] LE       Pain: [x]0  []1  []2   []3  []4   []5   []6   []7   []8   []9   []10         Post Treatment Note: Treatment ended at 4204. Vital signs,97.4-72-/68/100% . Total fluid removed was 1000 ml. Pre wt 93 kg. Post wt 92.2. Report given to floor nurse. Pt tolerated treatment well.       POST TREATMENT PRIMARY NURSE HANDOFF REPORT:     First initial/Last name/Title         Post Dialysis: Dayton Millan RN Time:  1819     Abbreviations: AVG-arterial venous graft, AVF-arterial venous fistula, IJ-Internal Jugular, Subcl-Subclavian, Fem-Femoral, Tx-treatment, AP/HR-apical heart rate, DFR-dialysate flow rate, BFR-blood flow rate, AP-arterial pressure, -venous pressure, UF-ultrafiltrate, TMP-transmembrane pressure, Wu-Venous, Art-Arterial, RO-Reverse Osmosis

## 2017-05-20 NOTE — PROGRESS NOTES
[facility]      CARDIOLOGY PROGRESS NOTE  RECS:      1. Acute diastolic congestive heart failure- presents with worsening CHF with BLE edema and elevated NT pro BNP. Stricts I&O. Monitor electrolytes and renal function. 2. Hypotension- transient. Now stable. 3. Anemia- Monitor H&H.  4. Thrombocytopenia?- hold asa for now. Follow CBC today   5. S/p CABG in 2013 in [facility] 7- stable denies chest pain or pressure. S/p AICD interrogate 2/17 normal function. 6. Hx Carotid endarterectomy- bilateral in 2015    7. Mitral regurgitation-mild to moderate regurgitation echo on 2016   8. Aortic stenosis : moderate last echo 2/17. Recheck echo. 9. KYLEE on CKD- May need HD per Nephrology note  9. PAD- with BLE pain- vascular is following     patient with ESRD/ agrees for HD- has permacath-oozing from site         SUMMARY: echo 2/17  Left ventricle: Systolic function was normal by visual assessment. Ejection fraction was estimated in the range of 55 % to 60 %. No obvious  wall motion abnormalities identified in the views obtained. Wall thickness  was mildly increased. Features were consistent with a pseudonormal left  ventricular filling pattern, with concomitant abnormal relaxation and  increased filling pressure (grade 2 diastolic dysfunction). Right ventricle: The ventricle was mildly dilated. Systolic function was  mildly reduced. Systolic pressure was moderately increased. Estimated peak  pressure was 55 mmHg. Right atrium: The atrium was mildly dilated. Mitral valve: There was moderate thickening. Aortic valve: The valve was probably trileaflet. Leaflets exhibited  calcification and reduced mobility. There was moderate stenosis. Valve  peak gradient was 34 mmHg. Valve mean gradient was 21 mmHg. Estimated  aortic valve area (by Vmax) was 1.14 cmï¾².       ASSESSMENT:  Hospital Problems  Date Reviewed: 3/11/2017          Codes Class Noted POA    Acute on chronic kidney failure ([facility] ICD-10-CM: N17.9, N18.9  ICD-9-CM: 584.9, 585.9  5/19/2017 Unknown        Cellulitis ICD-10-CM: L03.90  ICD-9-CM: 682.9  5/14/2017 Unknown        Pulmonary HTN ([facility] ICD-10-CM: I27.2  ICD-9-CM: 416.8  4/4/2017 Yes        Aortic stenosis, moderate (Chronic) ICD-10-CM: I35.0  ICD-9-CM: 424.1  3/11/2017 Yes        Acute on chronic diastolic (congestive) heart failure (HCC) ICD-10-CM: I50.33  ICD-9-CM: 428.33, 428.0  2/27/2017 Yes        Acute CHF (congestive heart failure) ([facility] ICD-10-CM: I50.9  ICD-9-CM: 428.0  2/26/2017 Yes        HTN (hypertension) (Chronic) ICD-10-CM: I10  ICD-9-CM: 401.9  9/4/2016 Yes        HLD (hyperlipidemia) (Chronic) ICD-10-CM: E78.5  ICD-9-CM: 272.4  9/4/2016 Yes        Diastolic CHF, acute on chronic [facility]) ICD-10-CM: I50.33  ICD-9-CM: 428.33, 428.0  8/17/2016 Yes        Acute exacerbation of CHF (congestive heart failure) ([facility] ICD-10-CM: I50.9  ICD-9-CM: 428.0  8/12/2016 Unknown        Hx of CABG ICD-10-CM: Z95.1  ICD-9-CM: V45.81  7/22/2016 Yes    Overview Signed 7/22/2016  5:23 PM by Hosea Hammonds NP     2013             H/O carotid endarterectomy ICD-10-CM: Z98.890  ICD-9-CM: V45.89  7/22/2016 Yes    Overview Signed 7/22/2016  5:27 PM by Hosea Hammonds NP     2015             AICD (automatic cardioverter/defibrillator) present ICD-10-CM: Z95.810  ICD-9-CM: V45.02  7/22/2016 Yes    Overview Signed 7/22/2016  5:27 PM by Hosea Hammonds NP     2015             CHF (congestive heart failure) ([facility]) ICD-10-CM: I50.9  ICD-9-CM: 428.0  7/21/2016 Yes        CAD (coronary artery disease) ICD-10-CM: I25.10  ICD-9-CM: 414.00  3/5/2014 Yes        PVD (peripheral vascular disease) ([facility] ICD-10-CM: I73.9  ICD-9-CM: 443.9  3/5/2014 Yes                SUBJECTIVE:    No CP  Denies  SOB   Leg pain is slowly improving    OBJECTIVE:    VS:   Visit Vitals    /68    Pulse 81    Temp 98.2 °F (36.8 °C)    Resp 20    Ht 6' (1.829 m)    Wt 88 kg (193 lb 14.4 oz)    SpO2 90%    BMI 26.3 kg/m2         Intake/Output Summary (Last 24 hours) at 05/20/17 1051  Last data filed at 05/20/17 0400   Gross per 24 hour   Intake               20 ml   Output             2450 ml   Net            -2430 ml     TELE: normal sinus rhythm    General: alert, well developed, pleasant and in no distress  HENT: Normocephalic, atraumatic. Normal external eye. Neck :  increased JVP  Cardiac:  regular rate and rhythm, S1, S2 normal, no S3 or S4, systolic murmur: holosystolic 3/6, harsh at lower left sternal border, at apex, throughout the precordium, no click, no rub  Lungs: fine rales lung bases. Abdomen: Soft, nontender, no masses  Extremities:  Edema +1 up to upper thighs and abdomen. BLE tender/cold  to touch       Labs: Results:       Chemistry Recent Labs      05/20/17 0436 05/19/17 2120 05/19/17 0533   GLU  84  78  76   NA  133*  135*  131*   K  4.1  4.0  4.2   CL  96*  95*  92*   CO2  28  28  27   BUN  63*  59*  86*   CREA  4.14*  3.96*  4.94*   CA  8.7  8.7  9.2   AGAP  9  12  12   BUCR  15  15  17      CBC w/Diff Recent Labs      05/19/17 2120 05/19/17   0533  05/18/17   0514   WBC  5.9  4.3*  4.4*   RBC  2.92*  2.84*  2.77*   HGB  8.2*  8.0*  7.8*   HCT  26.5*  25.5*  24.8*   PLT  53*  44*  45*   GRANS  74*  72  77*   LYMPH  10*  9*  7*   EOS  3  8*  6*      Cardiac Enzymes Recent Labs      05/20/17 0436 05/19/17 2120   CPK  27*  24*   CKND1  10.4*  7.5*      Coagulation No results for input(s): PTP, INR, APTT in the last 72 hours. No lab exists for component: INREXT, INREXT    Lipid Panel Lab Results   Component Value Date/Time    Cholesterol, total 100 07/22/2016 05:20 AM    HDL Cholesterol 31 07/22/2016 05:20 AM    LDL, calculated 54.4 07/22/2016 05:20 AM    VLDL, calculated 14.6 07/22/2016 05:20 AM    Triglyceride 73 07/22/2016 05:20 AM    CHOL/HDL Ratio 3.2 07/22/2016 05:20 AM      BNP No results for input(s): BNPP in the last 72 hours.    Liver Enzymes No results for input(s): TP, ALB, TBIL, AP, SGOT, GPT in the last 72 hours.     No lab exists for component: DBIL   Thyroid Studies Lab Results   Component Value Date/Time    TSH 4.23 07/22/2016 05:20 AM                Jimena Lira MD

## 2017-05-20 NOTE — PROGRESS NOTES
1930 Turnover given to me by RN, pt asleep on the bed, no one at bedside. Peeked in and said hello, he did not respond, chest rise and fall and pt breathing. Stood next to him and whispered I will be back with your medications shortly. 2020 returned to pt room, still sleeping. Went to gather his evening medications. Ran into his daughter in the hallway . 2050 returned with meds and checked his vitals, he was very lethargic and refused to open his eyes. BP was 71/53, O2 on 3 LPM was 70%. Respiratory tech was present, she increased him to 10 LPM, he was still lethargic, glucose was checked and WNL  I pulled the blanket and sheet back and saw blood on his blanket sheet and nightgown. The blood was around the patient's dialysis fistula. I viewed his labs briefly and tried to assess the patients alertness. Asked CLINTON Sow to call a rapid response for a solution to his BP. No maintenance fluids ordered previously due to dialysis patient. With rapid response order placed to give a 250 bolus of NS. Hold coreg and hold bumex. Phoned Dr. Giselle Gary to inform him of rapid response and of the pt's lactic acid  2.8 and asked to initiate sepsis protocol. Also informed  that platelets won't be ready until tomorrow. //    [phone] assessed pt, 4x4 gauze was saturated with blood again, Pt also had a bowel movement, he was cleaned and gown changed. Daughter is still at bedside. Bedside turnover was given to RN __ SBAR, [facility], ED Summary and questions were answered. //

## 2017-05-20 NOTE — PROGRESS NOTES
[facility]  Pulmonary, Critical Care, and Sleep Medicine    Name: Marcin Irvin MRN: [id number]   : 1940 Hospital: [address] [place] [address]   Date: 2017          Subjective/Interval History:     17     Overnight events- had dialysis catheter placed and dialyzed. RRT called for hypotension and AMS  C/o SOB, no chest pain  Placed on Oxygen- VM  Noted to have blood oozing at insertion site. CXr with complete opafication - right hemithorax  Given DDAVP (0.3mcg/kg). Ordered  one unit of platelets as well. BP improved   Feels better at present- daughter at bedside. HPI:  Patient is a 78 y.o. Male, prior smoker, with multiple medical problems and extensive cardiac history, to include PMH of Acute on chronic Systolic and Diastolic CHF; COPD (per pt, but not found in chart) - on home O2 at 3LPM at rest, 4LPM with exertion; PVD, HTN, Pulmonary HTN, CAD, s/p CABG (), s/p AICD (); HLD; and chronic ITP, who presented to [facility] ED via EMS on 17, c/o R leg pain, s/p sliding off his recliner (per pt), and progressively worsening of leg swelling x1 week. Pt was seen on 17 in the ED for leg swelling as well, had negative PVL and was d/h with keflex and pain meds. Has been taking abx as directed and has finished his pain meds but he has not had any improvement in pain. Upon arrival to ED, pt has clinical signs of cellulitis, not responding to out-patient treatment. Pt was admitted for cellulitis and CHF exacerbation. [facility] course has included: PVL's (-) for DVT; CTA (-) for PE; Arterial studies show occluded L SFA and moderate >50% stenosis of the R superficial femoral artery with severe >75% stenosis of the profunda femoris artery. Pulmonary team consulted (17) d/t hypoxia in the setting of acute CHF with fluid overload, 2/2 worsening KYLEE on CKD and pt currently refusing dialysis    ROS:Pertinent items are noted in HPI.     Objective:   Vital Signs: Visit Vitals    /68    Pulse 81    Temp 98.2 °F (36.8 °C)    Resp 20    Ht 6' (1.829 m)    Wt 88 kg (193 lb 14.4 oz)    SpO2 95%    BMI 26.3 kg/m2       O2 Device: Hi flow nasal cannula   O2 Flow Rate (L/min): 10 l/min   Temp (24hrs), Av.5 °F (36.4 °C), Min:97.2 °F (36.2 °C), Max:98.2 °F (36.8 °C)       Intake/Output:   Last shift:         Last 3 shifts:  1901 -  0700  In: 20 [I.V.:20]  Out: 3700 [Urine:2600]    Intake/Output Summary (Last 24 hours) at 17 0814  Last data filed at 17 0400   Gross per 24 hour   Intake               20 ml   Output             2750 ml   Net            -2730 ml        Physical Exam:    General: Alert, awake, NAD, resting comfortably on 12L HFNC   HEENT: Normocephalic,atraumatic; PERRL, conjunctivae/corneas clear, anicteric; nares normal; no drainage/sinus tenderness; nasal/oral mucosa moist; neck supple, symmetric; trachea midline; No adenopathy; + JVD noted. Resp: Symmetrical chest rise; mod AE bilat; diminished right side, no wheezing   CV: RRR, S1S2 normal; No m/r/g   Abdomen: Protuberant; moderate ascites; abd is slightly firm, distended and tympanic - no change from previous; non-tender(+) B. S x4;.No masses/ organomegaly/ paradox noted   Extremity: 1-2+ pulses to upper extremities; unable to palpate BLE pulses - unchanged; anasarca - BLE continues to improve per pt and RN, from previous;  to touch; +1-+2 edema BLE up to upper thighs and abdominal wall    Neuro: Alert, grossly non-focal   Skin: Venous statis; dry; 2 ulcers noted to L shin - covered with Mepilex      DATA:  Labs:  Recent Labs      17   2120  17   0533  17   0514   WBC  5.9  4.3*  4.4*   HGB  8.2*  8.0*  7.8*   HCT  26.5*  25.5*  24.8*   PLT  53*  44*  45*     Recent Labs      17   0436  17   2120  17   0533   17   1005   NA  133*  135*  131*   < >   --    K  4.1  4.0  4.2   < >   --    CL  96*  95*  92*   < >   -- CO2  28  28  27   < >   --    GLU  84  78  76   < >   --    BUN  63*  59*  86*   < >   --    CREA  4.14*  3.96*  4.94*   < >   --    CA  8.7  8.7  9.2   < >  8.5   MG   --   2.0   --    --   2.0   PHOS   --    --    --    --   4.5    < > = values in this interval not displayed. PFT:    N/A                                                   Echo [05/18/17]:   SUMMARY:  Left ventricle: Systolic function was normal. Ejection fraction was  estimated in the range of 55 % to 60 %. No obvious wall motion  abnormalities identified in the views obtained. Wall thickness was mildly  increased. Aortic valve: There was moderate stenosis. Valve peak gradient was 49  mmHg. Valve mean gradient was 31 mmHg. Estimated aortic valve area (by  Vmax) was 1 cm-sq. Imaging: No new imaging today    US RETROPERITONEUM COMP [05/17/17]: FINDINGS:      Right kidney size: 7.5 x 3.2 x 3.5 cm. Left kidney size: 12.5 x 5.2 x 6.2 cm.      At the right kidney upper pole region, there is a 3.7 x 3.5 x 3.3 cm  circumscribed cyst. No hydronephrosis is demonstrated in the right kidney. No evidence of solid or cystic mass or hydronephrosis is detected in the left kidney. Both kidneys demonstrate increased parenchymal echogenicity.      Moderate ascites is demonstrated in the peritoneal cavity particularly around  the liver and spleen. The spleen is enlarged, measuring 13.0 x 5.6 x 13.8 cm.      The bladder is not optimally evaluated due to contracted state. Emerson catheter  has been deployed. The inferior vena cava and aorta are not well visualized.         IMPRESSION:  1. No postobstructive changes. Asymmetric atrophy of the right kidney. Renal cyst at the upper pole of the right kidney. 2. Increased renal parenchymal echogenicity, potentially suggestive of medical renal disease.   3. Moderate ascites     CXR Results  (Last 48 hours)    None      CXR 5/19 - reviewed by myself: complete opacification of right hemithorax, ( new) Right sided dialysis catheter in place     CXR [05/17/17]: FINDINGS:   A cardiac pacemaker hub is identified in the left upper torso region  with leads through the left subclavian approach. No pneumothorax is detected. There is moderate to large right-sided pleural effusion. The size of the pleural effusion may be slightly increased compared to 5/14/17. Increased streaky densities are identified in the aerated right lung particularly in the upper to lower lung zones. Increased streaky densities are also identified in the left lower lung zone. There is new developing left costophrenic angle blunting. The cardiac silhouette is moderately prominent, with multiple intact median sternotomy wires.      IMPRESSION:  1. Pacemaker in place. 2. Moderate to large right pleural effusion, slightly increased compared to 5/14/17. 3. New developing small left-sided pleural effusion. 3. Streaky densities in both lungs suggestive of atelectatic changes vs. Infiltrate        [x]I have personally reviewed the patients radiographs  [x]Radiographs reviewed with radiologist   []No change from prior, tubes and lines in adequate position  []Improved   [x]Worsening    IMPRESSION:      · Bilateral Pleural Effusions: now with complete opacification right hemithorax- concerning for bleeding. Discussed with pt about chest tube- with low platelets would be of concern for any intervention. hemodynamically stabilized and measures underway to correct thrombocytopenia.   · Metabolic Acidosis - 2/2 to below  · Acute on Chronic hypoxic Respiratory Failure - 2/2 Fluid overload d/t worsening CHF in the setting of ESRD   · COPD  · Acute diastolic CHF - Elevated BNP & BLE edema   · KYLEE on CKD - Elevated Cr; Nephrology following; pt needs HD   · Hypotension - Currently on Dobutamine drip at 5mcg/mg/kg/hr  · Pulmonary HTN  · Anemia - Received transfusion - 1U PRBC on 05/15/17  · Thrombocytopenia - Pt does have chronic ITP ( seen by Hematology in 02/2017); worsening (05/19)  · Anasacra  · Cellulitis - Failed out-patient treatment; completed Zosyn on 05/16/17. · PAD - with BLE pain - Vascular following  · CAD - S/p CABG in 2013; s/p AICD in 02/17; hx of Carotid endarterectomy - bilateral in 2015; Mitral regurgitation - mild-to moderate based on Echo (2016)  · Gout      Patient Active Problem List   Diagnosis Code    Hematospermia R36.1    CAD (coronary artery disease) I25.10    PVD (peripheral vascular disease) ([facility] [address] I73.9    CHF (congestive heart failure) ([facility]) I50.9    Anasarca R60.1    Hx of CABG Z95.1    H/O carotid endarterectomy Z98.890    AICD (automatic cardioverter/defibrillator) present Z95.810    Acute exacerbation of CHF (congestive heart failure) ([facility]) O82.2    Diastolic CHF, acute on chronic ([facility]) I50.33    HTN (hypertension) I10    HLD (hyperlipidemia) E78.5    Acute CHF (congestive heart failure) ([facility]) I50.9    Acute on chronic diastolic (congestive) heart failure ([facility]) I50.33    Thrombocytopenia ([facility]) D69.6    Hypokalemia E87.6    Aortic stenosis, moderate I35.0    CKD (chronic kidney disease) N18.9    Pulmonary HTN ([facility]) I27.2    Cellulitis L03.90      PLAN:   · SpO2 >90%; titrate supp O2 PRN; currently pt is resting on comfortably on 8L HFNC;  pt is a chronic CO2 retainer - monitor closely for signs of CO2 narcosis. · ABG improved on BiPAP; con't BiPAP PRN & qHS. · CXR and consider CT scan to better define bleeding collection site- chest wall vs pleural.    · Aggressive pulmonary toileting  · Aspiration precautions - HOB >30'  · Pt likely has component of COPD - will plan to add maintenance inhalers for added respiratory benefit - for now - will con't Con't duo-nebs q6 PRN;Con't Flonase; and monitor respiratory response to dialysis. · Continue to correct Platelets- DDAVP, platelet transfusion  · Cardio following - currently on Dobutamine drip at 5mcg/kg/hr for hypotension. Currently HD stable; will monitor.  On Bumex gtt; defer diuresis management to Cardio & Nephro  · Vascular following - for arterial insufficiency  · Nephrology following - ESRD, dialysis   · DVT, PUD prophylaxis - per primary team.      Highly complex decision making performed.     Rajinder العلي MD

## 2017-05-20 NOTE — ROUTINE PROCESS
Patient was a Rapid response call with hypotension, desaturation, and bleeding from procedure site. Increased O2 to 10L HFNC with Spo2 increased to 95%. Hospitalist ordered for pt. To be switched to O2 with mask. Placed on 55% Venti mask with SPO2 of 95%. Pt was not placed on BIPAP due to BP of 73/51.

## 2017-05-20 NOTE — PROGRESS NOTES
[address] [facility]  Rapid Response Note    Patient: Paddy Quezada 78 y.o. male 1940  Patient MRN: [id number]    Admission Date: 5/14/2017   Admission Diagnosis: Acute exacerbation of CHF (congestive heart failure) (HCC)  Cellulitis    Rapid Response  Rapid response called for: Hypotension and Lethargy    Nursing reported that patient's blood pressure was low (70s/50s). Respiratory reported that patient's O2 sat was 70%. Upon entering the room, patient was in moderate distress. There was blood on the [facility] insertion site. Patient stated that he did not have chest pain but said that he did have shortness of breath. Patient did not endorse nausea. We ordered a septic work up given patient's leukocytosis and discussed case with Dr. Aissatou Young and Dr. Cesario uRssell (due to the [facility] site bleeding). During rapid, blood pressure started to slowly rise. Objective  Vitals at the beginning of the rapid  BP: 73/50 (adjusted to 96/58 then 87/53 then 93/53 throughout rapid)  HR: 73  O2 Sat: 90% (High Flow at 10L)  Temp: Afebrile    Vitals at the end of the rapid  BP: 93/53  HR: 71  RR:18  O2 Sat: 94% (10L High Flow NC)  Temp: 97.2    Physical Examination  Gen: Appears to be in moderate distress  CV: Appeared regularly regular during my examination  Resp: Decreased lung sounds on the right. GI: Distended (ascitic) abdomen  Ext: Radial pulses 1+ bilaterally    Assessment/Plan and Disposition  Paddy Quezada is a 78y.o. year old male admitted for Acute exacerbation of CHF (congestive heart failure) (HCC) and Cellulitis  Rapid Response Called For: Hypotension and Lethargy    Medications Administered: NS 250cc bolus  Labs Ordered: CBC, BMP, Lactic Acid, Blood Cultures  EKG: NSR with old T wave inversions in Leads V4, V5 and V6  CXR: Obtained. Official read pending  CT Head: Not Obtained    Hypotension and Lethargy  -BP initially 73/50.  Adjusted to 93/53 at the end of the rapid  -Differential Dx for hypotension and lethargy: Excessive diuresis during dialysis today, hypotension secondary to acute blood loss/hemorrhage, hypotension/lethargy due to narcotic use, hypotension secondary to overwhelming sepsis. -Most likely explanation for hypotension is likely excessive UF removal during dialysis today. Patient was dialyzed for the first time on 5/19/17 with an UF Goal of 2000. Upon review of chart, patient's blood pressures were normal (even elevated) prior to the initiation of dialysis today. Following completion of dialysis, patient's blood pressure began to slightly drop. Given the fact that patient had not had dialysis before and that today he had dialysis for the first time, it is reasonable to presume that patient's current hypotensive episode (along with the associated lethargy) was secondary to initiating dialysis today.  -Additionally, patient's H/H this morning was 8.0/25.5 and platelets were 44. We ordered a CBC to evaluate if patient was loosing an excessive amount of blood at the [facility] site. We called vascular surgery and discussed with Dr. Ken Sullivan. Dr. Ken Sullivan asked us to order DDAVP (0.3mcg/kg). We were asked to order one unit of platelets as well. Dr. Ken Sullivan asked us to administer the DDAVP at 0.3mcg/kg now and administer another DDAVP at 0.3mcg/kg order while the platelets are transfusing. I have placed these orders and notified nursing staff.  -Patient's hypotension may be secondary to overwhelming sepsis, but patient was normotensive (and even hypertensive) yesterday afternoon. Blood cultures demonstrated no growth for 4 days so far but a urine culture is growing yeast. We did order a set of blood cultures during the rapid response but it is unlikely that patient's current clinical picture with hypotension and lethargy is secondary to overwhelming sepsis. No antibiotics were initiated during the rapid response.  -Lastly, patient was ordered for Cowen 5/325 q4 hours PRN.  We will cancel this order as this could lead to worsening mentation and hypotension. We discontinued the Norco order and discussed as to why with nursing staff and with patient and patient's daughter  -Additionally, we discussed case with Cardiology. Given hypotension, we will hold patient's Bumex drip at this time. We will also discontinue patient's Coreg at this time as well. This was discussed with Cardiology Associate's NP (Leora). Disposition: Patient condition currently gaurded  Patient Location: CVT Step-Down    Attending (Dr. Brianne Morrow) notified of rapid response and is in agreement with plan. Primary team resuming care.      Jesús Connelly MD, PGY-1  [facility] Medicine  May 19, 2017 10:08 PM

## 2017-05-20 NOTE — PROGRESS NOTES
responded to Rapid Response for  Sasha Lewis, who is a 78 y.o.,male,     The  provided the following Interventions:  Provided crisis spiritual care and support. Offered prayers on behalf for the patient. Chart reviewed. The following outcomes were achieved:      Assessment:  There are no further spiritual or Moravian issues which require intervention at this time. Plan:  Chaplains will continue to follow and will provide spiritual care as needed.  recommends bedside caregivers page  on duty if patient or family shows signs of acute spiritual or emotional distress.        [address] [facility] [place]   [phone]

## 2017-05-20 NOTE — PROGRESS NOTES
Hosea Mandujaon M.D. PROGRESS NOTE    Name: Bailey Bran MRN: [id number]   : 1940 Hospital: [facility]   Date: 2017  Admission Date: 2017     Subjective/Objective/Plans  Has large right pleural effusion, on non rebreather oxygen  HD today hopefully relieved effusion  Alert and coherent    Vital Signs:  Visit Vitals    /76    Pulse 83    Temp 97.6 °F (36.4 °C)    Resp 18    Ht 6' (1.829 m)    Wt 88 kg (193 lb 14.4 oz)    SpO2 100%    BMI 26.3 kg/m2       O2 Device: Hi flow nasal cannula   O2 Flow Rate (L/min): 15 l/min   Temp (24hrs), Av.5 °F (36.4 °C), Min:97.2 °F (36.2 °C), Max:98.2 °F (36.8 °C)     1:16 PM  Intake/Output:   Last shift:         Last 3 shifts:  1901 -  0700  In: 20 [I.V.:20]  Out: 3700 [Urine:2600]    Intake/Output Summary (Last 24 hours) at 17 1316  Last data filed at 17 0400   Gross per 24 hour   Intake                0 ml   Output             2450 ml   Net            -2450 ml         Physical Exam:    General: alert and moderately ill    HEENT: pupils equal, no ear discharge   Neck: No abnormally enlarged lymph nodes. , no JDV   Mouth: MMM no lesions    Chest: pacemaker left chest, no breast masses   Lungs: decreased air exchange right lung   Heart: Regular rate and rhythm   Abdomen: abdomen is soft without significant tenderness, masses, organomegaly or guarding   Extremity: negative   Neuro: alert    Skin: Skin color, texture, turgor normal. No rashes or lesions    Data Review:    Labs: Results:       Chemistry Recent Labs      17   0436  17   2120  17   0533   GLU  84  78  76   NA  133*  135*  131*   K  4.1  4.0  4.2   CL  96*  95*  92*   CO2  28  28  27   BUN  63*  59*  86*   CREA  4.14*  3.96*  4.94*   CA  8.7  8.7  9.2   AGAP  9  12  12   BUCR  15  15  17      CBC w/Diff Recent Labs      17   2120  17   0533  17   0514   WBC  5.9  4.3*  4.4*   RBC  2.92*  2.84*  2.77*   HGB  8.2*  8.0* 7.8*   HCT  26.5*  25.5*  24.8*   PLT  53*  44*  45*   GRANS  74*  72  77*   LYMPH  10*  9*  7*   EOS  3  8*  6*      Cardiac Enzymes Recent Labs      05/20/17   0436  05/19/17 2120   CPK  27*  24*   CKND1  10.4*  7.5*      Coagulation No results for input(s): PTP, INR, APTT in the last 72 hours. No lab exists for component: INREXT    Lipid Panel Lab Results   Component Value Date/Time    Cholesterol, total 100 07/22/2016 05:20 AM    HDL Cholesterol 31 07/22/2016 05:20 AM    LDL, calculated 54.4 07/22/2016 05:20 AM    VLDL, calculated 14.6 07/22/2016 05:20 AM    Triglyceride 73 07/22/2016 05:20 AM    CHOL/HDL Ratio 3.2 07/22/2016 05:20 AM      BNP No results for input(s): BNPP in the last 72 hours. Liver Enzymes No results for input(s): TP, ALB, TBILI, AP, SGOT, ALT, CBIL in the last 72 hours. Thyroid Studies Lab Results   Component Value Date/Time    TSH 4.23 07/22/2016 05:20 AM          Procedures/imaging: see electronic medical records for all procedures, Xrays and details which were not copied into this note but were reviewed. Allergies:  No Known Allergies    Home Medications:  Prior to Admission Medications   Prescriptions Last Dose Informant Patient Reported? Taking? HYDROcodone-acetaminophen (NORCO) 5-325 mg per tablet 5/12/2017 at 0300  No No   Sig: Take 1 Tab by mouth every six (6) hours as needed for Pain. Max Daily Amount: 4 Tabs. albuterol-ipratropium (DUO-NEB) 2.5 mg-0.5 mg/3 ml nebu 5/11/2017 at 1700  No No   Sig: 3 mL by Nebulization route every six (6) hours as needed. allopurinol (ZYLOPRIM) 100 mg tablet 5/11/2017 at 0300  No No   Sig: Take 1 Tab by mouth daily. Indications: GOUT   aspirin delayed-release 81 mg tablet 5/12/2017 at 0300  No No   Sig: Take 2 Tabs by mouth daily. carvedilol (COREG) 6.25 mg tablet 5/12/2017 at 0300  No No   Sig: Take 1 Tab by mouth two (2) times daily (with meals).  Indications: Chronic Heart Failure   cephALEXin (KEFLEX) 500 mg capsule Not Taking at Unknown time  No No   Sig: Take 1 Cap by mouth four (4) times daily for 7 days. cholecalciferol (VITAMIN D3) 1,000 unit tablet 2017 at 0300  No No   Sig: Take 2 Tabs by mouth daily. colchicine 0.6 mg tablet 2017 at 0300  Yes No   Sig: Take 0.6 mg by mouth daily. ferrous sulfate 325 mg (65 mg iron) tablet 2017 at 0300  No No   Sig: Take 1 Tab by mouth daily (with breakfast). fish oil-omega-3 fatty acids 340-1,000 mg capsule 2017 at 0300  No Yes   Sig: Take 1 Cap by mouth two (2) times a day. Indications: HYPERTRIGLYCERIDEMIA   fluticasone (FLONASE) 50 mcg/actuation nasal spray 2017 at 0300  No No   Si spray each nostril daily  Indications: ALLERGIC RHINITIS   furosemide (LASIX) 40 mg tablet 2017 at 0300  No No   Si tab BID   influenza vaccine 2016-17, 36mos+,,PF, (FLUZONE/FLUARIX/FLULAVAL QUAD) syrg injection   No No   Si.5 mL by IntraMUSCular route PRIOR TO DISCHARGE. loperamide (IMMODIUM) 2 mg tablet 2017 at 0300  Yes No   Sig: Take 2 mg by mouth four (4) times daily as needed for Diarrhea.   metOLazone (ZAROXOLYN) 2.5 mg tablet Not Taking at Unknown time  No No   Sig: Take 1 Tab by mouth daily. Patient taking differently: Take 2.5 mg by mouth every other day. nitroglycerin (NITROSTAT) 0.4 mg SL tablet Unknown at Unknown time  No No   Si Tab by SubLINGual route as needed for Chest Pain.   pantoprazole (PROTONIX) 40 mg tablet Unknown at Unknown time  No No   Sig: Take 1 Tab by mouth Daily (before breakfast). pravastatin (PRAVACHOL) 80 mg tablet 2017 at 0300  No No   Sig: Take 1 Tab by mouth nightly. therapeutic multivitamin (THERAGRAN) tablet 2017 at 0300  No No   Sig: Take 1 Tab by mouth daily. zolpidem (AMBIEN) 5 mg tablet Unknown at Unknown time  No No   Sig: Take 1 Tab by mouth nightly as needed for Sleep. Max Daily Amount: 5 mg.       Facility-Administered Medications: None       Current Medications:  Current Facility-Administered Medications   Medication Dose Route Frequency    vancomycin (VANCOCIN) 1,750 mg in 0.9% sodium chloride 500 mL IVPB  1,750 mg IntraVENous ONCE    VANCOMYCIN INFORMATION NOTE   Other Rx Dosing/Monitoring    sodium citrate 4 gram /100 mL (4 %) 0.08 g  2 mL Hemodialysis DIALYSIS PRN    0.9% sodium chloride infusion 250 mL  250 mL IntraVENous CONTINUOUS    0.9% sodium chloride infusion 250 mL  250 mL IntraVENous PRN    colchicine (MITIGARE) capsule 0.6 mg  0.6 mg Oral Q TU, TH & SAT    epoetin ron (EPOGEN;PROCRIT) injection 6,000 Units  6,000 Units SubCUTAneous Q MON, WED & FRI    allopurinol (ZYLOPRIM) tablet 50 mg  50 mg Oral DAILY    DOBUTamine (DOBUTREX) 500 mg/250 mL (2,000 mcg/mL) infusion  5 mcg/kg/min IntraVENous CONTINUOUS    cholecalciferol (VITAMIN D3) tablet 2,000 Units  2,000 Units Oral DAILY    therapeutic multivitamin (THERAGRAN) tablet 1 Tab  1 Tab Oral DAILY    nitroglycerin (NITROSTAT) tablet 0.4 mg  0.4 mg SubLINGual PRN    pantoprazole (PROTONIX) tablet 40 mg  40 mg Oral ACB    albuterol-ipratropium (DUO-NEB) 2.5 MG-0.5 MG/3 ML  3 mL Nebulization Q6H PRN    ferrous sulfate tablet 325 mg  325 mg Oral DAILY WITH BREAKFAST    fluticasone (FLONASE) 50 mcg/actuation nasal spray 2 Spray  2 Spray Both Nostrils DAILY    HYDROcodone-acetaminophen (NORCO) 5-325 mg per tablet 1 Tab  1 Tab Oral Q4H PRN    influenza vaccine 2016-17 (36mos+)(PF) (FLUZONE/FLUARIX/FLULAVAL QUAD) injection 0.5 mL  0.5 mL IntraMUSCular PRIOR TO DISCHARGE    zolpidem (AMBIEN) tablet 5 mg  5 mg Oral QHS PRN    sodium chloride (NS) flush 5-10 mL  5-10 mL IntraVENous Q8H    sodium chloride (NS) flush 5-10 mL  5-10 mL IntraVENous PRN    ondansetron (ZOFRAN) injection 4 mg  4 mg IntraVENous Q4H PRN       Chart and notes reviewed. Data reviewed. I have evaluated and examined the patient.         IMPRESSION:   Patient Active Problem List   Diagnosis Code    Hematospermia R36.1    CAD (coronary artery disease) I25.10  PVD (peripheral vascular disease) (HCC) I73.9    CHF (congestive heart failure) (HCC) I50.9    Anasarca R60.1    Hx of CABG Z95.1    H/O carotid endarterectomy Z98.890    AICD (automatic cardioverter/defibrillator) present Z95.810    Acute exacerbation of CHF (congestive heart failure) (HCC) G89.5    Diastolic CHF, acute on chronic (HCC) I50.33    HTN (hypertension) I10    HLD (hyperlipidemia) E78.5    Acute CHF (congestive heart failure) (HCC) I50.9    Acute on chronic diastolic (congestive) heart failure (HCC) I50.33    Thrombocytopenia (HCC) D69.6    Hypokalemia E87.6    Aortic stenosis, moderate I35.0    CKD (chronic kidney disease) N18.9    Pulmonary HTN (HCC) I27.2    Cellulitis L03.90    Acute on chronic kidney failure (HCC) N17.9, N18.9 ·         PLAN:/DISCUSSION:   · HD today        Arti Celestin MD  5/20/2017, 1:16 PM

## 2017-05-20 NOTE — PROGRESS NOTES
RENAL DAILY PROGRESS NOTE      IMPRESSION:   Acute on chronic renal failure, not recovering renal function ,started on HD because of poor clearance, first HD on 5/19, tolerated well  Oozing from HD catheter, low platelet  Thrombocytopenia, chronic    Short of breath, multifactorial , bilateral pleural effusion, COPD exacerbation   Anemia   Hypotension, on dobutamine drip , improving    PLAN:   His  BUN remain on higher side, and oozing from catheter, need blood product for low platelet, I would continue HD support for better clearance and volume management. Plan to avoid UF more than 1L. Diuretics per cardiology colleague . Low platelet, Avoid heparin in HD catheter, will use citrate. Discussed with Dr. Demetria Garcia,  And family members. Subjective:     71y M with acute on chronic kidney injury,   Complaint:   Overnight events noted  Breathing is better  RRT called for hypotension and AMS  Oozing blood from HD catheter insertion site, chest xray shows complete opafication   Of right hemithorax, received ddavp yesterday   This morning BP better  no nausea, vomiting, chest pain,cough, seizure.      Current Facility-Administered Medications   Medication Dose Route Frequency    sodium citrate 4 gram /100 mL (4 %) 0.08 g  2 mL Hemodialysis DIALYSIS PRN    0.9% sodium chloride infusion 250 mL  250 mL IntraVENous CONTINUOUS    desmopressin (DDAVP) 27.96 mcg in 0.9% sodium chloride 50 mL IVPB  0.3 mcg/kg IntraVENous ONCE    0.9% sodium chloride infusion 250 mL  250 mL IntraVENous PRN    colchicine (MITIGARE) capsule 0.6 mg  0.6 mg Oral Q TU, TH & SAT    epoetin ron (EPOGEN;PROCRIT) injection 6,000 Units  6,000 Units SubCUTAneous Q MON, WED & FRI    allopurinol (ZYLOPRIM) tablet 50 mg  50 mg Oral DAILY    DOBUTamine (DOBUTREX) 500 mg/250 mL (2,000 mcg/mL) infusion  5 mcg/kg/min IntraVENous CONTINUOUS    cholecalciferol (VITAMIN D3) tablet 2,000 Units  2,000 Units Oral DAILY    therapeutic multivitamin (THERAGRAN) tablet 1 Tab  1 Tab Oral DAILY    nitroglycerin (NITROSTAT) tablet 0.4 mg  0.4 mg SubLINGual PRN    pantoprazole (PROTONIX) tablet 40 mg  40 mg Oral ACB    albuterol-ipratropium (DUO-NEB) 2.5 MG-0.5 MG/3 ML  3 mL Nebulization Q6H PRN    ferrous sulfate tablet 325 mg  325 mg Oral DAILY WITH BREAKFAST    fluticasone (FLONASE) 50 mcg/actuation nasal spray 2 Spray  2 Spray Both Nostrils DAILY    HYDROcodone-acetaminophen (NORCO) 5-325 mg per tablet 1 Tab  1 Tab Oral Q4H PRN    influenza vaccine 2016-17 (36mos+)(PF) (FLUZONE/FLUARIX/FLULAVAL QUAD) injection 0.5 mL  0.5 mL IntraMUSCular PRIOR TO DISCHARGE    zolpidem (AMBIEN) tablet 5 mg  5 mg Oral QHS PRN    sodium chloride (NS) flush 5-10 mL  5-10 mL IntraVENous Q8H    sodium chloride (NS) flush 5-10 mL  5-10 mL IntraVENous PRN    ondansetron (ZOFRAN) injection 4 mg  4 mg IntraVENous Q4H PRN       Review of Symptoms: comprehensive ROS negative except above.    Objective:     Patient Vitals for the past 24 hrs:   Temp Pulse Resp BP SpO2   05/20/17 0904 - - - - 90 %   05/20/17 0726 98.2 °F (36.8 °C) 81 20 127/68 95 %   05/20/17 0400 97.4 °F (36.3 °C) 78 18 136/64 95 %   05/20/17 0222 - - - 119/62 -   05/20/17 0000 97.6 °F (36.4 °C) 73 18 117/64 97 %   05/19/17 2232 - - - 96/51 -   05/19/17 2132 97.2 °F (36.2 °C) 71 18 93/53 94 %   05/19/17 2107 97.8 °F (36.6 °C) - - - -   05/19/17 2106 - 72 16 96/58 94 %   05/19/17 2105 - - - (!) 86/55 94 %   05/19/17 2105 - 71 16 (!) 73/51 (!) 73 %   05/19/17 2102 - - - (!) 75/51 91 %   05/19/17 2054 - - - - (!) 73 %   05/19/17 2020 97.3 °F (36.3 °C) 72 18 95/68 90 %   05/19/17 1630 97.5 °F (36.4 °C) 70 16 103/72 -   05/19/17 1600 - 70 16 (!) 85/56 -   05/19/17 1534 - (!) 54 16 103/87 -   05/19/17 1504 - 67 16 116/74 -   05/19/17 1434 - 65 16 116/75 -   05/19/17 1407 - 69 16 94/55 -   05/19/17 1325 - 67 16 122/86 -   05/19/17 1320 97.2 °F (36.2 °C) 68 16 124/81 -   05/19/17 1219 - 76 15 117/84 96 % 05/19/17 1205 - 68 13 - 95 %   05/19/17 1204 - - - 99/74 -        Weight change: -5.216 kg (-11 lb 8 oz)     05/18 1901 - 05/20 0700  In: 20 [I.V.:20]  Out: 3700 [Urine:2600]    Intake/Output Summary (Last 24 hours) at 05/20/17 1035  Last data filed at 05/20/17 0400   Gross per 24 hour   Intake               20 ml   Output             2450 ml   Net            -2430 ml     Physical Exam:   General: comfortable, no acute distress   HEENT sclera anicteric, supple neck, no thyromegaly  CVS: S1S2 heard,  no rub  RS: + air entry b/l,   Abd: Soft, Non tender, Not distended,   Neuro: non focal, awake, alert ,   Extrm: + edema, no cyanosis, clubbing   Musculoskeletal: No gross joints or bone deformities   Access; HD catheter site, oozing blood.          Data Review:     LABS:   Hematology:   Recent Labs      05/19/17 2120 05/19/17 0533 05/18/17   0514   WBC  5.9  4.3*  4.4*   HGB  8.2*  8.0*  7.8*   HCT  26.5*  25.5*  24.8*     Chemistry:   Recent Labs      05/20/17   0436  05/19/17 2120 05/19/17   0533  05/18/17   0514   BUN  63*  59*  86*  85*   CREA  4.14*  3.96*  4.94*  4.65*   CA  8.7  8.7  9.2  8.7   K  4.1  4.0  4.2  4.3   NA  133*  135*  131*  131*   CL  96*  95*  92*  93*   CO2  28  28  27  27   GLU  84  78  76  85              Procedures/imaging: see electronic medical records for all procedures, Xrays and details which were not copied into this note but were reviewed prior to creation of Plan          Assessment & Plan:     As above         Grecia Stratotn MD  5/20/2017  12:03 PM

## 2017-05-21 NOTE — PROGRESS NOTES
Lida Hernandez M.D. PROGRESS NOTE    Name: Brinda Garg MRN: [id number]   : 1940 Hospital: [facility]   Date: 2017  Admission Date: 2017     Subjective/Objective/Plans  1. ESRD  2. CHF, right pleural effusion  3. Thrombocytopenia, slightly better from yesterday    Repeat CXR today, question of chest tube but bleeding risk  Now on nasal oxygen, was on non rebreather yesterday    Plan  As per Renal and Pulmonary and Cardiology  Dobutamine drip  Oxygen  Vital Signs:  Visit Vitals    /78    Pulse 75    Temp 98.3 °F (36.8 °C)    Resp 18    Ht 6' (1.829 m)    Wt 95.3 kg (210 lb 3.2 oz)    SpO2 94%    BMI 28.51 kg/m2       O2 Device: Hi flow nasal cannula   O2 Flow Rate (L/min): 15 l/min   Temp (24hrs), Av.9 °F (36.6 °C), Min:97.5 °F (36.4 °C), Max:98.3 °F (36.8 °C)     6:06 AM  Intake/Output:   Last shift:      1901 -  0700  In: -   Out: 1000 [Urine:1000]  Last 3 shifts:  0701 -  1900  In: 20 [I.V.:20]  Out: 3750 [Urine:1650]    Intake/Output Summary (Last 24 hours) at 17 0606  Last data filed at 17 1909   Gross per 24 hour   Intake                0 ml   Output             2000 ml   Net            -2000 ml         Physical Exam:    General: in no respiratory distress and acyanotic and alert    HEENT: pupils equal, no ear discharge   Neck: No abnormally enlarged lymph nodes. , no JDV   Mouth: MMM no lesions    Chest: normal,    Lungs: decreased air exchange right lung   Heart: Regular rate and rhythm   Abdomen: abdomen is soft without significant tenderness, masses, organomegaly or guarding   Extremity: negative   Neuro: alert    Skin: Skin color, texture, turgor normal. No rashes or lesions    Data Review:    Labs: Results:       Chemistry Recent Labs      17   0436  17   2120  17   0533   GLU  84  78  76   NA  133*  135*  131*   K  4.1  4.0  4.2   CL  96*  95*  92*   CO2  28  28  27   BUN  63*  59*  86*   CREA  4.14* 3.96*  4.94*   CA  8.7  8.7  9.2   AGAP  9  12  12   BUCR  15  15  17      CBC w/Diff Recent Labs      05/19/17   2120  05/19/17   0533   WBC  5.9  4.3*   RBC  2.92*  2.84*   HGB  8.2*  8.0*   HCT  26.5*  25.5*   PLT  53*  44*   GRANS  74*  72   LYMPH  10*  9*   EOS  3  8*      Cardiac Enzymes Recent Labs      05/20/17   1330  05/20/17   0436   CPK  24*  27*   CKND1  8.8*  10.4*      Coagulation No results for input(s): PTP, INR, APTT in the last 72 hours. No lab exists for component: INREXT    Lipid Panel Lab Results   Component Value Date/Time    Cholesterol, total 100 07/22/2016 05:20 AM    HDL Cholesterol 31 07/22/2016 05:20 AM    LDL, calculated 54.4 07/22/2016 05:20 AM    VLDL, calculated 14.6 07/22/2016 05:20 AM    Triglyceride 73 07/22/2016 05:20 AM    CHOL/HDL Ratio 3.2 07/22/2016 05:20 AM      BNP No results for input(s): BNPP in the last 72 hours. Liver Enzymes No results for input(s): TP, ALB, TBILI, AP, SGOT, ALT, CBIL in the last 72 hours. Thyroid Studies Lab Results   Component Value Date/Time    TSH 4.23 07/22/2016 05:20 AM          Procedures/imaging: see electronic medical records for all procedures, Xrays and details which were not copied into this note but were reviewed. Allergies:  No Known Allergies    Home Medications:  Prior to Admission Medications   Prescriptions Last Dose Informant Patient Reported? Taking? HYDROcodone-acetaminophen (NORCO) 5-325 mg per tablet 5/12/2017 at 0300  No No   Sig: Take 1 Tab by mouth every six (6) hours as needed for Pain. Max Daily Amount: 4 Tabs. albuterol-ipratropium (DUO-NEB) 2.5 mg-0.5 mg/3 ml nebu 5/11/2017 at 1700  No No   Sig: 3 mL by Nebulization route every six (6) hours as needed. allopurinol (ZYLOPRIM) 100 mg tablet 5/11/2017 at 0300  No No   Sig: Take 1 Tab by mouth daily. Indications: GOUT   aspirin delayed-release 81 mg tablet 5/12/2017 at 0300  No No   Sig: Take 2 Tabs by mouth daily.    carvedilol (COREG) 6.25 mg tablet 5/12/2017 at 0300  No No   Sig: Take 1 Tab by mouth two (2) times daily (with meals). Indications: Chronic Heart Failure   cephALEXin (KEFLEX) 500 mg capsule Not Taking at Unknown time  No No   Sig: Take 1 Cap by mouth four (4) times daily for 7 days. cholecalciferol (VITAMIN D3) 1,000 unit tablet 2017 at 0300  No No   Sig: Take 2 Tabs by mouth daily. colchicine 0.6 mg tablet 2017 at 0300  Yes No   Sig: Take 0.6 mg by mouth daily. ferrous sulfate 325 mg (65 mg iron) tablet 2017 at 0300  No No   Sig: Take 1 Tab by mouth daily (with breakfast). fish oil-omega-3 fatty acids 340-1,000 mg capsule 2017 at 0300  No Yes   Sig: Take 1 Cap by mouth two (2) times a day. Indications: HYPERTRIGLYCERIDEMIA   fluticasone (FLONASE) 50 mcg/actuation nasal spray 2017 at 0300  No No   Si spray each nostril daily  Indications: ALLERGIC RHINITIS   furosemide (LASIX) 40 mg tablet 2017 at 0300  No No   Si tab BID   influenza vaccine 2016-17, 36mos+,,PF, (FLUZONE/FLUARIX/FLULAVAL QUAD) syrg injection   No No   Si.5 mL by IntraMUSCular route PRIOR TO DISCHARGE. loperamide (IMMODIUM) 2 mg tablet 2017 at 0300  Yes No   Sig: Take 2 mg by mouth four (4) times daily as needed for Diarrhea.   metOLazone (ZAROXOLYN) 2.5 mg tablet Not Taking at Unknown time  No No   Sig: Take 1 Tab by mouth daily. Patient taking differently: Take 2.5 mg by mouth every other day. nitroglycerin (NITROSTAT) 0.4 mg SL tablet Unknown at Unknown time  No No   Si Tab by SubLINGual route as needed for Chest Pain.   pantoprazole (PROTONIX) 40 mg tablet Unknown at Unknown time  No No   Sig: Take 1 Tab by mouth Daily (before breakfast). pravastatin (PRAVACHOL) 80 mg tablet 2017 at 0300  No No   Sig: Take 1 Tab by mouth nightly. therapeutic multivitamin (THERAGRAN) tablet 2017 at 0300  No No   Sig: Take 1 Tab by mouth daily.    zolpidem (AMBIEN) 5 mg tablet Unknown at Unknown time  No No   Sig: Take 1 Tab by mouth nightly as needed for Sleep. Max Daily Amount: 5 mg. Facility-Administered Medications: None       Current Medications:  Current Facility-Administered Medications   Medication Dose Route Frequency    VANCOMYCIN INFORMATION NOTE   Other Rx Dosing/Monitoring    sodium citrate 4 gram /100 mL (4 %) 0.08 g  2 mL Hemodialysis DIALYSIS PRN    0.9% sodium chloride infusion 250 mL  250 mL IntraVENous CONTINUOUS    0.9% sodium chloride infusion 250 mL  250 mL IntraVENous PRN    colchicine (MITIGARE) capsule 0.6 mg  0.6 mg Oral Q TU, TH & SAT    epoetin ron (EPOGEN;PROCRIT) injection 6,000 Units  6,000 Units SubCUTAneous Q MON, WED & FRI    allopurinol (ZYLOPRIM) tablet 50 mg  50 mg Oral DAILY    DOBUTamine (DOBUTREX) 500 mg/250 mL (2,000 mcg/mL) infusion  5 mcg/kg/min IntraVENous CONTINUOUS    cholecalciferol (VITAMIN D3) tablet 2,000 Units  2,000 Units Oral DAILY    therapeutic multivitamin (THERAGRAN) tablet 1 Tab  1 Tab Oral DAILY    nitroglycerin (NITROSTAT) tablet 0.4 mg  0.4 mg SubLINGual PRN    pantoprazole (PROTONIX) tablet 40 mg  40 mg Oral ACB    albuterol-ipratropium (DUO-NEB) 2.5 MG-0.5 MG/3 ML  3 mL Nebulization Q6H PRN    ferrous sulfate tablet 325 mg  325 mg Oral DAILY WITH BREAKFAST    fluticasone (FLONASE) 50 mcg/actuation nasal spray 2 Spray  2 Spray Both Nostrils DAILY    HYDROcodone-acetaminophen (NORCO) 5-325 mg per tablet 1 Tab  1 Tab Oral Q4H PRN    influenza vaccine 2016-17 (36mos+)(PF) (FLUZONE/FLUARIX/FLULAVAL QUAD) injection 0.5 mL  0.5 mL IntraMUSCular PRIOR TO DISCHARGE    zolpidem (AMBIEN) tablet 5 mg  5 mg Oral QHS PRN    sodium chloride (NS) flush 5-10 mL  5-10 mL IntraVENous Q8H    sodium chloride (NS) flush 5-10 mL  5-10 mL IntraVENous PRN    ondansetron (ZOFRAN) injection 4 mg  4 mg IntraVENous Q4H PRN       Chart and notes reviewed. Data reviewed. I have evaluated and examined the patient.         IMPRESSION:   Patient Active Problem List   Diagnosis Code    Hematospermia R36.1    CAD (coronary artery disease) I25.10    PVD (peripheral vascular disease) (HCC) I73.9    CHF (congestive heart failure) (HCC) I50.9    Anasarca R60.1    Hx of CABG Z95.1    H/O carotid endarterectomy Z98.890    AICD (automatic cardioverter/defibrillator) present Z95.810    Acute exacerbation of CHF (congestive heart failure) (HCC) B63.8    Diastolic CHF, acute on chronic (HCC) I50.33    HTN (hypertension) I10    HLD (hyperlipidemia) E78.5    Acute CHF (congestive heart failure) (HCC) I50.9    Acute on chronic diastolic (congestive) heart failure (HCC) I50.33    Thrombocytopenia (HCC) D69.6    Hypokalemia E87.6    Aortic stenosis, moderate I35.0    CKD (chronic kidney disease) N18.9    Pulmonary HTN (HCC) I27.2    Cellulitis L03.90    Acute on chronic kidney failure (HCC) N17.9, N18.9 ·         PLAN:/DISCUSSION:   · Continue current treatment        Lizz Tovar MD  5/21/2017, 6:06 AM

## 2017-05-21 NOTE — PROGRESS NOTES
5390 Patient stated \"Im not wearing this under an circumstance\". He was referring to the bipap unit.

## 2017-05-21 NOTE — PROGRESS NOTES
Pt sleeping.  I chose not to awaken him  Discussed with nurse though  Has had steady oozing from dialysis catheter site  This will continue to do so until platelet levels improved  Will need to change/reinforce dressing as needed  Did receive platelet transfusion and DDAVP yesterday  Defer further infusions to medical/renal team  following

## 2017-05-21 NOTE — PROGRESS NOTES
Dressing to Rt upper chest changed. Saturated with dark red blood. [address] site continues to ooze blood at this time. Pressure applied to site with saline bag. Pt tolerated well.

## 2017-05-21 NOTE — PROGRESS NOTES
[facility]  Pulmonary, Critical Care, and Sleep Medicine    Name: Amberly Enriquez MRN: [id number]   : 1940 Hospital: [address] [place] [address]   Date: 2017          Subjective/Interval History:     17     Overnight events- had dialysis and tolerated well  Coughing productively  Less SOB, no chest pain  Placed on Oxygen- nasal canula  Noted to have blood oozing at insertion site. CXR with complete opafication - right hemithorax  Given DDAVP (0.3mcg/kg). Ordered  one unit of platelets as well. BP improved       HPI:  Patient is a 78 y.o. Male, prior smoker, with multiple medical problems and extensive cardiac history, to include PMH of Acute on chronic Systolic and Diastolic CHF; COPD (per pt, but not found in chart) - on home O2 at 3LPM at rest, 4LPM with exertion; PVD, HTN, Pulmonary HTN, CAD, s/p CABG (), s/p AICD (); HLD; and chronic ITP, who presented to [facility] ED via EMS on 17, c/o R leg pain, s/p sliding off his recliner (per pt), and progressively worsening of leg swelling x1 week. Pt was seen on 17 in the ED for leg swelling as well, had negative PVL and was d/h with keflex and pain meds. Has been taking abx as directed and has finished his pain meds but he has not had any improvement in pain. Upon arrival to ED, pt has clinical signs of cellulitis, not responding to out-patient treatment. Pt was admitted for cellulitis and CHF exacerbation. [facility] course has included: PVL's (-) for DVT; CTA (-) for PE; Arterial studies show occluded L SFA and moderate >50% stenosis of the R superficial femoral artery with severe >75% stenosis of the profunda femoris artery. Pulmonary team consulted (17) d/t hypoxia in the setting of acute CHF with fluid overload, 2/2 worsening KYLEE on CKD and pt currently refusing dialysis    ROS:Pertinent items are noted in HPI.     Objective:   Vital Signs:    Visit Vitals    /76    Pulse 91    Temp 97.4 °F (36.3 °C)    Resp 21    Ht 6' (1.829 m)    Wt 95.3 kg (210 lb 3.2 oz)    SpO2 94%    BMI 28.51 kg/m2       O2 Device: Hi flow nasal cannula   O2 Flow Rate (L/min): 15 l/min   Temp (24hrs), Av °F (36.7 °C), Min:97.4 °F (36.3 °C), Max:98.8 °F (37.1 °C)       Intake/Output:   Last shift:      701 - 1900  In: -   Out: 800 [Urine:800]  Last 3 shifts: 1901 - 700  In: 0   Out: 2000 [Urine:1000]    Intake/Output Summary (Last 24 hours) at 17 08  Last data filed at 17 07   Gross per 24 hour   Intake                0 ml   Output             2800 ml   Net            -2800 ml        Physical Exam:    General: Alert, awake, NAD, resting comfortably on 12L HFNC   HEENT: Normocephalic,atraumatic; PERRL, conjunctivae/corneas clear, anicteric; nares normal; no drainage/sinus tenderness; nasal/oral mucosa moist; neck supple, symmetric; trachea midline; No adenopathy; + JVD noted. Resp: Symmetrical chest rise; mod AE bilat; diminished right side, no wheezing   CV: RRR, S1S2 normal; No m/r/g   Abdomen: Protuberant; moderate ascites; abd is slightly firm, distended and tympanic - no change from previous; non-tender(+) B. S x4;.No masses/ organomegaly/ paradox noted   Extremity: 1-2+ pulses to upper extremities; unable to palpate BLE pulses - unchanged; anasarca - BLE continues to improve per pt and RN, from previous;  to touch; +1-+2 edema BLE up to upper thighs and abdominal wall    Neuro: Alert, grossly non-focal   Skin: Venous statis; dry; 2 ulcers noted to L shin - covered with Mepilex      DATA:  Labs:  Recent Labs      170  17   0533   WBC  5.9  4.3*   HGB  8.2*  8.0*   HCT  26.5*  25.5*   PLT  53*  44*     Recent Labs      17   0436  170  17   0533   NA  133*  135*  131*   K  4.1  4.0  4.2   CL  96*  95*  92*   CO2  28  28  27   GLU  84  78  76   BUN  63*  59*  86*   CREA  4.14*  3.96*  4.94*   CA  8.7  8.7  9.2   MG   --   2.0 --      PFT:    N/A                                                   Echo [05/18/17]:   SUMMARY:  Left ventricle: Systolic function was normal. Ejection fraction was  estimated in the range of 55 % to 60 %. No obvious wall motion  abnormalities identified in the views obtained. Wall thickness was mildly  increased. Aortic valve: There was moderate stenosis. Valve peak gradient was 49  mmHg. Valve mean gradient was 31 mmHg. Estimated aortic valve area (by  Vmax) was 1 cm-sq. Imaging: No new imaging today    US RETROPERITONEUM COMP [05/17/17]: FINDINGS:      Right kidney size: 7.5 x 3.2 x 3.5 cm. Left kidney size: 12.5 x 5.2 x 6.2 cm.      At the right kidney upper pole region, there is a 3.7 x 3.5 x 3.3 cm  circumscribed cyst. No hydronephrosis is demonstrated in the right kidney. No evidence of solid or cystic mass or hydronephrosis is detected in the left kidney. Both kidneys demonstrate increased parenchymal echogenicity.      Moderate ascites is demonstrated in the peritoneal cavity particularly around  the liver and spleen. The spleen is enlarged, measuring 13.0 x 5.6 x 13.8 cm.      The bladder is not optimally evaluated due to contracted state. Emerson catheter  has been deployed. The inferior vena cava and aorta are not well visualized.         IMPRESSION:  1. No postobstructive changes. Asymmetric atrophy of the right kidney. Renal cyst at the upper pole of the right kidney. 2. Increased renal parenchymal echogenicity, potentially suggestive of medical renal disease. 3. Moderate ascites     CXR Results  (Last 48 hours)               05/20/17 0820  XR CHEST PORT Final result    Impression:  IMPRESSION:       Progressing opacification of the right hemithorax with complete opacification. Differential diagnosis of marked interval increase in right pleural effusion and   or atelectatic collapse of the right lung.  In the setting of thrombocytopenia   hemorrhagic effusion should be considered as well as traumatic injury from the   central line placement. Stable left basal infiltrates. Probable small left pleural effusion. Clinical service is aware findings       Narrative:  Portable Chest 0758 hours       CPT CODE:94488       HISTORY:    Increased  hypoxia., Episode of hypotension and altered mental status,   shortness of breath, blood oozing at dialysis catheter insertion site,   thrombocytopenia, chronic ITP, Right pleural effusion increasing with near   complete opacification of the right hemithorax       COMPARISON: Chest x-rays May 19, May 17 through February 26, 2017, April 12, 2016. FINDINGS:        There is complete opacification of the right hemithorax . The patient is mildly   rotated. There is no definite mediastinal shift. No change in the double-lumen   right jugular approach central catheter terminating at the distal superior vena   cava. Persistent prominent interstitial markings in the lower one half of the   left lung. The heart remains mildly enlarged. No change in the right atrial nor   the right ventricular lead of the left upper chest AICD. Sternal cerclage wires   noted. No Schulte 05/19/17 2140  XR CHEST PORT Final result    Impression:  IMPRESSION:       Interval near complete opacification of the right hemithorax status post large   caliber right central line placement. Differential diagnosis of marked interval   increase in right pleural effusion and or atelectatic collapse of the right   upper lobe and majority of the right lower lobe and right middle lobe. Possibility of traumatic injury from the central line placement should be   considered. Stable left basal infiltrates. Probable small left pleural effusion   Clinical service is aware of findings. Narrative:  Portable Chest 2120 hours       CPT ZZMA:[id number]       HISTORY:    Increased  hypoxia., Right pleural effusion increasing       COMPARISON: Chest x-rays May 17 through February 26, 2017, April 12, 2016. FINDINGS:        There is nearly complete opacification of the right hemithorax with minimal   central residual air at the inferior right hilar region. Interval placement of a   double-lumen right jugular approach central catheter terminates at the distal   superior vena cava. Persistent prominent interstitial markings in the lower one   half of the left lung. The heart remains mildly enlarged. No change in the right   atrial nor the right ventricular lead of the left upper chest AICD. Shelvy Setting CXR 5/19 - reviewed by myself: complete opacification of right hemithorax, ( new) Right sided dialysis catheter in place     CXR [05/17/17]: FINDINGS:   A cardiac pacemaker hub is identified in the left upper torso region  with leads through the left subclavian approach. No pneumothorax is detected. There is moderate to large right-sided pleural effusion. The size of the pleural effusion may be slightly increased compared to 5/14/17. Increased streaky densities are identified in the aerated right lung particularly in the upper to lower lung zones. Increased streaky densities are also identified in the left lower lung zone. There is new developing left costophrenic angle blunting. The cardiac silhouette is moderately prominent, with multiple intact median sternotomy wires.      IMPRESSION:  1. Pacemaker in place. 2. Moderate to large right pleural effusion, slightly increased compared to 5/14/17. 3. New developing small left-sided pleural effusion. 3. Streaky densities in both lungs suggestive of atelectatic changes vs. Infiltrate        [x]I have personally reviewed the patients radiographs  [x]Radiographs reviewed with radiologist   []No change from prior, tubes and lines in adequate position  []Improved   [x]Worsening    IMPRESSION:      · Bilateral Pleural Effusions: now with complete opacification right hemithorax- concerning for bleeding. H/H has been stable ?  Mucus plug with atelectasis> has been coughing more productively now. Discussed about chest tube- with low platelets would be of concern for any intervention. hemodynamically stabilized and measures underway to correct thrombocytopenia. · Metabolic Acidosis - 2/2 to below  · Acute on Chronic hypoxic Respiratory Failure - 2/2 Fluid overload d/t worsening CHF in the setting of ESRD   · COPD  · Acute diastolic CHF - Elevated BNP & BLE edema   · KYLEE on CKD - Elevated Cr; Nephrology following; pt needs HD   · Hypotension - Currently on Dobutamine drip at 5mcg/mg/kg/hr  · Pulmonary HTN  · Anemia - Received transfusion - 1U PRBC on 05/15/17  · Thrombocytopenia - Pt does have chronic ITP ( seen by Hematology in 02/2017); worsening (05/19)  · Anasacra  · Cellulitis - Failed out-patient treatment; completed Zosyn on 05/16/17. · PAD - with BLE pain - Vascular following  · CAD - S/p CABG in 2013; s/p AICD in 02/17; hx of Carotid endarterectomy - bilateral in 2015; Mitral regurgitation - mild-to moderate based on Echo (2016)  · Gout      Patient Active Problem List   Diagnosis Code    Hematospermia R36.1    CAD (coronary artery disease) I25.10    PVD (peripheral vascular disease) ([facility] [address] I73.9    CHF (congestive heart failure) ([facility]) I50.9    Anasarca R60.1    Hx of CABG Z95.1    H/O carotid endarterectomy Z98.890    AICD (automatic cardioverter/defibrillator) present Z95.810    Acute exacerbation of CHF (congestive heart failure) ([facility]) J94.6    Diastolic CHF, acute on chronic ([facility]) I50.33    HTN (hypertension) I10    HLD (hyperlipidemia) E78.5    Acute CHF (congestive heart failure) ([facility]) I50.9    Acute on chronic diastolic (congestive) heart failure ([facility]) I50.33    Thrombocytopenia ([facility]) D69.6    Hypokalemia E87.6    Aortic stenosis, moderate I35.0    CKD (chronic kidney disease) N18.9    Pulmonary HTN ([facility]) I27.2    Cellulitis L03.90      PLAN:   · SpO2 >90%; titrate supp O2 PRN; currently on nasal canula.  Pt is a chronic CO2 retainer - monitor closely for signs of CO2 narcosis. · Will d/c BiPAP  · Follow up CXR and consider CT scan to better define bleeding collection site- chest wall vs pleural.    · Aggressive pulmonary toileting  · Aspiration precautions - HOB >30'  · Pt likely has component of COPD - will plan to add maintenance inhalers for added respiratory benefit - for now - will con't Con't duo-nebs q6 PRN;Con't Flonase; and monitor respiratory response to dialysis. · Continue to correct Platelets- DDAVP, platelet transfusion  · Cardio following - currently on Dobutamine drip at 5mcg/kg/hr for hypotension. Currently HD stable; will monitor. defer diuresis management to Cardio & Nephro  · Vascular following - for arterial insufficiency  · Nephrology following - ESRD, dialysis   · DVT, PUD prophylaxis - per primary team.      Highly complex decision making performed.     Mandeep Mckoy MD

## 2017-05-21 NOTE — PROGRESS NOTES
RENAL DAILY PROGRESS NOTE      IMPRESSION:   Acute on chronic renal failure, not recovering renal function ,started on HD because of poor clearance, first HD on 5/19,-- tolerated second HD well yesterday   Oozing from HD catheter, low platelet  Thrombocytopenia, chronic    Short of breath, multifactorial , bilateral pleural effusion, COPD exacerbation   Anemia   Hypotension, on dobutamine drip ,    PLAN:   Awaiting for lab this morning,  Clinically breathing appears stable , NO plan for HD today unless any urgency. Diuretics per cardiology colleague . Low platelet, Avoid heparin in HD catheter, using citrate. Discussed with Dr. Lilian Briseno, and Paul Betts, vascular team.            Subjective:     31R M with acute on chronic kidney injury,   Complaint:   Overnight events noted  S/p HD yesterday , tolerated well. Still oozing from HD access site. no nausea, vomiting, chest pain,cough, seizure.      Current Facility-Administered Medications   Medication Dose Route Frequency    VANCOMYCIN INFORMATION NOTE   Other Rx Dosing/Monitoring    sodium citrate 4 gram /100 mL (4 %) 0.08 g  2 mL Hemodialysis DIALYSIS PRN    0.9% sodium chloride infusion 250 mL  250 mL IntraVENous CONTINUOUS    0.9% sodium chloride infusion 250 mL  250 mL IntraVENous PRN    colchicine (MITIGARE) capsule 0.6 mg  0.6 mg Oral Q TU, TH & SAT    epoetin ron (EPOGEN;PROCRIT) injection 6,000 Units  6,000 Units SubCUTAneous Q MON, WED & FRI    allopurinol (ZYLOPRIM) tablet 50 mg  50 mg Oral DAILY    DOBUTamine (DOBUTREX) 500 mg/250 mL (2,000 mcg/mL) infusion  5 mcg/kg/min IntraVENous CONTINUOUS    cholecalciferol (VITAMIN D3) tablet 2,000 Units  2,000 Units Oral DAILY    therapeutic multivitamin (THERAGRAN) tablet 1 Tab  1 Tab Oral DAILY    nitroglycerin (NITROSTAT) tablet 0.4 mg  0.4 mg SubLINGual PRN    pantoprazole (PROTONIX) tablet 40 mg  40 mg Oral ACB    albuterol-ipratropium (DUO-NEB) 2.5 MG-0.5 MG/3 ML  3 mL Nebulization Q6H PRN  ferrous sulfate tablet 325 mg  325 mg Oral DAILY WITH BREAKFAST    fluticasone (FLONASE) 50 mcg/actuation nasal spray 2 Spray  2 Spray Both Nostrils DAILY    HYDROcodone-acetaminophen (NORCO) 5-325 mg per tablet 1 Tab  1 Tab Oral Q4H PRN    influenza vaccine 2016-17 (36mos+)(PF) (FLUZONE/FLUARIX/FLULAVAL QUAD) injection 0.5 mL  0.5 mL IntraMUSCular PRIOR TO DISCHARGE    zolpidem (AMBIEN) tablet 5 mg  5 mg Oral QHS PRN    sodium chloride (NS) flush 5-10 mL  5-10 mL IntraVENous Q8H    sodium chloride (NS) flush 5-10 mL  5-10 mL IntraVENous PRN    ondansetron (ZOFRAN) injection 4 mg  4 mg IntraVENous Q4H PRN       Review of Symptoms: comprehensive ROS negative except above.    Objective:     Patient Vitals for the past 24 hrs:   Temp Pulse Resp BP SpO2   05/21/17 0832 - - - - 100 %   05/21/17 0749 97.4 °F (36.3 °C) 91 21 143/76 -   05/21/17 0400 98.3 °F (36.8 °C) 75 18 154/78 94 %   05/20/17 2330 - 73 13 - 100 %   05/20/17 2323 97.8 °F (36.6 °C) 73 18 134/81 100 %   05/20/17 2315 - 76 17 - (!) 85 %   05/20/17 2300 - 73 18 146/77 100 %   05/20/17 2245 - 74 19 124/81 100 %   05/20/17 2230 98.8 °F (37.1 °C) 70 11 156/75 -   05/20/17 2215 98.7 °F (37.1 °C) 71 (!) 7 151/86 100 %   05/20/17 2200 - 71 16 151/76 -   05/20/17 2145 - 72 17 156/86 -   05/20/17 2130 - 75 12 114/71 (!) 86 %   05/20/17 2115 - 73 18 149/78 -   05/20/17 2100 - 76 27 155/84 -   05/20/17 2045 - 79 15 (!) 118/104 -   05/20/17 2030 - 72 15 148/66 93 %   05/20/17 2015 - 74 16 150/69 97 %   05/20/17 2000 - 76 16 131/71 96 %   05/20/17 1945 - 74 17 136/78 95 %   05/20/17 1930 - 72 8 153/79 94 %   05/20/17 1915 - 76 15 129/71 (!) 88 %   05/20/17 1900 98.1 °F (36.7 °C) 73 9 154/69 100 %   05/20/17 1845 - 70 8 150/66 100 %   05/20/17 1830 - 73 (!) 0 145/67 100 %   05/20/17 1815 - 72 16 147/68 100 %   05/20/17 1800 - 69 (!) 6 142/69 100 %   05/20/17 1745 - 68 20 138/71 100 %   05/20/17 1730 - 72 14 138/73 100 %   05/20/17 1715 - 71 18 143/74 100 % 05/20/17 1700 - 74 18 129/72 100 %   05/20/17 1645 - 76 12 93/78 100 %   05/20/17 1630 - 78 18 126/67 100 %   05/20/17 1620 97.5 °F (36.4 °C) - - - -   05/20/17 1615 - 71 - 109/66 100 %   05/20/17 1600 - 72 13 113/68 100 %   05/20/17 1545 - 72 (!) 0 149/77 100 %   05/20/17 1536 - - - 154/81 -   05/20/17 1530 97.5 °F (36.4 °C) 73 - 140/78 100 %   05/20/17 1123 97.6 °F (36.4 °C) 83 18 155/76 100 %        Weight change: 7.394 kg (16 lb 4.8 oz)     05/19 1901 - 05/21 0700  In: 0   Out: 2000 [Urine:1000]    Intake/Output Summary (Last 24 hours) at 05/21/17 1110  Last data filed at 05/21/17 7784   Gross per 24 hour   Intake                0 ml   Output             2800 ml   Net            -2800 ml     Physical Exam:   General: comfortable, no acute distress   HEENT sclera anicteric, supple neck, no thyromegaly  CVS: S1S2 heard,  no rub  RS: + air entry b/l,   Abd: Soft, Non tender, Not distended,   Neuro: non focal, awake, alert ,   Extrm: + edema, no cyanosis, clubbing   Musculoskeletal: No gross joints or bone deformities   Access; HD catheter site, oozing blood.          Data Review:     LABS:   Hematology:   Recent Labs      05/19/17 2120 05/19/17   0533   WBC  5.9  4.3*   HGB  8.2*  8.0*   HCT  26.5*  25.5*     Chemistry:   Recent Labs      05/20/17   0436  05/19/17 2120 05/19/17   0533   BUN  63*  59*  86*   CREA  4.14*  3.96*  4.94*   CA  8.7  8.7  9.2   K  4.1  4.0  4.2   NA  133*  135*  131*   CL  96*  95*  92*   CO2  28  28  27   GLU  84  78  76              Procedures/imaging: see electronic medical records for all procedures, Xrays and details which were not copied into this note but were reviewed prior to creation of Plan          Assessment & Plan:     As above         Farnaz Cedeno MD  5/21/2017  12:03 PM

## 2017-05-21 NOTE — PROGRESS NOTES
[facility]      CARDIOLOGY PROGRESS NOTE  RECS:      1. Acute diastolic congestive heart failure- presents with worsening CHF with BLE edema and elevated NT pro BNP. Now on HD   2. Hypotension- transient. Now stable. 3. Anemia- Monitor H&H.  4. Thrombocytopenia?- hold asa for now. Follow CBC today   5. S/p CABG in 2013 in [facility] 7- stable denies chest pain or pressure. S/p AICD interrogate 2/17 normal function. 6. Hx Carotid endarterectomy- bilateral in 2015    7. Mitral regurgitation-mild to moderate regurgitation echo on 2016   8. Aortic stenosis : moderate last echo 2/17. Recheck echo. 9. KYLEE on CKD- May need HD per Nephrology note  9. PAD- with BLE pain- vascular is following   10. Right lung opacification- ? Bleeding from permacath. 12. Elevated troponin- NSTEMI vs demand ischemia vs secondary to KYLEE- medical management for now.           SUMMARY: echo 2/17  Left ventricle: Systolic function was normal by visual assessment. Ejection fraction was estimated in the range of 55 % to 60 %. No obvious  wall motion abnormalities identified in the views obtained. Wall thickness  was mildly increased. Features were consistent with a pseudonormal left  ventricular filling pattern, with concomitant abnormal relaxation and  increased filling pressure (grade 2 diastolic dysfunction). Right ventricle: The ventricle was mildly dilated. Systolic function was  mildly reduced. Systolic pressure was moderately increased. Estimated peak  pressure was 55 mmHg. Right atrium: The atrium was mildly dilated. Mitral valve: There was moderate thickening. Aortic valve: The valve was probably trileaflet. Leaflets exhibited  calcification and reduced mobility. There was moderate stenosis. Valve  peak gradient was 34 mmHg. Valve mean gradient was 21 mmHg. Estimated  aortic valve area (by Vmax) was 1.14 cmï¾².       ASSESSMENT:  Hospital Problems  Date Reviewed: 3/11/2017          Codes Class Noted POA Acute on chronic kidney failure (HCC) ICD-10-CM: N17.9, N18.9  ICD-9-CM: 584.9, 585.9  5/19/2017 Unknown        Cellulitis ICD-10-CM: L03.90  ICD-9-CM: 682.9  5/14/2017 Unknown        Pulmonary HTN ([facility] ICD-10-CM: I27.2  ICD-9-CM: 416.8  4/4/2017 Yes        Aortic stenosis, moderate (Chronic) ICD-10-CM: I35.0  ICD-9-CM: 424.1  3/11/2017 Yes        Acute on chronic diastolic (congestive) heart failure (HCC) ICD-10-CM: I50.33  ICD-9-CM: 428.33, 428.0  2/27/2017 Yes        Acute CHF (congestive heart failure) ([facility] ICD-10-CM: I50.9  ICD-9-CM: 428.0  2/26/2017 Yes        HTN (hypertension) (Chronic) ICD-10-CM: I10  ICD-9-CM: 401.9  9/4/2016 Yes        HLD (hyperlipidemia) (Chronic) ICD-10-CM: E78.5  ICD-9-CM: 272.4  9/4/2016 Yes        Diastolic CHF, acute on chronic [facility]) ICD-10-CM: I50.33  ICD-9-CM: 428.33, 428.0  8/17/2016 Yes        Acute exacerbation of CHF (congestive heart failure) ([facility] ICD-10-CM: I50.9  ICD-9-CM: 428.0  8/12/2016 Unknown        Hx of CABG ICD-10-CM: Z95.1  ICD-9-CM: V45.81  7/22/2016 Yes    Overview Signed 7/22/2016  5:23 PM by Sylwia Plaza NP     2013             H/O carotid endarterectomy ICD-10-CM: Z98.890  ICD-9-CM: V45.89  7/22/2016 Yes    Overview Signed 7/22/2016  5:27 PM by Sylwia Plaza NP     2015             AICD (automatic cardioverter/defibrillator) present ICD-10-CM: Z95.810  ICD-9-CM: V45.02  7/22/2016 Yes    Overview Signed 7/22/2016  5:27 PM by Sylwia Plaza NP     2015             CHF (congestive heart failure) (HCC) ICD-10-CM: I50.9  ICD-9-CM: 428.0  7/21/2016 Yes        CAD (coronary artery disease) ICD-10-CM: I25.10  ICD-9-CM: 414.00  3/5/2014 Yes        PVD (peripheral vascular disease) ([facility] ICD-10-CM: I73.9  ICD-9-CM: 443.9  3/5/2014 Yes                SUBJECTIVE:    No CP  Denies  SOB   Leg pain is slowly improving    OBJECTIVE:    VS:   Visit Vitals    /76    Pulse 91    Temp 97.4 °F (36.3 °C)    Resp 21    Ht 6' (1.829 m)    Wt 95.3 kg (210 lb 3.2 oz)    SpO2 100%    BMI 28.51 kg/m2         Intake/Output Summary (Last 24 hours) at 05/21/17 0958  Last data filed at 05/21/17 1624   Gross per 24 hour   Intake                0 ml   Output             2800 ml   Net            -2800 ml     TELE: normal sinus rhythm    General: alert, well developed, pleasant and in no distress  HENT: Normocephalic, atraumatic. Normal external eye. Neck :  increased JVP  Cardiac:  regular rate and rhythm, S1, S2 normal, no S3 or S4, systolic murmur: holosystolic 3/6, harsh at lower left sternal border, at apex, throughout the precordium, no click, no rub  Lungs: fine rales lung bases. Abdomen: Soft, nontender, no masses  Extremities:  Edema +1 up to upper thighs and abdomen. Labs: Results:       Chemistry Recent Labs      05/20/17   0436  05/19/17 2120 05/19/17   0533   GLU  84  78  76   NA  133*  135*  131*   K  4.1  4.0  4.2   CL  96*  95*  92*   CO2  28  28  27   BUN  63*  59*  86*   CREA  4.14*  3.96*  4.94*   CA  8.7  8.7  9.2   AGAP  9  12  12   BUCR  15  15  17      CBC w/Diff Recent Labs      05/19/17 2120 05/19/17   0533   WBC  5.9  4.3*   RBC  2.92*  2.84*   HGB  8.2*  8.0*   HCT  26.5*  25.5*   PLT  53*  44*   GRANS  74*  72   LYMPH  10*  9*   EOS  3  8*      Cardiac Enzymes Recent Labs      05/20/17 1330  05/20/17   0436   CPK  24*  27*   CKND1  8.8*  10.4*      Coagulation No results for input(s): PTP, INR, APTT in the last 72 hours. No lab exists for component: INREXT, INREXT    Lipid Panel Lab Results   Component Value Date/Time    Cholesterol, total 100 07/22/2016 05:20 AM    HDL Cholesterol 31 07/22/2016 05:20 AM    LDL, calculated 54.4 07/22/2016 05:20 AM    VLDL, calculated 14.6 07/22/2016 05:20 AM    Triglyceride 73 07/22/2016 05:20 AM    CHOL/HDL Ratio 3.2 07/22/2016 05:20 AM      BNP No results for input(s): BNPP in the last 72 hours.    Liver Enzymes No results for input(s): TP, ALB, TBIL, AP, SGOT, GPT in the last 72 hours.    No lab exists for component: DBIL   Thyroid Studies Lab Results   Component Value Date/Time    TSH 4.23 07/22/2016 05:20 AM                Jude Wen MD

## 2017-05-21 NOTE — ROUTINE PROCESS
1920  Bedside turnover given to me by CLINTON Christine. Pt on hardFarmeron cardiac telemetry monitor, denies chest pain and denies shortness of breath. Will continue to assess. His daughter and son are at bedside. He is very short tempered tonight, demanding everything be done for him and if not perfectly he is getting very annoyed and raising his voice and being rude. 67   Pt's daughter came out and asked to have Bipap placed on her dad, stated \"He wants the mask on to go to sleep\":  I went in and the mask with the headpiece was taken apart, I put the mask over his face and had a strap upside down, I went to flip it into the right direction and he started to scream \"Get this dam mask off of me\", \"You don't know what you are doing\", CLINTON Farr entered the patients room and I asked him if with her assistance we could do it quickly around his head and he agreed. After we put it on he stated \"Something is in my eye take the fucking mask off\", I removed it, called respiratory and replaced the nasal cannula.     2325 pt is screaming loudly at his daughter arguing with her

## 2017-05-22 NOTE — DIALYSIS
ACUTE HEMODIALYSIS FLOW SHEET    HEMODIALYSIS ORDERS: Physician: eliel      Dialyzer: revaclear         Duration: 3 hr  BFR: 350   DFR: 600   Dialysate:  Temp *37 K+   3    Ca+  2.5 Na 140 Bicarb 30   Weight:  94 kg    Bed Scale [x]     Unable to Obtain []      Dry weight/UF Goal: 1500 Access CVL  Needle Gauge na    Heparin []  Bolus      Units    [] Hourly       Units    [x]None     Catheter locking solution sodium citrate   Pre BP:   133/82    Pulse:     81     Temperature:   95.2  Respirations: 18  Tx: NS       ml/Bolus  Other        [x] N/A   Labs: Pre        Post:        [x] N/A   Additional Orders(medications, blood products, hypotension management):       [x] N/A     [x] Time Out/Safety Check  [x] [facility] Consent Verified     CATHETER ACCESS: []N/A   [x]Right   []Left   [x]IJ     []Fem   [] First use X-ray verified     [x]Tunnel                [] Non Tunneled   [x]No S/S infection  []Redness  []Drainage []Cultured []Swelling []Pain   [x]Medical Aseptic Prep Utilized   [x]Dressing Changed  [] Biopatch  Date:       []Clotted   [x]Patent   Flows: [x]Good  []Poor  []Reversed   If access problem,  notified: []Yes    []N/A  Date:           GRAFT/FISTULA ACCESS:  [x]N/A     []Right     []Left     []UE     []LE   []AVG   []AVF        []Buttonhole    []Medical Aseptic Prep Utilized   []No S/S infection  []Redness  []Drainage []Cultured []Swelling []Pain    Bruit:   [] Strong    [] Weak       Thrill :   [] Strong    [] Weak       Needle Gauge:    Length:     If access problem,  notified: []Yes     [x]N/A  Date:        Please describe access if present and not used:       GENERAL ASSESSMENT:    LUNGS:  Rate  SaO2%        [] N/A    [x] Clear  [] Coarse  [] Crackles  [] Wheezing        [] Diminished     Location : []RLL   []LLL    []RUL  []KEN   Cough: []Productive  []Dry  [x]N/A   Respirations:  [x]Easy  []Labored   Therapy:  []RA  [x]NC 5 l/min    Mask: []NRB []Venti       O2%                  []Ventilator []Intubated  [] Trach  [] BiPaP   CARDIAC: [x]Regular      [] Irregular   [] Pericardial Rub  [] JVD        []  Monitored  [] Bedside  [] Remotely monitored [] N/A  Rhythm:    EDEMA: [] None  [x]Generalized  [] Pitting [] 1    [] 2    [] 3    [] 4                 [] Facial  [] Pedal  []  UE  [] LE   SKIN:   [x] Warm  [] Hot     [] Cold   [x] Dry     [] Pale   [] Diaphoretic                  [] Flushed  [] Jaundiced  [] Cyanotic  [] Rash  [] Weeping   LOC:    [x] Alert      []Oriented:    [] Person     [] Place  []Time               [x] Confused  [] Lethargic  [] Medicated  [] Non-responsive     GI / ABDOMEN:  [] Flat    [] Distended    [x] Soft    [] Firm   []  Obese                             [] Diarrhea  [x] Bowel Sounds  [] Nausea  [] Vomiting       / URINE ASSESSMENT:[] Voiding   [x] Oliguria  [] Anuria   []  Emerson     [] Incontinent    []  Incontinent Brief      []  Bathroom Privileges     PAIN: [x] 0 []1  []2   []3   []4   []5   []6   []7   []8   []9   []10            Scale 0-10  Action/Follow Up:    MOBILITY:  [] Amb    [] Amb/Assist    [x] Bed    [] Wheelchair  [] Stretcher      All Vitals and Treatment Details on Attached [facility] [place]          Room # 2308     [] 1st Time Acute  [] Stat  [x] Routine  [] Urgent     [x] Acute Room  []  Bedside  [] ICU/CCU  [] ER   Isolation Precautions:  [x] Dialysis   [] Airborne   [] Contact    [] Reverse   Special Considerations:         [] Blood Consent Verified [x]N/A     ALLERGIES:   [x] NKA          Code Status:  [x] Full Code  [] DNR  [] Other           HBsAg ONLY: Date Drawn   05/19/17       [x]Negative []Positive []Unknown   HBsAb: Date 05/19/17    [x] Susceptible   [] Auhiin86 []Not Drawn  [] Drawn     Current Labs:    Date of Labs: Today [x]        Cut and paste current labs here.                                                                                                                                   DIET:  [x] Renal    [] Other     [] NPO     []  Diabetic      PRIMARY NURSE REPORT: First initial/Last name/Title      Pre Dialysis: DARLING estrada RN    Time: 1000      EDUCATION:    [x] Patient [] Other         Knowledge Basis: []None [x]Minimal [] Substantial   Barriers to learning  [x]N/A   [] Access Care     [] S&S of infection     [] Fluid Management     []K+     [x]Procedural    []Albumin     [] Medications     [] Tx Options     [] Transplant     [] Diet     [] Other   Teaching Tools:  [x] Explain  [] Demo  [] Handouts [] Video  Patient response:   [x] Verbalized understanding  [] Teach back  [] Return demonstration [] Requires follow up   Inappropriate due to            [address] Before each treatment:     Machine Number:                   [facility]                                  [x] Unit Machine # 7 with centralized RO                                  [] Portable Machine #1/RO serial # [id number]                                  [] Portable Machine #2/RO serial # [id number]                                  [] Portable Machine #3/RO serial # [id number]                                                                                                       [facility]                                  [] Portable Machine #11/RO serial # [id number]                                   [] Portable Machine #12/RO serial # [id number]                                  [] Portable Machine #13/RO serial #  [id number]      Alarm Test:  Pass time 1300         Other:         [x] RO/Machine Log Complete      Temp    37            [x]Extracorporeal Circuit Tested for integrity   Dialysate: pH  7.4 Conductivity: Meter   14     HD Machine   14                  TCD: 14  Dialyzer Lot # [id number]         Blood Tubing Lot # [id number]       Saline Lot #       CHLORINE TESTING-Before each treatment and every 4 hours    Total Chlorine: [x] less than 0.1 ppm  Time: 1100 4 Hr/2nd Check Time: 1400   (if greater than 0.1 ppm from Primary then every 30 minutes from Secondary)     TREATMENT INITIATION  with Dialysis Precautions:   [x] All Connections Secured                 [x] Saline Line Double Clamped   [x] Venous Parameters Set                  [x] Arterial Parameters Set    [x] Prime Given  250 ml               [x]Air Foam Detector Engaged      Treatment Initiation Note: pt arrived in stable condition via bed CVL accessed and treatment initiated without difficulty. Dr Mandi Peterson at bedside; K bath increased to 3 d/t serum potassium levels. CVL dressing saturated with serosanguinous fluid. Dr Mandi Peterson notified. Gel foam aseptically applied and pressure dressed over central line dressing      Medication Dose Volume Route Initials Dialyzer Cleared: [] Good [x] Fair  [] Poor    Blood processed:  56.2 L  UF Removed  1500 Ml    Post Wt: 92.5    kg  POst BP:   134/80       Pulse: 78      Respirations: 18  Temperature: 96.1                                   Post Tx Vascular Access: AVF/AVG: Bleeding stopped Art  min. Wu. Min   N/A                                   Catheter: Locking solution: sodium citrate 1:1000 Art. 1.6  Wu. 1.6                                   Post Assessment:                                    Skin:  [x] Warm  [x] Dry [] Diaphoretic    [] Flushed  [] Pale [] Cyanotic   [facility] Signatures Title Initials  Time Lungs: [x] Clear    [] Course  [] Crackles  [] Wheezing [] Diminished   Dona Hoyt RN    Cardiac: [x] Regular   [] Irregular   [] Monitor  [] N/A  Rhythm:           Edema:  [] None    [] General     [] Facial   [] Pedal    [] UE    [] LE       Pain: [x]0  []1  []2   []3  []4   []5   []6   []7   []8   []9   []10         Post Treatment Note: HD well tolerated. 1.5L UF removed. No acute distress noted during or post HD treatment.      POST TREATMENT PRIMARY NURSE HANDOFF REPORT:     First initial/Last name/Title         Post Dialysis: DARLING Oden RN Time:  0073     Abbreviations: AVG-arterial venous graft, AVF-arterial venous fistula, IJ-Internal Jugular, Subcl-Subclavian, Fem-Femoral, Tx-treatment, AP/HR-apical heart rate, DFR-dialysate flow rate, BFR-blood flow rate, AP-arterial pressure, -venous pressure, UF-ultrafiltrate, TMP-transmembrane pressure, Wu-Venous, Art-Arterial, RO-Reverse Osmosis

## 2017-05-22 NOTE — PROGRESS NOTES
NUTRITION    BPA/MST Referral       RECOMMENDATIONS / PLAN:     - Continue current nutrition interventions. - Continue RD inpatient monitoring and evaluation. NUTRITION INTERVENTIONS & DIAGNOSIS:     [x] Meals/Snacks: modified diet  [x] Medical food supplementation: Magic Cup TID    Nutrition Diagnosis: Inadequate oral intake related to decreased appetite as evidenced by fair to poor po intake, consuming <50% of meals. ASSESSMENT:     Subjective/Objective:  Pt out of the room at time of visit. Meal intake remains variable. Palliative care following.      Average po intake adequate to meet patients estimated nutritional needs:   [] Yes     [x] No   [] Unable to determine at this time    Diet: DIET NUTRITIONAL SUPPLEMENTS All Meals; MAGIC CUPS  DIET RENAL Regular      Food Allergies: NKFA  Current Appetite:   [x] Good     [] Fair     [] Poor     [] Other:  Appetite/meal intake prior to admission:   [] Good     [x] Fair     [] Poor     [] Other:  Feeding Limitations:  [] Swallowing difficulty    [] Chewing difficulty    [] Other:  Current Meal Intake: Patient Vitals for the past 100 hrs:   % Diet Eaten   05/22/17 1212 25 %   05/22/17 0949 100 %   05/21/17 1811 0 %     BM: 5/18   Skin Integrity: WDL  Edema: 3+ pitting LEs   Pertinent Medications: Reviewed; cholecalciferol, ferrous sulfate, fish oil, theragran     Recent Labs      05/22/17   0405  05/21/17   1034  05/20/17   0436  05/19/17   2120   NA  134*  133*  133*  135*   K  3.6  3.4*  4.1  4.0   CL  96*  97*  96*  95*   CO2  30  29  28  28   GLU  101*  153*  84  78   BUN  50*  46*  63*  59*   CREA  3.79*  3.65*  4.14*  3.96*   CA  9.1  8.7  8.7  8.7   MG   --    --    --   2.0   ALB  2.7*   --    --    --    SGOT  79*   --    --    --    ALT  152*   --    --    --        Intake/Output Summary (Last 24 hours) at 05/22/17 1322  Last data filed at 05/22/17 1212   Gross per 24 hour   Intake              480 ml   Output             1025 ml   Net -545 ml       Anthropometrics:  Ht Readings from Last 1 Encounters:   05/14/17 6' (1.829 m)     Last 3 Recorded Weights in this Encounter    05/20/17 0400 05/21/17 0400 05/22/17 0400   Weight: 88 kg (193 lb 14.4 oz) 95.3 kg (210 lb 3.2 oz) 94 kg (207 lb 4.8 oz)     Body mass index is 28.11 kg/(m^2). Weight History: patient reports 4 lb, 2% weight loss over the past week PTA    Weight Metrics 5/22/2017 5/9/2017 4/4/2017 3/11/2017 8/23/2016 8/12/2016 8/2/2016   Weight 207 lb 4.8 oz 193 lb 196 lb 188 lb 197 lb 9.6 oz - 214 lb 9.6 oz   BMI 28.11 kg/m2 26.18 kg/m2 26.58 kg/m2 25.5 kg/m2 - 26.8 kg/m2 29.1 kg/m2        Admitting Diagnosis: Acute exacerbation of CHF (congestive heart failure) ([facility]  Cellulitis  Past Medical History:   Diagnosis Date    Arrhythmia     A fib; has external vest and belt he wears for this    Arthritis     High blood pressure     Hypercholesteremia     Mini stroke ([facility] 2000    Unspecified sleep apnea     does not wear machine       Education Needs:        [x] None identified  [] Identified - Not appropriate at this time  []  Identified and addressed - refer to education log  Learning Limitations:   [x] None identified  [] Identified    Cultural, Moravian & ethnic food preferences:  [x] None identified    [] Identified and addressed     ESTIMATED NUTRITION NEEDS:     Calories: 4482-4900 kcal (30-35 kcal/kg) based on  [] Actual BW      [] SBW: 81 kg  Protein: 105-122 gm (1.3-1.5 gm/kg) based on  [] Actual BW      [x] SBW   Fluid: 1 mL/kcal     MONITORING & EVALUATION:     Nutrition Goal(s):   1. Po intake of meals will meet >75% of patient estimated nutritional needs within the next 7 days.   Outcome:  [] Met/Ongoing    []  Not Met    [x] New/Initial Goal     Monitoring:   [x] Diet tolerance   [x] Meal intake   [x] Supplement intake   [] GI symptoms/ability to tolerate po diet   [] Respiratory status   [] Plan of care       Previous Recommendations (for follow-up assessments only): [x]   Implemented       []   Not Implemented (RD to address)     [] No Recommendation Made     Discharge Planning: renal, cardiac diet   [x] Participated in care planning, discharge planning, & interdisciplinary rounds as appropriate       Magdalene Barrera RD, [address]    Pager: [phone]

## 2017-05-22 NOTE — PROGRESS NOTES
[facility]  Pulmonary, Critical Care, and Sleep Medicine    Name: Izaiah Saez MRN: [id number]   : 1940 Hospital: [address] [place] [address] Dr   Date: 2017          Subjective/Interval History:     Patient is a 78 y.o. Male, prior smoker, with multiple medical problems and extensive cardiac history, to include PMH of Acute on chronic Systolic and Diastolic CHF; COPD (per pt, but not found in chart) - on home O2 at 3LPM at rest, 4LPM with exertion; PVD, HTN, Pulmonary HTN, CAD, s/p CABG (), s/p AICD (); HLD; and chronic ITP, who presented to [facility] ED via EMS on 17, c/o R leg pain, s/p sliding off his recliner (per pt), and progressively worsening of leg swelling x1 week. Pt was seen on 17 in the ED for leg swelling as well, had negative PVL and was d/h with keflex and pain meds. Has been taking abx as directed and has finished his pain meds but he has not had any improvement in pain. Upon arrival to ED, pt has clinical signs of cellulitis, not responding to out-patient treatment. Pt was admitted for cellulitis and CHF exacerbation. [facility] course has included: PVL's (-) for DVT; CTA (-) for PE; Arterial studies show occluded L SFA and moderate >50% stenosis of the R superficial femoral artery with severe >75% stenosis of the profunda femoris artery. Pulmonary team consulted (17) d/t hypoxia in the setting of acute CHF with fluid overload, 2/2 worsening KYLEE on CKD and pt currently refusing dialysis.  Pt may also have underlying respiratory disease, and reports hx of COPD - underlying COPD exacerbation?    17:  - Alert, awake, still oozing from dialysis cath site - HD stable at this time; resting comfortably on 5LPM NC  - States he feels breathing continues to improve and feel overall better  - Still has cough and c/o mild SOB; CXR this morning much improved from previous; R sided effusion is slowly resolving.   - Pt declines to wear BiPAP anymore, states that the mask isn't fitting right and he feels that he is breathing okay on NC.   - Refusing to use PEP device currently, states that using it causes pain at his HD site, so he doesn't want to use it until the pain is better  - Denies any CP, palpitations, abd pain, N/V/D, F/C, H/A  - Overall, appears improved. - Had Dialysis on  & , scheduled to have HD again today; Bumex gtt is currently off; off Dobutamine gtt as well. ROS:Pertinent items are noted in HPI. Objective:   Vital Signs:    Visit Vitals    /72 (BP 1 Location: Left arm, BP Patient Position: Sitting)    Pulse 86    Temp 97.3 °F (36.3 °C)    Resp 22    Ht 6' (1.829 m)    Wt 94 kg (207 lb 4.8 oz)    SpO2 97%  Comment: NC positioned in mouth    BMI 28.11 kg/m2       O2 Device: Nasal cannula   O2 Flow Rate (L/min): 5 l/min   Temp (24hrs), Av.7 °F (36.5 °C), Min:97.3 °F (36.3 °C), Max:98.1 °F (36.7 °C)       Intake/Output:   Last shift:         Last 3 shifts:  1901 -  0700  In: -   Out: 3125 [Urine:3125]    Intake/Output Summary (Last 24 hours) at 17 0851  Last data filed at 17 0610   Gross per 24 hour   Intake                0 ml   Output             1025 ml   Net            -1025 ml        Physical Exam:    General: Alert, awake, NAD, resting comfortably on 5LPM NC   HEENT: Normocephalic,atraumatic; PERRL, conjunctivae/corneas clear, anicteric; nares normal; no drainage/sinus tenderness; nasal/oral mucosa moist; neck supple, symmetric; trachea midline; No adenopathy; No JVD noted. Resp: Symmetrical chest rise; mod AE bilat; diminished throughout, especially bibasilar, R>L; No wheezes/rhonchi noted. CV: RRR, S1S2 normal; No m/r/g   Abdomen: Protuberant; moderate ascites - appears slightly improved; abd is slightly firm, distended and tympanic - no change from previous; non-tender(+) B. S x4;.No masses/ organomegaly/ paradox noted   Extremity: 1-2+ pulses to upper extremities; unable to palpate BLE pulses - unchanged; anasarca - BLE continues to improve;  to touch; +1-+2 edema BLE up to upper thighs and abdominal wall    Neuro: Alert, grossly non-focal   Skin: Venous statis; dry; 2 ulcers noted to L shin - covered with Mepilex      DATA:  Labs:  Recent Labs      05/22/17   0405  05/19/17   2120   WBC  5.3  5.9   HGB  7.3*  8.2*   HCT  23.5*  26.5*   PLT  55*  53*     Recent Labs      05/22/17   0405  05/21/17   1034  05/20/17   0436  05/19/17   2120   NA  134*  133*  133*  135*   K  3.6  3.4*  4.1  4.0   CL  96*  97*  96*  95*   CO2  30  29  28  28   GLU  101*  153*  84  78   BUN  50*  46*  63*  59*   CREA  3.79*  3.65*  4.14*  3.96*   CA  9.1  8.7  8.7  8.7   MG   --    --    --   2.0     PFT:    N/A                                                   Echo [05/18/17]:   SUMMARY:  Left ventricle: Systolic function was normal. Ejection fraction was  estimated in the range of 55 % to 60 %. No obvious wall motion  abnormalities identified in the views obtained. Wall thickness was mildly  increased. Aortic valve: There was moderate stenosis. Valve peak gradient was 49  mmHg. Valve mean gradient was 31 mmHg. Estimated aortic valve area (by  Vmax) was 1 cm-sq. Imaging:   CXR [5/22/17]: FINDINGS:   Tubes and Lines: A tunneled right-sided hemodialysis catheter is noted and  unchanged. The left-sided pacemaker is unchanged.     Pleura: No pneumothorax appreciated. A moderate right pleural effusion is noted which is significantly improved since prior chest x-ray. There is blunting of left costophrenic angle which may represent a small left pleural effusion.     Lungs: Likely compressive atelectasis in the right lung. Mild interstitial  changes in the left lung are noted.     Cardiac silhouette: Cardiomegaly with tortuous aorta.     Pulmonary Vascularity: The pulmonary vasculature is unremarkable.     Osseous Structures: Degenerative changes of the spine.     IMPRESSION:  1. Interval improvement in the right pleural effusion. It is moderate in size.     2. Likely compressive atelectasis in the right lung.          [x]I have personally reviewed the patients radiographs  [x]Radiographs reviewed with radiologist   []No change from prior, tubes and lines in adequate position  [x]Improved   []Worsening    IMPRESSION:   · Bilateral Pleural Effusions: Improving on CXR (05/22); CXR on (05/19) demonstrated complete opacification R hemithorax- H/H has been stable; ? Mucus plug with atelectasis > has been coughing more productively now. Discussed with pt about chest tube- with low platelets would be of concern for any intervention. Currently (05/22) HD stable and measures underway to correct thrombocytopenia. · Metabolic Acidosis - 2/2 to below  · Acute on Chronic hypoxic Respiratory Failure - 2/2 Fluid overload d/t worsening CHF in the setting of ESRD   · COPD  · Acute diastolic CHF - Elevated BNP & BLE edema   · KYLEE on CKD - Elevated Cr; Nephrology following;Now receiving HD - started on 05/19  · Hypotension - Currently on Dobutamine drip at 5mcg/mg/kg/hr  · Pulmonary HTN  · Anemia - Received transfusion - 1U PRBC on 05/15/17  · Thrombocytopenia - Pt does have chronic ITP ( seen by Hematology in 02/2017); worsening (05/19) - Given DDAVP & 1U Plts on 05/19 following RRT  · Anasacra  · Cellulitis - Failed out-patient treatment; completed Zosyn on 05/16/17.    · PAD - with BLE pain - Vascular following  · CAD - S/p CABG in 2013; s/p AICD in 02/17; hx of Carotid endarterectomy - bilateral in 2015; Mitral regurgitation - mild-to moderate based on Echo (2016)  · Gout      Patient Active Problem List   Diagnosis Code    Hematospermia R36.1    CAD (coronary artery disease) I25.10    PVD (peripheral vascular disease) ([facility] [address] I73.9    CHF (congestive heart failure) ([facility]) I50.9    Anasarca R60.1    Hx of CABG Z95.1    H/O carotid endarterectomy Z98.890    AICD (automatic cardioverter/defibrillator) present Z95.810    Acute exacerbation of CHF (congestive heart failure) ([facility]) I07.9    Diastolic CHF, acute on chronic ([facility]) I50.33    HTN (hypertension) I10    HLD (hyperlipidemia) E78.5    Acute CHF (congestive heart failure) ([facility]) I50.9    Acute on chronic diastolic (congestive) heart failure (HCC) I50.33    Thrombocytopenia ([facility]) D69.6    Hypokalemia E87.6    Aortic stenosis, moderate I35.0    CKD (chronic kidney disease) N18.9    Pulmonary HTN ([facility]) I27.2    Cellulitis L03.90      PLAN:   · SpO2 >90%; titrate supp O2 PRN; currently pt is resting on comfortably on 5LPM NC;  pt is a chronic CO2 retainer - monitor closely for signs of CO2 narcosis. · Repeat CXR (05/22) improved from (05/19) for large R sided pleural effusion; will continue to monitor; will hold off on Thoracentesis right now d/t low plt count and pt clinically feeling better. Repeat CXR tomorrow to monitor  · Aggressive pulmonary toileting; encourage ICS & PEP device   · Aspiration precautions - HOB >30'  · Will add Symbicort BID for likely COPD; con't duo-nebs q6 PRN; Con't Flonase; monitor respiratory response to dialysis. · Sputum cx ordered-Still unable to collect sample, will monitor; hold off on starting ABX at this time; con't to monitor s/s for infection   · Plts remain low - 55K today (05/22) - Pt was given DDAVP and 1U plts on 05/19 - con't correction and monitor HD site as it is still oozing. · Cardio following - currently off Dobutamine gtt; Currently HD stable; will monitor. Off Bumex gtt; defer diuresis management to Cardio & Nephro; Repeat Echo EF 55-60%. · Vascular following - For Arterial insufficiency; occlusion of SFA  · Nephrology following - ESRD, Started HD (05/19)  · Recommend SAMANTA with Pulmonary Clinic upon D/C for overall f/u; evaluation for COPD, baseline PTF's, evaluation for ROOPA. · Will Follow closely, thank you for this consult.    · DVT, PUD prophylaxis - per primary team.          Rick Chase SHONDA

## 2017-05-22 NOTE — PROGRESS NOTES
Bedside turnover given to Edward pt on cardiac telemetry monitor in stable condition, sbar, mar ed summary and bandages over dialysis access still having drainage and bleeding.//

## 2017-05-22 NOTE — PROGRESS NOTES
Ruben Mcdonough M.D. PROGRESS NOTE    Name: Lulu Rider MRN: [id number]   : 1940 Hospital: [address] [place] [address]   Date: 2017  Admission Date: 2017     Subjective/Objective/Plans  1. CHF, right pleural effusion  2. ESRD  3. Chronic respiratory failure  4. Thrombocytopenia  5. Aortic stenosis    Repeat CXR today report pending  Clinically improved yesterday after HD  VSS  Multiorgan failure  Discussed with daughters who are asking prognosis  I defer them to call Renal, possibly not candidate for long term HD, consider hospice    Vital Signs:  Visit Vitals    /71    Pulse 82    Temp 98.1 °F (36.7 °C)    Resp 18    Ht 6' (1.829 m)    Wt 94 kg (207 lb 4.8 oz)    SpO2 100%    BMI 28.11 kg/m2       O2 Device: Nasal cannula   O2 Flow Rate (L/min): 5 l/min   Temp (24hrs), Av.7 °F (36.5 °C), Min:97.4 °F (36.3 °C), Max:98.1 °F (36.7 °C)     8:12 AM  Intake/Output:   Last shift:         Last 3 shifts:  1901 -  0700  In: -   Out: 3125 [Urine:3125]    Intake/Output Summary (Last 24 hours) at 17 0812  Last data filed at 17 0610   Gross per 24 hour   Intake                0 ml   Output             1025 ml   Net            -1025 ml         Physical Exam:    General: alert    HEENT: pupils equal, no ear discharge   Neck: No abnormally enlarged lymph nodes. , no JDV   Mouth: MMM no lesions    Chest: normal, no breast masses   Lungs: decreased air exchange right lung   Heart: Regular rate and rhythm   Abdomen: abdomen is soft without significant tenderness, masses, organomegaly or guarding   Extremity: negative   Neuro: alert    Skin: Skin color, texture, turgor normal. No rashes or lesions    Data Review:    Labs: Results:       Chemistry Recent Labs      17   0405  17   1034  17   0436   GLU  101*  153*  84   NA  134*  133*  133*   K  3.6  3.4*  4.1   CL  96*  97*  96*   CO2  30  29  28   BUN  50*  46*  63*   CREA  3.79*  3.65*  4.14*   CA  9.1  8.7  8.7 AGAP  8  7  9   BUCR  13  13  15      CBC w/Diff Recent Labs      05/22/17   0405  05/19/17   2120   WBC  5.3  5.9   RBC  2.59*  2.92*   HGB  7.3*  8.2*   HCT  23.5*  26.5*   PLT  55*  53*   GRANS  62  74*   LYMPH  14*  10*   EOS  10*  3      Cardiac Enzymes Recent Labs      05/20/17   1330  05/20/17   0436   CPK  24*  27*   CKND1  8.8*  10.4*      Coagulation No results for input(s): PTP, INR, APTT in the last 72 hours. No lab exists for component: INREXT    Lipid Panel Lab Results   Component Value Date/Time    Cholesterol, total 100 07/22/2016 05:20 AM    HDL Cholesterol 31 07/22/2016 05:20 AM    LDL, calculated 54.4 07/22/2016 05:20 AM    VLDL, calculated 14.6 07/22/2016 05:20 AM    Triglyceride 73 07/22/2016 05:20 AM    CHOL/HDL Ratio 3.2 07/22/2016 05:20 AM      BNP No results for input(s): BNPP in the last 72 hours. Liver Enzymes No results for input(s): TP, ALB, TBILI, AP, SGOT, ALT, CBIL in the last 72 hours. Thyroid Studies Lab Results   Component Value Date/Time    TSH 4.23 07/22/2016 05:20 AM          Procedures/imaging: see electronic medical records for all procedures, Xrays and details which were not copied into this note but were reviewed. Allergies:  No Known Allergies    Home Medications:  Prior to Admission Medications   Prescriptions Last Dose Informant Patient Reported? Taking? HYDROcodone-acetaminophen (NORCO) 5-325 mg per tablet 5/12/2017 at 0300  No No   Sig: Take 1 Tab by mouth every six (6) hours as needed for Pain. Max Daily Amount: 4 Tabs. albuterol-ipratropium (DUO-NEB) 2.5 mg-0.5 mg/3 ml nebu 5/11/2017 at 1700  No No   Sig: 3 mL by Nebulization route every six (6) hours as needed. allopurinol (ZYLOPRIM) 100 mg tablet 5/11/2017 at 0300  No No   Sig: Take 1 Tab by mouth daily. Indications: GOUT   aspirin delayed-release 81 mg tablet 5/12/2017 at 0300  No No   Sig: Take 2 Tabs by mouth daily.    carvedilol (COREG) 6.25 mg tablet 5/12/2017 at 0300  No No   Sig: Take 1 Tab by mouth two (2) times daily (with meals). Indications: Chronic Heart Failure   cephALEXin (KEFLEX) 500 mg capsule Not Taking at Unknown time  No No   Sig: Take 1 Cap by mouth four (4) times daily for 7 days. cholecalciferol (VITAMIN D3) 1,000 unit tablet 2017 at 0300  No No   Sig: Take 2 Tabs by mouth daily. colchicine 0.6 mg tablet 2017 at 0300  Yes No   Sig: Take 0.6 mg by mouth daily. ferrous sulfate 325 mg (65 mg iron) tablet 2017 at 0300  No No   Sig: Take 1 Tab by mouth daily (with breakfast). fish oil-omega-3 fatty acids 340-1,000 mg capsule 2017 at 0300  No Yes   Sig: Take 1 Cap by mouth two (2) times a day. Indications: HYPERTRIGLYCERIDEMIA   fluticasone (FLONASE) 50 mcg/actuation nasal spray 2017 at 0300  No No   Si spray each nostril daily  Indications: ALLERGIC RHINITIS   furosemide (LASIX) 40 mg tablet 2017 at 0300  No No   Si tab BID   influenza vaccine 2016-, 36mos+,,PF, (FLUZONE/FLUARIX/FLULAVAL QUAD) syrg injection   No No   Si.5 mL by IntraMUSCular route PRIOR TO DISCHARGE. loperamide (IMMODIUM) 2 mg tablet 2017 at 0300  Yes No   Sig: Take 2 mg by mouth four (4) times daily as needed for Diarrhea.   metOLazone (ZAROXOLYN) 2.5 mg tablet Not Taking at Unknown time  No No   Sig: Take 1 Tab by mouth daily. Patient taking differently: Take 2.5 mg by mouth every other day. nitroglycerin (NITROSTAT) 0.4 mg SL tablet Unknown at Unknown time  No No   Si Tab by SubLINGual route as needed for Chest Pain.   pantoprazole (PROTONIX) 40 mg tablet Unknown at Unknown time  No No   Sig: Take 1 Tab by mouth Daily (before breakfast). pravastatin (PRAVACHOL) 80 mg tablet 2017 at 0300  No No   Sig: Take 1 Tab by mouth nightly. therapeutic multivitamin (THERAGRAN) tablet 2017 at 0300  No No   Sig: Take 1 Tab by mouth daily. zolpidem (AMBIEN) 5 mg tablet Unknown at Unknown time  No No   Sig: Take 1 Tab by mouth nightly as needed for Sleep. Max Daily Amount: 5 mg. Facility-Administered Medications: None       Current Medications:  Current Facility-Administered Medications   Medication Dose Route Frequency    carvedilol (COREG) tablet 3.125 mg  3.125 mg Oral Q12H    VANCOMYCIN INFORMATION NOTE   Other Rx Dosing/Monitoring    sodium citrate 4 gram /100 mL (4 %) 0.08 g  2 mL Hemodialysis DIALYSIS PRN    0.9% sodium chloride infusion 250 mL  250 mL IntraVENous CONTINUOUS    0.9% sodium chloride infusion 250 mL  250 mL IntraVENous PRN    colchicine (MITIGARE) capsule 0.6 mg  0.6 mg Oral Q TU, TH & SAT    epoetin ron (EPOGEN;PROCRIT) injection 6,000 Units  6,000 Units SubCUTAneous Q MON, WED & FRI    allopurinol (ZYLOPRIM) tablet 50 mg  50 mg Oral DAILY    DOBUTamine (DOBUTREX) 500 mg/250 mL (2,000 mcg/mL) infusion  5 mcg/kg/min IntraVENous CONTINUOUS    cholecalciferol (VITAMIN D3) tablet 2,000 Units  2,000 Units Oral DAILY    therapeutic multivitamin (THERAGRAN) tablet 1 Tab  1 Tab Oral DAILY    nitroglycerin (NITROSTAT) tablet 0.4 mg  0.4 mg SubLINGual PRN    pantoprazole (PROTONIX) tablet 40 mg  40 mg Oral ACB    albuterol-ipratropium (DUO-NEB) 2.5 MG-0.5 MG/3 ML  3 mL Nebulization Q6H PRN    ferrous sulfate tablet 325 mg  325 mg Oral DAILY WITH BREAKFAST    fluticasone (FLONASE) 50 mcg/actuation nasal spray 2 Spray  2 Spray Both Nostrils DAILY    HYDROcodone-acetaminophen (NORCO) 5-325 mg per tablet 1 Tab  1 Tab Oral Q4H PRN    influenza vaccine 2016-17 (36mos+)(PF) (FLUZONE/FLUARIX/FLULAVAL QUAD) injection 0.5 mL  0.5 mL IntraMUSCular PRIOR TO DISCHARGE    zolpidem (AMBIEN) tablet 5 mg  5 mg Oral QHS PRN    sodium chloride (NS) flush 5-10 mL  5-10 mL IntraVENous Q8H    sodium chloride (NS) flush 5-10 mL  5-10 mL IntraVENous PRN    ondansetron (ZOFRAN) injection 4 mg  4 mg IntraVENous Q4H PRN       Chart and notes reviewed. Data reviewed. I have evaluated and examined the patient.         IMPRESSION:   Patient Active Problem List   Diagnosis Code    Hematospermia R36.1    CAD (coronary artery disease) I25.10    PVD (peripheral vascular disease) ([facility] [address] I73.9    CHF (congestive heart failure) ([facility]) I50.9    Anasarca R60.1    Hx of CABG Z95.1    H/O carotid endarterectomy Z98.890    AICD (automatic cardioverter/defibrillator) present Z95.810    Acute exacerbation of CHF (congestive heart failure) ([facility]) V70.9    Diastolic CHF, acute on chronic (HCC) I50.33    HTN (hypertension) I10    HLD (hyperlipidemia) E78.5    Acute CHF (congestive heart failure) ([facility]) I50.9    Acute on chronic diastolic (congestive) heart failure (HCC) I50.33    Thrombocytopenia (HCC) D69.6    Hypokalemia E87.6    Aortic stenosis, moderate I35.0    CKD (chronic kidney disease) N18.9    Pulmonary HTN (HCC) I27.2    Cellulitis L03.90    Acute on chronic kidney failure (HCC) N17.9, N18.9 ·         PLAN:/DISCUSSION:   · Continue current treatment        Thang Thomas MD  5/22/2017, 8:12 AM

## 2017-05-22 NOTE — PROGRESS NOTES
Bedside turnover given to me by CLINTNO Black and CLINTON JASSO. He is on the cardiac telemetry monitor, 2308. He is asleep currently, he was awake all night last night and this morning. I let him sleep, his vitals are stable and bedside table with fresh ice water and call bell within reach. Patient;s daughter phoned and stated \"My dad's call bell isn't working\". I went into the patient's room and he had almost pulled it out of the wall again. This morning he yanked on it and broke the call bell and maintenance had to replace it. I pushed it fully in the wall again and went over how to use it with him, trying to show that he needs to push the red button and not pull it out of the wall. Pt started to yell at me and told me to get out of his room, he stated \"I am very suspicious of you and the people here, I think you are taking and breaking my call bell\". I reassured him that we are not breaking anything and we are trying to take care of him\". I avoided confrontation as he has been very argumentative about everything with staff and his family. He upset his daughter to the point of her not visiting him today at all.

## 2017-05-22 NOTE — PROGRESS NOTES
Pt currently out of room at dialysis. Spoke with 3 daughters in room   Reportedly temp cath site continues to ooze bloody drainage. Explained pt has low platelet count and that this can happen. Note plan for DDAVP, platelet transfusion. Discussed will require bilateral lower extremity angiograms given claudication along with leg wounds and left leg SFA occlusion. Discussed holding off until we know whether or not his kidney function will return and his platelets rise; and as long as he stays in semi-elective state. Discussed if gets into acute on chronic limb ischemia will require more urgent procedures (ie, bypass vs amputation depending on his medical status)  Daughters express understanding. [facility]. Following.      Kareen Lanza  [phone]

## 2017-05-22 NOTE — PROGRESS NOTES
[facility]      CARDIOLOGY PROGRESS NOTE  RECS:      1. Acute diastolic congestive heart failure- presents with worsening CHF with BLE edema and elevated NT pro BNP. Improving Now on HD. Echo revealed preserved EF% no wma  2. Hypotension- resolved. on low dose Coreg and monitor bp.  3. Anemia- Monitor H&H. Dropping today   4. Thrombocytopenia?- asa on hold   5. S/p CABG in 2013 in [facility] 7- stable denies chest pain or pressure. S/p AICD interrogate 2/17 normal function. 6. Hx Carotid endarterectomy- bilateral in 2015    7. Right lung opacification- ? Bleeding from permacath. 8. Aortic stenosis : moderate last echo 5/17  9. ESRD- on HD  10. Hyperlipidemia- statin on hold due to elevated LFT's . follow LFT's today   12. Elevated troponin- NSTEMI vs demand ischemia vs secondary to KYLEE- medical management for now.           SUMMARY: echo 5/17  Left ventricle: Systolic function was normal. Ejection fraction was  estimated in the range of 55 % to 60 %. No obvious wall motion  abnormalities identified in the views obtained. Wall thickness was mildly  increased. Aortic valve: There was moderate stenosis. Valve peak gradient was 49  mmHg. Valve mean gradient was 31 mmHg. Estimated aortic valve area (by  Vmax) was 1 cm-sq. COMPARISONS:  Comparison was made with the previous study of 28-Feb-2017. Aortic  stenosis has worsened. Otherwise no significant change. INDICATIONS: Congestive heart failure, Mitral regurgitation, Aortic  stenosis.             ASSESSMENT:  Hospital Problems  Date Reviewed: 3/11/2017          Codes Class Noted POA    Acute on chronic kidney failure [facility]) ICD-10-CM: N17.9, N18.9  ICD-9-CM: 584.9, 585.9  5/19/2017 Unknown        Cellulitis ICD-10-CM: L03.90  ICD-9-CM: 682.9  5/14/2017 Unknown        Pulmonary HTN ([facility] [address] [facility] ICD-10-CM: I27.2  ICD-9-CM: 416.8  4/4/2017 Yes        Aortic stenosis, moderate (Chronic) ICD-10-CM: I35.0  ICD-9-CM: 424.1  3/11/2017 Yes        Acute on chronic diastolic (congestive) heart failure (HCC) ICD-10-CM: I50.33  ICD-9-CM: 428.33, 428.0  2/27/2017 Yes        Acute CHF (congestive heart failure) ([facility] ICD-10-CM: I50.9  ICD-9-CM: 428.0  2/26/2017 Yes        HTN (hypertension) (Chronic) ICD-10-CM: I10  ICD-9-CM: 401.9  9/4/2016 Yes        HLD (hyperlipidemia) (Chronic) ICD-10-CM: E78.5  ICD-9-CM: 272.4  9/4/2016 Yes        Diastolic CHF, acute on chronic [facility]) ICD-10-CM: I50.33  ICD-9-CM: 428.33, 428.0  8/17/2016 Yes        Acute exacerbation of CHF (congestive heart failure) ([facility] ICD-10-CM: I50.9  ICD-9-CM: 428.0  8/12/2016 Unknown        Hx of CABG ICD-10-CM: Z95.1  ICD-9-CM: V45.81  7/22/2016 Yes    Overview Signed 7/22/2016  5:23 PM by Valeria Dawkins NP     2013             H/O carotid endarterectomy ICD-10-CM: Z98.890  ICD-9-CM: V45.89  7/22/2016 Yes    Overview Signed 7/22/2016  5:27 PM by Vaelria Dawkins NP     2015             AICD (automatic cardioverter/defibrillator) present ICD-10-CM: Z95.810  ICD-9-CM: V45.02  7/22/2016 Yes    Overview Signed 7/22/2016  5:27 PM by Valeria Dawkins NP     2015             CHF (congestive heart failure) (HCC) ICD-10-CM: I50.9  ICD-9-CM: 428.0  7/21/2016 Yes        CAD (coronary artery disease) ICD-10-CM: I25.10  ICD-9-CM: 414.00  3/5/2014 Yes        PVD (peripheral vascular disease) ([facility] ICD-10-CM: I73.9  ICD-9-CM: 443.9  3/5/2014 Yes                SUBJECTIVE:    No CP  Denies  SOB   Improved leg pain     OBJECTIVE:    VS:   Visit Vitals    /72 (BP 1 Location: Left arm, BP Patient Position: Sitting)    Pulse 86    Temp 97.3 °F (36.3 °C)    Resp 22    Ht 6' (1.829 m)    Wt 94 kg (207 lb 4.8 oz)    SpO2 97%    BMI 28.11 kg/m2         Intake/Output Summary (Last 24 hours) at 05/22/17 0831  Last data filed at 05/22/17 0610   Gross per 24 hour   Intake                0 ml   Output             1025 ml   Net            -1025 ml     TELE: normal sinus rhythm    General: alert, well developed, pleasant and in no distress. Confused at times. HENT: Normocephalic, atraumatic. Normal external eye. Neck :  increased JVP  Cardiac:  regular rate and rhythm, S1, S2 normal, no S3 or S4, systolic murmur: holosystolic 3/6, harsh at lower left sternal border, at apex, throughout the precordium, no click, no rub  Lungs: heard  rales lung bases. Abdomen: Soft, nontender, no masses  Extremities:  Edema +1 up to upper thighs and abdomen. Labs: Results:       Chemistry Recent Labs      05/22/17   0405  05/21/17   1034  05/20/17   0436   GLU  101*  153*  84   NA  134*  133*  133*   K  3.6  3.4*  4.1   CL  96*  97*  96*   CO2  30  29  28   BUN  50*  46*  63*   CREA  3.79*  3.65*  4.14*   CA  9.1  8.7  8.7   AGAP  8  7  9   BUCR  13  13  15      CBC w/Diff Recent Labs      05/22/17   0405  05/19/17   2120   WBC  5.3  5.9   RBC  2.59*  2.92*   HGB  7.3*  8.2*   HCT  23.5*  26.5*   PLT  55*  53*   GRANS  62  74*   LYMPH  14*  10*   EOS  10*  3      Cardiac Enzymes Recent Labs      05/20/17   1330  05/20/17   0436   CPK  24*  27*   CKND1  8.8*  10.4*      Coagulation No results for input(s): PTP, INR, APTT in the last 72 hours. No lab exists for component: INREXT, INREXT    Lipid Panel Lab Results   Component Value Date/Time    Cholesterol, total 100 07/22/2016 05:20 AM    HDL Cholesterol 31 07/22/2016 05:20 AM    LDL, calculated 54.4 07/22/2016 05:20 AM    VLDL, calculated 14.6 07/22/2016 05:20 AM    Triglyceride 73 07/22/2016 05:20 AM    CHOL/HDL Ratio 3.2 07/22/2016 05:20 AM      BNP No results for input(s): BNPP in the last 72 hours. Liver Enzymes No results for input(s): TP, ALB, TBIL, AP, SGOT, GPT in the last 72 hours. No lab exists for component: DBIL   Thyroid Studies Lab Results   Component Value Date/Time    TSH 4.23 07/22/2016 05:20 AM                [facility], [phone] supervised. I have independently evaluated and examined the patient.   All relevant labs and testing data's are reviewed. Care plan discussed and updated after review.     Jordan Shelley MD

## 2017-05-22 NOTE — PROGRESS NOTES
Visited with Mr Tayler Cook in Hemodialysis Unit. Alert, smiling, discussed feeling \"better\" since starting HD. Opportunity given for questions. Will continue to follow.

## 2017-05-22 NOTE — PROGRESS NOTES
RENAL DAILY PROGRESS NOTE      IMPRESSION:   Acute on chronic renal failure, not recovering renal function ,started on HD because of poor clearance, first HD on ,-- tolerated second HD well yesterday   Oozing from HD catheter, low platelet  Thrombocytopenia, chronic    Short of breath, multifactorial , bilateral pleural effusion, COPD exacerbation   Anemia   Hypotension, on dobutamine drip , improving    PLAN:   Plan for HD today , with  2K bath UF 1.5 L, increase BF rate to 350 ,  Will check iv iron, continue epogen , will hold any BT unless Hb < 7. Diuretics per cardiology colleague . Low platelet, Avoid heparin in HD catheter, using citrate. At 4:26 PM on 2017, I saw and examined patient during hemodialysis treatment. The patient was receiving hemodialysis for treatment of  renal failure. I have also reviewed vital signs, intake and output, lab results and recent events, and agreed with today's dialysis order. HD rounding    Blood pressure 138/71, pulse 85, temperature 95.2 °F (35.1 °C), temperature source Axillary, resp. rate 16, height 6' (1.829 m), weight 94 kg (207 lb 4.8 oz), SpO2 96 %. Temp (24hrs), Av.3 °F (36.3 °C), Min:95.2 °F (35.1 °C), Max:98.1 °F (36.7 °C)      Blood Pressure: BP: 138/71  Pulse: Pulse (Heart Rate): 85  Temp:  Temp: 95.2 °F (35.1 °C)    Artificial Kidney Dialyzer/Set Up Inspection: Revaclear   hours Duration of Treatment (hours): 2.5 hours   Heparin Bolus Heparin Bolus (units): 0 units  Blood flow rate Blood Flow Rate (ml/min): 250 ml/min   Dialysate rate     Arterial Access Pressure Arterial Access Pressure (mmHg): -100  Venous Return Pressure Venous Return Pressure (mmHg): 70  Ultrafiltration Rate Goal/Amount of Fluid to Remove (mL): 1500 mL  Fluid Removal Fluid Removed (mL): 1500  Net Fluid Removal NET Fluid Removed (mL): 1000 ml    Had family meeting today with daughters, discussed his condition from renal stand point.                Subjective:     71y M with acute on chronic kidney injury,   Complaint:   Overnight events noted  Still oozing from HD access site, Hb gradually trending down  Breathing is markedly better   Chest xray gradually improving  no nausea, vomiting, chest pain,cough, seizure.      Current Facility-Administered Medications   Medication Dose Route Frequency    carvedilol (COREG) tablet 3.125 mg  3.125 mg Oral Q12H    acetaminophen (TYLENOL) tablet 650 mg  650 mg Oral Q6H PRN    sodium citrate 4 gram /100 mL (4 %) 0.08 g  2 mL Hemodialysis DIALYSIS PRN    0.9% sodium chloride infusion 250 mL  250 mL IntraVENous CONTINUOUS    0.9% sodium chloride infusion 250 mL  250 mL IntraVENous PRN    colchicine (MITIGARE) capsule 0.6 mg  0.6 mg Oral Q TU, TH & SAT    epoetin ron (EPOGEN;PROCRIT) injection 6,000 Units  6,000 Units SubCUTAneous Q MON, WED & FRI    allopurinol (ZYLOPRIM) tablet 50 mg  50 mg Oral DAILY    DOBUTamine (DOBUTREX) 500 mg/250 mL (2,000 mcg/mL) infusion  5 mcg/kg/min IntraVENous CONTINUOUS    cholecalciferol (VITAMIN D3) tablet 2,000 Units  2,000 Units Oral DAILY    therapeutic multivitamin (THERAGRAN) tablet 1 Tab  1 Tab Oral DAILY    nitroglycerin (NITROSTAT) tablet 0.4 mg  0.4 mg SubLINGual PRN    pantoprazole (PROTONIX) tablet 40 mg  40 mg Oral ACB    albuterol-ipratropium (DUO-NEB) 2.5 MG-0.5 MG/3 ML  3 mL Nebulization Q6H PRN    ferrous sulfate tablet 325 mg  325 mg Oral DAILY WITH BREAKFAST    fluticasone (FLONASE) 50 mcg/actuation nasal spray 2 Spray  2 Spray Both Nostrils DAILY    HYDROcodone-acetaminophen (NORCO) 5-325 mg per tablet 1 Tab  1 Tab Oral Q4H PRN    influenza vaccine 2016-17 (36mos+)(PF) (FLUZONE/FLUARIX/FLULAVAL QUAD) injection 0.5 mL  0.5 mL IntraMUSCular PRIOR TO DISCHARGE    zolpidem (AMBIEN) tablet 5 mg  5 mg Oral QHS PRN    sodium chloride (NS) flush 5-10 mL  5-10 mL IntraVENous Q8H    sodium chloride (NS) flush 5-10 mL  5-10 mL IntraVENous PRN    ondansetron (ZOFRAN) injection 4 mg  4 mg IntraVENous Q4H PRN       Review of Symptoms: comprehensive ROS negative except above.    Objective:     Patient Vitals for the past 24 hrs:   Temp Pulse Resp BP SpO2   05/22/17 0824 97.3 °F (36.3 °C) 86 22 119/72 97 %   05/22/17 0800 - 92 17 (!) 167/93 (!) 88 %   05/22/17 0700 - 77 15 142/83 -   05/22/17 0600 - 77 14 127/71 -   05/22/17 0500 - 85 18 107/83 -   05/22/17 0400 98.1 °F (36.7 °C) 76 16 142/69 100 %   05/22/17 0300 - 74 16 130/74 -   05/22/17 0200 - 76 20 116/80 -   05/22/17 0100 - 70 13 133/70 -   05/22/17 0000 - 74 17 137/67 -   05/21/17 2339 97.9 °F (36.6 °C) 75 15 127/77 100 %   05/21/17 2300 - 77 21 (!) 124/105 -   05/21/17 2245 - 76 15 134/73 -   05/21/17 2230 - 75 17 140/81 -   05/21/17 2215 - 75 17 135/70 -   05/21/17 2200 - 76 12 134/82 -   05/21/17 2145 - 75 17 141/73 -   05/21/17 2130 - 82 17 152/80 -   05/21/17 2115 - 74 17 142/74 -   05/21/17 2100 - 77 17 137/75 -   05/21/17 2045 - 73 15 143/73 -   05/21/17 2030 - 73 14 139/72 -   05/21/17 2015 - 74 15 138/72 -   05/21/17 2000 - 76 17 130/76 -   05/21/17 1945 - 75 19 141/78 -   05/21/17 1930 - 74 19 - -   05/21/17 1915 - 70 13 140/68 -   05/21/17 1900 97.8 °F (36.6 °C) 83 15 138/72 96 %   05/21/17 1845 - 71 15 119/68 -   05/21/17 1830 - 71 14 116/67 -   05/21/17 1815 - 69 12 118/65 -   05/21/17 1800 - 69 12 125/68 -   05/21/17 1745 - 69 15 118/72 -   05/21/17 1730 - 74 15 130/73 -   05/21/17 1715 - 72 13 132/73 -   05/21/17 1700 - 74 15 125/62 -   05/21/17 1645 - 69 16 133/72 -   05/21/17 1632 97.4 °F (36.3 °C) 71 15 128/73 98 %   05/21/17 1630 - 69 11 128/73 -   05/21/17 1615 - 73 10 131/77 -   05/21/17 1600 - 69 14 121/67 -   05/21/17 1545 - 70 15 119/65 -   05/21/17 1530 - 70 13 118/69 -   05/21/17 1515 - 68 13 112/67 -   05/21/17 1500 - 71 15 121/62 -   05/21/17 1445 - 71 12 116/71 -   05/21/17 1430 - 74 15 139/71 -   05/21/17 1415 - 72 14 139/71 -   05/21/17 1400 - 69 (!) 7 117/68 -   05/21/17 1345 - 74 13 121/59 -   05/21/17 1330 - 68 14 116/59 -   05/21/17 1315 - 68 14 108/61 -   05/21/17 1300 - 68 14 110/64 -   05/21/17 1245 - 73 15 118/71 -   05/21/17 1230 - 72 15 133/72 -   05/21/17 1215 - 72 15 136/77 -   05/21/17 1200 - 79 19 120/63 -   05/21/17 1145 - 69 13 112/64 -   05/21/17 1131 97.5 °F (36.4 °C) 69 16 109/62 95 %   05/21/17 1130 - 68 12 109/62 -   05/21/17 1115 - 69 13 110/62 -   05/21/17 1100 - 70 16 113/69 -        Weight change: -1.315 kg (-2 lb 14.4 oz)     05/20 1901 - 05/22 0700  In: -   Out: 3125 [Urine:3125]    Intake/Output Summary (Last 24 hours) at 05/22/17 1046  Last data filed at 05/22/17 0949   Gross per 24 hour   Intake              240 ml   Output             1025 ml   Net             -785 ml     Physical Exam:   General: comfortable, no acute distress   HEENT sclera anicteric, supple neck, no thyromegaly  CVS: S1S2 heard,  no rub  RS: + air entry b/l,   Abd: Soft, Non tender, Not distended,   Neuro: non focal, awake, alert ,   Extrm: + edema, no cyanosis, clubbing   Musculoskeletal: No gross joints or bone deformities   Access; HD catheter site, oozing blood.          Data Review:     LABS:   Hematology:   Recent Labs      05/22/17   0405  05/19/17 2120   WBC  5.3  5.9   HGB  7.3*  8.2*   HCT  23.5*  26.5*     Chemistry:   Recent Labs      05/22/17   0405  05/21/17   1034  05/20/17   0436  05/19/17 2120   BUN  50*  46*  63*  59*   CREA  3.79*  3.65*  4.14*  3.96*   CA  9.1  8.7  8.7  8.7   ALB  2.7*   --    --    --    K  3.6  3.4*  4.1  4.0   NA  134*  133*  133*  135*   CL  96*  97*  96*  95*   CO2  30  29  28  28   GLU  101*  153*  84  78              Procedures/imaging: see electronic medical records for all procedures, Xrays and details which were not copied into this note but were reviewed prior to creation of Plan          Assessment & Plan:     As above         Tanmay Seo MD  5/22/2017  12:03 PM

## 2017-05-22 NOTE — PROGRESS NOTES
0745 Right upper chest dialysis catheter dressing saturated with blood. Dressing removed and new dressing applied. Repositioned for comfort. Callbell within reach. 1300 Dressing change done to right chest hemodialysis catheter. To dialysis by bed. Oxygen at 5L n/c.    1800 Re-inforced dressing to dialysis catheter due to leakage. Family present. Patient turned and repositoned for comfort. 1930 Bedside and Verbal shift change report given to ever collins rn (oncoming nurse) by Alex Kendrick (offgoing nurse). Report included the following information SBAR, Kardex and MAR.

## 2017-05-22 NOTE — PROGRESS NOTES
Received call from nurse who voices that daughters X3 are here but patient just left for dialysis. Inquired if daughters could meet with Palliative Medicine tomorrow at 11am when patient could be available. Plan is for Palliative Medicine to meet with patient and daughters at [address] [place] [address], tomorrow, Tuesday May 23rd.

## 2017-05-23 NOTE — PROGRESS NOTES
Ulisses Mirza M.D. PROGRESS NOTE    Name: Nadiya Guevara MRN: [id number]   : 1940 Hospital: [facility]   Date: 2017  Admission Date: 2017     Subjective/Objective/Plans  1. CHF, pleural effusion  2. Moderate aortic stenosis  3. ESRD  4. Thrombocytopenia    Alert, less SOB    Poor long term prognosis, likely 6 months per Renal  Patient and family agree with hospice care, no long term HD  Vital Signs:  Visit Vitals    /82 (BP 1 Location: Left arm, BP Patient Position: At rest)    Pulse 94    Temp 98.5 °F (36.9 °C)    Resp 18    Ht 6' (1.829 m)    Wt 86.6 kg (191 lb)    SpO2 96%    BMI 25.9 kg/m2       O2 Device: Nasal cannula   O2 Flow Rate (L/min): 5 l/min   Temp (24hrs), Av.2 °F (36.8 °C), Min:97.8 °F (36.6 °C), Max:98.5 °F (36.9 °C)     7:20 PM  Intake/Output:   Last shift:         Last 3 shifts:  07 -  190  In: 480 [P.O.:480]  Out: 2150 [Urine:650]    Intake/Output Summary (Last 24 hours) at 17 1920  Last data filed at 17 1531   Gross per 24 hour   Intake                0 ml   Output              650 ml   Net             -650 ml         Physical Exam:    General: in no apparent distress and alert    HEENT: pupils equal, no ear discharge   Neck: No abnormally enlarged lymph nodes. , no JDV   Mouth: MMM no lesions    Chest: normal, no breast masses   Lungs: decreased air exchange bilaterally   Heart: Regular rate and rhythm   Abdomen: abdomen is soft without significant tenderness, masses, organomegaly or guarding   Extremity: negative   Neuro: alert    Skin: Skin color, texture, turgor normal. No rashes or lesions    Data Review:    Labs: Results:       Chemistry Recent Labs      17   0539  17   0405  17   1034   GLU  102*  101*  153*   NA  135*  134*  133*   K  3.7  3.6  3.4*   CL  100  96*  97*   CO2  27  30  29   BUN  33*  50*  46*   CREA  2.91*  3.79*  3.65*   CA  8.5  9.1  8.7   AGAP  8  8  7   BUCR  11*  13  13   TBILI --   1.3*   --    AP   --   70   --    TP   --   6.8   --    ALB   --   2.7*   --    GLOB   --   4.1*   --    AGRAT   --   0.7*   --       CBC w/Diff Recent Labs      05/23/17   0539  05/22/17   0405   WBC  4.5*  5.3   RBC  2.46*  2.59*   HGB  7.0*  7.3*   HCT  22.2*  23.5*   PLT  48*  55*   GRANS   --   62   LYMPH   --   14*   EOS   --   10*      Cardiac Enzymes No results for input(s): CPK, CKND1, TYSON in the last 72 hours. No lab exists for component: CKRMB, TROIP   Coagulation Recent Labs      05/23/17   0539   PTP  17.0*   INR  1.4*       Lipid Panel Lab Results   Component Value Date/Time    Cholesterol, total 100 07/22/2016 05:20 AM    HDL Cholesterol 31 07/22/2016 05:20 AM    LDL, calculated 54.4 07/22/2016 05:20 AM    VLDL, calculated 14.6 07/22/2016 05:20 AM    Triglyceride 73 07/22/2016 05:20 AM    CHOL/HDL Ratio 3.2 07/22/2016 05:20 AM      BNP No results for input(s): BNPP in the last 72 hours. Liver Enzymes Recent Labs      05/22/17 0405   TP  6.8   ALB  2.7*   TBILI  1.3*   AP  70   SGOT  79*   ALT  152*   CBIL  0.6*      Thyroid Studies Lab Results   Component Value Date/Time    TSH 4.23 07/22/2016 05:20 AM          Procedures/imaging: see electronic medical records for all procedures, Xrays and details which were not copied into this note but were reviewed. Allergies:  No Known Allergies    Home Medications:  Prior to Admission Medications   Prescriptions Last Dose Informant Patient Reported? Taking? HYDROcodone-acetaminophen (NORCO) 5-325 mg per tablet 5/12/2017 at 0300  No No   Sig: Take 1 Tab by mouth every six (6) hours as needed for Pain. Max Daily Amount: 4 Tabs. albuterol-ipratropium (DUO-NEB) 2.5 mg-0.5 mg/3 ml nebu 5/11/2017 at 1700  No No   Sig: 3 mL by Nebulization route every six (6) hours as needed. allopurinol (ZYLOPRIM) 100 mg tablet 5/11/2017 at 0300  No No   Sig: Take 1 Tab by mouth daily.  Indications: GOUT   aspirin delayed-release 81 mg tablet 5/12/2017 at 0300 No No   Sig: Take 2 Tabs by mouth daily. carvedilol (COREG) 6.25 mg tablet 2017 at 0300  No No   Sig: Take 1 Tab by mouth two (2) times daily (with meals). Indications: Chronic Heart Failure   cephALEXin (KEFLEX) 500 mg capsule Not Taking at Unknown time  No No   Sig: Take 1 Cap by mouth four (4) times daily for 7 days. cholecalciferol (VITAMIN D3) 1,000 unit tablet 2017 at 0300  No No   Sig: Take 2 Tabs by mouth daily. colchicine 0.6 mg tablet 2017 at 0300  Yes No   Sig: Take 0.6 mg by mouth daily. ferrous sulfate 325 mg (65 mg iron) tablet 2017 at 0300  No No   Sig: Take 1 Tab by mouth daily (with breakfast). fish oil-omega-3 fatty acids 340-1,000 mg capsule 2017 at 0300  No Yes   Sig: Take 1 Cap by mouth two (2) times a day. Indications: HYPERTRIGLYCERIDEMIA   fluticasone (FLONASE) 50 mcg/actuation nasal spray 2017 at 0300  No No   Si spray each nostril daily  Indications: ALLERGIC RHINITIS   furosemide (LASIX) 40 mg tablet 2017 at 0300  No No   Si tab BID   influenza vaccine 2016-17, 36mos+,,PF, (FLUZONE/FLUARIX/FLULAVAL QUAD) syrg injection   No No   Si.5 mL by IntraMUSCular route PRIOR TO DISCHARGE. loperamide (IMMODIUM) 2 mg tablet 2017 at 0300  Yes No   Sig: Take 2 mg by mouth four (4) times daily as needed for Diarrhea.   metOLazone (ZAROXOLYN) 2.5 mg tablet Not Taking at Unknown time  No No   Sig: Take 1 Tab by mouth daily. Patient taking differently: Take 2.5 mg by mouth every other day. nitroglycerin (NITROSTAT) 0.4 mg SL tablet Unknown at Unknown time  No No   Si Tab by SubLINGual route as needed for Chest Pain.   pantoprazole (PROTONIX) 40 mg tablet Unknown at Unknown time  No No   Sig: Take 1 Tab by mouth Daily (before breakfast). pravastatin (PRAVACHOL) 80 mg tablet 2017 at 0300  No No   Sig: Take 1 Tab by mouth nightly. therapeutic multivitamin (THERAGRAN) tablet 2017 at 0300  No No   Sig: Take 1 Tab by mouth daily. zolpidem (AMBIEN) 5 mg tablet Unknown at Unknown time  No No   Sig: Take 1 Tab by mouth nightly as needed for Sleep. Max Daily Amount: 5 mg.       Facility-Administered Medications: None       Current Medications:  Current Facility-Administered Medications   Medication Dose Route Frequency    [START ON 5/24/2017] iron sucrose (VENOFER) 100 mg in 0.9% sodium chloride 100 mL IVPB  100 mg IntraVENous DIALYSIS MON, WED & FRI    carvedilol (COREG) tablet 3.125 mg  3.125 mg Oral Q12H    acetaminophen (TYLENOL) tablet 650 mg  650 mg Oral Q6H PRN    budesonide-formoterol (SYMBICORT) 160-4.5 mcg/actuation HFA inhaler 2 Puff  2 Puff Inhalation BID    sodium citrate 4 gram /100 mL (4 %) 0.08 g  2 mL Hemodialysis DIALYSIS PRN    0.9% sodium chloride infusion 250 mL  250 mL IntraVENous PRN    colchicine (MITIGARE) capsule 0.6 mg  0.6 mg Oral Q TU, TH & SAT    epoetin ron (EPOGEN;PROCRIT) injection 6,000 Units  6,000 Units SubCUTAneous Q MON, WED & FRI    allopurinol (ZYLOPRIM) tablet 50 mg  50 mg Oral DAILY    DOBUTamine (DOBUTREX) 500 mg/250 mL (2,000 mcg/mL) infusion  5 mcg/kg/min IntraVENous CONTINUOUS    cholecalciferol (VITAMIN D3) tablet 2,000 Units  2,000 Units Oral DAILY    therapeutic multivitamin (THERAGRAN) tablet 1 Tab  1 Tab Oral DAILY    nitroglycerin (NITROSTAT) tablet 0.4 mg  0.4 mg SubLINGual PRN    pantoprazole (PROTONIX) tablet 40 mg  40 mg Oral ACB    albuterol-ipratropium (DUO-NEB) 2.5 MG-0.5 MG/3 ML  3 mL Nebulization Q6H PRN    ferrous sulfate tablet 325 mg  325 mg Oral DAILY WITH BREAKFAST    fluticasone (FLONASE) 50 mcg/actuation nasal spray 2 Spray  2 Spray Both Nostrils DAILY    HYDROcodone-acetaminophen (NORCO) 5-325 mg per tablet 1 Tab  1 Tab Oral Q4H PRN    influenza vaccine 2016-17 (36mos+)(PF) (FLUZONE/FLUARIX/FLULAVAL QUAD) injection 0.5 mL  0.5 mL IntraMUSCular PRIOR TO DISCHARGE    zolpidem (AMBIEN) tablet 5 mg  5 mg Oral QHS PRN    sodium chloride (NS) flush 5-10 mL 5-10 mL IntraVENous Q8H    sodium chloride (NS) flush 5-10 mL  5-10 mL IntraVENous PRN    ondansetron (ZOFRAN) injection 4 mg  4 mg IntraVENous Q4H PRN       Chart and notes reviewed. Data reviewed. I have evaluated and examined the patient.         IMPRESSION:   Patient Active Problem List   Diagnosis Code    Hematospermia R36.1    CAD (coronary artery disease) I25.10    PVD (peripheral vascular disease) ([facility] [address] I73.9    CHF (congestive heart failure) ([facility]) I50.9    Anasarca R60.1    Hx of CABG Z95.1    H/O carotid endarterectomy Z98.890    AICD (automatic cardioverter/defibrillator) present Z95.810    Acute exacerbation of CHF (congestive heart failure) ([facility]) G78.6    Diastolic CHF, acute on chronic (HCC) I50.33    HTN (hypertension) I10    HLD (hyperlipidemia) E78.5    Acute CHF (congestive heart failure) ([facility]) I50.9    Acute on chronic diastolic (congestive) heart failure (HCC) I50.33    Thrombocytopenia (HCC) D69.6    Hypokalemia E87.6    Aortic stenosis, moderate I35.0    CKD (chronic kidney disease) N18.9    Pulmonary HTN ([facility]) I27.2    Cellulitis L03.90    Acute on chronic kidney failure (HCC) N17.9, N18.9 ·         PLAN:/DISCUSSION:   · Dc in am if home equipments bed, cetc are ready        Conor Umanzor MD  5/23/2017, 7:20 PM

## 2017-05-23 NOTE — PROGRESS NOTES
RENAL DAILY PROGRESS NOTE      IMPRESSION:   Acute on chronic renal failure, not recovering renal function ,started on HD because of poor clearance, first HD on 5/19,-- tolerated second HD well yesterday , UF 1.5 L   Oozing from HD catheter, low platelet  Thrombocytopenia, chronic    Short of breath, multifactorial , bilateral pleural effusion, COPD exacerbation   Anemia , low iron saturation   Hypotension, on dobutamine drip , improving    PLAN:   Mr. Faith Bravo has poor renal clearance, getting refrectory to diuretics. Hemodialysis is helping with better volume management at present. Plan to continue HD support on MWF schedule for now. I have discussed at length with him and his daughter about risk and benefit for dialysis. Encourage discussion with palliative care team for further goal of care discussion. continue epogen during HD , will arrange IV iron during HD  Diuretics per cardiology colleague . Low platelet, Avoid heparin in HD catheter, using citrate. Discussed with palliative care team.           Subjective:     71y M with acute on chronic kidney injury,   Complaint:   Overnight events noted  Still oozing from HD access site, Hb gradually trending down  Breathing is markedly better   Palliative care team in room,  Explain again risk and benefit. no nausea, vomiting, chest pain,cough, seizure.      Current Facility-Administered Medications   Medication Dose Route Frequency    carvedilol (COREG) tablet 3.125 mg  3.125 mg Oral Q12H    acetaminophen (TYLENOL) tablet 650 mg  650 mg Oral Q6H PRN    budesonide-formoterol (SYMBICORT) 160-4.5 mcg/actuation HFA inhaler 2 Puff  2 Puff Inhalation BID    sodium citrate 4 gram /100 mL (4 %) 0.08 g  2 mL Hemodialysis DIALYSIS PRN    0.9% sodium chloride infusion 250 mL  250 mL IntraVENous PRN    colchicine (MITIGARE) capsule 0.6 mg  0.6 mg Oral Q TU, TH & SAT    epoetin ron (EPOGEN;PROCRIT) injection 6,000 Units  6,000 Units SubCUTAneous Q MON, WED & FRI    allopurinol (ZYLOPRIM) tablet 50 mg  50 mg Oral DAILY    DOBUTamine (DOBUTREX) 500 mg/250 mL (2,000 mcg/mL) infusion  5 mcg/kg/min IntraVENous CONTINUOUS    cholecalciferol (VITAMIN D3) tablet 2,000 Units  2,000 Units Oral DAILY    therapeutic multivitamin (THERAGRAN) tablet 1 Tab  1 Tab Oral DAILY    nitroglycerin (NITROSTAT) tablet 0.4 mg  0.4 mg SubLINGual PRN    pantoprazole (PROTONIX) tablet 40 mg  40 mg Oral ACB    albuterol-ipratropium (DUO-NEB) 2.5 MG-0.5 MG/3 ML  3 mL Nebulization Q6H PRN    ferrous sulfate tablet 325 mg  325 mg Oral DAILY WITH BREAKFAST    fluticasone (FLONASE) 50 mcg/actuation nasal spray 2 Spray  2 Spray Both Nostrils DAILY    HYDROcodone-acetaminophen (NORCO) 5-325 mg per tablet 1 Tab  1 Tab Oral Q4H PRN    influenza vaccine 2016-17 (36mos+)(PF) (FLUZONE/FLUARIX/FLULAVAL QUAD) injection 0.5 mL  0.5 mL IntraMUSCular PRIOR TO DISCHARGE    zolpidem (AMBIEN) tablet 5 mg  5 mg Oral QHS PRN    sodium chloride (NS) flush 5-10 mL  5-10 mL IntraVENous Q8H    sodium chloride (NS) flush 5-10 mL  5-10 mL IntraVENous PRN    ondansetron (ZOFRAN) injection 4 mg  4 mg IntraVENous Q4H PRN       Review of Symptoms: comprehensive ROS negative except above.    Objective:     Patient Vitals for the past 24 hrs:   Temp Pulse Resp BP SpO2   05/23/17 0819 98.5 °F (36.9 °C) 94 18 138/82 96 %   05/23/17 0438 98.3 °F (36.8 °C) 80 18 148/78 99 %   05/23/17 0006 98 °F (36.7 °C) 78 20 126/75 100 %   05/22/17 1947 97.8 °F (36.6 °C) - - - -   05/22/17 1656 97.7 °F (36.5 °C) 76 20 107/62 100 %   05/22/17 1630 96.1 °F (35.6 °C) 78 18 134/80 -   05/22/17 1628 - 75 18 152/83 -   05/22/17 1600 - 78 18 139/80 -   05/22/17 1530 - 77 18 144/85 -   05/22/17 1500 - 78 18 137/79 -   05/22/17 1430 - 80 18 133/82 -   05/22/17 1400 - 80 18 135/79 -   05/22/17 1330 - 79 18 130/75 -   05/22/17 1300 95.2 °F (35.1 °C) 81 18 133/82 -   05/22/17 1207 97.7 °F (36.5 °C) 85 16 138/71 96 %        Weight change:      05/21 1901 - 05/23 0700  In: 480 [P.O.:480]  Out: 2200 [Urine:700]    Intake/Output Summary (Last 24 hours) at 05/23/17 1114  Last data filed at 05/22/17 1628   Gross per 24 hour   Intake              240 ml   Output             1500 ml   Net            -1260 ml     Physical Exam:   General: comfortable, no acute distress   HEENT sclera anicteric, supple neck, no thyromegaly  CVS: S1S2 heard,  no rub  RS: + air entry b/l,   Abd: Soft, Non tender, Not distended,   Neuro: non focal, awake, alert ,   Extrm: + edema, no cyanosis, clubbing   Musculoskeletal: No gross joints or bone deformities   Access; HD catheter site, oozing blood.          Data Review:     LABS:   Hematology:   Recent Labs      05/23/17   0539  05/22/17   0405   WBC  4.5*  5.3   HGB  7.0*  7.3*   HCT  22.2*  23.5*     Chemistry:   Recent Labs      05/23/17   0539  05/22/17   0405  05/21/17   1034   BUN  33*  50*  46*   CREA  2.91*  3.79*  3.65*   CA  8.5  9.1  8.7   ALB   --   2.7*   --    K  3.7  3.6  3.4*   NA  135*  134*  133*   CL  100  96*  97*   CO2  27  30  29   GLU  102*  101*  153*              Procedures/imaging: see electronic medical records for all procedures, Xrays and details which were not copied into this note but were reviewed prior to creation of Plan          Assessment & Plan:     As above         Rachel Maldonado MD  5/23/2017  12:03 PM

## 2017-05-23 NOTE — PROGRESS NOTES
[facility]  Pulmonary, Critical Care, and Sleep Medicine    Name: Deloris Slaughter MRN: [id number]   : 1940 Hospital: [facility]   Date: 2017          Subjective/Interval History:     Patient is a 78 y.o. Male, prior smoker, with multiple medical problems and extensive cardiac history, to include PMH of Acute on chronic Systolic and Diastolic CHF; COPD (per pt, but not found in chart) - on home O2 at 3LPM at rest, 4LPM with exertion; PVD, HTN, Pulmonary HTN, CAD, s/p CABG (), s/p AICD (); HLD; and chronic ITP, who presented to [facility] ED via EMS on 17, c/o R leg pain, s/p sliding off his recliner (per pt), and progressively worsening of leg swelling x1 week. Pt was seen on 17 in the ED for leg swelling as well, had negative PVL and was d/h with keflex and pain meds. Has been taking abx as directed and has finished his pain meds but he has not had any improvement in pain. Upon arrival to ED, pt has clinical signs of cellulitis, not responding to out-patient treatment. Pt was admitted for cellulitis and CHF exacerbation. [facility] course has included: PVL's (-) for DVT; CTA (-) for PE; Arterial studies show occluded L SFA and moderate >50% stenosis of the R superficial femoral artery with severe >75% stenosis of the profunda femoris artery. Pulmonary team consulted (17) d/t hypoxia in the setting of acute CHF with fluid overload, 2/2 worsening KYLEE on CKD and pt currently refusing dialysis.  Pt may also have underlying respiratory disease, and reports hx of COPD - underlying COPD exacerbation?    17:  - Had HD yesterday - 1.5L removed; tolerated okay; became bradycardic and was found to have low Hgb after dialysis; was started back on Dobutamine - 5mcg/kg/min overnight.   - Nauseous this AM, was given Zofran which helped  - Currently awake, alert, resting comfortably in bed on 5LPM NC with all 3 of his daughters at bedside  - States breathing has significantly improved overall; feels improved from yesterday as well  - Still oozing consistently from temp cath site; H/H & Plt down further today. Plts - 48K this AM; H/H - 7.0/22.2  - Started using PEP device more; feel it helps his breathing  - Daughters note that pt feels febrile - temp is afebrile currently  - Denies any CP, palpitations, abd pain, vomiting/diarrhea; F/C; H/A  - Palliative care meeting today at [address] [place] [address] items are noted in HPI. Objective:   Vital Signs:    Visit Vitals    /82 (BP 1 Location: Left arm, BP Patient Position: At rest)    Pulse 94    Temp 98.5 °F (36.9 °C)    Resp 18    Ht 6' (1.829 m)    Wt 86.6 kg (191 lb)    SpO2 96%    BMI 25.9 kg/m2       O2 Device: Nasal cannula   O2 Flow Rate (L/min): 5 l/min   Temp (24hrs), Av.4 °F (36.3 °C), Min:95.2 °F (35.1 °C), Max:98.5 °F (36.9 °C)       Intake/Output:   Last shift:         Last 3 shifts:  1901 -  0700  In: 480 [P.O.:480]  Out: 2200 [Urine:700]    Intake/Output Summary (Last 24 hours) at 17 1025  Last data filed at 17 1628   Gross per 24 hour   Intake              240 ml   Output             1500 ml   Net            -1260 ml        Physical Exam:    General: Alert, awake, NAD, resting comfortably on 5LPM NC   HEENT: Normocephalic,atraumatic; PERRL, conjunctivae/corneas clear, anicteric; nares normal; no drainage/sinus tenderness; nasal/oral mucosa moist; neck supple, symmetric; trachea midline; No adenopathy; No JVD noted. Resp: Symmetrical chest rise; mod AE bilat; diminished throughout, especially bibasilar, R>L; No wheezes/rhonchi noted. CV: RRR, S1S2 normal; No m/r/g   Abdomen: Protuberant; moderate ascites - appears slightly improved; abd is slightly firm, distended and tympanic - no change from previous; non-tender(+) B. S x4;.No masses/ organomegaly/ paradox noted   Extremity: 1-2+ pulses to upper extremities; unable to palpate BLE pulses - unchanged; anasarca - BLE continues to improve;  to touch; +1-+2 edema BLE up to upper thighs and abdominal wall    Neuro: Alert, grossly non-focal   Skin: Venous statis; dry; 2 ulcers noted to L shin - covered with Mepilex      DATA:  Labs:  Recent Labs      05/23/17   0539  05/22/17   0405   WBC  4.5*  5.3   HGB  7.0*  7.3*   HCT  22.2*  23.5*   PLT  48*  55*     Recent Labs      05/23/17   0539  05/22/17   0405  05/21/17   1034   NA  135*  134*  133*   K  3.7  3.6  3.4*   CL  100  96*  97*   CO2  27  30  29   GLU  102*  101*  153*   BUN  33*  50*  46*   CREA  2.91*  3.79*  3.65*   CA  8.5  9.1  8.7   ALB   --   2.7*   --    SGOT   --   79*   --    ALT   --   152*   --    INR  1.4*   --    --      PFT:    N/A                                                   Echo [05/18/17]:   SUMMARY:  Left ventricle: Systolic function was normal. Ejection fraction was  estimated in the range of 55 % to 60 %. No obvious wall motion  abnormalities identified in the views obtained. Wall thickness was mildly  increased. Aortic valve: There was moderate stenosis. Valve peak gradient was 49  mmHg. Valve mean gradient was 31 mmHg. Estimated aortic valve area (by  Vmax) was 1 cm-sq. Imaging:   CXR [5/22/17]: FINDINGS:   Tubes and Lines: A tunneled right-sided hemodialysis catheter is noted and  unchanged. The left-sided pacemaker is unchanged.     Pleura: No pneumothorax appreciated. A moderate right pleural effusion is noted which is significantly improved since prior chest x-ray. There is blunting of left costophrenic angle which may represent a small left pleural effusion.     Lungs: Likely compressive atelectasis in the right lung. Mild interstitial  changes in the left lung are noted.     Cardiac silhouette: Cardiomegaly with tortuous aorta.     Pulmonary Vascularity: The pulmonary vasculature is unremarkable.     Osseous Structures: Degenerative changes of the spine.     IMPRESSION:  1.  Interval improvement in the right pleural effusion. It is moderate in size.     2. Likely compressive atelectasis in the right lung.          [x]I have personally reviewed the patients radiographs  [x]Radiographs reviewed with radiologist   []No change from prior, tubes and lines in adequate position  [x]Improved   []Worsening    IMPRESSION:   · Bilateral Pleural Effusions: Improving on CXR (05/22); CXR on (05/19) demonstrated complete opacification R hemithorax- H/H has been stable; ? Mucus plug with atelectasis > has been coughing more productively now. Discussed with pt about chest tube- with low platelets would be of concern for any intervention. Currently (05/22) HD stable and measures underway to correct thrombocytopenia. · Metabolic Acidosis - 2/2 to below  · Acute on Chronic hypoxic Respiratory Failure - 2/2 Fluid overload d/t worsening CHF in the setting of ESRD   · COPD  · Acute diastolic CHF - Elevated BNP & BLE edema   · KYLEE on CKD - Elevated Cr; Nephrology following;Now receiving HD - started on 05/19  · Hypotension - Currently on Dobutamine drip at 5mcg/mg/kg/hr  · Pulmonary HTN  · Anemia - Received transfusion - 1U PRBC on 05/15/17  · Thrombocytopenia - Pt does have chronic ITP ( seen by Hematology in 02/2017); worsening (05/19) - Given DDAVP & 1U Plts on 05/19 following RRT  · Anasacra  · Cellulitis - Failed out-patient treatment; completed Zosyn on 05/16/17.    · PAD - with BLE pain - Vascular following  · CAD - S/p CABG in 2013; s/p AICD in 02/17; hx of Carotid endarterectomy - bilateral in 2015; Mitral regurgitation - mild-to moderate based on Echo (2016)  · Gout      Patient Active Problem List   Diagnosis Code    Hematospermia R36.1    CAD (coronary artery disease) I25.10    PVD (peripheral vascular disease) ([facility] I73.9    CHF (congestive heart failure) ([facility]) I50.9    Anasarca R60.1    Hx of CABG Z95.1    H/O carotid endarterectomy Z98.890    AICD (automatic cardioverter/defibrillator) present Z95.810    Acute exacerbation of CHF (congestive heart failure) (HCC) L11.5    Diastolic CHF, acute on chronic (HCC) I50.33    HTN (hypertension) I10    HLD (hyperlipidemia) E78.5    Acute CHF (congestive heart failure) ([facility]) I50.9    Acute on chronic diastolic (congestive) heart failure (HCC) I50.33    Thrombocytopenia (HCC) D69.6    Hypokalemia E87.6    Aortic stenosis, moderate I35.0    CKD (chronic kidney disease) N18.9    Pulmonary HTN (HCC) I27.2    Cellulitis L03.90      PLAN:   · SpO2 >90%; titrate supp O2 PRN; currently pt is resting on comfortably on 5LPM NC;  pt is a chronic CO2 retainer - monitor closely for signs of CO2 narcosis. · Repeat CXR (05/23) appears worse than yesterday - R side effusion appears increased again. Will continue to monitor; will hold off on Thoracentesis right now d/t low plt count and pt clinically feeling better. Daily CXR to monitor for now. · Aggressive pulmonary toileting; encourage ICS & PEP device   · Aspiration precautions - HOB >30'  · Con't Symbicort BID; con't duo-nebs q6 PRN; Con't Flonase; monitor respiratory response to dialysis. · Sputum cx ordered-Still unable to collect sample, will monitor; hold off on starting ABX at this time; con't to monitor s/s for infection   · Plts remain low - 48K today (05/23); H/H low this AM - Pt was given DDAVP and 1U plts on 05/19 - con't correction; monitor HD site as it is still oozing. · Cardio following - Resumed Dobutamine gtt overnight - 5mcg/kg/min; Currently HD stable; will monitor. Off Bumex gtt; defer diuresis management to Cardio & Nephro; Repeat Echo EF 55-60%. · Vascular following - For Arterial insufficiency; occlusion of SFA  · Nephrology following - ESRD, Started HD (05/19)  · Recommend SAMANTA with Pulmonary Clinic upon D/C for overall f/u; evaluation for COPD, baseline PTF's, evaluation for ROOPA. · Palliative care meeting today - 11am to discuss further plan of care.    · Will Follow closely, thank you for this consult.    · DVT, PUD prophylaxis - per primary team.          Natalio Morris PA-C

## 2017-05-23 NOTE — PROGRESS NOTES
Pt sitting in bed eating breakfast. Daughter at bedside. Pt c/o false teeth being thrown away and now cant eat properly. Temp cath site continues to ooze bloody drainage. Platelets 48 today. Hbg 7. Denies leg pain unless \"somebody messes with them\".   NAD. Pt looks quite comfortable. C/o some chest heaviness but no pain. States breathing \"pretty good right now\". Abd distended with ascites. +BLE edema, R>L. Feet warm to touch bilaterally. Has sensation and movement in toes. Multiple superficial wounds to left lower leg. No signs of infection. Continue current care per primary team.   CXR images reviewed- ? Right Pneumothorax- official read pending. Palliative care meeting scheduled today for 11am. Follow up recommendations. Will require bilateral lower extremity angiograms given claudication and left leg SFA occlusion. Holding off until we know whether or not his kidney function will return and his platelets rise. Following.      Kareen Lanza  [phone]

## 2017-05-23 NOTE — PALLIATIVE CARE
Follow up on patient that is on our service. Met with Mr. Fly Gibbs and daughters X3 to discuss goals at this time. Lengthy discussion regarding what is important to him at this time. He voiced being tired of being \"rolled around like a ball and not getting better\", being , \"experimented on with no positive outcome\". Discussed the current progress of dialysis and patient voices being happy with it making him feel better but also is realistic that he would not want dialysis long term and it's \"hit or miss\" as far as the outcome. Allowed him to voice his frustrations of not being able to control the bleed from his temporary cath site. Mr. Fly Gibbs wants to go home and be with his family and focus on comfort, quality and dignity. He agrees to hospice upon discharge. Family will need a day to get his house cleaned and get equipment in to care for him. Encouraged daughters to purchase brown/red sheets and towels to minimize the trauma of a bleed and discussed calling hospice should they have questions once home on how to manage medical situations. Discussed Code Status at length in the setting of his chronic illnesses and Mr. Fly Gibbs wishes for Do Not Resuscitate. Durable DNR completed and Dr. Ifeanyi Aldridge is aware. Spoke with pulmonary as far as the plan. Would encouraged paring down of medications and procedures that do not contribute to comfort. He is aware and agreeable to no more dialysis. Blood transfusions would be acceptable until discharge as recommended by the physician. Will continue to follow for support and assistance in decision making. Obtained information on Durable Power of  for patient and daughters and encouraged outside assistance in completing those.

## 2017-05-23 NOTE — PROGRESS NOTES
Noted hospice order. Palliative care liaison progress note reviewed.    Called [facility] [phone], spoke to Marni Escamilla, made referral.

## 2017-05-23 NOTE — PROGRESS NOTES
[facility]      CARDIOLOGY PROGRESS NOTE  RECS:      1. Acute diastolic congestive heart failure- with some improvement still c/o SOB has edema . Limited  Echo revealed preserved EF% no wma. 2. Hypotension- resolved. on low dose Coreg and monitor bp.  3. Anemia- Monitor H&H. Dropped consider transfusion. 4. Thrombocytopenia?- asa on hold   5. S/p CABG in 2013 in [facility] 7- stable denies chest pain or pressure. S/p AICD interrogate 2/17 normal function. 6. Hx Carotid endarterectomy- bilateral in 2015    7. Right lung opacification- ? Bleeding from permacath. requiring frequent dressing changes   8. Aortic stenosis : moderate last echo 5/17  9. ESRD- on HD  10. Hyperlipidemia- statin on hold due to elevated LFT's . Plan  for Palliative Medicine to meet with patient and daughters today.        SUMMARY: echo 5/17  Left ventricle: Systolic function was normal. Ejection fraction was  estimated in the range of 55 % to 60 %. No obvious wall motion  abnormalities identified in the views obtained. Wall thickness was mildly  increased. Aortic valve: There was moderate stenosis. Valve peak gradient was 49  mmHg. Valve mean gradient was 31 mmHg. Estimated aortic valve area (by  Vmax) was 1 cm-sq. COMPARISONS:  Comparison was made with the previous study of 28-Feb-2017. Aortic  stenosis has worsened. Otherwise no significant change. INDICATIONS: Congestive heart failure, Mitral regurgitation, Aortic  stenosis.     ASSESSMENT:  Hospital Problems  Date Reviewed: 3/11/2017          Codes Class Noted POA    Acute on chronic kidney failure [facility]) ICD-10-CM: N17.9, N18.9  ICD-9-CM: 584.9, 585.9  5/19/2017 Unknown        Cellulitis ICD-10-CM: L03.90  ICD-9-CM: 682.9  5/14/2017 Unknown        Pulmonary HTN ([facility] ICD-10-CM: I27.2  ICD-9-CM: 416.8  4/4/2017 Yes        Aortic stenosis, moderate (Chronic) ICD-10-CM: I35.0  ICD-9-CM: 424.1  3/11/2017 Yes        Acute on chronic diastolic (congestive) heart failure ([facility] ICD-10-CM: I50.33  ICD-9-CM: 428.33, 428.0  2/27/2017 Yes        Acute CHF (congestive heart failure) ([facility] ICD-10-CM: I50.9  ICD-9-CM: 428.0  2/26/2017 Yes        HTN (hypertension) (Chronic) ICD-10-CM: I10  ICD-9-CM: 401.9  9/4/2016 Yes        HLD (hyperlipidemia) (Chronic) ICD-10-CM: E78.5  ICD-9-CM: 272.4  9/4/2016 Yes        Diastolic CHF, acute on chronic [facility]) ICD-10-CM: I50.33  ICD-9-CM: 428.33, 428.0  8/17/2016 Yes        Acute exacerbation of CHF (congestive heart failure) ([facility] ICD-10-CM: I50.9  ICD-9-CM: 428.0  8/12/2016 Unknown        Hx of CABG ICD-10-CM: Z95.1  ICD-9-CM: V45.81  7/22/2016 Yes    Overview Signed 7/22/2016  5:23 PM by Aiden López NP     2013             H/O carotid endarterectomy ICD-10-CM: Z98.890  ICD-9-CM: V45.89  7/22/2016 Yes    Overview Signed 7/22/2016  5:27 PM by Aiden López NP     2015             AICD (automatic cardioverter/defibrillator) present ICD-10-CM: Z95.810  ICD-9-CM: V45.02  7/22/2016 Yes    Overview Signed 7/22/2016  5:27 PM by Aiden López NP     2015             CHF (congestive heart failure) (HCC) ICD-10-CM: I50.9  ICD-9-CM: 428.0  7/21/2016 Yes        CAD (coronary artery disease) ICD-10-CM: I25.10  ICD-9-CM: 414.00  3/5/2014 Yes        PVD (peripheral vascular disease) ([facility] ICD-10-CM: I73.9  ICD-9-CM: 443.9  3/5/2014 Yes                SUBJECTIVE:    No CP  C/o  SOB   Improved leg pain     OBJECTIVE:    VS:   Visit Vitals    /82 (BP 1 Location: Left arm, BP Patient Position: At rest)    Pulse 94    Temp 98.5 °F (36.9 °C)    Resp 18    Ht 6' (1.829 m)    Wt 86.6 kg (191 lb)    SpO2 96%    BMI 25.9 kg/m2         Intake/Output Summary (Last 24 hours) at 05/23/17 0857  Last data filed at 05/22/17 1628   Gross per 24 hour   Intake              480 ml   Output             1500 ml   Net            -1020 ml     TELE: normal sinus rhythm    General: alert, well developed, pleasant and in no distress.  Confused at times.  HENT: Normocephalic, atraumatic. Normal external eye. Neck :  increased JVP  Cardiac:  regular rate and rhythm, S1, S2 normal, no S3 or S4, systolic murmur: holosystolic 3/6, harsh at lower left sternal border, at apex, throughout the precordium, no click, no rub  Lungs: heard  rales lung bases. Abdomen: Soft, nontender, no masses  Extremities:  Edema +1 up to upper thighs and abdomen. Labs: Results:       Chemistry Recent Labs      05/23/17   0539  05/22/17   0405  05/21/17   1034   GLU  102*  101*  153*   NA  135*  134*  133*   K  3.7  3.6  3.4*   CL  100  96*  97*   CO2  27  30  29   BUN  33*  50*  46*   CREA  2.91*  3.79*  3.65*   CA  8.5  9.1  8.7   AGAP  8  8  7   BUCR  11*  13  13   AP   --   70   --    TP   --   6.8   --    ALB   --   2.7*   --    GLOB   --   4.1*   --    AGRAT   --   0.7*   --       CBC w/Diff Recent Labs      05/23/17   0539  05/22/17   0405   WBC  4.5*  5.3   RBC  2.46*  2.59*   HGB  7.0*  7.3*   HCT  22.2*  23.5*   PLT  48*  55*   GRANS   --   62   LYMPH   --   14*   EOS   --   10*      Cardiac Enzymes Recent Labs      05/20/17   1330   CPK  24*   CKND1  8.8*      Coagulation Recent Labs      05/23/17   0539   PTP  17.0*   INR  1.4*       Lipid Panel Lab Results   Component Value Date/Time    Cholesterol, total 100 07/22/2016 05:20 AM    HDL Cholesterol 31 07/22/2016 05:20 AM    LDL, calculated 54.4 07/22/2016 05:20 AM    VLDL, calculated 14.6 07/22/2016 05:20 AM    Triglyceride 73 07/22/2016 05:20 AM    CHOL/HDL Ratio 3.2 07/22/2016 05:20 AM      BNP No results for input(s): BNPP in the last 72 hours. Liver Enzymes Recent Labs      05/22/17   0405   TP  6.8   ALB  2.7*   AP  70   SGOT  79*      Thyroid Studies Lab Results   Component Value Date/Time    TSH 4.23 07/22/2016 05:20 AM                Kevin Rivers [phone] supervised. I have independently evaluated and examined the patient. All relevant labs and testing data's are reviewed.   Care plan discussed and updated after review.     Shantal Mireles MD

## 2017-05-23 NOTE — HOSPICE
[facility] RN Note:  Hospice consult noted and appreciated. Chart review in progress. Family not currently at bedside. Called [place]-daughter, hospice family meeting with all 3 daughters scheduled for 10 am tomorrow 5/24 at bedside. Will assess DME needs and arrange delivery for tomorrow per family preference. [place] has been primary caregiver for the past 5 years and states she needs a day to clean and arrange house before patient is discharged. Anticipated discharge date Thursday morning 5/25. Please call with any questions or concerns. Thank You for allowing us to participate in the care of this patient.      Joanna Zaman, [address]   [phone] office/24 hrs/weekends  [phone] cell (M-F 8-5pm)

## 2017-05-23 NOTE — ACP (ADVANCE CARE PLANNING)
Patient requested assistance with completing Advance Medical Directive.  consulted with patient and clarified questions and concerns about same. Patient signed Advance Medical Directive, with original given to patient, a copy given to [address]. Patient chose two of his daughters to be his Medical Agents: Lachelle Bahena and [facility]. Thiago Carpenter MDiv.   Board Certified [employer] [phone]

## 2017-05-23 NOTE — PROGRESS NOTES
Patient requested assistance with completing Advance Medical Directive.  consulted with patient and clarified questions and concerns about same. Patient signed Advance Medical Directive, with original given to patient, a copy given to [address].  will place a copy in the paper chart after lunch today. The patient's three daughters were present. 1100:  placed a paper copy of Advance Medical Directive in patient's chart. Thiago Carpenter MDiv.   Board Certified [employer] [phone]

## 2017-05-24 NOTE — HOSPICE
[facility] RN Note:  In to meet with patient,and daughters [place], [place], [address] . Discussed Hospice philosophy, general plan of care, levels of care, services and on call procedures. Family information packet provided & reviewed with daughters. Pt and family state that they understand that continuing dialysis is not possible under hospice care. Family and patient agreeable to home hospice. Hospice has ordered DME to be delivered to home this evening. Anticipated discharge 5/25 morning. Unknown transport time. Please call with any questions or concerns. Thank You for allowing us to participate in the care of this patient.      Sierra Franklin, [facility]   [phone] office/24 hrs/weekends  [phone] cell (M-F 8-5pm)

## 2017-05-24 NOTE — PROGRESS NOTES
Met with pt and family at bedside, this pt was transferred from CV[facility], family and pt voiced concern the need for pt's bottom partial teeth that was accidentally thrown away by staff. Family wanted to know the procedure to have the teeth replaced. This information will be passed on to 4N unit manager of this family's concern.

## 2017-05-24 NOTE — PROGRESS NOTES
Patient and family have decided to go home on hospice  Tunneled dialysis catheter no longer needed and asked to be removed    Procedure Note: The anchoring sutures were removed. As the catheter had been in only a few days, I did not need to do any dissection. The catheter was removed in it's entirety with the cuff intact. Direct pressure was applied for approximately 10 minutes. Since he has had persistent oozing due to thrombocytopenia, I did give 3cc of 1% lidocaine and placed a nylon suture for additional hemostasis, followed by surgicel at the site and a pressure dressing. He tolerated the procedure well.     If further oozing, advised nurse to notify us and we had discussed giving ddavp or platelets

## 2017-05-24 NOTE — PROGRESS NOTES
Received call from  [facility] in [place]; she asks pt's clinicals to be faxed to her # [phone]. Requested paperwork faxed.

## 2017-05-24 NOTE — ROUTINE PROCESS
Bedside and Verbal shift change report given CILNTON Dale (oncoming nurse) by Rigo Funes RN (offgoing nurse). Report included the following information SBAR, Kardex, MAR and Recent Results.     SITUATION:  Code Status: DNR  Reason for Admission: Acute exacerbation of CHF (congestive heart failure) ([facility]  Cellulitis  Hospital day: 10  Problem List:       Hospital Problems  Date Reviewed: 3/11/2017          Codes Class Noted POA    Acute on chronic kidney failure ([facility] ICD-10-CM: N17.9, N18.9  ICD-9-CM: 584.9, 585.9  5/19/2017 Unknown        Cellulitis ICD-10-CM: L03.90  ICD-9-CM: 682.9  5/14/2017 Unknown        Pulmonary HTN ([facility] ICD-10-CM: I27.2  ICD-9-CM: 416.8  4/4/2017 Yes        Aortic stenosis, moderate (Chronic) ICD-10-CM: I35.0  ICD-9-CM: 424.1  3/11/2017 Yes        Acute on chronic diastolic (congestive) heart failure (HCC) ICD-10-CM: I50.33  ICD-9-CM: 428.33, 428.0  2/27/2017 Yes        Acute CHF (congestive heart failure) ([facility] ICD-10-CM: I50.9  ICD-9-CM: 428.0  2/26/2017 Yes        HTN (hypertension) (Chronic) ICD-10-CM: I10  ICD-9-CM: 401.9  9/4/2016 Yes        HLD (hyperlipidemia) (Chronic) ICD-10-CM: E78.5  ICD-9-CM: 272.4  9/4/2016 Yes        Diastolic CHF, acute on chronic ([facility] ICD-10-CM: I50.33  ICD-9-CM: 428.33, 428.0  8/17/2016 Yes        Acute exacerbation of CHF (congestive heart failure) ([facility] ICD-10-CM: I50.9  ICD-9-CM: 428.0  8/12/2016 Unknown        Hx of CABG ICD-10-CM: Z95.1  ICD-9-CM: V45.81  7/22/2016 Yes    Overview Signed 7/22/2016  5:23 PM by Mauro Flanagan NP     2013             H/O carotid endarterectomy ICD-10-CM: Z98.890  ICD-9-CM: V45.89  7/22/2016 Yes    Overview Signed 7/22/2016  5:27 PM by Mauro Flanagan NP     2015             AICD (automatic cardioverter/defibrillator) present ICD-10-CM: Z95.810  ICD-9-CM: V45.02  7/22/2016 Yes    Overview Signed 7/22/2016  5:27 PM by Mauro Flanagan NP     2015             CHF (congestive heart failure) ([facility] ICD-10-CM: I50.9  ICD-9-CM: 428.0  7/21/2016 Yes        CAD (coronary artery disease) ICD-10-CM: I25.10  ICD-9-CM: 414.00  3/5/2014 Yes        PVD (peripheral vascular disease) ([facility] ICD-10-CM: I73.9  ICD-9-CM: 443.9  3/5/2014 Yes              BACKGROUND:   Past Medical History:   Past Medical History:   Diagnosis Date    Arrhythmia     A fib; has external vest and belt he wears for this    Arthritis     High blood pressure     Hypercholesteremia     Mini stroke ([facility] 2000    Unspecified sleep apnea     does not wear machine      Patient taking anticoagulants no    Patient has a defibrillator: no    History of shots NO for example, flu, pneumonia, tetanus   Isolation History NO for example, MRSA, CDiff    ASSESSMENT:  Changes in Assessment Throughout Shift: none  Significant Changes in 24 hours (for example, RR/code, fall)  Patient has Central Line: no Reasons if yes:   Patient has Emerson Cath: yes Reasons if yes:  Comfort  Mobility Issues  PT  IV Patency  OR Checklist  Pending Tests    Last Vitals:  Vitals w/ MEWS Score (last day)     Date/Time MEWS Score Pulse Resp Temp BP Level of Consciousness SpO2    05/24/17 1054 -- -- -- -- -- -- 94 %    05/24/17 0825 1 81 18 97.6 °F (36.4 °C) 113/75 Alert 94 %    05/23/17 2140 1 88 18 96.7 °F (35.9 °C) 103/65 Alert 96 %    05/23/17 2000 -- 82 -- -- 115/69 -- --    05/23/17 1905 -- -- -- -- -- -- 95 %    05/23/17 0819 1 94 18 98.5 °F (36.9 °C) 138/82 Alert 96 %    05/23/17 0438 1 80 18 98.3 °F (36.8 °C) 148/78 Alert 99 %    05/23/17 0006 1 78 20 98 °F (36.7 °C) 126/75 Alert 100 %            PAIN    Pain Assessment    Pain Intensity 1: 0 (05/24/17 0800)    Pain Location 1:  (bilateral lower extremities)    Pain Intervention(s) 1: Medication (see MAR)    Patient Stated Pain Goal: 0  Intervention effective: yes  Time of last intervention: 1900 Reassessment Completed: yes   Other actions taken for pain:     Last 3 Weights:  Last 3 Recorded Weights in this Encounter    05/22/17 4548 05/23/17 0850 05/24/17 1007   Weight: 94 kg (207 lb 4.8 oz) 86.6 kg (191 lb) 86.6 kg (191 lb)   Weight change:     INTAKE/OUPUT    Current Shift:      Last three shifts: 05/23 0701 - 05/24 1900  In: -   Out: 650 [Urine:650]    RECOMMENDATIONS AND DISCHARGE PLANNING  Patient needs and requests:     Pending tests/procedures:      Discharge plan for patient:     Discharge planning Needs or Barriers:     Estimated Discharge Date:  Posted on Whiteboard in Patients Room: yes       \"HEALS\" SAFETY CHECK  A safety check occurred in the patient's room between off going nurse and oncoming nurse listed above. The safety check included the below items:    H  High Alert Medications Verify all high alert medication drips (heparin, PCA, etc.)  E  Equipment Suction is set up for ALL patients (with phylicia)  Red plugs utilized for all equipment (IV pumps, etc.)  WOWs wiped down at end of shift. Room stocked with oxygen, suction, and other unit-specific supplies  A  Alarms Bed alarm is set for fall risk patients  Ensure chair alarm is in place and activated if patient is up in a chair  L  Lines Check IV for any infiltration  Emerson bag is empty if patient has a Emerson   Tubing and IV bags are labeled  S  Safety  Room is clean, patient is clean, and equipment is clean. Hallways are clear from equipment besides carts. Fall bracelet on for fall risk patients  Ensure room is clear and free of clutter  Suction is set up for ALL patients (with phylicia)  Hallways are clear from equipment besides carts.    Isolation precautions followed, supplies available outside room, sign posted    Yasmeen Peoples RN

## 2017-05-24 NOTE — PROGRESS NOTES
RENAL DAILY PROGRESS NOTE      IMPRESSION:   Acute on chronic renal failure, not recovering renal function ,started on HD because of poor clearance, first HD on 5/19,-- tolerated second HD well yesterday , UF 1.5 L   Oozing from HD catheter, low platelet  Thrombocytopenia, chronic    Short of breath, multifactorial , bilateral pleural effusion, COPD exacerbation   Anemia , low iron saturation   Hypotension, on dobutamine drip , improving    PLAN:   · Plan for hospice care  · Need to remove HD catheter prior discharge , discussed with vascular colleague , plan to remove HD catheter and monitor overnight, will give ddavp and platelet transfusion .       Discussed with palliative care team and vascular team.           Subjective:     71y M with acute on chronic kidney injury,   Complaint:   Overnight events noted  Admits wants to go home  \" i am tired with this all, enough dialysis\"      Current Facility-Administered Medications   Medication Dose Route Frequency    budesonide-formoterol (SYMBICORT) 160-4.5 mcg/actuation HFA inhaler 2 Puff  2 Puff Inhalation BID RT    iron sucrose (VENOFER) 100 mg in 0.9% sodium chloride 100 mL IVPB  100 mg IntraVENous DIALYSIS MON, WED & FRI    carvedilol (COREG) tablet 3.125 mg  3.125 mg Oral Q12H    acetaminophen (TYLENOL) tablet 650 mg  650 mg Oral Q6H PRN    sodium citrate 4 gram /100 mL (4 %) 0.08 g  2 mL Hemodialysis DIALYSIS PRN    0.9% sodium chloride infusion 250 mL  250 mL IntraVENous PRN    colchicine (MITIGARE) capsule 0.6 mg  0.6 mg Oral Q TU, TH & SAT    epoetin ron (EPOGEN;PROCRIT) injection 6,000 Units  6,000 Units SubCUTAneous Q MON, WED & FRI    allopurinol (ZYLOPRIM) tablet 50 mg  50 mg Oral DAILY    cholecalciferol (VITAMIN D3) tablet 2,000 Units  2,000 Units Oral DAILY    therapeutic multivitamin (THERAGRAN) tablet 1 Tab  1 Tab Oral DAILY    nitroglycerin (NITROSTAT) tablet 0.4 mg  0.4 mg SubLINGual PRN    pantoprazole (PROTONIX) tablet 40 mg  40 mg Oral ACB    albuterol-ipratropium (DUO-NEB) 2.5 MG-0.5 MG/3 ML  3 mL Nebulization Q6H PRN    ferrous sulfate tablet 325 mg  325 mg Oral DAILY WITH BREAKFAST    fluticasone (FLONASE) 50 mcg/actuation nasal spray 2 Spray  2 Spray Both Nostrils DAILY    HYDROcodone-acetaminophen (NORCO) 5-325 mg per tablet 1 Tab  1 Tab Oral Q4H PRN    influenza vaccine 2016-17 (36mos+)(PF) (FLUZONE/FLUARIX/FLULAVAL QUAD) injection 0.5 mL  0.5 mL IntraMUSCular PRIOR TO DISCHARGE    zolpidem (AMBIEN) tablet 5 mg  5 mg Oral QHS PRN    sodium chloride (NS) flush 5-10 mL  5-10 mL IntraVENous Q8H    sodium chloride (NS) flush 5-10 mL  5-10 mL IntraVENous PRN    ondansetron (ZOFRAN) injection 4 mg  4 mg IntraVENous Q4H PRN       Review of Symptoms: comprehensive ROS negative except above. Objective:     Patient Vitals for the past 24 hrs:   Temp Pulse Resp BP SpO2   05/24/17 1054 - - - - 94 %   05/24/17 0825 97.6 °F (36.4 °C) 81 18 113/75 94 %   05/23/17 2140 96.7 °F (35.9 °C) 88 18 103/65 96 %   05/23/17 2000 - 82 - 115/69 -   05/23/17 1905 - - - - 95 %        Weight change:      05/22 1901 - 05/24 0700  In: -   Out: 650 [Urine:650]    Intake/Output Summary (Last 24 hours) at 05/24/17 1249  Last data filed at 05/23/17 1531   Gross per 24 hour   Intake                0 ml   Output              300 ml   Net             -300 ml     Physical Exam:   General: comfortable, no acute distress   HEENT  no thyromegaly  CVS: S1S2 heard,  no rub  RS: + air entry b/l,   Abd: Soft, Non tender, Not distended,   Neuro: non focal, awake, alert ,   Extrm: + edema, no cyanosis, clubbing   Musculoskeletal: No gross joints or bone deformities   Access; HD catheter site, oozing blood.          Data Review:     LABS:   Hematology:   Recent Labs      05/23/17   0539  05/22/17   0405   WBC  4.5*  5.3   HGB  7.0*  7.3*   HCT  22.2*  23.5*     Chemistry:   Recent Labs      05/24/17   0510  05/23/17   0539  05/22/17   0405   BUN  43*  33*  50*   CREA  3.23* 2.91*  3.79*   CA  8.4*  8.5  9.1   ALB   --    --   2.7*   K  4.2  3.7  3.6   NA  135*  135*  134*   CL  98*  100  96*   CO2  28  27  30   GLU  85  102*  101*              Procedures/imaging: see electronic medical records for all procedures, Xrays and details which were not copied into this note but were reviewed prior to creation of Plan          Assessment & Plan:     As above         Iron Pruett MD  5/24/2017  12:03 PM

## 2017-05-24 NOTE — ROUTINE PROCESS
Spoke with  informed him that  c/o some anxiety and not feeling like himself. Ativan 0.5  Once dose was ordered.

## 2017-05-24 NOTE — PROGRESS NOTES
[facility]  Pulmonary, Critical Care, and Sleep Medicine    Name: Basil Cardona MRN: [id number]   : 1940 Hospital: [facility]   Date: 2017          Subjective/Interval History:     Patient is a 78 y.o. Male, prior smoker, with multiple medical problems and extensive cardiac history, to include PMH of Acute on chronic Systolic and Diastolic CHF; COPD (per pt, but not found in chart) - on home O2 at 3LPM at rest, 4LPM with exertion; PVD, HTN, Pulmonary HTN, CAD, s/p CABG (), s/p AICD (); HLD; and chronic ITP, who presented to [facility] ED via EMS on 17, c/o R leg pain, s/p sliding off his recliner (per pt), and progressively worsening of leg swelling x1 week. Pt was seen on 17 in the ED for leg swelling as well, had negative PVL and was d/h with keflex and pain meds. Has been taking abx as directed and has finished his pain meds but he has not had any improvement in pain. Upon arrival to ED, pt has clinical signs of cellulitis, not responding to out-patient treatment. Pt was admitted for cellulitis and CHF exacerbation. [facility] course has included: PVL's (-) for DVT; CTA (-) for PE; Arterial studies show occluded L SFA and moderate >50% stenosis of the R superficial femoral artery with severe >75% stenosis of the profunda femoris artery. Pulmonary team consulted (17) d/t hypoxia in the setting of acute CHF with fluid overload, 2/2 worsening KYLEE on CKD and pt currently refusing dialysis.  Pt may also have underlying respiratory disease, and reports hx of COPD - underlying COPD exacerbation?    17:  - Pt & family met with Palliative care team () - Pt was made Hospice upon D/C with DNR in place  - Temp cath being removed by CTS upon my entering the room - HD will stop  - Pt is alert, awake, family at bedside; resting comfortably on 5LPM NC  - Breathing continues to improve  - D/C scheduled for 17 in the morning  - Continues to use PEP device - says he like it, feels like its helping him breathe  - Persistent mild productive cough  - Denies any CP, palpitations, abd pain, N/V/D; F/C; H/A  - Looking forward to going home and spending time with family          ROS:Pertinent items are noted in HPI. Objective:   Vital Signs:    Visit Vitals    /75 (BP 1 Location: Left arm, BP Patient Position: Head of bed elevated (Comment degrees))    Pulse 81    Temp 97.6 °F (36.4 °C)    Resp 18    Ht 6' (1.829 m)    Wt 86.6 kg (191 lb)    SpO2 94%    BMI 25.9 kg/m2       O2 Device: Nasal cannula   O2 Flow Rate (L/min): 5 l/min   Temp (24hrs), Av.2 °F (36.2 °C), Min:96.7 °F (35.9 °C), Max:97.6 °F (36.4 °C)       Intake/Output:   Last shift:         Last 3 shifts:  1901 -  0700  In: -   Out: 650 [Urine:650]    Intake/Output Summary (Last 24 hours) at 17 0839  Last data filed at 17 1531   Gross per 24 hour   Intake                0 ml   Output              300 ml   Net             -300 ml        Physical Exam:    General: Alert, awake, NAD, resting comfortably on 5LPM NC   HEENT: Normocephalic,atraumatic; PERRL, conjunctivae/corneas clear, anicteric; nares normal; no drainage/sinus tenderness; nasal/oral mucosa moist; neck supple, symmetric; trachea midline; No adenopathy; No JVD noted. Resp: Symmetrical chest rise; mod AE bilat; diminished throughout, especially bibasilar, R>L - slightly better than previous; No wheezes/rhonchi noted. CV: RRR, S1S2 normal; No m/r/g   Abdomen: Protuberant; moderate ascites - appears improved; abd is slightly firm, distended and tympanic - no change from previous; non-tender(+) B. S x4;.No masses/ organomegaly/ paradox noted   Extremity: 1-2+ pulses to upper extremities; unable to palpate BLE pulses - unchanged; anasarca; +1-+2 edema BLE up to upper thighs and abdominal wall    Neuro: Alert, grossly non-focal  Skin: Venous statis; dry; 2 ulcers noted to L shin      DATA:  Labs:  Recent Labs      05/23/17   0539  05/22/17   0405   WBC  4.5*  5.3   HGB  7.0*  7.3*   HCT  22.2*  23.5*   PLT  48*  55*     Recent Labs      05/24/17   0510  05/23/17   0539  05/22/17   0405   NA  135*  135*  134*   K  4.2  3.7  3.6   CL  98*  100  96*   CO2  28  27  30   GLU  85  102*  101*   BUN  43*  33*  50*   CREA  3.23*  2.91*  3.79*   CA  8.4*  8.5  9.1   ALB   --    --   2.7*   SGOT   --    --   79*   ALT   --    --   152*   INR   --   1.4*   --      PFT:    N/A                                                   Echo [05/18/17]:   SUMMARY:  Left ventricle: Systolic function was normal. Ejection fraction was  estimated in the range of 55 % to 60 %. No obvious wall motion  abnormalities identified in the views obtained. Wall thickness was mildly  increased. Aortic valve: There was moderate stenosis. Valve peak gradient was 49  mmHg. Valve mean gradient was 31 mmHg. Estimated aortic valve area (by  Vmax) was 1 cm-sq. Imaging:   CXR [5/24/17]: FINDINGS: Single AP portable view of chest at 0510 demonstrates a right internal  central venous jugular dual lumen catheter with tip in the superior vena cava  and a left-sided defibrillating pacemaker which obscures a small portion the  upper lateral left chest. Dual leads are present and intact. There is probably  no change in the relatively large right pleural effusion obscuring the  hemidiaphragm some increased interstitial opacities in the remaining aerated  lung. There is blunting of the left costophrenic angle suggesting possible  small effusion. No gross consolidations are seen. The mediastinum is not  widened but the heart is enlarged. Sternal wires are present from a probable  prior CABG procedure. There is little change from 23 May 2017.     IMPRESSION:  Large right pleural effusion and probable associated atelectasis; some increased  interstitial opacity in the remaining aerated lung. Probable small left base  effusion.  No gross consolidations. AICD.        [x]I have personally reviewed the patients radiographs  [x]Radiographs reviewed with radiologist   []No change from prior, tubes and lines in adequate position  [x]Improved   []Worsening    IMPRESSION:   · Bilateral Pleural Effusions: Improving on CXR (05/22); CXR on (05/19) demonstrated complete opacification R hemithorax- H/H has been stable; ? Mucus plug with atelectasis > has been coughing more productively now. Discussed with pt about chest tube- with low platelets would be of concern for any intervention. Currently (05/22) HD stable and measures underway to correct thrombocytopenia. · Metabolic Acidosis - 2/2 to below  · Acute on Chronic hypoxic Respiratory Failure - 2/2 Fluid overload d/t worsening CHF in the setting of ESRD   · COPD  · Acute diastolic CHF - Elevated BNP & BLE edema   · KYLEE on CKD - Elevated Cr; Nephrology following;Now receiving HD - started on 05/19  · Hypotension - Currently on Dobutamine drip at 5mcg/mg/kg/hr  · Pulmonary HTN  · Anemia - Received transfusion - 1U PRBC on 05/15/17  · Thrombocytopenia - Pt does have chronic ITP ( seen by Hematology in 02/2017); worsening (05/19) - Given DDAVP & 1U Plts on 05/19 following RRT  · Anasacra  · Cellulitis - Failed out-patient treatment; completed Zosyn on 05/16/17.    · PAD - with BLE pain - Vascular following  · CAD - S/p CABG in 2013; s/p AICD in 02/17; hx of Carotid endarterectomy - bilateral in 2015; Mitral regurgitation - mild-to moderate based on Echo (2016)  · Gout      Patient Active Problem List   Diagnosis Code    Hematospermia R36.1    CAD (coronary artery disease) I25.10    PVD (peripheral vascular disease) ([facility] I73.9    CHF (congestive heart failure) ([facility] I50.9    Anasarca R60.1    Hx of CABG Z95.1    H/O carotid endarterectomy Z98.890    AICD (automatic cardioverter/defibrillator) present Z95.810    Acute exacerbation of CHF (congestive heart failure) ([facility]) T08.9    Diastolic CHF, acute on chronic (HCC) I50.33    HTN (hypertension) I10    HLD (hyperlipidemia) E78.5    Acute CHF (congestive heart failure) (HCC) I50.9    Acute on chronic diastolic (congestive) heart failure (HCC) I50.33    Thrombocytopenia (HCC) D69.6    Hypokalemia E87.6    Aortic stenosis, moderate I35.0    CKD (chronic kidney disease) N18.9    Pulmonary HTN (HCC) I27.2    Cellulitis L03.90      PLAN:   · SpO2 >90%; titrate supp O2 PRN; currently pt is resting on comfortably on 5LPM NC;  pt is a chronic CO2 retainer - monitor closely for signs of CO2 narcosis. · Repeat CXR appears slightly improved for R sided effusion  · Encourage ICS & PEP device - encouraged pt to continue with these upon D/C for added respiratory benefit. · Aspiration precautions - HOB >30'  · Con't Symbicort BID; con't duo-nebs q6 PRN; Con't Flonase  · Plts remain low; has received DDAVP and 1U plts on 05/19; Temp cath still oozing - was removed today 2/2 to oozing and decision to stop dialysis upon D/C with hospice care  · Cardio, Vascular & Nephrology following  · Palliative care meeting (05/203/17) - pt was made hospice upon D/C & DNR order in place  · Will sign off - will be available for questions if needed.          Stacey Bolanos PA-C

## 2017-05-24 NOTE — PROGRESS NOTES
Loli Godinez M.D. PROGRESS NOTE    Name: Romain Francisco MRN: [id number]   : 1940 Hospital: [address] [place] [address] Dr   Date: 2017  Admission Date: 2017     Subjective/Objective/Plans  Comfortable at rest  Refusing to have HD removed    Vital Signs:  Visit Vitals    /75 (BP 1 Location: Left arm, BP Patient Position: Head of bed elevated (Comment degrees))    Pulse 81    Temp 97.6 °F (36.4 °C)    Resp 18    Ht 6' (1.829 m)    Wt 86.6 kg (191 lb)    SpO2 94%    BMI 25.9 kg/m2       O2 Device: Nasal cannula   O2 Flow Rate (L/min): 2 l/min   Temp (24hrs), Av.2 °F (36.2 °C), Min:96.7 °F (35.9 °C), Max:97.6 °F (36.4 °C)     11:24 AM  Intake/Output:   Last shift:         Last 3 shifts:  1901 -  0700  In: -   Out: 650 [Urine:650]    Intake/Output Summary (Last 24 hours) at 17 1124  Last data filed at 17 1531   Gross per 24 hour   Intake                0 ml   Output              300 ml   Net             -300 ml         Physical Exam:    General: in no apparent distress    HEENT: pupils equal, no ear discharge   Neck: No abnormally enlarged lymph nodes. , no JDV   Mouth: MMM no lesions    Chest: normal, no breast masses   Lungs: decreased air exchange bilaterally   Heart: 2/6 systolic murmur   Abdomen: abdomen is soft without significant tenderness, masses, organomegaly or guarding   Extremity: negative   Neuro: alert    Skin: Skin color, texture, turgor normal. No rashes or lesions    Data Review:    Labs: Results:       Chemistry Recent Labs      17   0510  17   0539  17   0405   GLU  85  102*  101*   NA  135*  135*  134*   K  4.2  3.7  3.6   CL  98*  100  96*   CO2  28  27  30   BUN  43*  33*  50*   CREA  3.23*  2.91*  3.79*   CA  8.4*  8.5  9.1   AGAP  9  8  8   BUCR  13  11*  13   TBILI   --    --   1.3*   AP   --    --   70   TP   --    --   6.8   ALB   --    --   2.7*   GLOB   --    --   4.1*   AGRAT   --    --   0.7*      CBC w/Diff Recent Labs 05/23/17 0539  05/22/17   0405   WBC  4.5*  5.3   RBC  2.46*  2.59*   HGB  7.0*  7.3*   HCT  22.2*  23.5*   PLT  48*  55*   GRANS   --   62   LYMPH   --   14*   EOS   --   10*      Cardiac Enzymes No results for input(s): CPK, CKND1, TYSON in the last 72 hours. No lab exists for component: CKRMB, TROIP   Coagulation Recent Labs      05/23/17 0539   PTP  17.0*   INR  1.4*       Lipid Panel Lab Results   Component Value Date/Time    Cholesterol, total 100 07/22/2016 05:20 AM    HDL Cholesterol 31 07/22/2016 05:20 AM    LDL, calculated 54.4 07/22/2016 05:20 AM    VLDL, calculated 14.6 07/22/2016 05:20 AM    Triglyceride 73 07/22/2016 05:20 AM    CHOL/HDL Ratio 3.2 07/22/2016 05:20 AM      BNP No results for input(s): BNPP in the last 72 hours. Liver Enzymes Recent Labs      05/22/17 0405   TP  6.8   ALB  2.7*   TBILI  1.3*   AP  70   SGOT  79*   ALT  152*   CBIL  0.6*      Thyroid Studies Lab Results   Component Value Date/Time    TSH 4.23 07/22/2016 05:20 AM          Procedures/imaging: see electronic medical records for all procedures, Xrays and details which were not copied into this note but were reviewed. Allergies:  No Known Allergies    Home Medications:  Prior to Admission Medications   Prescriptions Last Dose Informant Patient Reported? Taking? HYDROcodone-acetaminophen (NORCO) 5-325 mg per tablet 5/12/2017 at 0300  No No   Sig: Take 1 Tab by mouth every six (6) hours as needed for Pain. Max Daily Amount: 4 Tabs. albuterol-ipratropium (DUO-NEB) 2.5 mg-0.5 mg/3 ml nebu 5/11/2017 at 1700  No No   Sig: 3 mL by Nebulization route every six (6) hours as needed. allopurinol (ZYLOPRIM) 100 mg tablet 5/11/2017 at 0300  No No   Sig: Take 1 Tab by mouth daily. Indications: GOUT   aspirin delayed-release 81 mg tablet 5/12/2017 at 0300  No No   Sig: Take 2 Tabs by mouth daily. carvedilol (COREG) 6.25 mg tablet 5/12/2017 at 0300  No No   Sig: Take 1 Tab by mouth two (2) times daily (with meals). Indications: Chronic Heart Failure   cephALEXin (KEFLEX) 500 mg capsule Not Taking at Unknown time  No No   Sig: Take 1 Cap by mouth four (4) times daily for 7 days. cholecalciferol (VITAMIN D3) 1,000 unit tablet 2017 at 0300  No No   Sig: Take 2 Tabs by mouth daily. colchicine 0.6 mg tablet 2017 at 0300  Yes No   Sig: Take 0.6 mg by mouth daily. ferrous sulfate 325 mg (65 mg iron) tablet 2017 at 0300  No No   Sig: Take 1 Tab by mouth daily (with breakfast). fish oil-omega-3 fatty acids 340-1,000 mg capsule 2017 at 0300  No Yes   Sig: Take 1 Cap by mouth two (2) times a day. Indications: HYPERTRIGLYCERIDEMIA   fluticasone (FLONASE) 50 mcg/actuation nasal spray 2017 at 0300  No No   Si spray each nostril daily  Indications: ALLERGIC RHINITIS   furosemide (LASIX) 40 mg tablet 2017 at 0300  No No   Si tab BID   influenza vaccine 2016-17, 36mos+,,PF, (FLUZONE/FLUARIX/FLULAVAL QUAD) syrg injection   No No   Si.5 mL by IntraMUSCular route PRIOR TO DISCHARGE. loperamide (IMMODIUM) 2 mg tablet 2017 at 0300  Yes No   Sig: Take 2 mg by mouth four (4) times daily as needed for Diarrhea.   metOLazone (ZAROXOLYN) 2.5 mg tablet Not Taking at Unknown time  No No   Sig: Take 1 Tab by mouth daily. Patient taking differently: Take 2.5 mg by mouth every other day. nitroglycerin (NITROSTAT) 0.4 mg SL tablet Unknown at Unknown time  No No   Si Tab by SubLINGual route as needed for Chest Pain.   pantoprazole (PROTONIX) 40 mg tablet Unknown at Unknown time  No No   Sig: Take 1 Tab by mouth Daily (before breakfast). pravastatin (PRAVACHOL) 80 mg tablet 2017 at 0300  No No   Sig: Take 1 Tab by mouth nightly. therapeutic multivitamin (THERAGRAN) tablet 2017 at 0300  No No   Sig: Take 1 Tab by mouth daily. zolpidem (AMBIEN) 5 mg tablet Unknown at Unknown time  No No   Sig: Take 1 Tab by mouth nightly as needed for Sleep. Max Daily Amount: 5 mg. Facility-Administered Medications: None       Current Medications:  Current Facility-Administered Medications   Medication Dose Route Frequency    budesonide-formoterol (SYMBICORT) 160-4.5 mcg/actuation HFA inhaler 2 Puff  2 Puff Inhalation BID RT    lidocaine (PF) (XYLOCAINE) 10 mg/mL (1 %) injection 5 mL  5 mL IntraDERMal ONCE    iron sucrose (VENOFER) 100 mg in 0.9% sodium chloride 100 mL IVPB  100 mg IntraVENous DIALYSIS MON, WED & FRI    carvedilol (COREG) tablet 3.125 mg  3.125 mg Oral Q12H    acetaminophen (TYLENOL) tablet 650 mg  650 mg Oral Q6H PRN    sodium citrate 4 gram /100 mL (4 %) 0.08 g  2 mL Hemodialysis DIALYSIS PRN    0.9% sodium chloride infusion 250 mL  250 mL IntraVENous PRN    colchicine (MITIGARE) capsule 0.6 mg  0.6 mg Oral Q TU, TH & SAT    epoetin ron (EPOGEN;PROCRIT) injection 6,000 Units  6,000 Units SubCUTAneous Q MON, WED & FRI    allopurinol (ZYLOPRIM) tablet 50 mg  50 mg Oral DAILY    cholecalciferol (VITAMIN D3) tablet 2,000 Units  2,000 Units Oral DAILY    therapeutic multivitamin (THERAGRAN) tablet 1 Tab  1 Tab Oral DAILY    nitroglycerin (NITROSTAT) tablet 0.4 mg  0.4 mg SubLINGual PRN    pantoprazole (PROTONIX) tablet 40 mg  40 mg Oral ACB    albuterol-ipratropium (DUO-NEB) 2.5 MG-0.5 MG/3 ML  3 mL Nebulization Q6H PRN    ferrous sulfate tablet 325 mg  325 mg Oral DAILY WITH BREAKFAST    fluticasone (FLONASE) 50 mcg/actuation nasal spray 2 Spray  2 Spray Both Nostrils DAILY    HYDROcodone-acetaminophen (NORCO) 5-325 mg per tablet 1 Tab  1 Tab Oral Q4H PRN    influenza vaccine 2016-17 (36mos+)(PF) (FLUZONE/FLUARIX/FLULAVAL QUAD) injection 0.5 mL  0.5 mL IntraMUSCular PRIOR TO DISCHARGE    zolpidem (AMBIEN) tablet 5 mg  5 mg Oral QHS PRN    sodium chloride (NS) flush 5-10 mL  5-10 mL IntraVENous Q8H    sodium chloride (NS) flush 5-10 mL  5-10 mL IntraVENous PRN    ondansetron (ZOFRAN) injection 4 mg  4 mg IntraVENous Q4H PRN       Chart and notes reviewed. Data reviewed. I have evaluated and examined the patient.         IMPRESSION:   Patient Active Problem List   Diagnosis Code    Hematospermia R36.1    CAD (coronary artery disease) I25.10    PVD (peripheral vascular disease) ([facility] [address] I73.9    CHF (congestive heart failure) ([facility]) I50.9    Anasarca R60.1    Hx of CABG Z95.1    H/O carotid endarterectomy Z98.890    AICD (automatic cardioverter/defibrillator) present Z95.810    Acute exacerbation of CHF (congestive heart failure) (HCC) I79.7    Diastolic CHF, acute on chronic (HCC) I50.33    HTN (hypertension) I10    HLD (hyperlipidemia) E78.5    Acute CHF (congestive heart failure) (HCC) I50.9    Acute on chronic diastolic (congestive) heart failure (HCC) I50.33    Thrombocytopenia (HCC) D69.6    Hypokalemia E87.6    Aortic stenosis, moderate I35.0    CKD (chronic kidney disease) N18.9    Pulmonary HTN (HCC) I27.2    Cellulitis L03.90    Acute on chronic kidney failure (HCC) N17.9, N18.9 ·         PLAN:/DISCUSSION:   · DC in am        Wilner Becerra MD  5/24/2017, 11:24 AM

## 2017-05-25 NOTE — HOSPICE
[facility] RN Note: Call from [place] LPN palliative care, made aware that family are curious about having discharge medications in hand before patient is discharged. Hospice unable to provide narcotics and/or comfort meds to patient until patient is discharged from acute care hospital to home. Made [place] aware that family will need to have medications filled at outpatient pharmacy until patient is admitted to hospice also that Hospice RN will call attending physician to inquire about discharge medications. Dr. Antonio Olivera, attending, states that he already wrote discharge medications including Norco and placed them in hospital chart. Hospice has attempted to call [facility] numerous times to make aware of above, will continue to contact.       Nusrat Watson, [address] [facility]   [phone] office/24 hrs/weekends

## 2017-05-25 NOTE — DISCHARGE SUMMARY
[place] [address]  [address] [place]. Dinesh Delarosa SUMMARY    Name:  Devang Ruth  MR#:  [id number]  :  1940  Account #:  [de-identified]  Date of Adm:  2017  Date of Discharge:  2017      FINAL DIAGNOSES  1. Congestive heart failure with pleural effusion. 2. Moderate aortic stenosis. 3. End-stage renal disease. 4. Thrombocytopenia. 5. Mild dementia. 6. History of coronary artery disease, defibrillator placement and stent  placement. 7. History of obstructive sleep apnea. 8. History of mini strokes. 9. History of acute on chronic diastolic heart failure. 10. History of carotid endarterectomy. 11. History of chronic respiratory failure on oxygen. 12. History of gout. DIET: Renal with Magic Cup supplements with meals. MEDICATION ON DISCHARGE  1. Allopurinol 50 mg daily. 2. Symbicort 160/4.5 inhalation 2 puffs b.i.d.  3. Coreg 3.125 mg every 12 hours. 4. Vitamin D3 2000 units daily. 5. Colchicine 0.6 mg 3 times a week. 6. Ferrous sulfate 325 mg daily with breakfast.  7. Flonase 50 mcg nasal spray 2 sprays both nostrils daily. 8. Protonix 40 mg daily. 9. Multivitamins 1 tablet daily. 10. Tylenol 650 every 6 hours p.r.n. pain or fever. 11. DuoNeb 3 mL every 6 hours as needed for shortness of breath. 12. Nitroglycerin 0.4 mg sublingual p.r.n. chest pain. 13. Ambien 5 mg at bedtime p.r.n. SUMMARY: This is a 81-year-old white male with history of coronary  artery disease, defibrillator placement, stent, coronary bypass,  moderate aortic stenosis, chronic diastolic heart failure, hypertension,  hyperlipidemia, chronic kidney disease stage 3, chronic ascites, was  admitted because of pain and swelling of the right leg. He was  diagnosed with cellulitis a week prior and was treated with antibiotics,  but no better. He came with still having pain and swelling of the right  leg. He has ascites and peripheral edema. CLINICAL COURSE AND MANAGEMENT: PVL studies negative for  acute DVT. He appears to be in acute congestive heart failure with  acute diastolic heart failure. He received several sessions of  hemodialysis; however, he has a large right pleural effusion. A  hemodialysis catheter was put in place in the right jugular, from where  he had some significant bleed due to thrombocytopenia. He has  extensive medical problems and it was felt that his was prognosis,  possibly 6 months or less. Renal discussed with the family that  probably he should be hospice care and then go on permanent  dialysis. Discussion was done with family members and Palliative  Care, and they have agreed that the patient can go home on hospice  care. The patient is also agreeable to this. Therefore, at this time, if  everything else is arranged for home, the patient can be discharged  today.         MD Felicia Bunn / Judie  D:  05/25/2017   10:56  T:  05/25/2017   11:17  Job #:  [id number]

## 2017-05-25 NOTE — PROGRESS NOTES
Visited with Mr Flash Samson who was lying in bed. Daughter at bedside. Awaiting DC to home with Hospice and discussed feelings about this. Stated he is feeling more anxious, and RN at bedside and aware. Requesting am care, CNAs to bedside.

## 2017-05-25 NOTE — DISCHARGE INSTRUCTIONS
DISCHARGE SUMMARY from Nurse    The following personal items are in your possession at time of discharge:    Dental Appliances: Uppers, Lowers, With patient  Visual Aid: None     Home Medications: None  Jewelry: None  Clothing: Undergarments, Slippers, Shirt, Other (comment) (sweat shirt)  Other Valuables: Cell Phone, Money clip, Money (comment) (\"close to a hundred\")  Personal Items Sent to Safe: no (pt refuse to have money sent to the [facility] of security)      *  Please give a list of your current medications to your Primary Care Provider. *  Please update this list whenever your medications are discontinued, doses are      changed, or new medications (including over-the-counter products) are added. *  Please carry medication information at all times in case of emergency situations. These are general instructions for a healthy lifestyle:    No smoking/ No tobacco products/ Avoid exposure to second hand smoke    Surgeon General's Warning:  Quitting smoking now greatly reduces serious risk to your health. Obesity, smoking, and sedentary lifestyle greatly increases your risk for illness    A healthy diet, regular physical exercise & weight monitoring are important for maintaining a healthy lifestyle    You may be retaining fluid if you have a history of heart failure or if you experience any of the following symptoms:  Weight gain of 3 pounds or more overnight or 5 pounds in a week, increased swelling in our hands or feet or shortness of breath while lying flat in bed. Please call your doctor as soon as you notice any of these symptoms; do not wait until your next office visit. Recognize signs and symptoms of STROKE:    F-face looks uneven    A-arms unable to move or move unevenly    S-speech slurred or non-existent    T-time-call 911 as soon as signs and symptoms begin-DO NOT go       Back to bed or wait to see if you get better-TIME IS BRAIN.     Warning Signs of HEART ATTACK     Call 911 if you have these symptoms:   Chest discomfort. Most heart attacks involve discomfort in the center of the chest that lasts more than a few minutes, or that goes away and comes back. It can feel like uncomfortable pressure, squeezing, fullness, or pain.  Discomfort in other areas of the upper body. Symptoms can include pain or discomfort in one or both arms, the back, neck, jaw, or stomach.  Shortness of breath with or without chest discomfort.  Other signs may include breaking out in a cold sweat, nausea, or lightheadedness. Don't wait more than five minutes to call 911 - MINUTES MATTER! Fast action can save your life. Calling 911 is almost always the fastest way to get lifesaving treatment. Emergency Medical Services staff can begin treatment when they arrive -- up to an hour sooner than if someone gets to the hospital by car. The discharge information has been reviewed with the caregiver. The caregiver verbalized understanding. Discharge medications reviewed with the caregiver and appropriate educational materials and side effects teaching were provided. Patient armband removed and shredded              Anemia: Care Instructions  Your Care Instructions    Anemia is a low level of red blood cells, which carry oxygen throughout your body. Many things can cause anemia. Lack of iron is one of the most common causes. Your body needs iron to make hemoglobin, a substance in red blood cells that carries oxygen from the lungs to your body's cells. Without enough iron, the body produces fewer and smaller red blood cells. As a result, your body's cells do not get enough oxygen, and you feel tired and weak. And you may have trouble concentrating. Bleeding is the most common cause of a lack of iron. You may have heavy menstrual bleeding or bleeding caused by conditions such as ulcers, hemorrhoids, or cancer.  Regular use of aspirin or other anti-inflammatory medicines (such as ibuprofen) also can cause bleeding in some people. A lack of iron in your diet also can cause anemia, especially at times when the body needs more iron, such as during pregnancy, infancy, and the teen years. Your doctor may have prescribed iron pills. It may take several months of treatment for your iron levels to return to normal. Your doctor also may suggest that you eat foods that are rich in iron, such as meat and beans. There are many other causes of anemia. It is not always due to a lack of iron. Finding the specific cause of your anemia will help your doctor find the right treatment for you. Follow-up care is a key part of your treatment and safety. Be sure to make and go to all appointments, and call your doctor if you are having problems. It's also a good idea to know your test results and keep a list of the medicines you take. How can you care for yourself at home? · Take your medicines exactly as prescribed. Call your doctor if you think you are having a problem with your medicine. · If your doctor recommends iron pills, take them as directed:  ¨ Try to take the pills on an empty stomach about 1 hour before or 2 hours after meals. But you may need to take iron with food to avoid an upset stomach. ¨ Do not take antacids or drink milk or caffeine drinks (such as coffee, tea, or cola) at the same time or within 2 hours of the time that you take your iron. They can make it hard for your body to absorb the iron. ¨ Vitamin C (from food or supplements) helps your body absorb iron. Try taking iron pills with a glass of orange juice or some other food that is high in vitamin C, such as citrus fruits. ¨ Iron pills may cause stomach problems, such as heartburn, nausea, diarrhea, constipation, and cramps. Be sure to drink plenty of fluids, and include fruits, vegetables, and fiber in your diet each day. Iron pills often make your bowel movements dark or green.   ¨ If you forget to take an iron pill, do not take a double dose of iron the next time you take a pill. ¨ Keep iron pills out of the reach of small children. An overdose of iron can be very dangerous. · Follow your doctor's advice about eating iron-rich foods. These include red meat, shellfish, poultry, eggs, beans, raisins, whole-grain bread, and leafy green vegetables. · Steam vegetables to help them keep their iron content. When should you call for help? Call 911 anytime you think you may need emergency care. For example, call if:  · You have symptoms of a heart attack. These may include:  ¨ Chest pain or pressure, or a strange feeling in the chest.  ¨ Sweating. ¨ Shortness of breath. ¨ Nausea or vomiting. ¨ Pain, pressure, or a strange feeling in the back, neck, jaw, or upper belly or in one or both shoulders or arms. ¨ Lightheadedness or sudden weakness. ¨ A fast or irregular heartbeat. After you call 911, the  may tell you to chew 1 adult-strength or 2 to 4 low-dose aspirin. Wait for an ambulance. Do not try to drive yourself. · You passed out (lost consciousness). Call your doctor now or seek immediate medical care if:  · You have new or increased shortness of breath. · You are dizzy or lightheaded, or you feel like you may faint. · Your fatigue and weakness continue or get worse. · You have any abnormal bleeding, such as:  ¨ Nosebleeds. ¨ Vaginal bleeding that is different (heavier, more frequent, at a different time of the month) than what you are used to. ¨ Bloody or black stools, or rectal bleeding. ¨ Bloody or pink urine. Watch closely for changes in your health, and be sure to contact your doctor if:  · You do not get better as expected. Where can you learn more? Go to http://shira-garo.info/. Enter R301 in the search box to learn more about \"Anemia: Care Instructions. \"  Current as of: October 13, 2016  Content Version: 11.2  © 5844-3571 [employer].  Care instructions adapted under license by [employer] (which disclaims liability or warranty for this information). If you have questions about a medical condition or this instruction, always ask your healthcare professional. [address] any warranty or liability for your use of this information.

## 2017-05-25 NOTE — PROGRESS NOTES
Received call from Dr. Kelly Zheng regarding low h&h, option for blood transfusion or home without transfusion. Talked to Mr. Tayler Cook and his daughter regarding choice and he chooses to go home without the transfusion. Plan is hospice at home. Call made to [facility] to notify them of the transportation hold up and later discharge.  notified and called transport who will  the patient at 3:30pm.    Call made to [facility] [employer] [facility] to inquire about discharge medication. Family concerned and wants to make sure there is medication for him when he gets home. Relayed information to Devyn Camilo who has a message out to [facility] who will be handling the admission to hospice.

## 2017-05-28 ENCOUNTER — HOME CARE VISIT (OUTPATIENT)
Dept: HOSPICE | Facility: HOSPICE | Age: 77
End: 2017-05-28
Payer: MEDICARE

## 2017-05-28 PROCEDURE — 0651 HSPC ROUTINE HOME CARE

## 2017-05-28 PROCEDURE — G0299 HHS/HOSPICE OF RN EA 15 MIN: HCPCS

## 2022-04-25 NOTE — ROUTINE PROCESS
Bedside and Verbal shift change report received from [address] [place] [address]. Report included the following information SBAR, Kardex, MAR and Recent Results. Patient sitting on side of bed. Denies needs. Call light in reach.  Julianna Bruno RN S/P Syncopal episode in the shower. F/S 96 at triage

## 2023-03-19 NOTE — ED PROVIDER NOTES
HPI Comments: Patient is a 79 y/o male w/ PMH heart failure, HTN, hyperlipidemia, gout, and anemia of chronic illness, who presents to the ER c/o right lower leg pain and swelling. Patient is oxygen dependent at home. Patient states his home health care nurse sent him to the ER for possible rule out of DVT of the right lower leg. Patient denied any previous hx of the same. He reports pain and swelling in his right calf that began several days ago and has worsened since. Patient denied any report of chest pain, SOB, fevers, chills, abdominal pain, and has no other complaints. Patient is a 78 y.o. male presenting with leg pain. The history is provided by the patient. Leg Pain           Past Medical History:   Diagnosis Date    Arrhythmia     A fib; has external vest and belt he wears for this    Arthritis     High blood pressure     Hypercholesteremia     Mini stroke ([facility] [address] 2000    Unspecified sleep apnea     does not wear machine       Past Surgical History:   Procedure Laterality Date    CABG, ARTERY-VEIN, THREE  2013    HX CAROTID ENDARTERECTOMY Left     HX CAROTID ENDARTERECTOMY Right 2000    HX CORONARY STENT PLACEMENT           History reviewed. No pertinent family history. Social History     Social History    Marital status: UNKNOWN     Spouse name: N/A    Number of children: N/A    Years of education: N/A     Occupational History    Not on file. Social History Main Topics    Smoking status: Former Smoker    Smokeless tobacco: Never Used      Comment: quit in the 90s    Alcohol use No    Drug use: No    Sexual activity: Not on file     Other Topics Concern    Not on file     Social History Narrative         ALLERGIES: Review of patient's allergies indicates no known allergies. Review of Systems   Constitutional: Negative for chills, fatigue and fever. HENT: Negative. Negative for sore throat. Eyes: Negative.     Respiratory: Negative for cough and shortness of breath. Cardiovascular: Negative for chest pain and palpitations. Gastrointestinal: Negative for abdominal pain, nausea and vomiting. Genitourinary: Negative for dysuria. Musculoskeletal: Positive for myalgias. Right calf pain and swelling   Skin: Negative. Neurological: Negative for dizziness, weakness, light-headedness and headaches. Psychiatric/Behavioral: Negative. All other systems reviewed and are negative. Vitals:    05/09/17 1017   BP: 135/76   Pulse: 61   Temp: 98.4 °F (36.9 °C)   SpO2: 100%   Weight: 87.5 kg (193 lb)   Height: 6' (1.829 m)            Physical Exam   Constitutional: He is oriented to person, place, and time. No distress. Frail appearance   HENT:   Head: Normocephalic and atraumatic. Mouth/Throat: Oropharynx is clear and moist.   Eyes: Conjunctivae are normal. No scleral icterus. Neck: Neck supple. No JVD present. No tracheal deviation present. Cardiovascular: Normal rate, regular rhythm and normal heart sounds. Pulmonary/Chest: Effort normal. No respiratory distress. He has wheezes. He has no rales. He exhibits no tenderness. Oxygen dependent via NC   Abdominal: Soft. Bowel sounds are normal. He exhibits no distension. There is no tenderness. There is no rebound and no guarding. Musculoskeletal:        Right lower leg: He exhibits tenderness and swelling. Decreased pedal pulses on right; erythematous, pitting edema in right lower leg; +2 edema in left lower leg; right posterior calf skin tender and taught on PE     Neurological: He is alert and oriented to person, place, and time. He has normal strength. Gait abnormal. GCS eye subscore is 4. GCS verbal subscore is 5. GCS motor subscore is 6. Slow, unsteady gait   Skin: Skin is warm and dry. He is not diaphoretic. Psychiatric: He has a normal mood and affect. Nursing note and vitals reviewed.        MDM  Number of Diagnoses or Management Options  Diagnosis management comments: 11:10 AM  76 y/o male c/o right lower leg pain onset several days ago and worsening since. Sent in by home health care for eval for possible DVT. Pt denied any other complaints at this time. No previous hx of dvt in past.  Labs and US ordered. Seble Nunez PA-C    12:50 PM  Labs reviewed and discussed with patient. H/H noted to be chronically low. Discussed elevated BNP with patient and Dr. Calvin Strickland. Pt states he is not having any SOB, chest pain, and is only complaining of leg pain. He will follow up with his cardiologist this afternoon regarding elevated BNP and right leg swelling and edema. Will plan on abx for suspected cellulitis as well. All questions answered and patient in agreement with plan of care. Will plan for discharge.   Seble Nunez PA-C    Clinical Impression:  Right leg swelling, cellulitis         Amount and/or Complexity of Data Reviewed  Clinical lab tests: ordered and reviewed  Tests in the radiology section of CPT®: ordered and reviewed      ED Course       Procedures Opt out

## 2024-11-30 NOTE — PROGRESS NOTES
Problem: Mobility Impaired (Adult and Pediatric)  Goal: *Acute Goals and Plan of Care (Insert Text)  Physical Therapy Goals  Initiated 3/3/2017 and to be accomplished within 7 day(s)  1. Patient will move from supine to sit and sit to supine in bed with independence. 2. Patient will transfer from bed to chair and chair to bed with modified independence using the least restrictive device. 3. Patient will perform sit to stand with modified independence. 4. Patient will ambulate with modified independence for 150 feet with the least restrictive device. 5. Patient will ascend/descend 3 stairs with 1 handrail(s) with supervision/set-up. PHYSICAL THERAPY TREATMENT     Patient: Vonzell Aschoff (89 y.o. male)  Date: 3/9/2017  Diagnosis: Acute CHF (congestive heart failure) ([facility])  CHF (congestive heart failure) ([facility])  Acute on chronic diastolic (congestive) heart failure ([facility] [address] <principal problem not specified>  Precautions: Fall  Chart, physical therapy assessment, plan of care and goals were reviewed. ASSESSMENT:  Pt demos improved activity tolerance and didn't need a rest break today compared today. Pt presents with decreased balance and decreased activity tolerance which limits his functional mobility. [facility] to [facility] to address deficits. Progression toward goals:  [X]      Improving appropriately and progressing toward goals  [ ]      Improving slowly and progressing toward goals  [ ]      Not making progress toward goals and plan of care will be adjusted       PLAN:  Patient continues to benefit from skilled intervention to address the above impairments. Continue treatment per established plan of care. Discharge Recommendations:  Home Health  Further Equipment Recommendations for Discharge:  rolling walker       G-CODES:      Mobility  Current  CJ= 20-39%. The severity rating is based on the Level of Assistance required for Functional Mobility and ADLs.       SUBJECTIVE: Patient stated I feel better today.       OBJECTIVE DATA SUMMARY:   Critical Behavior:  Neurologic State: Alert, Appropriate for age  Orientation Level: Oriented X4  Cognition: Appropriate decision making, Follows commands, Appropriate for age attention/concentration, Appropriate safety awareness  Safety/Judgement: Awareness of environment, Good awareness of safety precautions, Insight into deficits  Functional Mobility Training:  Bed Mobility:  Rolling: Independent  Supine to Sit: Independent  Sit to Supine: Independent  Scooting: Independent  Transfers:  Sit to Stand: Supervision (with RW)  Stand to Sit: Supervision (with RW)  Stand Pivot Transfers: Supervision (with RW)     Bed to Chair: Supervision (with RW)  Balance:  Sitting: Intact  Sitting - Static: Normal   Sitting - Dynamic: Normal  Standing: Intact; With support  Standing - Static: Good (with RW)  Standing - Dynamic : Fair (with RW)  Ambulation/Gait Training:  Distance (ft): 100 Feet (ft) (x1 no rest break)  Assistive Device: Walker, rolling  Ambulation - Level of Assistance: Supervision (IV pole and O2 mgmt)  Gait Description (WDL): Exceptions to WDL  Gait Abnormalities: Decreased step clearance  Base of Support: Center of gravity altered  Stance: Left decreased;Right decreased;Time;Weight shift  Speed/Hailee: Slow  Step Length: Right shortened;Left shortened  Swing Pattern: Right symmetrical;Left symmetrical  Interventions: Verbal cues (for posture)  Pain:  Pain Scale 1: Numeric (0 - 10)  Pain Intensity 1: 0  Activity Tolerance:   Improved, Pt didn't require rest break with ambulation  Please refer to the flowsheet for vital signs taken during this treatment.   After treatment:   [X] Patient left in no apparent distress sitting up in chair  [ ] Patient left in no apparent distress in bed  [X] Call bell left within reach  [X] Nursing notified  [ ] Caregiver present  [ ] Bed alarm activated      Kendra Pedersen PT   Time Calculation: 25 mins Started smoking at age 18, quit at age 62.